# Patient Record
Sex: FEMALE | Race: WHITE | NOT HISPANIC OR LATINO | Employment: OTHER | URBAN - METROPOLITAN AREA
[De-identification: names, ages, dates, MRNs, and addresses within clinical notes are randomized per-mention and may not be internally consistent; named-entity substitution may affect disease eponyms.]

---

## 2020-07-10 ENCOUNTER — TRANSCRIBE ORDERS (OUTPATIENT)
Dept: ADMINISTRATIVE | Facility: HOSPITAL | Age: 80
End: 2020-07-10

## 2020-07-10 DIAGNOSIS — C34.00 MALIGNANT NEOPLASM OF MAIN BRONCHUS, UNSPECIFIED LATERALITY (HCC): Primary | ICD-10-CM

## 2020-07-16 ENCOUNTER — HOSPITAL ENCOUNTER (OUTPATIENT)
Dept: CT IMAGING | Facility: HOSPITAL | Age: 80
Discharge: HOME | End: 2020-07-16
Admitting: NURSE PRACTITIONER

## 2020-07-16 DIAGNOSIS — C34.00 MALIGNANT NEOPLASM OF MAIN BRONCHUS, UNSPECIFIED LATERALITY (HCC): ICD-10-CM

## 2020-07-16 PROCEDURE — 71250 CT THORAX DX C-: CPT

## 2024-01-31 ENCOUNTER — APPOINTMENT (OUTPATIENT)
Dept: GENERAL RADIOLOGY | Facility: HOSPITAL | Age: 84
End: 2024-01-31
Payer: MEDICARE

## 2024-01-31 ENCOUNTER — HOSPITAL ENCOUNTER (OUTPATIENT)
Facility: HOSPITAL | Age: 84
Setting detail: OBSERVATION
Discharge: HOME | End: 2024-02-02
Attending: EMERGENCY MEDICINE | Admitting: FAMILY MEDICINE
Payer: MEDICARE

## 2024-01-31 DIAGNOSIS — N30.01 ACUTE CYSTITIS WITH HEMATURIA: ICD-10-CM

## 2024-01-31 DIAGNOSIS — R53.1 GENERALIZED WEAKNESS: ICD-10-CM

## 2024-01-31 DIAGNOSIS — N39.0 ACUTE UTI: Primary | ICD-10-CM

## 2024-01-31 DIAGNOSIS — D72.829 LEUKOCYTOSIS, UNSPECIFIED TYPE: ICD-10-CM

## 2024-01-31 DIAGNOSIS — Z86.59 HISTORY OF DEMENTIA: ICD-10-CM

## 2024-01-31 PROBLEM — I48.91 ATRIAL FIBRILLATION: Status: ACTIVE | Noted: 2024-01-31

## 2024-01-31 PROBLEM — F03.90 DEMENTIA: Status: ACTIVE | Noted: 2024-01-31

## 2024-01-31 PROBLEM — G20.A1 PARKINSON'S DISEASE: Status: ACTIVE | Noted: 2024-01-31

## 2024-01-31 PROBLEM — J44.9 COPD (CHRONIC OBSTRUCTIVE PULMONARY DISEASE): Status: ACTIVE | Noted: 2024-01-31

## 2024-01-31 LAB
ALBUMIN SERPL-MCNC: 3.7 G/DL (ref 3.5–5.2)
ALBUMIN/GLOB SERPL: 1.1 G/DL
ALP SERPL-CCNC: 84 U/L (ref 39–117)
ALT SERPL W P-5'-P-CCNC: 9 U/L (ref 1–33)
ANION GAP SERPL CALCULATED.3IONS-SCNC: 12 MMOL/L (ref 5–15)
AST SERPL-CCNC: 17 U/L (ref 1–32)
BACTERIA UR QL AUTO: ABNORMAL /HPF
BASOPHILS # BLD AUTO: 0.05 10*3/MM3 (ref 0–0.2)
BASOPHILS NFR BLD AUTO: 0.3 % (ref 0–1.5)
BILIRUB SERPL-MCNC: 0.8 MG/DL (ref 0–1.2)
BILIRUB UR QL STRIP: NEGATIVE
BUN SERPL-MCNC: 17 MG/DL (ref 8–23)
BUN/CREAT SERPL: 20 (ref 7–25)
CALCIUM SPEC-SCNC: 9.1 MG/DL (ref 8.6–10.5)
CHLORIDE SERPL-SCNC: 99 MMOL/L (ref 98–107)
CLARITY UR: ABNORMAL
CO2 SERPL-SCNC: 25 MMOL/L (ref 22–29)
COLOR UR: YELLOW
CREAT SERPL-MCNC: 0.85 MG/DL (ref 0.57–1)
D-LACTATE SERPL-SCNC: 1 MMOL/L (ref 0.5–2)
DEPRECATED RDW RBC AUTO: 46.7 FL (ref 37–54)
EGFRCR SERPLBLD CKD-EPI 2021: 67.2 ML/MIN/1.73
EOSINOPHIL # BLD AUTO: 0.01 10*3/MM3 (ref 0–0.4)
EOSINOPHIL NFR BLD AUTO: 0.1 % (ref 0.3–6.2)
ERYTHROCYTE [DISTWIDTH] IN BLOOD BY AUTOMATED COUNT: 14.2 % (ref 12.3–15.4)
FLUAV SUBTYP SPEC NAA+PROBE: NOT DETECTED
FLUBV RNA ISLT QL NAA+PROBE: NOT DETECTED
GLOBULIN UR ELPH-MCNC: 3.4 GM/DL
GLUCOSE SERPL-MCNC: 113 MG/DL (ref 65–99)
GLUCOSE UR STRIP-MCNC: NEGATIVE MG/DL
HCT VFR BLD AUTO: 38.8 % (ref 34–46.6)
HGB BLD-MCNC: 12 G/DL (ref 12–15.9)
HGB UR QL STRIP.AUTO: ABNORMAL
HOLD SPECIMEN: NORMAL
HYALINE CASTS UR QL AUTO: ABNORMAL /LPF
IMM GRANULOCYTES # BLD AUTO: 0.08 10*3/MM3 (ref 0–0.05)
IMM GRANULOCYTES NFR BLD AUTO: 0.5 % (ref 0–0.5)
KETONES UR QL STRIP: NEGATIVE
LEUKOCYTE ESTERASE UR QL STRIP.AUTO: ABNORMAL
LYMPHOCYTES # BLD AUTO: 0.91 10*3/MM3 (ref 0.7–3.1)
LYMPHOCYTES NFR BLD AUTO: 5.8 % (ref 19.6–45.3)
MAGNESIUM SERPL-MCNC: 2.1 MG/DL (ref 1.6–2.4)
MCH RBC QN AUTO: 27.9 PG (ref 26.6–33)
MCHC RBC AUTO-ENTMCNC: 30.9 G/DL (ref 31.5–35.7)
MCV RBC AUTO: 90.2 FL (ref 79–97)
MONOCYTES # BLD AUTO: 0.81 10*3/MM3 (ref 0.1–0.9)
MONOCYTES NFR BLD AUTO: 5.2 % (ref 5–12)
NEUTROPHILS NFR BLD AUTO: 13.83 10*3/MM3 (ref 1.7–7)
NEUTROPHILS NFR BLD AUTO: 88.1 % (ref 42.7–76)
NITRITE UR QL STRIP: POSITIVE
NRBC BLD AUTO-RTO: 0 /100 WBC (ref 0–0.2)
PH UR STRIP.AUTO: 7 [PH] (ref 5–8)
PLATELET # BLD AUTO: 293 10*3/MM3 (ref 140–450)
PMV BLD AUTO: 9.4 FL (ref 6–12)
POTASSIUM SERPL-SCNC: 3.9 MMOL/L (ref 3.5–5.2)
PROCALCITONIN SERPL-MCNC: 1.51 NG/ML (ref 0–0.25)
PROT SERPL-MCNC: 7.1 G/DL (ref 6–8.5)
PROT UR QL STRIP: ABNORMAL
QT INTERVAL: 438 MS
QTC INTERVAL: 486 MS
RBC # BLD AUTO: 4.3 10*6/MM3 (ref 3.77–5.28)
RBC # UR STRIP: ABNORMAL /HPF
REF LAB TEST METHOD: ABNORMAL
SARS-COV-2 RNA RESP QL NAA+PROBE: NOT DETECTED
SODIUM SERPL-SCNC: 136 MMOL/L (ref 136–145)
SP GR UR STRIP: 1.01 (ref 1–1.03)
SQUAMOUS #/AREA URNS HPF: ABNORMAL /HPF
TRANS CELLS #/AREA URNS HPF: ABNORMAL /HPF
TROPONIN T SERPL HS-MCNC: 23 NG/L
UROBILINOGEN UR QL STRIP: ABNORMAL
WBC # UR STRIP: ABNORMAL /HPF
WBC NRBC COR # BLD AUTO: 15.69 10*3/MM3 (ref 3.4–10.8)
WHOLE BLOOD HOLD COAG: NORMAL
WHOLE BLOOD HOLD SPECIMEN: NORMAL

## 2024-01-31 PROCEDURE — 80053 COMPREHEN METABOLIC PANEL: CPT | Performed by: EMERGENCY MEDICINE

## 2024-01-31 PROCEDURE — 99284 EMERGENCY DEPT VISIT MOD MDM: CPT

## 2024-01-31 PROCEDURE — 83605 ASSAY OF LACTIC ACID: CPT | Performed by: EMERGENCY MEDICINE

## 2024-01-31 PROCEDURE — G0378 HOSPITAL OBSERVATION PER HR: HCPCS

## 2024-01-31 PROCEDURE — 81001 URINALYSIS AUTO W/SCOPE: CPT | Performed by: EMERGENCY MEDICINE

## 2024-01-31 PROCEDURE — 84484 ASSAY OF TROPONIN QUANT: CPT | Performed by: EMERGENCY MEDICINE

## 2024-01-31 PROCEDURE — 87088 URINE BACTERIA CULTURE: CPT | Performed by: EMERGENCY MEDICINE

## 2024-01-31 PROCEDURE — 87040 BLOOD CULTURE FOR BACTERIA: CPT | Performed by: EMERGENCY MEDICINE

## 2024-01-31 PROCEDURE — 83735 ASSAY OF MAGNESIUM: CPT | Performed by: EMERGENCY MEDICINE

## 2024-01-31 PROCEDURE — 84145 PROCALCITONIN (PCT): CPT | Performed by: EMERGENCY MEDICINE

## 2024-01-31 PROCEDURE — 71045 X-RAY EXAM CHEST 1 VIEW: CPT

## 2024-01-31 PROCEDURE — P9612 CATHETERIZE FOR URINE SPEC: HCPCS

## 2024-01-31 PROCEDURE — 97530 THERAPEUTIC ACTIVITIES: CPT

## 2024-01-31 PROCEDURE — 96365 THER/PROPH/DIAG IV INF INIT: CPT

## 2024-01-31 PROCEDURE — 93005 ELECTROCARDIOGRAM TRACING: CPT | Performed by: EMERGENCY MEDICINE

## 2024-01-31 PROCEDURE — 25010000002 CEFTRIAXONE PER 250 MG: Performed by: EMERGENCY MEDICINE

## 2024-01-31 PROCEDURE — 87636 SARSCOV2 & INF A&B AMP PRB: CPT | Performed by: EMERGENCY MEDICINE

## 2024-01-31 PROCEDURE — 85025 COMPLETE CBC W/AUTO DIFF WBC: CPT | Performed by: EMERGENCY MEDICINE

## 2024-01-31 PROCEDURE — 36415 COLL VENOUS BLD VENIPUNCTURE: CPT

## 2024-01-31 PROCEDURE — 99223 1ST HOSP IP/OBS HIGH 75: CPT | Performed by: INTERNAL MEDICINE

## 2024-01-31 PROCEDURE — 87086 URINE CULTURE/COLONY COUNT: CPT | Performed by: EMERGENCY MEDICINE

## 2024-01-31 PROCEDURE — 87186 SC STD MICRODIL/AGAR DIL: CPT | Performed by: EMERGENCY MEDICINE

## 2024-01-31 PROCEDURE — 97162 PT EVAL MOD COMPLEX 30 MIN: CPT

## 2024-01-31 RX ORDER — SODIUM CHLORIDE 0.9 % (FLUSH) 0.9 %
10 SYRINGE (ML) INJECTION AS NEEDED
Status: DISCONTINUED | OUTPATIENT
Start: 2024-01-31 | End: 2024-02-02 | Stop reason: HOSPADM

## 2024-01-31 RX ORDER — ONDANSETRON 2 MG/ML
4 INJECTION INTRAMUSCULAR; INTRAVENOUS EVERY 6 HOURS PRN
Status: DISCONTINUED | OUTPATIENT
Start: 2024-01-31 | End: 2024-02-02 | Stop reason: HOSPADM

## 2024-01-31 RX ORDER — AMOXICILLIN 250 MG
2 CAPSULE ORAL 2 TIMES DAILY PRN
Status: DISCONTINUED | OUTPATIENT
Start: 2024-01-31 | End: 2024-02-02 | Stop reason: HOSPADM

## 2024-01-31 RX ORDER — ACETAMINOPHEN 325 MG/1
650 TABLET ORAL EVERY 4 HOURS PRN
Status: DISCONTINUED | OUTPATIENT
Start: 2024-01-31 | End: 2024-02-02 | Stop reason: HOSPADM

## 2024-01-31 RX ORDER — TRAZODONE HYDROCHLORIDE 50 MG/1
100 TABLET ORAL NIGHTLY
COMMUNITY

## 2024-01-31 RX ORDER — FLUTICASONE PROPIONATE 50 MCG
2 SPRAY, SUSPENSION (ML) NASAL DAILY PRN
COMMUNITY

## 2024-01-31 RX ORDER — ACETAMINOPHEN 650 MG/1
650 SUPPOSITORY RECTAL EVERY 4 HOURS PRN
Status: DISCONTINUED | OUTPATIENT
Start: 2024-01-31 | End: 2024-02-02 | Stop reason: HOSPADM

## 2024-01-31 RX ORDER — BISACODYL 5 MG/1
5 TABLET, DELAYED RELEASE ORAL DAILY PRN
Status: DISCONTINUED | OUTPATIENT
Start: 2024-01-31 | End: 2024-02-02 | Stop reason: HOSPADM

## 2024-01-31 RX ORDER — SODIUM CHLORIDE 0.9 % (FLUSH) 0.9 %
10 SYRINGE (ML) INJECTION EVERY 12 HOURS SCHEDULED
Status: DISCONTINUED | OUTPATIENT
Start: 2024-01-31 | End: 2024-02-02 | Stop reason: HOSPADM

## 2024-01-31 RX ORDER — BISACODYL 10 MG
10 SUPPOSITORY, RECTAL RECTAL DAILY PRN
Status: DISCONTINUED | OUTPATIENT
Start: 2024-01-31 | End: 2024-02-02 | Stop reason: HOSPADM

## 2024-01-31 RX ORDER — TRAZODONE HYDROCHLORIDE 100 MG/1
100 TABLET ORAL NIGHTLY
Status: DISCONTINUED | OUTPATIENT
Start: 2024-01-31 | End: 2024-02-02 | Stop reason: HOSPADM

## 2024-01-31 RX ORDER — SODIUM CHLORIDE 9 MG/ML
40 INJECTION, SOLUTION INTRAVENOUS AS NEEDED
Status: DISCONTINUED | OUTPATIENT
Start: 2024-01-31 | End: 2024-02-02 | Stop reason: HOSPADM

## 2024-01-31 RX ORDER — ACETAMINOPHEN 160 MG/5ML
650 SOLUTION ORAL EVERY 4 HOURS PRN
Status: DISCONTINUED | OUTPATIENT
Start: 2024-01-31 | End: 2024-02-02 | Stop reason: HOSPADM

## 2024-01-31 RX ORDER — POLYETHYLENE GLYCOL 3350 17 G/17G
17 POWDER, FOR SOLUTION ORAL DAILY PRN
Status: DISCONTINUED | OUTPATIENT
Start: 2024-01-31 | End: 2024-02-02 | Stop reason: HOSPADM

## 2024-01-31 RX ADMIN — APIXABAN 2.5 MG: 2.5 TABLET, FILM COATED ORAL at 21:37

## 2024-01-31 RX ADMIN — TRAZODONE HYDROCHLORIDE 100 MG: 100 TABLET ORAL at 21:37

## 2024-01-31 RX ADMIN — CARBIDOPA AND LEVODOPA 1 TABLET: 25; 100 TABLET ORAL at 18:29

## 2024-01-31 RX ADMIN — Medication 10 ML: at 21:00

## 2024-01-31 RX ADMIN — CARBIDOPA AND LEVODOPA 1 TABLET: 25; 100 TABLET ORAL at 21:37

## 2024-01-31 RX ADMIN — SODIUM CHLORIDE 1000 MG: 900 INJECTION INTRAVENOUS at 11:09

## 2024-01-31 NOTE — Clinical Note
Level of Care: Telemetry [5]   Diagnosis: UTI (urinary tract infection) [559399]   Admitting Physician: WILBERT HODGES [[id number]]

## 2024-01-31 NOTE — CASE MANAGEMENT/SOCIAL WORK
Discharge Planning Assessment  [facility]     Patient Name: Monica Boyce  MRN: [id number]  Today's Date: 1/31/2024    Admit Date: 1/31/2024    Plan: Home   Discharge Needs Assessment       Row Name 01/31/24 1129       Living Environment    People in Home child(cari), adult    Current Living Arrangements home    Primary Care Provided by child(cari)    Provides Primary Care For no one    Family Caregiver if Needed child(cari), adult    Family Caregiver Names Lore Lewis, daughter    Quality of Family Relationships helpful;involved;supportive    Able to Return to Prior Arrangements yes       Transition Planning    Patient/Family Anticipates Transition to home with family;home with help/services    Patient/Family Anticipated Services at Transition        Discharge Needs Assessment    Equipment Currently Used at Home lift device;hospital bed;shower chair;other (see comments);wheelchair;rollator  elevated toilet seat    Concerns to be Addressed denies needs/concerns at this time    Discharge Coordination/Progress Lives in a house in [facility] with daughter, needs assistance with ADLs. Has multiple DME, see list.  Has had [facility] in the past, and would like it again.  Has an advanced directive.                   Discharge Plan       Row Name 01/31/24 1131       Plan    Plan Home    Patient/Family in Agreement with Plan yes    Plan Comments I spoke with Ms Boyce's daughter Lety over the phone.  Ms Boyce resides with Lety in [facility] and needs assistance with ADLs.  She has a lift chair, hospital bed, shower chair, elevated toilet seat, rollator and wheelchair.  She has had [facility] home [facility] in the past, and would like them again after this hospitalization.  Ms Boyce does have advanced directives, and Lety states that the power of  paperwork is at bedside.  Her insurance is Medicare with [employer] as secondary, and they do help cover prescriptions.   There hasn't been any issues or lapses in coverage with insurance.  Ms Boyce's PCP is Nemo Aponte, and she gets her prescriptions filled at [facility] in [place].  Lety did state that she would not send her mother to a SNF for rehab, and would prefer home health instead.  Case management will continue to follow.    Final Discharge Disposition Code 01 - home or self-care                  Continued Care and Services - Admitted Since 1/31/2024    Coordination has not been started for this encounter.          Demographic Summary    No documentation.                  Functional Status       Row Name 01/31/24 1124       Functional Status    Usual Activity Tolerance fair    Current Activity Tolerance fair       Functional Status, IADL    Medications assistive equipment and person    Meal Preparation assistive equipment and person    Housekeeping assistive equipment and person    Laundry assistive equipment and person    Shopping assistive equipment and person                   Psychosocial    No documentation.                  Abuse/Neglect    No documentation.                  Legal    No documentation.                  Substance Abuse    No documentation.                  Patient Forms    No documentation.                     Elicia Banerjee RN

## 2024-01-31 NOTE — ED PROVIDER NOTES
"Subjective   History of Present Illness  83-year-old female presents for evaluation of generalized weakness.  She presents via EMS.  She lives at home with her daughter.  She is a poor historian.  She was recently treated for a UTI but presents today complaining of generalized weakness that she reports has been ongoing for the past few weeks.  She is reportedly continuing to have \"cloudy urine\" as well.  She has been a bit more confused than normal per EMS personnel.  No known fevers.  Given her ongoing symptoms, she was brought here to the emergency department to be evaluated.  Awaiting arrival of family to further corroborate her history.      Review of Systems   Unable to perform ROS: Dementia   Neurological:  Positive for weakness.   Psychiatric/Behavioral:  Positive for confusion.        Past Medical History:   Diagnosis Date    Atrial fibrillation     COPD (chronic obstructive pulmonary disease)     Dementia     History of lung cancer     Parkinson disease     Pulmonary embolism        No Known Allergies    Past Surgical History:   Procedure Laterality Date    JOINT REPLACEMENT      LUNG CANCER SURGERY         Family History   Problem Relation Age of Onset    Heart disease Mother     Heart disease Father     Aneurysm Father        Social History     Socioeconomic History    Marital status: Legally    Tobacco Use    Smoking status: Former     Packs/day: 1.00     Years: 40.00     Additional pack years: 0.00     Total pack years: 40.00     Types: Cigarettes    Smokeless tobacco: Never   Vaping Use    Vaping Use: Never used   Substance and Sexual Activity    Alcohol use: Never    Drug use: Never           Objective   Physical Exam  Vitals and nursing note reviewed.   Constitutional:       General: She is not in acute distress.     Appearance: She is well-developed. She is not diaphoretic.      Comments: Nontoxic-appearing elderly female   HENT:      Head: Normocephalic and atraumatic.   Eyes:      Pupils: " "Pupils are equal, round, and reactive to light.   Neck:      Comments: No meningeal signs or nuchal rigidity  Cardiovascular:      Rate and Rhythm: Normal rate and regular rhythm.      Heart sounds: Normal heart sounds. No murmur heard.     No friction rub. No gallop.   Pulmonary:      Effort: Pulmonary effort is normal. No respiratory distress.      Breath sounds: Normal breath sounds. No wheezing or rales.   Abdominal:      General: Bowel sounds are normal. There is no distension.      Palpations: Abdomen is soft. There is no mass.      Tenderness: There is no abdominal tenderness. There is no guarding or rebound.      Comments: No focal abdominal tenderness, no peritoneal signs, no pain out of proportion to exam   Genitourinary:     Comments: No CVA tenderness present  Musculoskeletal:         General: Normal range of motion.      Cervical back: Neck supple.   Skin:     General: Skin is warm and dry.      Findings: No erythema or rash.   Neurological:      Mental Status: She is alert.      Comments: Following simple commands, moving all 4 extremities   Psychiatric:         Mood and Affect: Mood normal.         Procedures           ED Course  ED Course as of 01/31/24 1707   Wed Jan 31, 2024   0836 83-year-old female presents for evaluation of generalized weakness.  She presents via EMS.  She lives at home with her daughter.  She was recently treated for a UTI but presents today complaining of generalized weakness that she reports has been ongoing for the past few weeks.  She is reportedly continued to have \"cloudy urine.\"  On arrival to the ED, the patient is nontoxic-appearing.  Benign exam.  Nonsurgical abdomen.  Moist mucous membranes.  Differential diagnosis is quite broad.  We will obtain labs and imaging, and we will reassess following initial interventions. [DD]   0586 I personally and independently viewed the patient's x-ray images myself, and I am in agreement with the radiologist's reading for final " interpretation--particularly, there is no pneumonia noted.  No indication for emergent chest CT. [DD]   1025 Urinalysis is suggestive of infection.  Urine culture obtained.  Blood culture obtained.  The patient clearly appears to be failing outpatient treatment at this point.  Rocephin given.  I discussed the patient's case with our hospitalist, Dr. Ames, and the patient will be admitted under her care for further evaluation and treatment.  The patient is hemodynamically stable at this time and aware/agreeable with the plan. [DD]      ED Course User Index  [DD] Joaquin Castro MD                                   Recent Results (from the past 24 hour(s))   Comprehensive Metabolic Panel    Collection Time: 01/31/24  8:34 AM    Specimen: Blood   Result Value Ref Range    Glucose 113 (H) 65 - 99 mg/dL    BUN 17 8 - 23 mg/dL    Creatinine 0.85 0.57 - 1.00 mg/dL    Sodium 136 136 - 145 mmol/L    Potassium 3.9 3.5 - 5.2 mmol/L    Chloride 99 98 - 107 mmol/L    CO2 25.0 22.0 - 29.0 mmol/L    Calcium 9.1 8.6 - 10.5 mg/dL    Total Protein 7.1 6.0 - 8.5 g/dL    Albumin 3.7 3.5 - 5.2 g/dL    ALT (SGPT) 9 1 - 33 U/L    AST (SGOT) 17 1 - 32 U/L    Alkaline Phosphatase 84 39 - 117 U/L    Total Bilirubin 0.8 0.0 - 1.2 mg/dL    Globulin 3.4 gm/dL    A/G Ratio 1.1 g/dL    BUN/Creatinine Ratio 20.0 7.0 - 25.0    Anion Gap 12.0 5.0 - 15.0 mmol/L    eGFR 67.2 >60.0 mL/min/1.73   Single High Sensitivity Troponin T    Collection Time: 01/31/24  8:34 AM    Specimen: Blood   Result Value Ref Range    HS Troponin T 23 (H) <14 ng/L   Magnesium    Collection Time: 01/31/24  8:34 AM    Specimen: Blood   Result Value Ref Range    Magnesium 2.1 1.6 - 2.4 mg/dL   Green Top (Gel)    Collection Time: 01/31/24  8:34 AM   Result Value Ref Range    Extra Tube Hold for add-ons.    Lavender Top    Collection Time: 01/31/24  8:34 AM   Result Value Ref Range    Extra Tube hold for add-on    Gold Top - SST    Collection Time: 01/31/24  8:34 AM    Result Value Ref Range    Extra Tube Hold for add-ons.    Gray Top    Collection Time: 01/31/24  8:34 AM   Result Value Ref Range    Extra Tube Hold for add-ons.    Light Blue Top    Collection Time: 01/31/24  8:34 AM   Result Value Ref Range    Extra Tube Hold for add-ons.    CBC Auto Differential    Collection Time: 01/31/24  8:34 AM    Specimen: Blood   Result Value Ref Range    WBC 15.69 (H) 3.40 - 10.80 10*3/mm3    RBC 4.30 3.77 - 5.28 10*6/mm3    Hemoglobin 12.0 12.0 - 15.9 g/dL    Hematocrit 38.8 34.0 - 46.6 %    MCV 90.2 79.0 - 97.0 fL    MCH 27.9 26.6 - 33.0 pg    MCHC 30.9 (L) 31.5 - 35.7 g/dL    RDW 14.2 12.3 - 15.4 %    RDW-SD 46.7 37.0 - 54.0 fl    MPV 9.4 6.0 - 12.0 fL    Platelets 293 140 - 450 10*3/mm3    Neutrophil % 88.1 (H) 42.7 - 76.0 %    Lymphocyte % 5.8 (L) 19.6 - 45.3 %    Monocyte % 5.2 5.0 - 12.0 %    Eosinophil % 0.1 (L) 0.3 - 6.2 %    Basophil % 0.3 0.0 - 1.5 %    Immature Grans % 0.5 0.0 - 0.5 %    Neutrophils, Absolute 13.83 (H) 1.70 - 7.00 10*3/mm3    Lymphocytes, Absolute 0.91 0.70 - 3.10 10*3/mm3    Monocytes, Absolute 0.81 0.10 - 0.90 10*3/mm3    Eosinophils, Absolute 0.01 0.00 - 0.40 10*3/mm3    Basophils, Absolute 0.05 0.00 - 0.20 10*3/mm3    Immature Grans, Absolute 0.08 (H) 0.00 - 0.05 10*3/mm3    nRBC 0.0 0.0 - 0.2 /100 WBC   Lactic Acid, Plasma    Collection Time: 01/31/24  8:34 AM    Specimen: Blood   Result Value Ref Range    Lactate 1.0 0.5 - 2.0 mmol/L   Procalcitonin    Collection Time: 01/31/24  8:34 AM    Specimen: Blood   Result Value Ref Range    Procalcitonin 1.51 (H) 0.00 - 0.25 ng/mL   COVID-19 and FLU A/B PCR, 1 HR TAT - Swab, Nasopharynx    Collection Time: 01/31/24  9:00 AM    Specimen: Nasopharynx; Swab   Result Value Ref Range    COVID19 Not Detected Not Detected - Ref. Range    Influenza A PCR Not Detected Not Detected    Influenza B PCR Not Detected Not Detected   ECG 12 Lead ED Triage Standing Order; Weak / Dizzy / AMS    Collection Time: 01/31/24  9:03  AM   Result Value Ref Range    QT Interval 438 ms    QTC Interval 486 ms   Urinalysis With Culture If Indicated - Straight Cath    Collection Time: 01/31/24  9:07 AM    Specimen: Straight Cath; Urine   Result Value Ref Range    Color, UA Yellow Yellow, Straw    Appearance, UA Turbid (A) Clear    pH, UA 7.0 5.0 - 8.0    Specific Gravity, UA 1.014 1.001 - 1.030    Glucose, UA Negative Negative    Ketones, UA Negative Negative    Bilirubin, UA Negative Negative    Blood, UA Moderate (2+) (A) Negative    Protein,  mg/dL (2+) (A) Negative    Leuk Esterase, UA Large (3+) (A) Negative    Nitrite, UA Positive (A) Negative    Urobilinogen, UA 0.2 E.U./dL 0.2 - 1.0 E.U./dL   Urinalysis, Microscopic Only - Straight Cath    Collection Time: 01/31/24  9:07 AM    Specimen: Straight Cath; Urine   Result Value Ref Range    RBC, UA Too Numerous to Count (A) None Seen, 0-2 /HPF    WBC, UA Too Numerous to Count (A) None Seen, 0-2 /HPF    Bacteria, UA 2+ (A) None Seen, Trace /HPF    Squamous Epithelial Cells, UA 0-2 None Seen, 0-2 /HPF    Transitional Epithelial Cells, UA 0-2 0 - 2 /HPF    Hyaline Casts, UA Unable to determine due to loaded field 0 - 6 /LPF    Methodology Manual Light Microscopy      Note: In addition to lab results from this visit, the labs listed above may include labs taken at another facility or during a different encounter within the last 24 hours. Please correlate lab times with ED admission and discharge times for further clarification of the services performed during this visit.    XR Chest 1 View   Final Result   Impression:   1. COPD with suspected chronic interstitial changes.   2. Prominence of the right pulmonary hilum which may be due to vessels or lymphadenopathy. A CT of the chest can be obtained for further evaluation.         Electronically Signed: Haleigh Mcgovern MD     1/31/2024 9:10 AM EST     Workstation ID: [id number]        Vitals:    01/31/24 1149 01/31/24 1200 01/31/24 1300 01/31/24 1500    BP: 135/62   134/61   BP Location:    Left arm   Patient Position:    Sitting   Pulse: 74 79 77 94   Resp: 18   16   Temp:    98.5 °F (36.9 °C)   TempSrc:    Oral   SpO2: 96% 96% 95% 94%   Weight:       Height:         Medications   sodium chloride 0.9 % flush 10 mL (has no administration in time range)   apixaban (ELIQUIS) tablet 2.5 mg (has no administration in time range)   carbidopa-levodopa (SINEMET)  MG per tablet 1 tablet (has no administration in time range)   metoprolol tartrate (LOPRESSOR) tablet 25 mg (has no administration in time range)   traZODone (DESYREL) tablet 100 mg (has no administration in time range)   sodium chloride 0.9 % flush 10 mL (has no administration in time range)   sodium chloride 0.9 % flush 10 mL (has no administration in time range)   sodium chloride 0.9 % infusion 40 mL (has no administration in time range)   sennosides-docusate (PERICOLACE) 8.6-50 MG per tablet 2 tablet (has no administration in time range)     And   polyethylene glycol (MIRALAX) packet 17 g (has no administration in time range)     And   bisacodyl (DULCOLAX) EC tablet 5 mg (has no administration in time range)     And   bisacodyl (DULCOLAX) suppository 10 mg (has no administration in time range)   Potassium Replacement - Follow Nurse / BPA Driven Protocol (has no administration in time range)   Magnesium Standard Dose Replacement - Follow Nurse / BPA Driven Protocol (has no administration in time range)   Phosphorus Replacement - Follow Nurse / BPA Driven Protocol (has no administration in time range)   Calcium Replacement - Follow Nurse / BPA Driven Protocol (has no administration in time range)   acetaminophen (TYLENOL) tablet 650 mg (has no administration in time range)     Or   acetaminophen (TYLENOL) 160 MG/5ML oral solution 650 mg (has no administration in time range)     Or   acetaminophen (TYLENOL) suppository 650 mg (has no administration in time range)   ondansetron (ZOFRAN) injection 4 mg (has  no administration in time range)   cefTRIAXone (ROCEPHIN) 1,000 mg in sodium chloride 0.9 % 100 mL IVPB (0 mg Intravenous Stopped 1/31/24 1151)     ECG/EMG Results (last 24 hours)       Procedure Component Value Units Date/Time    ECG 12 Lead ED Triage Standing Order; Weak / Dizzy / AMS [[id number]] Collected: 01/31/24 0903     Updated: 01/31/24 1510     QT Interval 438 ms      QTC Interval 486 ms     Narrative:      Test Reason : ED Triage Standing Order~  Blood Pressure :   */*   mmHG  Vent. Rate :  74 BPM     Atrial Rate :  74 BPM     P-R Int : 184 ms          QRS Dur : 128 ms      QT Int : 438 ms       P-R-T Axes : 107  55   3 degrees     QTc Int : 486 ms    Sinus rhythm with marked sinus arrhythmia  Right bundle branch block  Abnormal ECG  No previous ECGs available  Confirmed by MD Castro Michael ([id number]) on 1/31/2024 3:09:26 PM    Referred By: MALIKA           Confirmed By: Adi Castro MD          ECG 12 Lead ED Triage Standing Order; Weak / Dizzy / AMS   Final Result   Test Reason : ED Triage Standing Order~   Blood Pressure :   */*   mmHG   Vent. Rate :  74 BPM     Atrial Rate :  74 BPM      P-R Int : 184 ms          QRS Dur : 128 ms       QT Int : 438 ms       P-R-T Axes : 107  55   3 degrees      QTc Int : 486 ms      Sinus rhythm with marked sinus arrhythmia   Right bundle branch block   Abnormal ECG   No previous ECGs available   Confirmed by MD Castro Michael ([id number]) on 1/31/2024 3:09:26 PM      Referred By: MALIKA           Confirmed By: Adi Castro MD                    Medical Decision Making  Amount and/or Complexity of Data Reviewed  Labs: ordered.  Radiology: ordered.  ECG/medicine tests: ordered.    Risk  Prescription drug management.  Decision regarding hospitalization.        Final diagnoses:   Acute UTI   Generalized weakness   Leukocytosis, unspecified type   History of dementia       ED Disposition  ED Disposition       ED Disposition   Decision to Admit    Condition   --    Comment   Level of  Care: Telemetry [5]   Diagnosis: UTI (urinary tract infection) [535076]   Admitting Physician: WILBERT HODGES [[id number]]                 No follow-up provider specified.       Medication List      No changes were made to your prescriptions during this visit.            Joaquin Castro MD  01/31/24 6016

## 2024-01-31 NOTE — THERAPY EVALUATION
Patient Name: Fanny Boyce  : 1940    MRN: [id number]                              Today's Date: 2024       Admit Date: 2024    Visit Dx: No diagnosis found.  Patient Active Problem List   Diagnosis    UTI (urinary tract infection)    Atrial fibrillation    COPD (chronic obstructive pulmonary disease)    Dementia    Parkinson's disease     Past Medical History:   Diagnosis Date    Atrial fibrillation     COPD (chronic obstructive pulmonary disease)     Dementia     History of lung cancer     Parkinson disease     Pulmonary embolism      Past Surgical History:   Procedure Laterality Date    JOINT REPLACEMENT      LUNG CANCER SURGERY        General Information       Row Name 24 1557          Physical Therapy Time and Intention    Document Type evaluation  -CM     Mode of Treatment physical therapy;individual therapy  -CM       Row Name 24 1557          General Information    Patient Profile Reviewed yes  -CM     Prior Level of Function independent:;all household mobility;mod assist:;ADL's;dependent:;home management  patient reports she ambulates in home with a rollator and her daughter assists her with all ADLs, denies any recent falls, patient may be a poor historian  -CM     Existing Precautions/Restrictions fall  -CM     Barriers to Rehab previous functional deficit;cognitive status  -CM       Row Name 24 155          Living Environment    People in Home child(cari), adult  -CM       Row Name 24 1557          Home Main Entrance    Number of Stairs, Main Entrance none  -CM       Row Name 24 1557          Stairs Within Home, Primary    Stairs, Within Home, Primary patient denies navigating any steps at home, she may be a poor historian  -CM     Number of Stairs, Within Home, Primary none  -CM       Row Name 24 1556          Cognition    Orientation Status (Cognition) oriented to;person;place;disoriented to;situation;time  -CM       Row Name 24 1552           Safety Issues, Functional Mobility    Safety Issues Affecting Function (Mobility) awareness of need for assistance;insight into deficits/self-awareness;safety precaution awareness;sequencing abilities;safety precautions follow-through/compliance  -CM     Impairments Affecting Function (Mobility) balance;endurance/activity tolerance;strength;coordination  -CM               User Key  (r) = Recorded By, (t) = Taken By, (c) = Cosigned By      Initials Name Provider Type    CM Erica Rivera, PT Physical Therapist                   Mobility       Row Name 01/31/24 6841          Bed Mobility    Bed Mobility supine-sit  -CM     Supine-Sit [place] (Bed Mobility) maximum assist (25% patient effort);1 person assist;verbal cues  -CM     Assistive Device (Bed Mobility) head of bed elevated;draw sheet;bed rails  -CM     Comment, (Bed Mobility) cues for sequencing, patient required assist to bring BLEs off EOB and to lift trunk from HOB, she denied dizziness at EOB and demonstrated good sitting balance  -CM       Row Name 01/31/24 3657          Bed-Chair Transfer    Bed-Chair [place] (Transfers) moderate assist (50% patient effort);1 person assist;verbal cues  -CM     Assistive Device (Bed-Chair Transfers) walker, front-wheeled  -CM     Comment, (Bed-Chair Transfer) patient able to take shuffling steps from bed to chair, assist required for posterior lean, weightshifting, and management of AD  -CM       Row Name 01/31/24 0354          Sit-Stand Transfer    Sit-Stand [place] (Transfers) moderate assist (50% patient effort);1 person assist;verbal cues  -CM     Assistive Device (Sit-Stand Transfers) walker, front-wheeled  -CM     Comment, (Sit-Stand Transfer) boost to clear hips from EOB, she had a fairly strong posterior lean upon standing with difficulty correcting  -CM       Row Name 01/31/24 1736          Gait/Stairs (Locomotion)    [place] Level (Gait) unable to assess  -CM     Comment,  (Gait/Stairs) deferred due to fatigue following standing transfer  -CM               User Key  (r) = Recorded By, (t) = Taken By, (c) = Cosigned By      Initials Name Provider Type    Erica Infante PT Physical Therapist                   Obj/Interventions       Row Name 01/31/24 1600          Range of Motion Comprehensive    General Range of Motion bilateral lower extremity ROM WFL  -CM       Row Name 01/31/24 1600          Strength Comprehensive (MMT)    General Manual Muscle Testing (MMT) Assessment lower extremity strength deficits identified  -CM     Comment, General Manual Muscle Testing (MMT) Assessment BLE grossly 4/5  -CM       Row Name 01/31/24 1600          Balance    Balance Assessment sitting static balance;standing static balance;standing dynamic balance  -CM     Static Sitting Balance contact guard  -CM     Position, Sitting Balance unsupported;sitting edge of bed  -CM     Static Standing Balance moderate assist  -CM     Dynamic Standing Balance moderate assist  -CM     Position/Device Used, Standing Balance supported;walker, front-wheeled  -CM     Comment, Balance posterior lean while in standing  -CM       Row Name 01/31/24 1600          Sensory Assessment (Somatosensory)    Sensory Assessment (Somatosensory) LE sensation intact  -CM               User Key  (r) = Recorded By, (t) = Taken By, (c) = Cosigned By      Initials Name Provider Type    Erica Infante PT Physical Therapist                   Goals/Plan       Row Name 01/31/24 1604          Bed Mobility Goal 1 (PT)    Activity/Assistive Device (Bed Mobility Goal 1, PT) sit to supine/supine to sit  -CM     [place] Level/Cues Needed (Bed Mobility Goal 1, PT) minimum assist (75% or more patient effort)  -CM     Time Frame (Bed Mobility Goal 1, PT) long term goal (LTG);10 days  -CM     Progress/Outcomes (Bed Mobility Goal 1, PT) new goal  -CM       Row Name 01/31/24 1604          Transfer Goal 1 (PT)    Activity/Assistive  Device (Transfer Goal 1, PT) sit-to-stand/stand-to-sit;bed-to-chair/chair-to-bed  -CM     [place] Level/Cues Needed (Transfer Goal 1, PT) minimum assist (75% or more patient effort)  -CM     Time Frame (Transfer Goal 1, PT) long term goal (LTG);10 days  -CM     Progress/Outcome (Transfer Goal 1, PT) new goal  -CM       Row Name 01/31/24 1604          Gait Training Goal 1 (PT)    Activity/Assistive Device (Gait Training Goal 1, PT) gait (walking locomotion);assistive device use  -CM     [place] Level (Gait Training Goal 1, PT) minimum assist (75% or more patient effort)  -CM     Distance (Gait Training Goal 1, PT) 50'  -CM     Time Frame (Gait Training Goal 1, PT) long term goal (LTG);10 days  -CM     Progress/Outcome (Gait Training Goal 1, PT) new goal  -CM       Row Name 01/31/24 160          Therapy Assessment/Plan (PT)    Planned Therapy Interventions (PT) balance training;bed mobility training;gait training;home exercise program;stretching;strengthening;stair training;ROM (range of motion);postural re-education;patient/family education;transfer training  -CM               User Key  (r) = Recorded By, (t) = Taken By, (c) = Cosigned By      Initials Name Provider Type    Erica Infante, PT Physical Therapist                   Clinical Impression       Row Name 01/31/24 1600          Pain    Pretreatment Pain Rating 0/10 - no pain  -CM     Posttreatment Pain Rating 0/10 - no pain  -CM       Row Name 01/31/24 1602          Plan of Care Review    Plan of Care Reviewed With patient  -CM     Outcome Evaluation Patient presents with deficits primarily in balance and coordination/sequencing with minor deficit in strength. She performed transfer to chair with modAx1 with FWW, limited by fatigue and posterior lean. IPPT is indicated to address current deficits. Recommend D/C to SNF. Per CM note, daughter prefers HHPT. If patient D/C'd home she would need 24/7 assist.  -CM       Row Name 01/31/24 4547           Therapy Assessment/Plan (PT)    Rehab Potential (PT) fair, will monitor progress closely  -CM     Criteria for Skilled Interventions Met (PT) yes;meets criteria;skilled treatment is necessary  -CM     Therapy Frequency (PT) daily  -CM       Row Name 01/31/24 1602          Vital Signs    Pretreatment Heart Rate (beats/min) 90  -CM     Posttreatment Heart Rate (beats/min) 99  -CM     Pre SpO2 (%) 94  -CM     O2 Delivery Pre Treatment room air  -CM     O2 Delivery Intra Treatment room air  -CM     Post SpO2 (%) 95  -CM     O2 Delivery Post Treatment room air  -CM     Pre Patient Position Supine  -CM     Intra Patient Position Sitting  -CM     Post Patient Position Sitting  -CM       Row Name 01/31/24 1602          Positioning and Restraints    Pre-Treatment Position in bed  -CM     Post Treatment Position chair  -CM     In Chair reclined;call light within reach;encouraged to call for assist;exit alarm on;waffle cushion;compression device;notified ns  -CM               User Key  (r) = Recorded By, (t) = Taken By, (c) = Cosigned By      Initials Name Provider Type    Erica Infante, PT Physical Therapist                   Outcome Measures       Row Name 01/31/24 1605 01/31/24 1155       How much help from another person do you currently need...    Turning from your back to your side while in flat bed without using bedrails? 2  -CM 3  -MO    Moving from lying on back to sitting on the side of a flat bed without bedrails? 2  -CM 3  -MO    Moving to and from a bed to a chair (including a wheelchair)? 2  -CM 3  -MO    Standing up from a chair using your arms (e.g., wheelchair, bedside chair)? 2  -CM 3  -MO    Climbing 3-5 steps with a railing? 1  -CM 2  -MO    To walk in hospital room? 2  -CM 3  -MO    AM-PAC 6 Clicks Score (PT) 11  -CM 17  -MO    Highest Level of Mobility Goal 4 --> Transfer to chair/commode  -CM 5 --> Static standing  -MO      Row Name 01/31/24 1605          Functional Assessment    Outcome  Measure Options AM-PAC 6 Clicks Basic Mobility (PT)  -CM               User Key  (r) = Recorded By, (t) = Taken By, (c) = Cosigned By      Initials Name Provider Type    Susana Marroquin, RN Registered Nurse    Erica Infante PT Physical Therapist                                 Physical Therapy Education       Title: PT OT SLP Therapies (In Progress)       Topic: Physical Therapy (In Progress)       Point: Mobility training (Done)       Learning Progress Summary             Patient Acceptance, E, VU by CM at 1/31/2024 1605                         Point: Home exercise program (Not Started)       Learner Progress:  Not documented in this visit.              Point: Body mechanics (Done)       Learning Progress Summary             Patient Acceptance, E, VU by CM at 1/31/2024 1605                         Point: Precautions (Done)       Learning Progress Summary             Patient Acceptance, E, VU by CM at 1/31/2024 1605                                         User Key       Initials Effective Dates Name Provider Type Discipline     09/22/22 -  Erica Rivera PT Physical Therapist PT                  PT Recommendation and Plan  Planned Therapy Interventions (PT): balance training, bed mobility training, gait training, home exercise program, stretching, strengthening, stair training, ROM (range of motion), postural re-education, patient/family education, transfer training  Plan of Care Reviewed With: patient  Outcome Evaluation: Patient presents with deficits primarily in balance and coordination/sequencing with minor deficit in strength. She performed transfer to chair with modAx1 with FWW, limited by fatigue and posterior lean. IPPT is indicated to address current deficits. Recommend D/C to SNF. Per CM note, daughter prefers HHPT. If patient D/C'd home she would need 24/7 assist.     Time Calculation:   PT Evaluation Complexity  History, PT Evaluation Complexity: 3 or more personal factors and/or  comorbidities  Examination of Body Systems (PT Eval Complexity): total of 3 or more elements  Clinical Presentation (PT Evaluation Complexity): evolving  Clinical Decision Making (PT Evaluation Complexity): moderate complexity  Overall Complexity (PT Evaluation Complexity): moderate complexity     PT Charges       Row Name 01/31/24 1606             Time Calculation    Start Time 1519  -CM      PT Received On 01/31/24  -CM      PT Goal Re-Cert Due Date 02/10/24  -CM         Timed Charges    13766 - PT Therapeutic Activity Minutes 10  -CM         Untimed Charges    PT Eval/Re-eval Minutes 48  -CM         Total Minutes    Timed Charges Total Minutes 10  -CM      Untimed Charges Total Minutes 48  -CM       Total Minutes 58  -CM                User Key  (r) = Recorded By, (t) = Taken By, (c) = Cosigned By      Initials Name Provider Type    Erica Infante, PT Physical Therapist                  Therapy Charges for Today       Code Description Service Date Service Provider Modifiers Qty    00387640641 HC PT EVAL MOD COMPLEXITY 4 1/31/2024 Erica Rivera, PT GP 1    15100050508 HC PT THERAPEUTIC ACT EA 15 MIN 1/31/2024 Erica Rivera, PT GP 1            PT G-Codes  Outcome Measure Options: AM-PAC 6 Clicks Basic Mobility (PT)  AM-PAC 6 Clicks Score (PT): 11  PT Discharge Summary  Anticipated Discharge Disposition (PT): skilled nursing facility    Erica Rivera, JOAN  1/31/2024

## 2024-01-31 NOTE — ED NOTES
Monica Boyce    Nursing Report ED to Floor:  Mental status: AO4  Ambulatory status: X2  Oxygen Therapy:  RA  Cardiac Rhythm: NSR  Admitted from: ED  Safety Concerns:  FALL RISK  Social Issues: NONE  ED Room #:  20    ED Nurse Phone Extension - [phone] or may call [phone].      HPI:   Chief Complaint   Patient presents with    Weakness - Generalized       Past Medical History:  No past medical history on file.     Past Surgical History:  No past surgical history on file.     Admitting Doctor:   Lorna Gamez MD    Consulting Provider(s):  Consults       No orders found from 1/2/2024 to 2/1/2024.             Admitting Diagnosis:   There were no encounter diagnoses.    Most Recent Vitals:   Vitals:    01/31/24 0930 01/31/24 0931 01/31/24 1000 01/31/24 1030   BP: 112/50  105/45 126/51   BP Location:       Patient Position:       Pulse:  72 62 65   Resp:       Temp:       TempSrc:       SpO2:  96% 96% 96%   Weight:       Height:           Active LDAs/IV Access:   Lines, Drains & Airways       Active LDAs       Name Placement date Placement time Site Days    Peripheral IV 01/31/24 0855 Right Antecubital 01/31/24  0855  Antecubital  less than 1                    Labs (abnormal labs have a star):   Labs Reviewed   COMPREHENSIVE METABOLIC PANEL - Abnormal; Notable for the following components:       Result Value    Glucose 113 (*)     All other components within normal limits    Narrative:     GFR Normal >60  Chronic Kidney Disease <60  Kidney Failure <15    The GFR formula is only valid for adults with stable renal function between ages 18 and 70.   SINGLE HSTROPONIN T - Abnormal; Notable for the following components:    HS Troponin T 23 (*)     All other components within normal limits    Narrative:     High Sensitive Troponin T Reference Range:  <14.0 ng/L- Negative Female for AMI  <22.0 ng/L- Negative Male for AMI  >=14 - Abnormal Female indicating possible myocardial injury.  >=22 - Abnormal Male indicating possible  myocardial injury.   Clinicians would have to utilize clinical acumen, EKG, Troponin, and serial changes to determine if it is an Acute Myocardial Infarction or myocardial injury due to an underlying chronic condition.        CBC WITH AUTO DIFFERENTIAL - Abnormal; Notable for the following components:    WBC 15.69 (*)     MCHC 30.9 (*)     Neutrophil % 88.1 (*)     Lymphocyte % 5.8 (*)     Eosinophil % 0.1 (*)     Neutrophils, Absolute 13.83 (*)     Immature Grans, Absolute 0.08 (*)     All other components within normal limits   URINALYSIS W/ CULTURE IF INDICATED - Abnormal; Notable for the following components:    Appearance, UA Turbid (*)     Blood, UA Moderate (2+) (*)     Protein,  mg/dL (2+) (*)     Leuk Esterase, UA Large (3+) (*)     Nitrite, UA Positive (*)     All other components within normal limits    Narrative:     In absence of clinical symptoms, the presence of pyuria, bacteria, and/or nitrites on the urinalysis result does not correlate with infection.   URINALYSIS, MICROSCOPIC ONLY - Abnormal; Notable for the following components:    RBC, UA Too Numerous to Count (*)     WBC, UA Too Numerous to Count (*)     Bacteria, UA 2+ (*)     All other components within normal limits   PROCALCITONIN - Abnormal; Notable for the following components:    Procalcitonin 1.51 (*)     All other components within normal limits    Narrative:     As a Marker for Sepsis (Non-Neonates):    1. <0.5 ng/mL represents a low risk of severe sepsis and/or septic shock.  2. >2 ng/mL represents a high risk of severe sepsis and/or septic shock.    As a Marker for Lower Respiratory Tract Infections that require antibiotic therapy:    PCT on Admission    Antibiotic Therapy       6-12 Hrs later    >0.5                Strongly Recommended  >0.25 - <0.5        Recommended   0.1 - 0.25          Discouraged              Remeasure/reassess PCT  <0.1                Strongly Discouraged     Remeasure/reassess PCT    As 28 day  "mortality risk marker: \"Change in Procalcitonin Result\" (>80% or <=80%) if Day 0 (or Day 1) and Day 4 values are available. Refer to http://www.[email or web address]-calculator.com    Change in PCT <=80%  A decrease of PCT levels below or equal to 80% defines a positive change in PCT test result representing a higher risk for 28-day all-cause mortality of patients diagnosed with severe sepsis for septic shock.    Change in PCT >80%  A decrease of PCT levels of more than 80% defines a negative change in PCT result representing a lower risk for 28-day all-cause mortality of patients diagnosed with severe sepsis or septic shock.      COVID-19 AND FLU A/B, NP SWAB IN TRANSPORT MEDIA 1 HR TAT - Normal    Narrative:     Fact sheet for providers: https://www.fda.gov/media/450695/download    Fact sheet for patients: https://www.fda.gov/media/824631/download    Test performed by PCR.   MAGNESIUM - Normal   LACTIC ACID, PLASMA - Normal   URINE CULTURE   BLOOD CULTURE   BLOOD CULTURE   RAINBOW DRAW    Narrative:     The following orders were created for panel order [place] Draw.  Procedure                               Abnormality         Status                     ---------                               -----------         ------                     Green Top (Gel)[[id number]]                                  Final result               Lavender Top[[id number]]                                     Final result               Gold Top - SST[[id number]]                                   Final result               [place] Top[[id number]]                                         In process                 Light Blue Top[[id number]]                                   Final result                 Please view results for these tests on the individual orders.   CBC AND DIFFERENTIAL    Narrative:     The following orders were created for panel order CBC & Differential.  Procedure                               Abnormality         Status                   "   ---------                               -----------         ------                     CBC Auto Differential[[id number]]        Abnormal            Final result                 Please view results for these tests on the individual orders.   GREEN TOP   LAVENDER TOP   GOLD TOP - SST   LIGHT BLUE TOP   GRAY TOP       Meds Given in ED:   Medications   sodium chloride 0.9 % flush 10 mL (has no administration in time range)   cefTRIAXone (ROCEPHIN) 1,000 mg in sodium chloride 0.9 % 100 mL IVPB (1,000 mg Intravenous New Bag 1/31/24 1101)

## 2024-01-31 NOTE — PLAN OF CARE
Goal Outcome Evaluation:  Plan of Care Reviewed With: patient           Outcome Evaluation: Patient presents with deficits primarily in balance and coordination/sequencing with minor deficit in strength. She performed transfer to chair with modAx1 with FWW, limited by fatigue and posterior lean. IPPT is indicated to address current deficits. Recommend D/C to SNF. Per CM note, daughter prefers HHPT. If patient D/C'd home she would need 24/7 assist.      Anticipated Discharge Disposition (PT): skilled nursing facility

## 2024-01-31 NOTE — H&P
[facility] Medicine Services  HISTORY AND PHYSICAL    Patient Name: Fanny Boyce  : 1940  MRN: [id number]  Primary Care Physician: Nemo Aponte PA  Date of admission: 2024      Subjective   Subjective     Chief Complaint:  Weakness, urinary frequency    HPI:  Fanny Boyce is a 83 y.o. female with atrial fibrillation and history of PE on Eliquis, mild dementia, COPD, parkinson's disease who was sent to the ED by her daughter due to worsening weakness and urinary frequency despite two rounds of outpatient antibiotics for UTI.  She was originally seen at the ED in [place] a few weeks ago and given a prescription for Bactrim which she completed then due to worsening symptoms again, her daughter gave her about a week of Augmentin that she had at the house.  Despite antibiotics, weakness again worsened and daughter reports that patient was up numerous times throughout the night urinating which is not normal for her.  Urine was cloudy and smelled foul.  Patient complained of pain in her side once last week but otherwise no abdominal pain or diarrhea.  No cough but did have some labored breathing yesterday for a brief time.  In the ED, UA was consistent with UTI and she had leukocytosis with WBC 15K.  Admission was requested for further management.  Patient denies any pain.  She is hungry.  Unable to provide much history other than that so everything else obtained from the daughter via telephone.      Personal History     Past Medical History:   Diagnosis Date    Atrial fibrillation     COPD (chronic obstructive pulmonary disease)     Dementia     History of lung cancer     Parkinson disease     Pulmonary embolism            Past Surgical History:   Procedure Laterality Date    JOINT REPLACEMENT      LUNG CANCER SURGERY         Family History: family history includes Aneurysm in her father; Heart disease in her father and mother.     Social History:  reports that she has  quit smoking. Her smoking use included cigarettes. She has a 40.00 pack-year smoking history. She has never used smokeless tobacco. She reports that she does not drink alcohol and does not use drugs.  Social History     Social History Narrative    Not on file       Medications:  Available home medication information reviewed.  apixaban, carbidopa-levodopa, fluticasone, metoprolol tartrate, and traZODone    No Known Allergies    Objective   Objective     Vital Signs:   Temp:  [98.4 °F (36.9 °C)] 98.4 °F (36.9 °C)  Heart Rate:  [62-81] 77  Resp:  [16-18] 18  BP: (105-135)/(45-62) 135/62       Physical Exam   Constitutional: No acute distress, awake, alert, laying in bed  HENT: NCAT, mucous membranes moist  Respiratory: Clear to auscultation bilaterally, respiratory effort normal   Cardiovascular: RRR  Gastrointestinal: Soft, nontender, nondistended  Musculoskeletal: No bilateral ankle edema  Psychiatric: Appropriate affect, cooperative  Neurologic: Pleasantly confused, Speech clear  Skin: No rashes on exposed surfaces    Result Review:  I have personally reviewed the results from the time of this admission to 1/31/2024 14:26 EST and agree with these findings:  [x]  Laboratory list / accordion  []  Microbiology  [x]  Radiology  []  EKG/Telemetry   []  Cardiology/Vascular   []  Pathology  [x]  Old records  []  Other:  Most notable findings include:       LAB RESULTS:      Lab 01/31/24  0834   WBC 15.69*   HEMOGLOBIN 12.0   HEMATOCRIT 38.8   PLATELETS 293   NEUTROS ABS 13.83*   IMMATURE GRANS (ABS) 0.08*   LYMPHS ABS 0.91   MONOS ABS 0.81   EOS ABS 0.01   MCV 90.2   PROCALCITONIN 1.51*   LACTATE 1.0         Lab 01/31/24  0834   SODIUM 136   POTASSIUM 3.9   CHLORIDE 99   CO2 25.0   ANION GAP 12.0   BUN 17   CREATININE 0.85   EGFR 67.2   GLUCOSE 113*   CALCIUM 9.1   MAGNESIUM 2.1         Lab 01/31/24  0834   TOTAL PROTEIN 7.1   ALBUMIN 3.7   GLOBULIN 3.4   ALT (SGPT) 9   AST (SGOT) 17   BILIRUBIN 0.8   ALK PHOS 84          Lab 01/31/24  0834   HSTROP T 23*                 UA          1/31/2024    09:07   Urinalysis   Squamous Epithelial Cells, UA 0-2    Specific Gravity, UA 1.014    Ketones, UA Negative    Blood, UA Moderate (2+)    Leukocytes, UA Large (3+)    Nitrite, UA Positive    RBC, UA Too Numerous to Count    WBC, UA Too Numerous to Count    Bacteria, UA 2+        Microbiology Results (last 10 days)       Procedure Component Value - Date/Time    COVID-19 and FLU A/B PCR, 1 HR TAT - Swab, Nasopharynx [[id number]]  (Normal) Collected: 01/31/24 0900    Lab Status: Final result Specimen: Swab from Nasopharynx Updated: 01/31/24 1006     COVID19 Not Detected     Influenza A PCR Not Detected     Influenza B PCR Not Detected    Narrative:      Fact sheet for providers: https://www.fda.gov/media/495502/download    Fact sheet for patients: https://www.[place].gov/media/309335/download    Test performed by PCR.            XR Chest 1 View    Result Date: 1/31/2024  XR CHEST 1 VW Date of Exam: 1/31/2024 8:52 AM EST Indication: Weak/Dizzy/AMS triage protocol Comparison: None available. Findings: Cardiomediastinal silhouette is unremarkable. The lungs are hyperinflated. There is prominence of the right pulmonary hilum. Mild interstitial coarsening is noted which may be chronic. Scarring at the left costophrenic angle. No pleural effusion or pneumothorax. No acute osseous abnormality identified.     Impression: Impression: 1. COPD with suspected chronic interstitial changes. 2. Prominence of the right pulmonary hilum which may be due to vessels or lymphadenopathy. A CT of the chest can be obtained for further evaluation. Electronically Signed: Haleigh Mcgovern MD  1/31/2024 9:10 AM EST  Workstation ID: [id number]         Assessment & Plan   Assessment & Plan       UTI (urinary tract infection)    Atrial fibrillation    COPD (chronic obstructive pulmonary disease)    Dementia    Parkinson's disease        UTI  -Failure of outpatient  antibiotics  -Obtain urine culture results from [facility] along with any physician documentation and radiology reports  -Urine, blood cultures here pending  -Continue ceftriaxone for now  -If no good explanation for why she failed oral antibiotics (resistance to bactrim and/or augmentin) or if she develops abdominal/flank pain, would consider CT abdomen/pelvis to rule out kidney stone or structural source of ongoing infection    Atrial fibrillation  -Continue Eliquis and metoprolol    COPD  Right hilar fullness  -Not on any medications  -Had some noncalcified nodules in that area on CT chest from 2020 but since she does not typically receive care here, not sure if that was followed.  Daughter reports history of lung cancer with resection so could be related to that.  I recommended to her that the patient discuss these findings with her PCP after discharge to determine if additional follow up is necessary based on their records.    Parkinson's disease  -Continue carbidopa-levodopa    Total time spent: 75 minutes  Time spent includes time reviewing chart, face-to-face time, counseling patient/family/caregiver, ordering medications/tests/procedures, communicating with other health care professionals, documenting clinical information in the electronic health record, and coordination of care.     DVT prophylaxis:  Medical DVT prophylaxis orders are present.          CODE STATUS:    Code Status and Medical Interventions:   Ordered at: 01/31/24 1422     Medical Intervention Limits:    NO intubation (DNI)     Level Of Support Discussed With:    Next of Kin (If No Surrogate)     Code Status (Patient has no pulse and is not breathing):    No CPR (Do Not Attempt to Resuscitate)     Medical Interventions (Patient has pulse or is breathing):    Limited Support   Daughter states she sent POA paperwork with EMS.  Will check to see if these records made it upstairs with the patient.    Expected Discharge   Expected discharge date/  time has not been documented.     Lorna Gamez MD  01/31/24

## 2024-02-01 LAB
ANION GAP SERPL CALCULATED.3IONS-SCNC: 11 MMOL/L (ref 5–15)
BUN SERPL-MCNC: 13 MG/DL (ref 8–23)
BUN/CREAT SERPL: 19.4 (ref 7–25)
CALCIUM SPEC-SCNC: 8.8 MG/DL (ref 8.6–10.5)
CHLORIDE SERPL-SCNC: 105 MMOL/L (ref 98–107)
CO2 SERPL-SCNC: 25 MMOL/L (ref 22–29)
CREAT SERPL-MCNC: 0.67 MG/DL (ref 0.57–1)
DEPRECATED RDW RBC AUTO: 46.5 FL (ref 37–54)
EGFRCR SERPLBLD CKD-EPI 2021: 85.8 ML/MIN/1.73
ERYTHROCYTE [DISTWIDTH] IN BLOOD BY AUTOMATED COUNT: 14 % (ref 12.3–15.4)
GLUCOSE SERPL-MCNC: 82 MG/DL (ref 65–99)
HCT VFR BLD AUTO: 36.2 % (ref 34–46.6)
HGB BLD-MCNC: 11.7 G/DL (ref 12–15.9)
MCH RBC QN AUTO: 29.1 PG (ref 26.6–33)
MCHC RBC AUTO-ENTMCNC: 32.3 G/DL (ref 31.5–35.7)
MCV RBC AUTO: 90 FL (ref 79–97)
PLATELET # BLD AUTO: 273 10*3/MM3 (ref 140–450)
PMV BLD AUTO: 9.8 FL (ref 6–12)
POTASSIUM SERPL-SCNC: 3.9 MMOL/L (ref 3.5–5.2)
RBC # BLD AUTO: 4.02 10*6/MM3 (ref 3.77–5.28)
SODIUM SERPL-SCNC: 141 MMOL/L (ref 136–145)
WBC NRBC COR # BLD AUTO: 12.23 10*3/MM3 (ref 3.4–10.8)

## 2024-02-01 PROCEDURE — G0378 HOSPITAL OBSERVATION PER HR: HCPCS

## 2024-02-01 PROCEDURE — 97165 OT EVAL LOW COMPLEX 30 MIN: CPT

## 2024-02-01 PROCEDURE — 99232 SBSQ HOSP IP/OBS MODERATE 35: CPT | Performed by: FAMILY MEDICINE

## 2024-02-01 PROCEDURE — 25010000002 CEFTRIAXONE PER 250 MG: Performed by: FAMILY MEDICINE

## 2024-02-01 PROCEDURE — 85027 COMPLETE CBC AUTOMATED: CPT | Performed by: INTERNAL MEDICINE

## 2024-02-01 PROCEDURE — 80048 BASIC METABOLIC PNL TOTAL CA: CPT | Performed by: INTERNAL MEDICINE

## 2024-02-01 PROCEDURE — 97535 SELF CARE MNGMENT TRAINING: CPT

## 2024-02-01 RX ADMIN — CARBIDOPA AND LEVODOPA 1 TABLET: 25; 100 TABLET ORAL at 17:56

## 2024-02-01 RX ADMIN — CARBIDOPA AND LEVODOPA 1 TABLET: 25; 100 TABLET ORAL at 20:28

## 2024-02-01 RX ADMIN — METOPROLOL TARTRATE 25 MG: 25 TABLET, FILM COATED ORAL at 08:46

## 2024-02-01 RX ADMIN — Medication 10 ML: at 08:46

## 2024-02-01 RX ADMIN — APIXABAN 2.5 MG: 2.5 TABLET, FILM COATED ORAL at 08:46

## 2024-02-01 RX ADMIN — TRAZODONE HYDROCHLORIDE 100 MG: 100 TABLET ORAL at 20:28

## 2024-02-01 RX ADMIN — Medication 10 ML: at 20:29

## 2024-02-01 RX ADMIN — APIXABAN 2.5 MG: 2.5 TABLET, FILM COATED ORAL at 20:28

## 2024-02-01 RX ADMIN — SODIUM CHLORIDE 1000 MG: 900 INJECTION INTRAVENOUS at 08:52

## 2024-02-01 NOTE — CASE MANAGEMENT/SOCIAL WORK
Continued Stay Note  [facility]     Patient Name: Fanny Boyce  MRN: [id number]  Today's Date: 2/1/2024    Admit Date: 1/31/2024    Plan: Home   Discharge Plan       Row Name 02/01/24 1435       Plan    Plan Home    Plan Comments I spoke with patient's daughter and she would like a referral to [facility].  I have reached out to Trang with [facility] and they have accepted and orders are placed. They will need to be reached out to at discharge.     Final Discharge Disposition Code 06 - home with home health care                   Discharge Codes    No documentation.                 Expected Discharge Date and Time       Expected Discharge Date Expected Discharge Time    Feb 2, 2024               Ginger Slaughter RN

## 2024-02-01 NOTE — PROGRESS NOTES
[facility] Medicine Services  PROGRESS NOTE    Patient Name: Fanny Boyce  : 1940  MRN: [id number]    Date of Admission: 2024  Primary Care Physician: Nemo Apotne PA    Subjective   Subjective     CC:  weakness    HPI:  Patient is a 89-year-old who was admitted with generalized weakness and found to have a urinary tract infection.  She denies dysuria or frequency however her daughter states that she never has those symptoms when she has a UTI.  She had been treated with antibiotics as an outpatient first with amoxicillin and then Bactrim.  The patient tells me today she finally feels a little bit stronger.  She is up sitting in a chair.  She is tolerating p.o. but has decreased appetite.  Patient's daughter is very hesitant to take her home today and would like her to stay 1 more day pending the urine culture.      Objective   Objective     Vital Signs:   Temp:  [96.9 °F (36.1 °C)-98.5 °F (36.9 °C)] 98 °F (36.7 °C)  Heart Rate:  [56-94] 56  Resp:  [16-18] 18  BP: (110-134)/(50-61) 110/50     Physical Exam:  Constitutional: No acute distress, awake, alert  HENT: NCAT, mucous membranes moist  Respiratory: Clear to auscultation bilaterally, respiratory effort normal   Cardiovascular: RRR  Gastrointestinal: Positive bowel sounds, soft, nontender, nondistended  Musculoskeletal: No bilateral ankle edema  Psychiatric: Appropriate affect, cooperative  Neurologic: Oriented x 3, generally weak, Cranial Nerves grossly intact to confrontation, speech clear  Skin: No rashes      Results Reviewed:  LAB RESULTS:      Lab 24  0612 24  0834   WBC 12.23* 15.69*   HEMOGLOBIN 11.7* 12.0   HEMATOCRIT 36.2 38.8   PLATELETS 273 293   NEUTROS ABS  --  13.83*   IMMATURE GRANS (ABS)  --  0.08*   LYMPHS ABS  --  0.91   MONOS ABS  --  0.81   EOS ABS  --  0.01   MCV 90.0 90.2   PROCALCITONIN  --  1.51*   LACTATE  --  1.0         Lab 24  0612 24  0834   SODIUM 141 136    POTASSIUM 3.9 3.9   CHLORIDE 105 99   CO2 25.0 25.0   ANION GAP 11.0 12.0   BUN 13 17   CREATININE 0.67 0.85   EGFR 85.8 67.2   GLUCOSE 82 113*   CALCIUM 8.8 9.1   MAGNESIUM  --  2.1         Lab 01/31/24  0834   TOTAL PROTEIN 7.1   ALBUMIN 3.7   GLOBULIN 3.4   ALT (SGPT) 9   AST (SGOT) 17   BILIRUBIN 0.8   ALK PHOS 84         Lab 01/31/24  0834   HSTROP T 23*                 Brief Urine Lab Results  (Last result in the past 365 days)        Color   Clarity   Blood   Leuk Est   Nitrite   Protein   CREAT   Urine HCG        01/31/24 0907 Yellow   Turbid   Moderate (2+)   Large (3+)   Positive   100 mg/dL (2+)                   Microbiology Results Abnormal       Procedure Component Value - Date/Time    Blood Culture - Blood, Arm, Right [[id number]]  (Normal) Collected: 01/31/24 1045    Lab Status: Preliminary result Specimen: Blood from Arm, Right Updated: 02/01/24 1201     Blood Culture No growth at 24 hours    Narrative:      Less than seven (7) mL's of blood was collected.  Insufficient quantity may yield false negative results.    Blood Culture - Blood, Arm, Left [[id number]]  (Normal) Collected: 01/31/24 1045    Lab Status: Preliminary result Specimen: Blood from Arm, Left Updated: 02/01/24 1201     Blood Culture No growth at 24 hours    Narrative:      Less than seven (7) mL's of blood was collected.  Insufficient quantity may yield false negative results.    COVID-19 and FLU A/B PCR, 1 HR TAT - Swab, Nasopharynx [[id number]]  (Normal) Collected: 01/31/24 0900    Lab Status: Final result Specimen: Swab from Nasopharynx Updated: 01/31/24 1006     COVID19 Not Detected     Influenza A PCR Not Detected     Influenza B PCR Not Detected    Narrative:      Fact sheet for providers: https://www.fda.gov/media/776140/download    Fact sheet for patients: https://www.fda.gov/media/952270/download    Test performed by PCR.            XR Chest 1 View    Result Date: 1/31/2024  XR CHEST 1 VW Date of Exam: 1/31/2024 8:52 AM EST  Indication: Weak/Dizzy/AMS triage protocol Comparison: None available. Findings: Cardiomediastinal silhouette is unremarkable. The lungs are hyperinflated. There is prominence of the right pulmonary hilum. Mild interstitial coarsening is noted which may be chronic. Scarring at the left costophrenic angle. No pleural effusion or pneumothorax. No acute osseous abnormality identified.     Impression: Impression: 1. COPD with suspected chronic interstitial changes. 2. Prominence of the right pulmonary hilum which may be due to vessels or lymphadenopathy. A CT of the chest can be obtained for further evaluation. Electronically Signed: Haleigh Mcgovern MD  1/31/2024 9:10 AM EST  Workstation ID: [id number]         Current medications:  Scheduled Meds:apixaban, 2.5 mg, Oral, BID  carbidopa-levodopa, 1 tablet, Oral, TID  cefTRIAXone, 1,000 mg, Intravenous, Q24H  metoprolol tartrate, 25 mg, Oral, QAM  sodium chloride, 10 mL, Intravenous, Q12H  traZODone, 100 mg, Oral, Nightly      Continuous Infusions:   PRN Meds:.  acetaminophen **OR** acetaminophen **OR** acetaminophen    senna-docusate sodium **AND** polyethylene glycol **AND** bisacodyl **AND** bisacodyl    Calcium Replacement - Follow Nurse / BPA Driven Protocol    Magnesium Standard Dose Replacement - Follow Nurse / BPA Driven Protocol    ondansetron    Phosphorus Replacement - Follow Nurse / BPA Driven Protocol    Potassium Replacement - Follow Nurse / BPA Driven Protocol    sodium chloride    sodium chloride    sodium chloride    Assessment & Plan   Assessment & Plan     Active Hospital Problems    Diagnosis  POA    **UTI (urinary tract infection) [N39.0]  Yes    Atrial fibrillation [I48.91]  Yes    COPD (chronic obstructive pulmonary disease) [J44.9]  Yes    Dementia [F03.90]  Yes    Parkinson's disease [G20.A1]  Yes      Resolved Hospital Problems   No resolved problems to display.        Brief Hospital Course to date:  Fanny Boyce is a 83 y.o. female who presented  with generalized weakness and was found to have a urinary tract infection with failure of 2 different outpatient antibiotics (amoxicillin and Bactrim).    Generalized weakness  Urinary tract infection, failed outpatient therapy  -Urine culture pending  -Continue Rocephin  -Clinically feels improved as of 2/1/2024    A-fib  -Continue Eliquis and metoprolol    COPD  Right hilar fullness  -Will follow-up with PCP for additional follow-up imaging    Parkinson's disease  -Continue carbidopa levodopa      Expected Discharge Location and Transportation: home  Expected Discharge   Expected Discharge Date: 2/2/2024; Expected Discharge Time:      DVT prophylaxis:  Medical DVT prophylaxis orders are present.         AM-PAC 6 Clicks Score (PT): 16 (02/01/24 0800)    CODE STATUS:   Code Status and Medical Interventions:   Ordered at: 01/31/24 1422     Medical Intervention Limits:    NO intubation (DNI)     Level Of Support Discussed With:    Next of Kin (If No Surrogate)     Code Status (Patient has no pulse and is not breathing):    No CPR (Do Not Attempt to Resuscitate)     Medical Interventions (Patient has pulse or is breathing):    Limited Support       Martha Arciniega MD  02/01/24

## 2024-02-01 NOTE — PLAN OF CARE
Goal Outcome Evaluation:  Plan of Care Reviewed With: patient           Outcome Evaluation: OT eval complete. Pt presents with overal weakness, decline in adl performance, and increased fall risk. She completed supine to sit with mod assist, sts with mod assist, grooming with setup, mod assist for donning socks, and mod assist for spt to chair. Recommend IPOT and HHOT at d/c.      Anticipated Discharge Disposition (OT): home with home health, home with assist

## 2024-02-01 NOTE — THERAPY EVALUATION
Patient Name: Fanny Boyce  : 1940    MRN: [id number]                              Today's Date: 2024       Admit Date: 2024    Visit Dx:     ICD-10-CM ICD-9-CM   1. Acute UTI  N39.0 599.0   2. Generalized weakness  R53.1 780.79   3. Leukocytosis, unspecified type  D72.829 288.60   4. History of dementia  Z86.59 V11.8     Patient Active Problem List   Diagnosis    UTI (urinary tract infection)    Atrial fibrillation    COPD (chronic obstructive pulmonary disease)    Dementia    Parkinson's disease     Past Medical History:   Diagnosis Date    Atrial fibrillation     COPD (chronic obstructive pulmonary disease)     Dementia     History of lung cancer     Parkinson disease     Pulmonary embolism      Past Surgical History:   Procedure Laterality Date    JOINT REPLACEMENT      LUNG CANCER SURGERY        General Information       Row Name 24 0855          OT Time and Intention    Document Type evaluation  -SW     Mode of Treatment individual therapy;occupational therapy  -       Row Name 24 0855          General Information    Patient Profile Reviewed yes  -SW     Prior Level of Function independent:;all household mobility;feeding;grooming;mod assist:;dressing;bathing;dependent:;home management;driving  -     Existing Precautions/Restrictions fall  -SW     Barriers to Rehab previous functional deficit;cognitive status  -       Row Name 24 0855          Occupational Profile    Environmental Supports and Barriers (Occupational Profile) Pt states she has a supportive dtr, a rollator, a tub shower with a seat.  -       Row Name 24 0855          Living Environment    People in Home child(cari), adult  -       Row Name 24 0855          Home Main Entrance    Number of Stairs, Main Entrance none  -       Row Name 24 0855          Stairs Within Home, Primary    Number of Stairs, Within Home, Primary none  -       Row Name 24 0855          Cognition     Orientation Status (Cognition) oriented to;person;place  -       Row Name 02/01/24 0855          Safety Issues, Functional Mobility    Safety Issues Affecting Function (Mobility) insight into deficits/self-awareness;awareness of need for assistance;safety precautions follow-through/compliance;safety precaution awareness;sequencing abilities  -     Impairments Affecting Function (Mobility) balance;endurance/activity tolerance;strength;coordination;cognition  -     Cognitive Impairments, Mobility Safety/Performance insight into deficits/self-awareness;safety precaution awareness;safety precaution follow-through;sequencing abilities  -               User Key  (r) = Recorded By, (t) = Taken By, (c) = Cosigned By      Initials Name Provider Type    Nicol Jain OT Occupational Therapist                     Mobility/ADL's       Row Name 02/01/24 0855          Bed Mobility    Bed Mobility supine-sit  -SW     Supine-Sit [place] (Bed Mobility) moderate assist (50% patient effort);verbal cues  -     Assistive Device (Bed Mobility) bed rails;head of bed elevated  -       Row Name 02/01/24 0855          Transfers    Transfers bed-chair transfer;sit-stand transfer  -[facility] Name 02/01/24 0855          Bed-Chair Transfer    Bed-Chair [place] (Transfers) moderate assist (50% patient effort);1 person assist;verbal cues  -     Comment, (Bed-Chair Transfer) spt  -       Row Name 02/01/24 0855          Sit-Stand Transfer    Sit-Stand [place] (Transfers) moderate assist (50% patient effort);verbal cues  -SW     Comment, (Sit-Stand Transfer) bed rail  -       Row Name 02/01/24 0855          Activities of Daily Living    BADL Assessment/Intervention lower body dressing;grooming  -       Row Name 02/01/24 0855          Lower Body Dressing Assessment/Training    [place] Level (Lower Body Dressing) lower body dressing skills;socks;moderate assist (50% patient effort)  -     Position (Lower  Body Dressing) unsupported sitting  -       Row Name 02/01/24 0855          Grooming Assessment/Training    [place] Level (Grooming) grooming skills;wash face, hands;set up  -     Position (Grooming) sitting up in bed  -               User Key  (r) = Recorded By, (t) = Taken By, (c) = Cosigned By      Initials Name Provider Type    Nicol Jain OT Occupational Therapist                   Obj/Interventions       Row Name 02/01/24 0850          Sensory Assessment (Somatosensory)    Sensory Assessment (Somatosensory) UE sensation intact  -[facility] Name 02/01/24 0850          Vision Assessment/Intervention    Visual Impairment/Limitations WFL;corrective lenses full-time  -[facility] Name 02/01/24 0850          Range of Motion Comprehensive    General Range of Motion bilateral upper extremity ROM WFL  -[facility] Name 02/01/24 0850          Strength Comprehensive (MMT)    General Manual Muscle Testing (MMT) Assessment upper extremity strength deficits identified  -     Comment, General Manual Muscle Testing (MMT) Assessment BUEs 4/5  -[facility] Name 02/01/24 0850          Balance    Balance Assessment sitting static balance;sitting dynamic balance;sit to stand dynamic balance;standing static balance;standing dynamic balance  -SW     Static Sitting Balance standby assist  -SW     Dynamic Sitting Balance contact guard  -SW     Position, Sitting Balance unsupported  -SW     Sit to Stand Dynamic Balance moderate assist  -SW     Static Standing Balance moderate assist  -SW     Dynamic Standing Balance moderate assist  -SW     Position/Device Used, Standing Balance supported  -SW     Balance Interventions standing;sitting;supported;sit to stand;dynamic;static;minimal challenge;occupation based/functional task  -               User Key  (r) = Recorded By, (t) = Taken By, (c) = Cosigned By      Initials Name Provider Type    Nicol Jain OT Occupational Therapist                   Goals/Plan        Row Name 02/01/24 0855          Transfer Goal 1 (OT)    Activity/Assistive Device (Transfer Goal 1, OT) toilet  -SW     [place] Level/Cues Needed (Transfer Goal 1, OT) minimum assist (75% or more patient effort)  -SW     Time Frame (Transfer Goal 1, OT) long term goal (LTG);by discharge  -     Progress/Outcome (Transfer Goal 1, OT) goal ongoing  -       Row Name 02/01/24 0855          Toileting Goal 1 (OT)    Activity/Device (Toileting Goal 1, OT) toileting skills, all  -SW     [place] Level/Cues Needed (Toileting Goal 1, OT) moderate assist (50-74% patient effort)  -SW     Time Frame (Toileting Goal 1, OT) long term goal (LTG);by discharge  -     Progress/Outcome (Toileting Goal 1, OT) goal ongoing  -       Row Name 02/01/24 0827          Therapy Assessment/Plan (OT)    Planned Therapy Interventions (OT) activity tolerance training;adaptive equipment training;BADL retraining;functional balance retraining;patient/caregiver education/training;strengthening exercise;transfer/mobility retraining  -               User Key  (r) = Recorded By, (t) = Taken By, (c) = Cosigned By      Initials Name Provider Type    Nicol Jain OT Occupational Therapist                   Clinical Impression       Row Name 02/01/24 0850          Pain Assessment    Pretreatment Pain Rating 0/10 - no pain  -     Posttreatment Pain Rating 0/10 - no pain  -[facility] Name 02/01/24 0850          Plan of Care Review    Plan of Care Reviewed With patient  -SW     Outcome Evaluation OT eval complete. Pt presents with overal weakness, decline in adl performance, and increased fall risk. She completed supine to sit with mod assist, sts with mod assist, grooming with setup, mod assist for donning socks, and mod assist for spt to chair. Recommend IPOT and HHOT at d/c.  -       Row Name 02/01/24 0850          Therapy Assessment/Plan (OT)    Rehab Potential (OT) good, to achieve stated therapy goals  -     Criteria for  Skilled Therapeutic Interventions Met (OT) yes;meets criteria;skilled treatment is necessary  -     Therapy Frequency (OT) daily  -SW       Row Name 02/01/24 0850          Therapy Plan Review/Discharge Plan (OT)    Anticipated Discharge Disposition (OT) home with home health;home with assist  -       Row Name 02/01/24 0850          Vital Signs    Pre Systolic BP Rehab 115  -SW     Pre Treatment Diastolic BP 52  -SW     Pretreatment Heart Rate (beats/min) 84  -SW     Pre SpO2 (%) 95  -SW     O2 Delivery Pre Treatment room air  -SW     O2 Delivery Intra Treatment room air  -SW     O2 Delivery Post Treatment room air  -SW     Pre Patient Position Supine  -SW     Intra Patient Position Standing  -SW     Post Patient Position Sitting  -SW       Row Name 02/01/24 0850          Positioning and Restraints    Pre-Treatment Position in bed  -SW     Post Treatment Position chair  -SW     In Chair notified nsg;reclined;sitting;call light within reach;encouraged to call for assist;exit alarm on;waffle cushion;legs elevated  -SW               User Key  (r) = Recorded By, (t) = Taken By, (c) = Cosigned By      Initials Name Provider Type    Nicol Jain, OT Occupational Therapist                   Outcome Measures       Row Name 02/01/24 1031          How much help from another is currently needed...    Putting on and taking off regular lower body clothing? 2  -SW     Bathing (including washing, rinsing, and drying) 2  -SW     Toileting (which includes using toilet bed pan or urinal) 2  -SW     Putting on and taking off regular upper body clothing 2  -SW     Taking care of personal grooming (such as brushing teeth) 3  -SW     Eating meals 4  -SW     AM-PAC 6 Clicks Score (OT) 15  -SW       Row Name 02/01/24 0800          How much help from another person do you currently need...    Turning from your back to your side while in flat bed without using bedrails? 3  -MO     Moving from lying on back to sitting on the side of a  flat bed without bedrails? 3  -MO     Moving to and from a bed to a chair (including a wheelchair)? 3  -MO     Standing up from a chair using your arms (e.g., wheelchair, bedside chair)? 3  -MO     Climbing 3-5 steps with a railing? 2  -MO     To walk in hospital room? 2  -MO     AM-PAC 6 Clicks Score (PT) 16  -MO     Highest Level of Mobility Goal 5 --> Static standing  -MO       Row Name 02/01/24 1031          Functional Assessment    Outcome Measure Options AM-PAC 6 Clicks Daily Activity (OT)  -               User Key  (r) = Recorded By, (t) = Taken By, (c) = Cosigned By      Initials Name Provider Type    Susana Marroquin, RN Registered Nurse    Nicol Jain OT Occupational Therapist                    Occupational Therapy Education       Title: PT OT SLP Therapies (In Progress)       Topic: Occupational Therapy (Not Started)       Point: ADL training (Done)       Description:   Instruct learner(s) on proper safety adaptation and remediation techniques during self care or transfers.   Instruct in proper use of assistive devices.                  Learning Progress Summary             Patient Acceptance, E, NR,VU by FABY at 2/1/2024 1033     by FABY at 2/1/2024 1032                         Point: Home exercise program (Not Started)       Description:   Instruct learner(s) on appropriate technique for monitoring, assisting and/or progressing therapeutic exercises/activities.                  Learner Progress:  Not documented in this visit.              Point: Precautions (Done)       Description:   Instruct learner(s) on prescribed precautions during self-care and functional transfers.                  Learning Progress Summary             Patient Acceptance, E, NR,VU by FABY at 2/1/2024 1033     by FABY at 2/1/2024 1032                         Point: Body mechanics (Not Started)       Description:   Instruct learner(s) on proper positioning and spine alignment during self-care, functional mobility activities and/or  exercises.                  Learner Progress:  Not documented in this visit.                              User Key       Initials Effective Dates Name Provider Type Discipline     06/16/21 -  Nicol Pugh OT Occupational Therapist OT                  OT Recommendation and Plan  Planned Therapy Interventions (OT): activity tolerance training, adaptive equipment training, BADL retraining, functional balance retraining, patient/caregiver education/training, strengthening exercise, transfer/mobility retraining  Therapy Frequency (OT): daily  Plan of Care Review  Plan of Care Reviewed With: patient  Outcome Evaluation: OT eval complete. Pt presents with overal weakness, decline in adl performance, and increased fall risk. She completed supine to sit with mod assist, sts with mod assist, grooming with setup, mod assist for donning socks, and mod assist for spt to chair. Recommend IPOT and HHOT at d/c.     Time Calculation:   Evaluation Complexity (OT)  Review Occupational Profile/Medical/Therapy History Complexity: brief/low complexity  Assessment, Occupational Performance/Identification of Deficit Complexity: 3-5 performance deficits  Clinical Decision Making Complexity (OT): problem focused assessment/low complexity  Overall Complexity of Evaluation (OT): low complexity     Time Calculation- OT       Row Name 02/01/24 0855             Time Calculation- OT    OT Start Time 0855  -SW      OT Received On 02/01/24  -SW      OT Goal Re-Cert Due Date 02/11/24  -         Timed Charges    03949 - OT Self Care/Mgmt Minutes 23  -SW         Untimed Charges    OT Eval/Re-eval Minutes 38  -SW         Total Minutes    Timed Charges Total Minutes 23  -SW      Untimed Charges Total Minutes 38  -SW       Total Minutes 61  -SW                User Key  (r) = Recorded By, (t) = Taken By, (c) = Cosigned By      Initials Name Provider Type     Nicol Pugh OT Occupational Therapist                  Therapy Charges for Today       Code  Description Service Date Service Provider Modifiers Qty    08197495896 HC OT SELF CARE/MGMT/TRAIN EA 15 MIN 2/1/2024 Nicol Pugh OT GO 2    [id number]  OT EVAL LOW COMPLEXITY 3 2/1/2024 Nicol Pugh OT GO 1                 Nicol Pugh OT  2/1/2024

## 2024-02-02 VITALS
BODY MASS INDEX: 22.97 KG/M2 | TEMPERATURE: 97.5 F | RESPIRATION RATE: 18 BRPM | OXYGEN SATURATION: 94 % | DIASTOLIC BLOOD PRESSURE: 62 MMHG | SYSTOLIC BLOOD PRESSURE: 124 MMHG | HEART RATE: 64 BPM | HEIGHT: 60 IN | WEIGHT: 117 LBS

## 2024-02-02 LAB — BACTERIA SPEC AEROBE CULT: ABNORMAL

## 2024-02-02 PROCEDURE — G0378 HOSPITAL OBSERVATION PER HR: HCPCS

## 2024-02-02 PROCEDURE — 25010000002 CEFTRIAXONE PER 250 MG: Performed by: FAMILY MEDICINE

## 2024-02-02 PROCEDURE — 99239 HOSP IP/OBS DSCHRG MGMT >30: CPT | Performed by: FAMILY MEDICINE

## 2024-02-02 RX ORDER — CEPHALEXIN 500 MG/1
500 CAPSULE ORAL 3 TIMES DAILY
Qty: 15 CAPSULE | Refills: 0 | Status: SHIPPED | OUTPATIENT
Start: 2024-02-02 | End: 2024-02-07

## 2024-02-02 RX ADMIN — Medication 10 ML: at 08:28

## 2024-02-02 RX ADMIN — METOPROLOL TARTRATE 25 MG: 25 TABLET, FILM COATED ORAL at 06:43

## 2024-02-02 RX ADMIN — CARBIDOPA AND LEVODOPA 1 TABLET: 25; 100 TABLET ORAL at 08:27

## 2024-02-02 RX ADMIN — SODIUM CHLORIDE 1000 MG: 900 INJECTION INTRAVENOUS at 11:16

## 2024-02-02 RX ADMIN — APIXABAN 2.5 MG: 2.5 TABLET, FILM COATED ORAL at 08:27

## 2024-02-02 NOTE — CASE MANAGEMENT/SOCIAL WORK
Continued Stay Note  [facility]     Patient Name: Fanny Boyce  MRN: [id number]  Today's Date: 2/2/2024    Admit Date: 1/31/2024    Plan: Home with [facility]   Discharge Plan       Row Name 02/02/24 0857       Plan    Plan Home with [facility]    Patient/Family in Agreement with Plan yes    Plan Comments Spoke with patient at beside and daughter by phone. Plan is home with [facility]. Trang at [facility] has the referral. CM will continue to follow.    Final Discharge Disposition Code 01 - home or self-care                   Discharge Codes    No documentation.                 Expected Discharge Date and Time       Expected Discharge Date Expected Discharge Time    Feb 2, 2024               Dom Dye RN

## 2024-02-02 NOTE — NURSING NOTE
WOC consult for pressure injury to coccyx.    Patient visited in chair with chair cushion, patient did well with walker and my help.    Did note pressure injury on coccyx.  Did note mild Gluteal hyperpigmentation and a few rough dry areas evidence of chronic irritant contact dermatitis due to incontinence from stool and/or urine /friction.    Recommend Z guard application.  Will sign off.

## 2024-02-02 NOTE — DISCHARGE SUMMARY
[facility] Medicine Services  DISCHARGE SUMMARY    Patient Name: Fanny Boyce  : 1940  MRN: [id number]    Date of Admission: 2024  8:29 AM  Date of Discharge:  24    Primary Care Physician: Nemo Aponte PA    Consults       No orders found from 2024 to 2024.            Hospital Course     Presenting Problem: UTI    Active Hospital Problems    Diagnosis  POA    **UTI (urinary tract infection) [N39.0]  Yes    Atrial fibrillation [I48.91]  Yes    COPD (chronic obstructive pulmonary disease) [J44.9]  Yes    Dementia [F03.90]  Yes    Parkinson's disease [G20.A1]  Yes      Resolved Hospital Problems   No resolved problems to display.          Hospital Course:  Fanny Boyce is a 83 y.o. female who presented with generalized weakness and was found to have a urinary tract infection with failure of 2 different outpatient antibiotics (amoxicillin and Bactrim) prior to presentation.     Generalized weakness  Urinary tract infection, failed outpatient therapy  -Urine culture growing E. coli, sensitivities pending  - Treated with Rocephin while admitted, transition to Keflex x 5 days at discharge  -Clinically feels improved as of -2024     A-fib  -Continue Eliquis and metoprolol     COPD  Right hilar fullness  -Will follow-up with PCP for additional follow-up imaging     Parkinson's disease  -Continue carbidopa levodopa      Discharge Follow Up Recommendations for outpatient labs/diagnostics:   PCP - 1 week check urine and repeat xrays    Day of Discharge     HPI:   Pt feeling better and feels ready to go home today. She denies NV or abd pain.     Review of Systems  Gen- No fevers, chills  CV- No chest pain, palpitations  Resp- No cough, dyspnea  GI- No N/V/D, abd pain      Vital Signs:   Temp:  [97.4 °F (36.3 °C)-98.3 °F (36.8 °C)] 97.4 °F (36.3 °C)  Heart Rate:  [55-84] 63  Resp:  [18] 18  BP: (110-134)/(50-60) 133/59      Physical Exam:  Constitutional: No  acute distress, awake, alert  HENT: NCAT, mucous membranes moist  Respiratory: Clear to auscultation bilaterally, respiratory effort normal   Cardiovascular: RRR  Gastrointestinal: Positive bowel sounds, soft, nontender, nondistended  Musculoskeletal: generally weak  Psychiatric: Appropriate affect, cooperative  Neurologic: Oriented x 3, weakness, Cranial Nerves grossly intact to confrontation, speech clear  Skin: No rashes      Pertinent  and/or Most Recent Results     LAB RESULTS:      Lab 02/01/24  0612 01/31/24  0834   WBC 12.23* 15.69*   HEMOGLOBIN 11.7* 12.0   HEMATOCRIT 36.2 38.8   PLATELETS 273 293   NEUTROS ABS  --  13.83*   IMMATURE GRANS (ABS)  --  0.08*   LYMPHS ABS  --  0.91   MONOS ABS  --  0.81   EOS ABS  --  0.01   MCV 90.0 90.2   PROCALCITONIN  --  1.51*   LACTATE  --  1.0         Lab 02/01/24  0612 01/31/24  0834   SODIUM 141 136   POTASSIUM 3.9 3.9   CHLORIDE 105 99   CO2 25.0 25.0   ANION GAP 11.0 12.0   BUN 13 17   CREATININE 0.67 0.85   EGFR 85.8 67.2   GLUCOSE 82 113*   CALCIUM 8.8 9.1   MAGNESIUM  --  2.1         Lab 01/31/24  0834   TOTAL PROTEIN 7.1   ALBUMIN 3.7   GLOBULIN 3.4   ALT (SGPT) 9   AST (SGOT) 17   BILIRUBIN 0.8   ALK PHOS 84         Lab 01/31/24  0834   HSTROP T 23*                 Brief Urine Lab Results  (Last result in the past 365 days)        Color   Clarity   Blood   Leuk Est   Nitrite   Protein   CREAT   Urine HCG        01/31/24 0907 Yellow   Turbid   Moderate (2+)   Large (3+)   Positive   100 mg/dL (2+)                 Microbiology Results (last 10 days)       Procedure Component Value - Date/Time    Blood Culture - Blood, Arm, Right [[id number]]  (Normal) Collected: 01/31/24 1045    Lab Status: Preliminary result Specimen: Blood from Arm, Right Updated: 02/01/24 1201     Blood Culture No growth at 24 hours    Narrative:      Less than seven (7) mL's of blood was collected.  Insufficient quantity may yield false negative results.    Blood Culture - Blood, Arm, Left  [[id number]]  (Normal) Collected: 01/31/24 1045    Lab Status: Preliminary result Specimen: Blood from Arm, Left Updated: 02/01/24 1201     Blood Culture No growth at 24 hours    Narrative:      Less than seven (7) mL's of blood was collected.  Insufficient quantity may yield false negative results.    Urine Culture - Urine, Straight Cath [[id number]]  (Abnormal)  (Susceptibility) Collected: 01/31/24 0907    Lab Status: Final result Specimen: Urine from Straight Cath Updated: 02/02/24 0349     Urine Culture >100,000 CFU/mL Escherichia coli    Narrative:      Colonization of the urinary tract without infection is common. Treatment is discouraged unless the patient is symptomatic, pregnant, or undergoing an invasive urologic procedure.    Susceptibility        Escherichia coli      KENDALL      Amoxicillin + Clavulanate Susceptible      Ampicillin Susceptible      Ampicillin + Sulbactam Susceptible      Cefazolin Susceptible      Cefepime Susceptible      Ceftazidime Susceptible      Ceftriaxone Susceptible      Gentamicin Susceptible      Levofloxacin Susceptible      Nitrofurantoin Susceptible      Piperacillin + Tazobactam Susceptible      Trimethoprim + Sulfamethoxazole Susceptible                           COVID-19 and FLU A/B PCR, 1 HR TAT - Swab, Nasopharynx [[id number]]  (Normal) Collected: 01/31/24 0900    Lab Status: Final result Specimen: Swab from Nasopharynx Updated: 01/31/24 1006     COVID19 Not Detected     Influenza A PCR Not Detected     Influenza B PCR Not Detected    Narrative:      Fact sheet for providers: https://www.fda.gov/media/403504/download    Fact sheet for patients: https://www.fda.gov/media/437895/download    Test performed by PCR.            XR Chest 1 View    Result Date: 1/31/2024  XR CHEST 1 VW Date of Exam: 1/31/2024 8:52 AM EST Indication: Weak/Dizzy/AMS triage protocol Comparison: None available. Findings: Cardiomediastinal silhouette is unremarkable. The lungs are hyperinflated. There  is prominence of the right pulmonary hilum. Mild interstitial coarsening is noted which may be chronic. Scarring at the left costophrenic angle. No pleural effusion or pneumothorax. No acute osseous abnormality identified.     Impression: 1. COPD with suspected chronic interstitial changes. 2. Prominence of the right pulmonary hilum which may be due to vessels or lymphadenopathy. A CT of the chest can be obtained for further evaluation. Electronically Signed: Haleigh Mcgovern MD  1/31/2024 9:10 AM EST  Workstation ID: [id number]                 Plan for Follow-up of Pending Labs/Results: blood cultures pending  Pending Labs       Order Current Status    Blood Culture - Blood, Arm, Left Preliminary result    Blood Culture - Blood, Arm, Right Preliminary result          Discharge Details        Discharge Medications        New Medications        Instructions Start Date   cephalexin 500 MG capsule  Commonly known as: Keflex   500 mg, Oral, 3 Times Daily             Continue These Medications        Instructions Start Date   apixaban 2.5 MG tablet tablet  Commonly known as: ELIQUIS   2.5 mg, Oral, 2 Times Daily      carbidopa-levodopa  MG per tablet  Commonly known as: SINEMET   1 tablet, Oral, 3 Times Daily      fluticasone 50 MCG/ACT nasal spray  Commonly known as: FLONASE   2 sprays, Nasal, Daily PRN      metoprolol tartrate 25 MG tablet  Commonly known as: LOPRESSOR   25 mg, Oral, Every Morning      traZODone 50 MG tablet  Commonly known as: DESYREL   100 mg, Oral, Nightly               No Known Allergies      Discharge Disposition:  Home or Self Care    Diet:  Hospital:  Diet Order   Procedures    Diet: Regular/House Diet; Texture: Regular Texture (IDDSI 7); Fluid Consistency: Thin (IDDSI 0)       Diet Instructions       Diet: Regular/House Diet; Thin (IDDSI 0)      Discharge Diet: Regular/House Diet    Fluid Consistency: Thin (IDDSI 0)             Activity:  Activity Instructions       Activity as Tolerated               Restrictions or Other Recommendations:  none       CODE STATUS:    Code Status and Medical Interventions:   Ordered at: 01/31/24 1422     Medical Intervention Limits:    NO intubation (DNI)     Level Of Support Discussed With:    Next of Kin (If No Surrogate)     Code Status (Patient has no pulse and is not breathing):    No CPR (Do Not Attempt to Resuscitate)     Medical Interventions (Patient has pulse or is breathing):    Limited Support       No future appointments.    Additional Instructions for the Follow-ups that You Need to Schedule       Ambulatory Referral to Home Health   As directed      Face to Face Visit Date: 2/1/2024   Follow-up provider for Plan of Care?: I treated the patient in an acute care facility and will not continue treatment after discharge.   Follow-up provider: NEMO APONTE [[id number]]   Reason/Clinical Findings: UTI   Describe mobility limitations that make leaving home difficult: use of rollator   Nursing/Therapeutic Services Requested: Skilled Nursing Physical Therapy Occupational Therapy   Skilled nursing orders: Medication education   PT orders: Strengthening   Occupational orders: Activities of daily living Strengthening   Frequency: Other (2-3 times a week)        Discharge Follow-up with PCP   As directed       Currently Documented PCP:    Nemo Aponte PA    PCP Phone Number:    [phone]     Follow Up Details: 1 week check urine                      Martha Arciniega MD  02/02/24      Time Spent on Discharge:  I spent  34  minutes on this discharge activity which included: face-to-face encounter with the patient, reviewing the data in the system, coordination of the care with the nursing staff as well as consultants, documentation, and entering orders.

## 2024-02-02 NOTE — CASE MANAGEMENT/SOCIAL WORK
Case Management Discharge Note      Final Note: Plan is home with [facility] SN/PT/OT. Daughter will transport. No other discharge needs identified. Trang at [facility] was notified of discharge.         Selected Continued Care - Admitted Since 1/31/2024       Destination    No services have been selected for the patient.                Durable Medical Equipment    No services have been selected for the patient.                Dialysis/Infusion    No services have been selected for the patient.                Home Medical Care Coordination complete.      Service Provider Selected Services Address Phone Fax Patient Preferred    [facility] ,  [facility] [address] [id number] [phone] --              Therapy    No services have been selected for the patient.                Community Resources    No services have been selected for the patient.                Community & DME    No services have been selected for the patient.                         Final Discharge Disposition Code: 06 - home with home health care

## 2024-02-02 NOTE — PLAN OF CARE
Problem: Skin Injury Risk Increased  Goal: Skin Health and Integrity  Outcome: Ongoing, Progressing  Intervention: Optimize Skin Protection  Recent Flowsheet Documentation  Taken 2/1/2024 2200 by Belkys Nolen RN  Head of Bed (HOB) Positioning: HOB elevated  Taken 2/1/2024 2000 by Belkys Nolen RN  Pressure Reduction Techniques: heels elevated off bed  Head of Bed (HOB) Positioning: HOB elevated  Pressure Reduction Devices: pressure-redistributing mattress utilized  Skin Protection: adhesive use limited     Problem: Fall Injury Risk  Goal: Absence of Fall and Fall-Related Injury  Outcome: Ongoing, Progressing  Intervention: Identify and Manage Contributors  Recent Flowsheet Documentation  Taken 2/1/2024 2000 by Belkys Nolen RN  Medication Review/Management: medications reviewed  Intervention: Promote Injury-Free Environment  Recent Flowsheet Documentation  Taken 2/1/2024 2200 by Belkys Nolen RN  Safety Promotion/Fall Prevention:   safety round/check completed   clutter free environment maintained   assistive device/personal items within reach  Taken 2/1/2024 2000 by Belkys Nolen RN  Safety Promotion/Fall Prevention:   safety round/check completed   assistive device/personal items within reach   clutter free environment maintained   elopement precautions   fall prevention program maintained   Goal Outcome Evaluation:

## 2024-02-02 NOTE — DISCHARGE PLACEMENT REQUEST
"Fanny Campbell \"Monica\" (83 y.o. Female)       Date of Birth   1940    Social Security Number       Address   [address] [place] [facility] [address]    Home Phone   [phone]    MRN   [id number]       Taoism   None    Marital Status   Legally                             Admission Date   1/31/24    Admission Type   Emergency    Admitting Provider   Martha Arciniega MD    Attending Provider   Martha Arciniega MD    Department, Room/Bed   [address], [address]       Discharge Date       Discharge Disposition   Home or Self Care    Discharge Destination                                 Attending Provider: Martha Arciniega MD    Allergies: No Known Allergies    Isolation: None   Infection: None   Code Status: No CPR    Ht: 152.4 cm (60\")   Wt: 53.1 kg (117 lb)    Admission Cmt: None   Principal Problem: UTI (urinary tract infection) [N39.0]                   Active Insurance as of 1/31/2024       Primary Coverage       Payor Plan Insurance Group Employer/Plan Group    MEDICARE MEDICARE A & B        Payor Plan Address Payor Plan Phone Number Payor Plan Fax Number Effective Dates    [address] [phone]  9/1/2001 - None Entered    [facility] [id number]         Subscriber Name Subscriber Birth Date Member ID       FANNY CAMPBELL 1940 [id number]               Secondary Coverage       Payor Plan Insurance Group Employer/Plan Group    HUMANA HUMANA [facility]              [id number]       Payor Plan Address Payor Plan Phone Number Payor Plan Fax Number Effective Dates    [address] [address]   1/1/2015 - None Entered    [facility] [id number]         Subscriber Name Subscriber Birth Date Member ID       FANNY CAMPBELL 1940 [id number]                     Emergency Contacts        (Rel.) Home Phone Work Phone Mobile Phone    Lety Lewis (Daughter) [phone] -- [phone]    Licha Pugh (Grandchild) -- -- --                 Discharge Summary        Martha Arciniega, " MD at 24 1033              [facility] Medicine Services  DISCHARGE SUMMARY    Patient Name: Fanny Boyce  : 1940  MRN: [id number]    Date of Admission: 2024  8:29 AM  Date of Discharge:  24    Primary Care Physician: Nemo Aponte PA    Consults       No orders found from 2024 to 2024.            Hospital Course     Presenting Problem: UTI    Active Hospital Problems    Diagnosis  POA    **UTI (urinary tract infection) [N39.0]  Yes    Atrial fibrillation [I48.91]  Yes    COPD (chronic obstructive pulmonary disease) [J44.9]  Yes    Dementia [F03.90]  Yes    Parkinson's disease [G20.A1]  Yes      Resolved Hospital Problems   No resolved problems to display.          Hospital Course:  Fanny Boyce is a 83 y.o. female who presented with generalized weakness and was found to have a urinary tract infection with failure of 2 different outpatient antibiotics (amoxicillin and Bactrim) prior to presentation.     Generalized weakness  Urinary tract infection, failed outpatient therapy  -Urine culture growing E. coli, sensitivities pending  - Treated with Rocephin while admitted, transition to Keflex x 5 days at discharge  -Clinically feels improved as of -2024     A-fib  -Continue Eliquis and metoprolol     COPD  Right hilar fullness  -Will follow-up with PCP for additional follow-up imaging     Parkinson's disease  -Continue carbidopa levodopa      Discharge Follow Up Recommendations for outpatient labs/diagnostics:   PCP - 1 week check urine and repeat xrays    Day of Discharge     HPI:   Pt feeling better and feels ready to go home today. She denies NV or abd pain.     Review of Systems  Gen- No fevers, chills  CV- No chest pain, palpitations  Resp- No cough, dyspnea  GI- No N/V/D, abd pain      Vital Signs:   Temp:  [97.4 °F (36.3 °C)-98.3 °F (36.8 °C)] 97.4 °F (36.3 °C)  Heart Rate:  [55-84] 63  Resp:  [18] 18  BP: (110-134)/(50-60)  133/59      Physical Exam:  Constitutional: No acute distress, awake, alert  HENT: NCAT, mucous membranes moist  Respiratory: Clear to auscultation bilaterally, respiratory effort normal   Cardiovascular: RRR  Gastrointestinal: Positive bowel sounds, soft, nontender, nondistended  Musculoskeletal: generally weak  Psychiatric: Appropriate affect, cooperative  Neurologic: Oriented x 3, weakness, Cranial Nerves grossly intact to confrontation, speech clear  Skin: No rashes      Pertinent  and/or Most Recent Results     LAB RESULTS:      Lab 02/01/24  0612 01/31/24  0834   WBC 12.23* 15.69*   HEMOGLOBIN 11.7* 12.0   HEMATOCRIT 36.2 38.8   PLATELETS 273 293   NEUTROS ABS  --  13.83*   IMMATURE GRANS (ABS)  --  0.08*   LYMPHS ABS  --  0.91   MONOS ABS  --  0.81   EOS ABS  --  0.01   MCV 90.0 90.2   PROCALCITONIN  --  1.51*   LACTATE  --  1.0         Lab 02/01/24  0612 01/31/24  0834   SODIUM 141 136   POTASSIUM 3.9 3.9   CHLORIDE 105 99   CO2 25.0 25.0   ANION GAP 11.0 12.0   BUN 13 17   CREATININE 0.67 0.85   EGFR 85.8 67.2   GLUCOSE 82 113*   CALCIUM 8.8 9.1   MAGNESIUM  --  2.1         Lab 01/31/24  0834   TOTAL PROTEIN 7.1   ALBUMIN 3.7   GLOBULIN 3.4   ALT (SGPT) 9   AST (SGOT) 17   BILIRUBIN 0.8   ALK PHOS 84         Lab 01/31/24  0834   HSTROP T 23*                 Brief Urine Lab Results  (Last result in the past 365 days)        Color   Clarity   Blood   Leuk Est   Nitrite   Protein   CREAT   Urine HCG        01/31/24 0907 Yellow   Turbid   Moderate (2+)   Large (3+)   Positive   100 mg/dL (2+)                 Microbiology Results (last 10 days)       Procedure Component Value - Date/Time    Blood Culture - Blood, Arm, Right [[id number]]  (Normal) Collected: 01/31/24 1045    Lab Status: Preliminary result Specimen: Blood from Arm, Right Updated: 02/01/24 1201     Blood Culture No growth at 24 hours    Narrative:      Less than seven (7) mL's of blood was collected.  Insufficient quantity may yield false negative  results.    Blood Culture - Blood, Arm, Left [[id number]]  (Normal) Collected: 01/31/24 1045    Lab Status: Preliminary result Specimen: Blood from Arm, Left Updated: 02/01/24 1201     Blood Culture No growth at 24 hours    Narrative:      Less than seven (7) mL's of blood was collected.  Insufficient quantity may yield false negative results.    Urine Culture - Urine, Straight Cath [[id number]]  (Abnormal)  (Susceptibility) Collected: 01/31/24 0907    Lab Status: Final result Specimen: Urine from Straight Cath Updated: 02/02/24 0349     Urine Culture >100,000 CFU/mL Escherichia coli    Narrative:      Colonization of the urinary tract without infection is common. Treatment is discouraged unless the patient is symptomatic, pregnant, or undergoing an invasive urologic procedure.    Susceptibility        Escherichia coli      KENDALL      Amoxicillin + Clavulanate Susceptible      Ampicillin Susceptible      Ampicillin + Sulbactam Susceptible      Cefazolin Susceptible      Cefepime Susceptible      Ceftazidime Susceptible      Ceftriaxone Susceptible      Gentamicin Susceptible      Levofloxacin Susceptible      Nitrofurantoin Susceptible      Piperacillin + Tazobactam Susceptible      Trimethoprim + Sulfamethoxazole Susceptible                           COVID-19 and FLU A/B PCR, 1 HR TAT - Swab, Nasopharynx [[id number]]  (Normal) Collected: 01/31/24 0900    Lab Status: Final result Specimen: Swab from Nasopharynx Updated: 01/31/24 1006     COVID19 Not Detected     Influenza A PCR Not Detected     Influenza B PCR Not Detected    Narrative:      Fact sheet for providers: https://www.fda.gov/media/348054/download    Fact sheet for patients: https://www.fda.gov/media/321917/download    Test performed by PCR.            XR Chest 1 View    Result Date: 1/31/2024  XR CHEST 1 VW Date of Exam: 1/31/2024 8:52 AM EST Indication: Weak/Dizzy/AMS triage protocol Comparison: None available. Findings: Cardiomediastinal silhouette is  unremarkable. The lungs are hyperinflated. There is prominence of the right pulmonary hilum. Mild interstitial coarsening is noted which may be chronic. Scarring at the left costophrenic angle. No pleural effusion or pneumothorax. No acute osseous abnormality identified.     Impression: 1. COPD with suspected chronic interstitial changes. 2. Prominence of the right pulmonary hilum which may be due to vessels or lymphadenopathy. A CT of the chest can be obtained for further evaluation. Electronically Signed: Haleigh Mcgovern MD  1/31/2024 9:10 AM EST  Workstation ID: [id number]                 Plan for Follow-up of Pending Labs/Results: blood cultures pending  Pending Labs       Order Current Status    Blood Culture - Blood, Arm, Left Preliminary result    Blood Culture - Blood, Arm, Right Preliminary result          Discharge Details        Discharge Medications        New Medications        Instructions Start Date   cephalexin 500 MG capsule  Commonly known as: Keflex   500 mg, Oral, 3 Times Daily             Continue These Medications        Instructions Start Date   apixaban 2.5 MG tablet tablet  Commonly known as: ELIQUIS   2.5 mg, Oral, 2 Times Daily      carbidopa-levodopa  MG per tablet  Commonly known as: SINEMET   1 tablet, Oral, 3 Times Daily      fluticasone 50 MCG/ACT nasal spray  Commonly known as: FLONASE   2 sprays, Nasal, Daily PRN      metoprolol tartrate 25 MG tablet  Commonly known as: LOPRESSOR   25 mg, Oral, Every Morning      traZODone 50 MG tablet  Commonly known as: DESYREL   100 mg, Oral, Nightly               No Known Allergies      Discharge Disposition:  Home or Self Care    Diet:  Hospital:  Diet Order   Procedures    Diet: Regular/House Diet; Texture: Regular Texture (IDDSI 7); Fluid Consistency: Thin (IDDSI 0)       Diet Instructions       Diet: Regular/House Diet; Thin (IDDSI 0)      Discharge Diet: Regular/House Diet    Fluid Consistency: Thin (IDDSI 0)              Activity:  Activity Instructions       Activity as Tolerated              Restrictions or Other Recommendations:  none       CODE STATUS:    Code Status and Medical Interventions:   Ordered at: 01/31/24 1422     Medical Intervention Limits:    NO intubation (DNI)     Level Of Support Discussed With:    Next of Kin (If No Surrogate)     Code Status (Patient has no pulse and is not breathing):    No CPR (Do Not Attempt to Resuscitate)     Medical Interventions (Patient has pulse or is breathing):    Limited Support       No future appointments.    Additional Instructions for the Follow-ups that You Need to Schedule       Ambulatory Referral to Home Health   As directed      Face to Face Visit Date: 2/1/2024   Follow-up provider for Plan of Care?: I treated the patient in an acute care facility and will not continue treatment after discharge.   Follow-up provider: NEMO APONTE [[id number]]   Reason/Clinical Findings: UTI   Describe mobility limitations that make leaving home difficult: use of rollator   Nursing/Therapeutic Services Requested: Skilled Nursing Physical Therapy Occupational Therapy   Skilled nursing orders: Medication education   PT orders: Strengthening   Occupational orders: Activities of daily living Strengthening   Frequency: Other (2-3 times a week)        Discharge Follow-up with PCP   As directed       Currently Documented PCP:    Nemo Aponte PA    PCP Phone Number:    [phone]     Follow Up Details: 1 week check urine                      Martha Arciniega MD  02/02/24      Time Spent on Discharge:  I spent  34  minutes on this discharge activity which included: face-to-face encounter with the patient, reviewing the data in the system, coordination of the care with the nursing staff as well as consultants, documentation, and entering orders.            Electronically signed by Martha Arciniega MD at 02/02/24 3279

## 2024-02-02 NOTE — PLAN OF CARE
Goal Outcome Evaluation: Aox4 room air pt is resting in chair call light within reach. Pt will be discharge home with daughter. Discharge teaching will be done with pt and daughter at bedside.

## 2024-02-03 ENCOUNTER — READMISSION MANAGEMENT (OUTPATIENT)
Dept: CALL CENTER | Facility: HOSPITAL | Age: 84
End: 2024-02-03
Payer: MEDICARE

## 2024-02-03 NOTE — OUTREACH NOTE
Prep Survey      Flowsheet Row Responses   [facility] facility patient discharged from? [place]   Is LACE score < 7 ? No   Eligibility Readm Mgmt   Discharge diagnosis UTI (urinary tract infection)   Does the patient have one of the following disease processes/diagnoses(primary or secondary)? Other   Does the patient have Home health ordered? Yes   What is the Home health agency?  [facility]   Is there a DME ordered? No   Prep survey completed? Yes            Libby DAVIS - Registered Nurse

## 2024-02-05 LAB
BACTERIA SPEC AEROBE CULT: NORMAL
BACTERIA SPEC AEROBE CULT: NORMAL

## 2024-02-07 ENCOUNTER — READMISSION MANAGEMENT (OUTPATIENT)
Dept: CALL CENTER | Facility: HOSPITAL | Age: 84
End: 2024-02-07
Payer: MEDICARE

## 2024-02-07 NOTE — OUTREACH NOTE
Medical Week 1 Survey      Flowsheet Row Responses   [facility] patient discharged from? [place]   Does the patient have one of the following disease processes/diagnoses(primary or secondary)? Other   Week 1 attempt successful? Yes   Call start time 1615   Call end time 1618   Discharge diagnosis UTI (urinary tract infection)   Person spoke with today (if not patient) and relationship Linwood, daughter   Meds reviewed with patient/caregiver? Yes   Is the patient having any side effects they believe may be caused by any medication additions or changes? No   Does the patient have all medications ordered at discharge? Yes   Is the patient taking all medications as directed (includes completed medication regime)? Yes   Does the patient have a primary care provider?  Yes   Does the patient have an appointment with their PCP within 7 days of discharge? Greater than 7 days   What is preventing the patient from scheduling follow up appointments within 7 days of discharge? Earlier appointment not available   Nursing Interventions Verified appointment date/time/provider   Has the patient kept scheduled appointments due by today? N/A   What is the Home health agency?  [facility]   Has home health visited the patient within 72 hours of discharge? Yes   Psychosocial issues? No   Did the patient receive a copy of their discharge instructions? Yes   Nursing interventions Reviewed instructions with patient   What is the patient's perception of their health status since discharge? Improving   Is the patient/caregiver able to teach back signs and symptoms related to disease process for when to call PCP? Yes   Is the patient/caregiver able to teach back signs and symptoms related to disease process for when to call 911? Yes   Is the patient/caregiver able to teach back the hierarchy of who to call/visit for symptoms/problems? PCP, Specialist, Home health nurse, Urgent Care, ED, 911 Yes   If the patient is a current  smoker, are they able to teach back resources for cessation? Not a smoker   Week 1 call completed? Yes   Call end time 1612            Libby DAVIS - Registered Nurse

## 2024-02-15 ENCOUNTER — READMISSION MANAGEMENT (OUTPATIENT)
Dept: CALL CENTER | Facility: HOSPITAL | Age: 84
End: 2024-02-15
Payer: MEDICARE

## 2024-02-19 ENCOUNTER — READMISSION MANAGEMENT (OUTPATIENT)
Dept: CALL CENTER | Facility: HOSPITAL | Age: 84
End: 2024-02-19
Payer: MEDICARE

## 2024-02-19 NOTE — OUTREACH NOTE
Medical Week 2 Survey      Flowsheet Row Responses   [facility] [place] patient discharged from? [place]   Does the patient have one of the following disease processes/diagnoses(primary or secondary)? Other   Week 2 attempt successful? Yes   Call start time 1828   Discharge diagnosis UTI (urinary tract infection)   Call end time 1831   Person spoke with today (if not patient) and relationship Linwood, lulú   Meds reviewed with patient/caregiver? Yes   Is the patient having any side effects they believe may be caused by any medication additions or changes? No   Does the patient have all medications ordered at discharge? Yes   Is the patient taking all medications as directed (includes completed medication regime)? Yes   Does the patient have a primary care provider?  Yes   Does the patient have an appointment with their PCP within 7 days of discharge? Yes   Has the patient kept scheduled appointments due by today? Yes   What is the Home health agency?  [facility]   Has home health visited the patient within 72 hours of discharge? Yes   Psychosocial issues? No   Did the patient receive a copy of their discharge instructions? Yes   Nursing interventions Reviewed instructions with patient   What is the patient's perception of their health status since discharge? Improving   Is the patient/caregiver able to teach back signs and symptoms related to disease process for when to call PCP? Yes   Is the patient/caregiver able to teach back signs and symptoms related to disease process for when to call 911? Yes   Is the patient/caregiver able to teach back the hierarchy of who to call/visit for symptoms/problems? PCP, Specialist, Home health nurse, Urgent Care, ED, 911 Yes   If the patient is a current smoker, are they able to teach back resources for cessation? Not a smoker   Week 2 Call Completed? Yes   Graduated Yes   Did the patient feel the follow up calls were helpful during their recovery period? Yes   Was the  number of calls appropriate? Yes   Does the patient have an Advance Directive or Living Will? Yes   Is the patient/caregiver familiar with Advance Care Planning? No   Would the patient like more information on Advance Care Planning? No   Is the patient interested in additional calls from an ambulatory ? No   Would this patient benefit from a Referral to [facility] Social Work? No   Wrap up additional comments pt doing great   Call end time [id number]            ADELITA STILES - Registered Nurse

## 2024-03-13 ENCOUNTER — APPOINTMENT (OUTPATIENT)
Dept: CT IMAGING | Facility: HOSPITAL | Age: 84
End: 2024-03-13
Payer: MEDICARE

## 2024-03-13 ENCOUNTER — HOSPITAL ENCOUNTER (EMERGENCY)
Facility: HOSPITAL | Age: 84
Discharge: HOME | End: 2024-03-14
Attending: EMERGENCY MEDICINE | Admitting: EMERGENCY MEDICINE
Payer: MEDICARE

## 2024-03-13 DIAGNOSIS — N20.0 KIDNEY STONE ON RIGHT SIDE: Primary | ICD-10-CM

## 2024-03-13 LAB
ALBUMIN SERPL-MCNC: 3.9 G/DL (ref 3.5–5.2)
ALBUMIN/GLOB SERPL: 1.1 G/DL
ALP SERPL-CCNC: 89 U/L (ref 39–117)
ALT SERPL W P-5'-P-CCNC: 5 U/L (ref 1–33)
ANION GAP SERPL CALCULATED.3IONS-SCNC: 10 MMOL/L (ref 5–15)
AST SERPL-CCNC: 17 U/L (ref 1–32)
BACTERIA UR QL AUTO: ABNORMAL /HPF
BASOPHILS # BLD AUTO: 0.08 10*3/MM3 (ref 0–0.2)
BASOPHILS NFR BLD AUTO: 1 % (ref 0–1.5)
BILIRUB SERPL-MCNC: 0.3 MG/DL (ref 0–1.2)
BILIRUB UR QL STRIP: NEGATIVE
BUN SERPL-MCNC: 21 MG/DL (ref 8–23)
BUN/CREAT SERPL: 21.9 (ref 7–25)
CALCIUM SPEC-SCNC: 9.2 MG/DL (ref 8.6–10.5)
CHLORIDE SERPL-SCNC: 101 MMOL/L (ref 98–107)
CLARITY UR: CLEAR
CO2 SERPL-SCNC: 25 MMOL/L (ref 22–29)
COLOR UR: YELLOW
CREAT SERPL-MCNC: 0.96 MG/DL (ref 0.57–1)
D-LACTATE SERPL-SCNC: 1.3 MMOL/L (ref 0.5–2)
DEPRECATED RDW RBC AUTO: 45.8 FL (ref 37–54)
EGFRCR SERPLBLD CKD-EPI 2021: 58.1 ML/MIN/1.73
EOSINOPHIL # BLD AUTO: 0.18 10*3/MM3 (ref 0–0.4)
EOSINOPHIL NFR BLD AUTO: 2.3 % (ref 0.3–6.2)
ERYTHROCYTE [DISTWIDTH] IN BLOOD BY AUTOMATED COUNT: 14.3 % (ref 12.3–15.4)
GLOBULIN UR ELPH-MCNC: 3.5 GM/DL
GLUCOSE SERPL-MCNC: 96 MG/DL (ref 65–99)
GLUCOSE UR STRIP-MCNC: NEGATIVE MG/DL
HCT VFR BLD AUTO: 42.4 % (ref 34–46.6)
HGB BLD-MCNC: 13.2 G/DL (ref 12–15.9)
HGB UR QL STRIP.AUTO: ABNORMAL
HOLD SPECIMEN: NORMAL
HOLD SPECIMEN: NORMAL
HYALINE CASTS UR QL AUTO: ABNORMAL /LPF
IMM GRANULOCYTES # BLD AUTO: 0.03 10*3/MM3 (ref 0–0.05)
IMM GRANULOCYTES NFR BLD AUTO: 0.4 % (ref 0–0.5)
KETONES UR QL STRIP: NEGATIVE
LEUKOCYTE ESTERASE UR QL STRIP.AUTO: ABNORMAL
LIPASE SERPL-CCNC: 35 U/L (ref 13–60)
LYMPHOCYTES # BLD AUTO: 1.44 10*3/MM3 (ref 0.7–3.1)
LYMPHOCYTES NFR BLD AUTO: 18.3 % (ref 19.6–45.3)
MCH RBC QN AUTO: 27.4 PG (ref 26.6–33)
MCHC RBC AUTO-ENTMCNC: 31.1 G/DL (ref 31.5–35.7)
MCV RBC AUTO: 88.1 FL (ref 79–97)
MONOCYTES # BLD AUTO: 0.48 10*3/MM3 (ref 0.1–0.9)
MONOCYTES NFR BLD AUTO: 6.1 % (ref 5–12)
NEUTROPHILS NFR BLD AUTO: 5.65 10*3/MM3 (ref 1.7–7)
NEUTROPHILS NFR BLD AUTO: 71.9 % (ref 42.7–76)
NITRITE UR QL STRIP: NEGATIVE
NRBC BLD AUTO-RTO: 0 /100 WBC (ref 0–0.2)
PH UR STRIP.AUTO: 7 [PH] (ref 5–8)
PLATELET # BLD AUTO: 267 10*3/MM3 (ref 140–450)
PMV BLD AUTO: 9.9 FL (ref 6–12)
POTASSIUM SERPL-SCNC: 4.3 MMOL/L (ref 3.5–5.2)
PROT SERPL-MCNC: 7.4 G/DL (ref 6–8.5)
PROT UR QL STRIP: NEGATIVE
RBC # BLD AUTO: 4.81 10*6/MM3 (ref 3.77–5.28)
RBC # UR STRIP: ABNORMAL /HPF
REF LAB TEST METHOD: ABNORMAL
SODIUM SERPL-SCNC: 136 MMOL/L (ref 136–145)
SP GR UR STRIP: 1.01 (ref 1–1.03)
SQUAMOUS #/AREA URNS HPF: ABNORMAL /HPF
UROBILINOGEN UR QL STRIP: ABNORMAL
WBC # UR STRIP: ABNORMAL /HPF
WBC NRBC COR # BLD AUTO: 7.86 10*3/MM3 (ref 3.4–10.8)
WHOLE BLOOD HOLD COAG: NORMAL
WHOLE BLOOD HOLD SPECIMEN: NORMAL

## 2024-03-13 PROCEDURE — 74176 CT ABD & PELVIS W/O CONTRAST: CPT

## 2024-03-13 PROCEDURE — 85025 COMPLETE CBC W/AUTO DIFF WBC: CPT | Performed by: EMERGENCY MEDICINE

## 2024-03-13 PROCEDURE — 83690 ASSAY OF LIPASE: CPT | Performed by: EMERGENCY MEDICINE

## 2024-03-13 PROCEDURE — 80053 COMPREHEN METABOLIC PANEL: CPT | Performed by: EMERGENCY MEDICINE

## 2024-03-13 PROCEDURE — 99284 EMERGENCY DEPT VISIT MOD MDM: CPT

## 2024-03-13 PROCEDURE — 83605 ASSAY OF LACTIC ACID: CPT | Performed by: EMERGENCY MEDICINE

## 2024-03-13 PROCEDURE — 81001 URINALYSIS AUTO W/SCOPE: CPT | Performed by: EMERGENCY MEDICINE

## 2024-03-13 RX ORDER — SODIUM CHLORIDE 9 MG/ML
10 INJECTION, SOLUTION INTRAMUSCULAR; INTRAVENOUS; SUBCUTANEOUS AS NEEDED
Status: DISCONTINUED | OUTPATIENT
Start: 2024-03-13 | End: 2024-03-14 | Stop reason: HOSPADM

## 2024-03-14 VITALS
HEIGHT: 60 IN | SYSTOLIC BLOOD PRESSURE: 149 MMHG | OXYGEN SATURATION: 94 % | DIASTOLIC BLOOD PRESSURE: 72 MMHG | RESPIRATION RATE: 18 BRPM | WEIGHT: 117 LBS | TEMPERATURE: 98.1 F | HEART RATE: 75 BPM | BODY MASS INDEX: 22.97 KG/M2

## 2024-03-14 LAB — HOLD SPECIMEN: NORMAL

## 2024-03-14 RX ORDER — HYDROCODONE BITARTRATE AND ACETAMINOPHEN 5; 325 MG/1; MG/1
1 TABLET ORAL EVERY 8 HOURS PRN
Qty: 9 TABLET | Refills: 0 | Status: SHIPPED | OUTPATIENT
Start: 2024-03-14 | End: 2024-03-17

## 2024-03-14 RX ORDER — ONDANSETRON 4 MG/1
4 TABLET, ORALLY DISINTEGRATING ORAL EVERY 8 HOURS PRN
Qty: 15 TABLET | Refills: 0 | Status: SHIPPED | OUTPATIENT
Start: 2024-03-14 | End: 2024-03-19

## 2024-03-14 NOTE — ED PROVIDER NOTES
Subjective   History of Present Illness  This is a 83-year-old female with a history of atrial fibrillation and dementia presented to the emergency department with some abdominal discomfort.  Patient states that she has had this for the last 2 weeks.  She complaining of some intermittent cramping in her lower abdomen.  Patient states that she is actually pain-free at this time.  Dull in nature.  Nonradiating.  She is not having any other associated symptoms.  Denies any nausea or vomiting.  No diarrhea.  No fevers or chills.  No headache or change in vision.  No focal weakness.  No chest pain or shortness of breath.    History provided by:  Patient and EMS personnel   used: No        Review of Systems   Constitutional:  Negative for chills and fever.   HENT:  Negative for congestion, ear pain and sore throat.    Eyes:  Negative for visual disturbance.   Respiratory:  Negative for shortness of breath.    Cardiovascular:  Negative for chest pain.   Gastrointestinal:  Positive for abdominal pain.   Genitourinary:  Negative for difficulty urinating.   Musculoskeletal:  Negative for arthralgias.   Skin:  Negative for rash.   Neurological:  Negative for dizziness, weakness and numbness.   Psychiatric/Behavioral:  Negative for agitation.        Past Medical History:   Diagnosis Date    Atrial fibrillation     COPD (chronic obstructive pulmonary disease)     Dementia     History of lung cancer     Parkinson disease     Pulmonary embolism        No Known Allergies    Past Surgical History:   Procedure Laterality Date    JOINT REPLACEMENT      LUNG CANCER SURGERY         Family History   Problem Relation Age of Onset    Heart disease Mother     Heart disease Father     Aneurysm Father        Social History     Socioeconomic History    Marital status: Legally    Tobacco Use    Smoking status: Former     Current packs/day: 1.00     Average packs/day: 1 pack/day for 40.0 years (40.0 ttl pk-yrs)      Types: Cigarettes    Smokeless tobacco: Never   Vaping Use    Vaping status: Never Used   Substance and Sexual Activity    Alcohol use: Never    Drug use: Never           Objective   Physical Exam  Vitals and nursing note reviewed.   Constitutional:       General: She is not in acute distress.     Appearance: She is not ill-appearing or toxic-appearing.   HENT:      Mouth/Throat:      Pharynx: No posterior oropharyngeal erythema.   Eyes:      Conjunctiva/sclera: Conjunctivae normal.      Pupils: Pupils are equal, round, and reactive to light.   Cardiovascular:      Rate and Rhythm: Normal rate and regular rhythm.   Pulmonary:      Effort: Pulmonary effort is normal. No respiratory distress.   Abdominal:      General: Abdomen is flat. There is no distension.      Palpations: There is no mass.      Tenderness: There is no abdominal tenderness. There is no guarding or rebound.   Musculoskeletal:         General: No deformity. Normal range of motion.   Skin:     General: Skin is warm.      Findings: No rash.   Neurological:      General: No focal deficit present.      Mental Status: She is alert.      Motor: No weakness.         Procedures           ED Course  ED Course as of 03/14/24 0029   Wed Mar 13, 2024   2138 BP: 149/79 [JK]   2138 Heart Rate: 69 [JK]   2138 Resp: 18 [JK]   2138 SpO2: 95 %  Interpretation:  Patient's repeat vitals, telemetry tracing, and pulse oximetry tracing were directly viewed and interpreted by myself.  Normal sinus rhythm [JK]   u Mar 14, 2024   0026 Urinalysis With Microscopic If Indicated (No Culture) - Urine, Clean Catch(!) [JK]   0026 Urinalysis, Microscopic Only - Urine, Clean Catch(!) [JK]   0026 Lipase [JK]   0026 Lactic Acid, Plasma [JK]   0026 CBC & Differential(!) [JK]   0026 Comprehensive Metabolic Panel(!)  Interpretation:  Laboratory studies were reviewed and interpreted directly by myself.  Urinalysis did not show any bacteria, lipase normal, lactic normal, CBC normal, CMP  normal [JK]   0027 CT Abdomen Pelvis Without Contrast  Interpretation:  Imaging was directly visualized by myself, per my interpretations, CT of the abdomen pelvis showed 8 mm mid ureteral stone as well as chronic changes. [JK]   0027 On reevaluation, the patient remains pain-free.  She is tolerating oral intake.  Patient does have evidence of an 8 mm obstructing stone.  I will consult urology for discussion. [JK]   0027 I did speak with Dr. Womack regarding the patient.  Given that there is no evidence in kidney dysfunction or bacteria in the urine, and the patient is pain-free, he feels that the patient is appropriate for outpatient treatment.  I did have discussion with the patient regarding this.  She is agreeable to that.  She would like to proceed with outpatient treatment. [JK]   0027 I spoke with family and notified them about the patient's condition.  They are agreeable with outpatient management.  She will be placed on a short course of analgesics as well as antiemetics.  The need to call in 24 hours to establish follow-up with urology.  Given strict return precautions.  Verbalized understanding. [JK]   0028 Shared decision making:   After full review of the patient's clinical presentation, review of any work-up including but not limited to laboratory studies and radiology obtained, I had a discussion with the patient's family.  Treatment options were discussed as well as the risks, benefits and consequences.  I discussed all findings with the patient's family.  During the discussion, treatment goals were understood by all as well as any misconceptions which were addressed with the patient's family.  Ample time was given for any questions they may have had.  They are in agreement with the treatment plan as well as final disposition.   [JK]      ED Course User Index  [JK] Bolivar Molina MD                                             Medical Decision Making  This is a 83-year-old female with a  history of A-fib and dementia presenting to the emergency department with abdominal discomfort.  Patient apparently has had this discomfort for the last 2 weeks.  At this time she is pain-free.  On exam there is no evidence of acute abdomen or peritonitis.  There is no reproducible tenderness.  Overall she is hemodynamically stable.  Nontoxic.  IV access was established and the patient.  Workup initiated.  Placed on continuous telemetry monitoring.      Differential diagnosis: Peptic ulcer disease, dyspepsia, GERD, pancreatitis, biliary colic, electrolyte abnormality, acute kidney injury, bowel obstruction      Amount and/or Complexity of Data Reviewed  Independent Historian: EMS  External Data Reviewed: labs, radiology, ECG and notes.     Details: External laboratories, imaging as well as notes were reviewed personally by myself.  All relevant studies were used to guide decision making.     Date of previous record: 1/3/2024    Source of note: Admission record    Summary: Patient was admitted for UTI and encephalopathy.  I did review basic laboratory studies on file as well as a previous chest x-ray, CT abdomen pelvis and EKG.  Records reviewed    Labs: ordered. Decision-making details documented in ED Course.  Radiology: ordered and independent interpretation performed. Decision-making details documented in ED Course.    Risk  Prescription drug management.        Final diagnoses:   Kidney stone on right side       ED Disposition  ED Disposition       ED Disposition   Discharge    Condition   Stable    Comment   --               Harshil Paul Jr., MD  [address] [facility] [address] [place] [address]  [phone]    Call in 1 day           Medication List        New Prescriptions      HYDROcodone-acetaminophen 5-325 MG per tablet  Commonly known as: NORCO  Take 1 tablet by mouth Every 8 (Eight) Hours As Needed for Moderate Pain for up to 3 days.     ondansetron ODT 4 MG disintegrating tablet  Commonly  known as: ZOFRAN-ODT  Place 1 tablet on the tongue Every 8 (Eight) Hours As Needed for Nausea or Vomiting for up to 5 days.               Where to Get Your Medications        These medications were sent to [facility] [id number] - [place] - [address] - [phone]  - [phone] FX  [address] [place] [address]      Phone: [phone]   HYDROcodone-acetaminophen 5-325 MG per tablet  ondansetron ODT 4 MG disintegrating tablet            Bolivar Molina MD  03/14/24 0029

## 2024-06-01 ENCOUNTER — APPOINTMENT (OUTPATIENT)
Dept: GENERAL RADIOLOGY | Facility: HOSPITAL | Age: 84
End: 2024-06-01
Payer: MEDICARE

## 2024-06-01 ENCOUNTER — HOSPITAL ENCOUNTER (INPATIENT)
Facility: HOSPITAL | Age: 84
LOS: 4 days | Discharge: HOME HEALTH | End: 2024-06-05
Attending: EMERGENCY MEDICINE | Admitting: INTERNAL MEDICINE
Payer: MEDICARE

## 2024-06-01 DIAGNOSIS — R44.1 VISUAL HALLUCINATIONS: ICD-10-CM

## 2024-06-01 DIAGNOSIS — S42.295A OTHER CLOSED NONDISPLACED FRACTURE OF PROXIMAL END OF LEFT HUMERUS, INITIAL ENCOUNTER: ICD-10-CM

## 2024-06-01 DIAGNOSIS — N39.0 ACUTE UTI: Primary | ICD-10-CM

## 2024-06-01 DIAGNOSIS — R79.89 ELEVATED LACTIC ACID LEVEL: ICD-10-CM

## 2024-06-01 DIAGNOSIS — S22.42XA CLOSED FRACTURE OF MULTIPLE RIBS OF LEFT SIDE, INITIAL ENCOUNTER: ICD-10-CM

## 2024-06-01 DIAGNOSIS — R79.89 ELEVATED TROPONIN: ICD-10-CM

## 2024-06-01 DIAGNOSIS — R53.1 GENERALIZED WEAKNESS: ICD-10-CM
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
## 2024-06-01 DIAGNOSIS — Z91.81 AT HIGH RISK FOR FALLS: ICD-10-CM

## 2024-06-01 LAB
ALBUMIN SERPL-MCNC: 3.7 G/DL (ref 3.5–5.2)
ALBUMIN/GLOB SERPL: 1.4 G/DL
ALP SERPL-CCNC: 96 U/L (ref 39–117)
ALT SERPL W P-5'-P-CCNC: <5 U/L (ref 1–33)
ANION GAP SERPL CALCULATED.3IONS-SCNC: 11 MMOL/L (ref 5–15)
AST SERPL-CCNC: 15 U/L (ref 1–32)
BACTERIA UR QL AUTO: ABNORMAL /HPF
BASOPHILS # BLD AUTO: 0.06 10*3/MM3 (ref 0–0.2)
BASOPHILS NFR BLD AUTO: 0.8 % (ref 0–1.5)
BILIRUB SERPL-MCNC: 0.5 MG/DL (ref 0–1.2)
BILIRUB UR QL STRIP: NEGATIVE
BUN SERPL-MCNC: 18 MG/DL (ref 8–23)
BUN/CREAT SERPL: 25 (ref 7–25)
CALCIUM SPEC-SCNC: 9.2 MG/DL (ref 8.6–10.5)
CHLORIDE SERPL-SCNC: 103 MMOL/L (ref 98–107)
CLARITY UR: ABNORMAL
CO2 SERPL-SCNC: 26 MMOL/L (ref 22–29)
COLOR UR: YELLOW
CREAT SERPL-MCNC: 0.72 MG/DL (ref 0.57–1)
D-LACTATE SERPL-SCNC: 1.7 MMOL/L (ref 0.5–2)
D-LACTATE SERPL-SCNC: 2.1 MMOL/L (ref 0.5–2)
DEPRECATED RDW RBC AUTO: 50.4 FL (ref 37–54)
EGFRCR SERPLBLD CKD-EPI 2021: 82.1 ML/MIN/1.73
EOSINOPHIL # BLD AUTO: 0.08 10*3/MM3 (ref 0–0.4)
EOSINOPHIL NFR BLD AUTO: 1.1 % (ref 0.3–6.2)
ERYTHROCYTE [DISTWIDTH] IN BLOOD BY AUTOMATED COUNT: 15.2 % (ref 12.3–15.4)
GLOBULIN UR ELPH-MCNC: 2.7 GM/DL
GLUCOSE SERPL-MCNC: 90 MG/DL (ref 65–99)
GLUCOSE UR STRIP-MCNC: NEGATIVE MG/DL
HCT VFR BLD AUTO: 33.4 % (ref 34–46.6)
HGB BLD-MCNC: 10.2 G/DL (ref 12–15.9)
HGB UR QL STRIP.AUTO: ABNORMAL
HOLD SPECIMEN: NORMAL
HYALINE CASTS UR QL AUTO: ABNORMAL /LPF
IMM GRANULOCYTES # BLD AUTO: 0.02 10*3/MM3 (ref 0–0.05)
IMM GRANULOCYTES NFR BLD AUTO: 0.3 % (ref 0–0.5)
KETONES UR QL STRIP: NEGATIVE
LEUKOCYTE ESTERASE UR QL STRIP.AUTO: ABNORMAL
LYMPHOCYTES # BLD AUTO: 1.01 10*3/MM3 (ref 0.7–3.1)
LYMPHOCYTES NFR BLD AUTO: 13.3 % (ref 19.6–45.3)
MAGNESIUM SERPL-MCNC: 2 MG/DL (ref 1.6–2.4)
MCH RBC QN AUTO: 27.6 PG (ref 26.6–33)
MCHC RBC AUTO-ENTMCNC: 30.5 G/DL (ref 31.5–35.7)
MCV RBC AUTO: 90.3 FL (ref 79–97)
MONOCYTES # BLD AUTO: 0.49 10*3/MM3 (ref 0.1–0.9)
MONOCYTES NFR BLD AUTO: 6.4 % (ref 5–12)
NEUTROPHILS NFR BLD AUTO: 5.95 10*3/MM3 (ref 1.7–7)
NEUTROPHILS NFR BLD AUTO: 78.1 % (ref 42.7–76)
NITRITE UR QL STRIP: POSITIVE
NRBC BLD AUTO-RTO: 0 /100 WBC (ref 0–0.2)
PH UR STRIP.AUTO: 6.5 [PH] (ref 5–8)
PLATELET # BLD AUTO: 253 10*3/MM3 (ref 140–450)
PMV BLD AUTO: 10.3 FL (ref 6–12)
POTASSIUM SERPL-SCNC: 4.1 MMOL/L (ref 3.5–5.2)
PROT SERPL-MCNC: 6.4 G/DL (ref 6–8.5)
PROT UR QL STRIP: ABNORMAL
RBC # BLD AUTO: 3.7 10*6/MM3 (ref 3.77–5.28)
RBC # UR STRIP: ABNORMAL /HPF
REF LAB TEST METHOD: ABNORMAL
RENAL EPI CELLS #/AREA URNS HPF: ABNORMAL /HPF
SODIUM SERPL-SCNC: 140 MMOL/L (ref 136–145)
SP GR UR STRIP: 1.01 (ref 1–1.03)
SQUAMOUS #/AREA URNS HPF: ABNORMAL /HPF
TROPONIN T SERPL HS-MCNC: 16 NG/L
UROBILINOGEN UR QL STRIP: ABNORMAL
WBC # UR STRIP: ABNORMAL /HPF
WBC NRBC COR # BLD AUTO: 7.61 10*3/MM3 (ref 3.4–10.8)
WHOLE BLOOD HOLD COAG: NORMAL
WHOLE BLOOD HOLD SPECIMEN: NORMAL

## 2024-06-01 PROCEDURE — 80053 COMPREHEN METABOLIC PANEL: CPT | Performed by: EMERGENCY MEDICINE

## 2024-06-01 PROCEDURE — 25010000002 CEFTRIAXONE PER 250 MG: Performed by: INTERNAL MEDICINE

## 2024-06-01 PROCEDURE — 87040 BLOOD CULTURE FOR BACTERIA: CPT | Performed by: EMERGENCY MEDICINE

## 2024-06-01 PROCEDURE — 93010 ELECTROCARDIOGRAM REPORT: CPT | Performed by: INTERNAL MEDICINE

## 2024-06-01 PROCEDURE — 83605 ASSAY OF LACTIC ACID: CPT | Performed by: EMERGENCY MEDICINE

## 2024-06-01 PROCEDURE — 73030 X-RAY EXAM OF SHOULDER: CPT

## 2024-06-01 PROCEDURE — 99223 1ST HOSP IP/OBS HIGH 75: CPT | Performed by: INTERNAL MEDICINE

## 2024-06-01 PROCEDURE — 87186 SC STD MICRODIL/AGAR DIL: CPT | Performed by: EMERGENCY MEDICINE

## 2024-06-01 PROCEDURE — 87088 URINE BACTERIA CULTURE: CPT | Performed by: EMERGENCY MEDICINE

## 2024-06-01 PROCEDURE — 73060 X-RAY EXAM OF HUMERUS: CPT

## 2024-06-01 PROCEDURE — 71045 X-RAY EXAM CHEST 1 VIEW: CPT

## 2024-06-01 PROCEDURE — 25810000003 SODIUM CHLORIDE 0.9 % SOLUTION: Performed by: EMERGENCY MEDICINE

## 2024-06-01 PROCEDURE — 81001 URINALYSIS AUTO W/SCOPE: CPT | Performed by: EMERGENCY MEDICINE

## 2024-06-01 PROCEDURE — 83735 ASSAY OF MAGNESIUM: CPT | Performed by: EMERGENCY MEDICINE

## 2024-06-01 PROCEDURE — 36415 COLL VENOUS BLD VENIPUNCTURE: CPT

## 2024-06-01 PROCEDURE — 87086 URINE CULTURE/COLONY COUNT: CPT | Performed by: EMERGENCY MEDICINE

## 2024-06-01 PROCEDURE — 25010000002 CEFTRIAXONE PER 250 MG: Performed by: EMERGENCY MEDICINE

## 2024-06-01 PROCEDURE — 85025 COMPLETE CBC W/AUTO DIFF WBC: CPT | Performed by: EMERGENCY MEDICINE

## 2024-06-01 PROCEDURE — 99285 EMERGENCY DEPT VISIT HI MDM: CPT

## 2024-06-01 PROCEDURE — 93005 ELECTROCARDIOGRAM TRACING: CPT | Performed by: EMERGENCY MEDICINE

## 2024-06-01 PROCEDURE — 84484 ASSAY OF TROPONIN QUANT: CPT | Performed by: EMERGENCY MEDICINE

## 2024-06-01 PROCEDURE — 73080 X-RAY EXAM OF ELBOW: CPT

## 2024-06-01 PROCEDURE — P9612 CATHETERIZE FOR URINE SPEC: HCPCS

## 2024-06-01 RX ORDER — SODIUM CHLORIDE 0.9 % (FLUSH) 0.9 %
10 SYRINGE (ML) INJECTION AS NEEDED
Status: DISCONTINUED | OUTPATIENT
Start: 2024-06-01 | End: 2024-06-05 | Stop reason: HOSPADM

## 2024-06-01 RX ORDER — ONDANSETRON 4 MG/1
4 TABLET, ORALLY DISINTEGRATING ORAL EVERY 6 HOURS PRN
Status: DISCONTINUED | OUTPATIENT
Start: 2024-06-01 | End: 2024-06-05 | Stop reason: HOSPADM

## 2024-06-01 RX ORDER — SODIUM CHLORIDE 9 MG/ML
40 INJECTION, SOLUTION INTRAVENOUS AS NEEDED
Status: DISCONTINUED | OUTPATIENT
Start: 2024-06-01 | End: 2024-06-05 | Stop reason: HOSPADM

## 2024-06-01 RX ORDER — BISACODYL 10 MG
10 SUPPOSITORY, RECTAL RECTAL DAILY PRN
Status: DISCONTINUED | OUTPATIENT
Start: 2024-06-01 | End: 2024-06-05 | Stop reason: HOSPADM

## 2024-06-01 RX ORDER — ACETAMINOPHEN 325 MG/1
650 TABLET ORAL EVERY 4 HOURS PRN
Status: DISCONTINUED | OUTPATIENT
Start: 2024-06-01 | End: 2024-06-05 | Stop reason: HOSPADM

## 2024-06-01 RX ORDER — AMOXICILLIN 250 MG
2 CAPSULE ORAL 2 TIMES DAILY PRN
Status: DISCONTINUED | OUTPATIENT
Start: 2024-06-01 | End: 2024-06-05 | Stop reason: HOSPADM

## 2024-06-01 RX ORDER — SODIUM CHLORIDE 0.9 % (FLUSH) 0.9 %
10 SYRINGE (ML) INJECTION EVERY 12 HOURS SCHEDULED
Status: DISCONTINUED | OUTPATIENT
Start: 2024-06-01 | End: 2024-06-05 | Stop reason: HOSPADM

## 2024-06-01 RX ORDER — FLUTICASONE PROPIONATE 50 MCG
2 SPRAY, SUSPENSION (ML) NASAL DAILY PRN
Status: DISCONTINUED | OUTPATIENT
Start: 2024-06-01 | End: 2024-06-05 | Stop reason: HOSPADM

## 2024-06-01 RX ORDER — BISACODYL 5 MG/1
5 TABLET, DELAYED RELEASE ORAL DAILY PRN
Status: DISCONTINUED | OUTPATIENT
Start: 2024-06-01 | End: 2024-06-05 | Stop reason: HOSPADM

## 2024-06-01 RX ORDER — POLYETHYLENE GLYCOL 3350 17 G/17G
17 POWDER, FOR SOLUTION ORAL DAILY PRN
Status: DISCONTINUED | OUTPATIENT
Start: 2024-06-01 | End: 2024-06-05 | Stop reason: HOSPADM

## 2024-06-01 RX ORDER — ONDANSETRON 2 MG/ML
4 INJECTION INTRAMUSCULAR; INTRAVENOUS EVERY 6 HOURS PRN
Status: DISCONTINUED | OUTPATIENT
Start: 2024-06-01 | End: 2024-06-05 | Stop reason: HOSPADM

## 2024-06-01 RX ADMIN — Medication 10 ML: at 20:34

## 2024-06-01 RX ADMIN — SODIUM CHLORIDE 1000 MG: 900 INJECTION INTRAVENOUS at 18:17

## 2024-06-01 RX ADMIN — CARBIDOPA AND LEVODOPA 1 TABLET: 25; 100 TABLET ORAL at 20:34

## 2024-06-01 RX ADMIN — SODIUM CHLORIDE 500 ML: 9 INJECTION, SOLUTION INTRAVENOUS at 14:47

## 2024-06-01 RX ADMIN — SODIUM CHLORIDE 1000 MG: 900 INJECTION INTRAVENOUS at 14:10

## 2024-06-01 RX ADMIN — CARBIDOPA AND LEVODOPA 1 TABLET: 25; 100 TABLET ORAL at 18:18

## 2024-06-01 RX ADMIN — ACETAMINOPHEN 650 MG: 325 TABLET ORAL at 20:30

## 2024-06-01 NOTE — H&P
[facility] Medicine Services  HISTORY AND PHYSICAL    Patient Name: Fanny Boyce  : 1940  MRN: [id number]  Primary Care Physician: Nemo Aponte PA  Date of admission: 2024      Subjective   Subjective     Chief Complaint:  Hallucinations/weakness/fall    HPI:  Fanny Boyce is a 83 y.o. female with h/o UTI's, COPD, parkinson's disease, afib on eliquis presents d/t hallucinations, weakness and fall today.  Hx is limited d/t mild confusion.  Xrays revealed acute impacted left humerus fracture, and acute mildly displaced fracture of the left 9th rib and anterior left 10th rib.  Currently patient only c/o left arm pain.        Personal History     Past Medical History:   Diagnosis Date    Atrial fibrillation     COPD (chronic obstructive pulmonary disease)     Dementia     History of lung cancer     Parkinson disease     Pulmonary embolism            Past Surgical History:   Procedure Laterality Date    JOINT REPLACEMENT      LUNG CANCER SURGERY         Family History: family history includes Aneurysm in her father; Heart disease in her father and mother.     Social History:  reports that she has quit smoking. Her smoking use included cigarettes. She has a 40 pack-year smoking history. She has never used smokeless tobacco. She reports that she does not drink alcohol and does not use drugs.  Social History     Social History Narrative    Not on file       Medications:  Available home medication information reviewed.  apixaban, carbidopa-levodopa, fluticasone, metoprolol tartrate, and traZODone    No Known Allergies    Objective   Objective     Vital Signs:   Temp:  [98.2 °F (36.8 °C)] 98.2 °F (36.8 °C)  Heart Rate:  [72-83] 72  Resp:  [18] 18  BP: ()/(51-72) 126/65       Physical Exam   Constitutional: Awake, alert  Eyes: PERRLA, sclerae anicteric, no conjunctival injection  HENT: NCAT, mucous membranes moist  Neck: Supple, no thyromegaly, no lymphadenopathy, trachea  midline  Respiratory: Clear to auscultation bilaterally, nonlabored respirations   Cardiovascular: irreg/irreg, no murmurs, rubs, or gallops, palpable pedal pulses bilaterally  Gastrointestinal: Positive bowel sounds, soft, nontender, nondistended  Musculoskeletal: No bilateral ankle edema, no clubbing or cyanosis to extremities.  LUE with ecchymosis and swelling  Psychiatric: Appropriate affect, cooperative  Neurologic: Oriented x 2 (not date), moves all extremities, Cranial Nerves grossly intact to confrontation, speech clear  Skin: No rashes      Result Review:  I have personally reviewed the results from the time of this admission to 6/1/2024 15:38 EDT and agree with these findings:  [x]  Laboratory list / accordion  [x]  Microbiology  [x]  Radiology  [x]  EKG/Telemetry   []  Cardiology/Vascular   []  Pathology  []  Old records  []  Other:  Most notable findings include:       LAB RESULTS:      Lab 06/01/24  1149   WBC 7.61   HEMOGLOBIN 10.2*   HEMATOCRIT 33.4*   PLATELETS 253   NEUTROS ABS 5.95   IMMATURE GRANS (ABS) 0.02   LYMPHS ABS 1.01   MONOS ABS 0.49   EOS ABS 0.08   MCV 90.3   LACTATE 2.1*         Lab 06/01/24  1149   SODIUM 140   POTASSIUM 4.1   CHLORIDE 103   CO2 26.0   ANION GAP 11.0   BUN 18   CREATININE 0.72   EGFR 82.1   GLUCOSE 90   CALCIUM 9.2   MAGNESIUM 2.0         Lab 06/01/24  1149   TOTAL PROTEIN 6.4   ALBUMIN 3.7   GLOBULIN 2.7   ALT (SGPT) <5   AST (SGOT) 15   BILIRUBIN 0.5   ALK PHOS 96         Lab 06/01/24  1220   HSTROP T 16*                 UA          1/31/2024    09:07 3/13/2024    22:54 6/1/2024    11:57   Urinalysis   Squamous Epithelial Cells, UA 0-2  0-2  0-2    Specific Gravity, UA 1.014  1.011  1.013    Ketones, UA Negative  Negative  Negative    Blood, UA Moderate (2+)  Trace  Small (1+)    Leukocytes, UA Large (3+)  Trace  Large (3+)    Nitrite, UA Positive  Negative  Positive    RBC, UA Too Numerous to Count  6-10  11-20    WBC, UA Too Numerous to Count  6-10  Too Numerous  to Count    Bacteria, UA 2+  None Seen  4+        Microbiology Results (last 10 days)       ** No results found for the last 240 hours. **            XR Chest 1 View    Result Date: 6/1/2024  XR CHEST 1 VW Date of Exam: 6/1/2024 12:09 PM EDT Indication: Weak/Dizzy/AMS triage protocol Comparison: 1/31/2024 Findings: No focal consolidation. No pneumothorax or pleural effusion. Cardiac size is normal. The visualized clavicles appear intact. No displaced rib fractures. The visualized upper abdomen is normal.     Impression: Impression: No acute cardiopulmonary disease. Electronically Signed: Aristeo Greene MD  6/1/2024 12:52 PM EDT  Workstation ID: [id number]    XR Shoulder 2+ View Left    Result Date: 6/1/2024  XR SHOULDER 2+ VW LEFT, XR ELBOW 3+ VW LEFT, XR HUMERUS LEFT Date of Exam: 6/1/2024 12:10 PM EDT Indication: trauma Comparison: Chest x-ray 1/31/2024 Findings: Left shoulder: There is an acute fracture of the proximal humerus with impaction at the surgical neck and poorly visualized greater tuberosity fracture involvement. There is approximately 1.7 cm of impaction at the surgical neck. The humeral head maintains articulation with the glenoid. No additional acute fractures are seen at the shoulder. The acromioclavicular joint is congruent. The bones are demineralized. There there is an acute segmental fracture of the left ninth rib with fracture at the posterior and anterolateral rib. There is an acute mildly displaced fracture of the anterolateral left 10th rib. Left humerus: The mid and distal humeral shaft appears normal. Left elbow: No definite soft tissue swelling. No evidence of elbow joint effusion. Joint spaces are preserved. No acute fracture or malalignment.     Impression: Impression: Acute impacted proximal humeral fracture. Acute mildly displaced segmental fracture of the left ninth rib and fracture of the anterior left 10th rib. Electronically Signed: Royer Quinetro MD  6/1/2024 12:49 PM EDT   Workstation ID: [id number]    XR Humerus Left    Result Date: 6/1/2024  XR SHOULDER 2+ VW LEFT, XR ELBOW 3+ VW LEFT, XR HUMERUS LEFT Date of Exam: 6/1/2024 12:10 PM EDT Indication: trauma Comparison: Chest x-ray 1/31/2024 Findings: Left shoulder: There is an acute fracture of the proximal humerus with impaction at the surgical neck and poorly visualized greater tuberosity fracture involvement. There is approximately 1.7 cm of impaction at the surgical neck. The humeral head maintains articulation with the glenoid. No additional acute fractures are seen at the shoulder. The acromioclavicular joint is congruent. The bones are demineralized. There there is an acute segmental fracture of the left ninth rib with fracture at the posterior and anterolateral rib. There is an acute mildly displaced fracture of the anterolateral left 10th rib. Left humerus: The mid and distal humeral shaft appears normal. Left elbow: No definite soft tissue swelling. No evidence of elbow joint effusion. Joint spaces are preserved. No acute fracture or malalignment.     Impression: Impression: Acute impacted proximal humeral fracture. Acute mildly displaced segmental fracture of the left ninth rib and fracture of the anterior left 10th rib. Electronically Signed: Royer Quintero MD  6/1/2024 12:49 PM EDT  Workstation ID: [id number]    XR Elbow 3+ View Left    Result Date: 6/1/2024  XR SHOULDER 2+ VW LEFT, XR ELBOW 3+ VW LEFT, XR HUMERUS LEFT Date of Exam: 6/1/2024 12:10 PM EDT Indication: trauma Comparison: Chest x-ray 1/31/2024 Findings: Left shoulder: There is an acute fracture of the proximal humerus with impaction at the surgical neck and poorly visualized greater tuberosity fracture involvement. There is approximately 1.7 cm of impaction at the surgical neck. The humeral head maintains articulation with the glenoid. No additional acute fractures are seen at the shoulder. The acromioclavicular joint is congruent. The bones are demineralized.  There there is an acute segmental fracture of the left ninth rib with fracture at the posterior and anterolateral rib. There is an acute mildly displaced fracture of the anterolateral left 10th rib. Left humerus: The mid and distal humeral shaft appears normal. Left elbow: No definite soft tissue swelling. No evidence of elbow joint effusion. Joint spaces are preserved. No acute fracture or malalignment.     Impression: Impression: Acute impacted proximal humeral fracture. Acute mildly displaced segmental fracture of the left ninth rib and fracture of the anterior left 10th rib. Electronically Signed: Royer Quintero MD  6/1/2024 12:49 PM EDT  Workstation ID: [id number]         Assessment & Plan   Assessment & Plan       UTI (urinary tract infection)    Atrial fibrillation    COPD (chronic obstructive pulmonary disease)    Dementia    Parkinson's disease    Fanny Boyce is a 83 y.o. female with h/o UTI's, COPD, parkinson's disease, afib on eliquis presents d/t hallucinations, weakness and fall today found to have UTI and left humerus fracture    Fall  UTI  --treat with rocephin.  Follow cultures  --with fall and on eliquis and some mild confusion (may be 2nd to UTI), will order CT head    Left Humerus fracture  --consult Ortho  --hold home eliquis in case of surgery and NPO after MN until seen by ortho    Left 9th/anterior 10th rib fracture    COPD    Parkinson's Disease  --continue sinemet    Afib  --hold eliquis in case need for surgery  --continue metoprolol      Attempted to call daughter, Lety Lewis but was unsuccessful in reaching.       DVT prophylaxis:  Mechanical DVT prophylaxis orders are signed and held.            CODE STATUS:    Code Status and Medical Interventions:   Ordered at: 06/01/24 1537     Medical Intervention Limits:    NO intubation (DNI)     Level Of Support Discussed With:    Patient     Code Status (Patient has no pulse and is not breathing):    No CPR (Do Not Attempt to  Resuscitate)     Medical Interventions (Patient has pulse or is breathing):    Limited Support       Expected Discharge   Expected discharge date/ time has not been documented.     Yusef Arshad MD  06/01/24

## 2024-06-01 NOTE — Clinical Note
Level of Care: Telemetry [5]   Diagnosis: UTI (urinary tract infection) [075597]   Admitting Physician: DEBBIE WALTON [[id number]]   Attending Physician: DEBBIE WALTON [[id number]]   Certification: I Certify That Inpatient Hospital Services Are Medically Necessary For Greater Than 2 Midnights

## 2024-06-01 NOTE — CONSULTS
Patient: Fanny Boyce    Date of Admission: 6/1/2024 11:20 AM    YOB: 1940    Medical Record Number: [id number]    Attending Physician: Yusef Arshad MD    Primary care physician: STEW Shukla    Consulting Physician: Reggie Harrington MD      Chief Complaints: Left shoulder pain      History of Present Illness: Monica is a pleasant 83-year-old female who fell on Monday onto her left side.  She complains of left shoulder and chest wall pain.  I spoke with her daughter over the phone who she lives with.  She notes they called EMS out to the house a couple of times but they did not think it was serious enough at that time to take her to the hospital earlier this week.  She was brought to [facility] today when her daughter noticed her urine was cloudy and smelled and she was concerned  for a UTI in addition to persistent left arm pain and bruising.  Upon my evaluation of the patient in her room she is sitting up in bed eating dinner and has an isolated complaint of left shoulder pain.  She appears comfortable.     No Known Allergies     Home Medications:  Medications Prior to Admission   Medication Sig Dispense Refill Last Dose    apixaban (ELIQUIS) 2.5 MG tablet tablet Take 1 tablet by mouth 2 (Two) Times a Day.   6/1/2024    carbidopa-levodopa (SINEMET)  MG per tablet Take 1 tablet by mouth 3 (Three) Times a Day.   6/1/2024    metoprolol tartrate (LOPRESSOR) 25 MG tablet Take 0.5 tablets by mouth 2 (Two) Times a Day.   6/1/2024    traZODone (DESYREL) 50 MG tablet Take 2 tablets by mouth Every Night.   6/1/2024    fluticasone (FLONASE) 50 MCG/ACT nasal spray 2 sprays into the nostril(s) as directed by provider Daily As Needed for Rhinitis or Allergies.          Current Medications:  Scheduled Meds:carbidopa-levodopa, 1 tablet, Oral, TID  cefTRIAXone, 1,000 mg, Intravenous, Once  [START ON 6/2/2024] cefTRIAXone, 2,000 mg, Intravenous, Q24H  [START ON 6/2/2024] metoprolol tartrate, 25  mg, Oral, QAM  sodium chloride, 10 mL, Intravenous, Q12H      Continuous Infusions:   PRN Meds:.  acetaminophen    senna-docusate sodium **AND** polyethylene glycol **AND** bisacodyl **AND** bisacodyl    fluticasone    ondansetron ODT **OR** ondansetron    sodium chloride    sodium chloride    sodium chloride    Past Medical History:   Diagnosis Date    Atrial fibrillation     COPD (chronic obstructive pulmonary disease)     Dementia     History of lung cancer     Parkinson disease     Pulmonary embolism         Past Surgical History:   Procedure Laterality Date    JOINT REPLACEMENT      LUNG CANCER SURGERY          Social History     Occupational History    Not on file   Tobacco Use    Smoking status: Former     Current packs/day: 1.00     Average packs/day: 1 pack/day for 40.0 years (40.0 ttl pk-yrs)     Types: Cigarettes    Smokeless tobacco: Never   Vaping Use    Vaping status: Never Used   Substance and Sexual Activity    Alcohol use: Never    Drug use: Never    Sexual activity: Not Currently      Social History     Social History Narrative    Not on file        Family History   Problem Relation Age of Onset    Heart disease Mother     Heart disease Father     Aneurysm Father          Review of Systems:     Musculoskeletal: Left shoulder pain.  No numbness or tingling.  Otherwise negative or noncontributory to the orthopedic exam.    Physical Exam: 83 y.o. female  General Appearance:    Alert, cooperative, in no acute distress                   Vitals:    06/01/24 1521 06/01/24 1526 06/01/24 1531 06/01/24 1641   BP:    131/76   BP Location:    Right arm   Patient Position:    Lying   Pulse: 76 73 72    Resp:    18   Temp:    98.5 °F (36.9 °C)   TempSrc:    Oral   SpO2: 94% 94% 94%    Weight:       Height:            Extremities:  Left shoulder skin is intact with no deformity  Left arm ecchymosis to the elbow, compartments soft  Sensation intact lateral shoulder and arm  Neurovascular intact  distally      Diagnostic Tests:    Admission on 06/01/2024   Component Date Value Ref Range Status    QT Interval 06/01/2024 396  ms Preliminary    QTC Interval 06/01/2024 456  ms Preliminary    Glucose 06/01/2024 90  65 - 99 mg/dL Final    BUN 06/01/2024 18  8 - 23 mg/dL Final    Creatinine 06/01/2024 0.72  0.57 - 1.00 mg/dL Final    Sodium 06/01/2024 140  136 - 145 mmol/L Final    Potassium 06/01/2024 4.1  3.5 - 5.2 mmol/L Final    Chloride 06/01/2024 103  98 - 107 mmol/L Final    CO2 06/01/2024 26.0  22.0 - 29.0 mmol/L Final    Calcium 06/01/2024 9.2  8.6 - 10.5 mg/dL Final    Total Protein 06/01/2024 6.4  6.0 - 8.5 g/dL Final    Albumin 06/01/2024 3.7  3.5 - 5.2 g/dL Final    ALT (SGPT) 06/01/2024 <5  1 - 33 U/L Final    AST (SGOT) 06/01/2024 15  1 - 32 U/L Final    Alkaline Phosphatase 06/01/2024 96  39 - 117 U/L Final    Total Bilirubin 06/01/2024 0.5  0.0 - 1.2 mg/dL Final    Globulin 06/01/2024 2.7  gm/dL Final    Calculated Result    A/G Ratio 06/01/2024 1.4  g/dL Final    BUN/Creatinine Ratio 06/01/2024 25.0  7.0 - 25.0 Final    Anion Gap 06/01/2024 11.0  5.0 - 15.0 mmol/L Final    eGFR 06/01/2024 82.1  >60.0 mL/min/1.73 Final    HS Troponin T 06/01/2024 16 (H)  <14 ng/L Final    Magnesium 06/01/2024 2.0  1.6 - 2.4 mg/dL Final    Extra Tube 06/01/2024 Hold for add-ons.   Final    Auto resulted.    Extra Tube 06/01/2024 hold for add-on   Final    Auto resulted    Extra Tube 06/01/2024 Hold for add-ons.   Final    Auto resulted.    Extra Tube 06/01/2024 Hold for add-ons.   Final    Auto resulted.    Extra Tube 06/01/2024 Hold for add-ons.   Final    Auto resulted    WBC 06/01/2024 7.61  3.40 - 10.80 10*3/mm3 Final    RBC 06/01/2024 3.70 (L)  3.77 - 5.28 10*6/mm3 Final    Hemoglobin 06/01/2024 10.2 (L)  12.0 - 15.9 g/dL Final    Hematocrit 06/01/2024 33.4 (L)  34.0 - 46.6 % Final    MCV 06/01/2024 90.3  79.0 - 97.0 fL Final    MCH 06/01/2024 27.6  26.6 - 33.0 pg Final    MCHC 06/01/2024 30.5 (L)  31.5 - 35.7  g/dL Final    RDW 06/01/2024 15.2  12.3 - 15.4 % Final    RDW-SD 06/01/2024 50.4  37.0 - 54.0 fl Final    MPV 06/01/2024 10.3  6.0 - 12.0 fL Final    Platelets 06/01/2024 253  140 - 450 10*3/mm3 Final    Neutrophil % 06/01/2024 78.1 (H)  42.7 - 76.0 % Final    Lymphocyte % 06/01/2024 13.3 (L)  19.6 - 45.3 % Final    Monocyte % 06/01/2024 6.4  5.0 - 12.0 % Final    Eosinophil % 06/01/2024 1.1  0.3 - 6.2 % Final    Basophil % 06/01/2024 0.8  0.0 - 1.5 % Final    Immature Grans % 06/01/2024 0.3  0.0 - 0.5 % Final    Neutrophils, Absolute 06/01/2024 5.95  1.70 - 7.00 10*3/mm3 Final    Lymphocytes, Absolute 06/01/2024 1.01  0.70 - 3.10 10*3/mm3 Final    Monocytes, Absolute 06/01/2024 0.49  0.10 - 0.90 10*3/mm3 Final    Eosinophils, Absolute 06/01/2024 0.08  0.00 - 0.40 10*3/mm3 Final    Basophils, Absolute 06/01/2024 0.06  0.00 - 0.20 10*3/mm3 Final    Immature Grans, Absolute 06/01/2024 0.02  0.00 - 0.05 10*3/mm3 Final    nRBC 06/01/2024 0.0  0.0 - 0.2 /100 WBC Final    Color, UA 06/01/2024 Yellow  Yellow, Straw Final    Appearance, UA 06/01/2024 Turbid (A)  Clear Final    pH, UA 06/01/2024 6.5  5.0 - 8.0 Final    Specific Gravity, UA 06/01/2024 1.013  1.001 - 1.030 Final    Glucose, UA 06/01/2024 Negative  Negative Final    Ketones, UA 06/01/2024 Negative  Negative Final    Bilirubin, UA 06/01/2024 Negative  Negative Final    Blood, UA 06/01/2024 Small (1+) (A)  Negative Final    Protein, UA 06/01/2024 30 mg/dL (1+) (A)  Negative Final    Leuk Esterase, UA 06/01/2024 Large (3+) (A)  Negative Final    Nitrite, UA 06/01/2024 Positive (A)  Negative Final    Urobilinogen, UA 06/01/2024 0.2 E.U./dL  0.2 - 1.0 E.U./dL Final    RBC, UA 06/01/2024 11-20 (A)  None Seen, 0-2 /HPF Final    WBC, UA 06/01/2024 Too Numerous to Count (A)  None Seen, 0-2 /HPF Final    Bacteria, UA 06/01/2024 4+ (A)  None Seen, Trace /HPF Final    Squamous Epithelial Cells, UA 06/01/2024 0-2  None Seen, 0-2 /HPF Final    Renal Epithelial Cells, UA  06/01/2024 7-12 (A)  0 - 2 /HPF Final    Hyaline Casts, UA 06/01/2024 7-12  0 - 6 /LPF Final    Methodology 06/01/2024 Manual Light Microscopy   Final    Lactate 06/01/2024 2.1 (C)  0.5 - 2.0 mmol/L Final    Falsely depressed results may occur on samples drawn from patients receiving N-Acetylcysteine (NAC) or Metamizole.    Lactate 06/01/2024 1.7  0.5 - 2.0 mmol/L Final    Falsely depressed results may occur on samples drawn from patients receiving N-Acetylcysteine (NAC) or Metamizole.       Left shoulder and humerus x-rays reveal impacted proximal humerus fracture.  Also left ninth and 10th rib fractures      Assessment: 83-year-old female with left proximal humerus fracture  Patient Active Problem List   Diagnosis    UTI (urinary tract infection)    Atrial fibrillation    COPD (chronic obstructive pulmonary disease)    Dementia    Parkinson's disease           Plan:  The patient voiced understanding of the risks, benefits, and alternative forms of treatment that were discussed and the patient consents to proceed with conservative nonoperative management of the left proximal humerus fracture.   -I reviewed the x-ray findings with Monica as well as with her daughter over the phone.  She has an impacted left proximal humerus fracture that should not require surgery.  Recommend regular sling immobilization.  PT and OT for elbow, wrist and hand exercises along with pendulum swings.  No active left shoulder motion.  -Admitted to the hospitalist service for medical management and UTI treatment.  -Recommend follow-up in the office in 2 weeks for repeat clinical check with x-rays.  -I will sign off for now, please call with any questions or concerns.  Thank you very much for calling, this patient was a pleasure evaluate and treat.      Discharge Plan: TBD      Date: 6/1/2024    Reggie Harrington MD

## 2024-06-01 NOTE — ED PROVIDER NOTES
Subjective   History of Present Illness  Patient is a pleasant 83-year-old female sent to the emergency department by her daughter who she lives with at home.  History is per EMS and his daughter is not present.  She to EMS that 2 days ago the patient fell and injured her left upper extremity.  The cause of the fall was not clear.  Patient states that she walked into the bedroom and then fell to the floor without a clear cause.  The daughter states that over the past day she has noticed her urine having a foul odor and being more concentrated in this coupled with the fall made her I suspect the patient may have a urinary tract infection.      Review of Systems   All other systems reviewed and are negative.      Past Medical History:   Diagnosis Date    Atrial fibrillation     COPD (chronic obstructive pulmonary disease)     Dementia     History of lung cancer     Parkinson disease     Pulmonary embolism        No Known Allergies    Past Surgical History:   Procedure Laterality Date    JOINT REPLACEMENT      LUNG CANCER SURGERY         Family History   Problem Relation Age of Onset    Heart disease Mother     Heart disease Father     Aneurysm Father        Social History     Socioeconomic History    Marital status: Legally    Tobacco Use    Smoking status: Former     Current packs/day: 1.00     Average packs/day: 1 pack/day for 40.0 years (40.0 ttl pk-yrs)     Types: Cigarettes    Smokeless tobacco: Never   Vaping Use    Vaping status: Never Used   Substance and Sexual Activity    Alcohol use: Never    Drug use: Never    Sexual activity: Not Currently           Objective   Physical Exam  Vitals and nursing note reviewed.   Constitutional:       General: She is not in acute distress.  HENT:      Head: Normocephalic and atraumatic.   Eyes:      Conjunctiva/sclera: Conjunctivae normal.      Pupils: Pupils are equal, round, and reactive to light.   Neck:      Thyroid: No thyromegaly.   Cardiovascular:      Rate  and Rhythm: Normal rate and regular rhythm.      Heart sounds: Normal heart sounds. No murmur heard.     No friction rub. No gallop.   Pulmonary:      Effort: Pulmonary effort is normal. No respiratory distress.      Breath sounds: Normal breath sounds.   Abdominal:      General: Bowel sounds are normal.      Palpations: Abdomen is soft.      Tenderness: There is no abdominal tenderness.   Musculoskeletal:         General: Normal range of motion.        Arms:       Cervical back: Normal range of motion and neck supple.      Comments: Contusion noted to the medial aspect of the proximal left upper extremity.  Mild tenderness to palpation of the left elbow but otherwise patient denies tenderness to palpation.   Lymphadenopathy:      Cervical: No cervical adenopathy.   Skin:     General: Skin is warm and dry.   Neurological:      Mental Status: She is alert.      Comments: Oriented to person and place, not time   Psychiatric:         Mood and Affect: Mood normal.         Behavior: Behavior normal.         Procedures           ED Course  ED Course as of 06/02/24 1205   Sat Jun 01, 2024   1426 Discussed case with the daughter over the phone.  She states that in addition to the history noted in the HPI she is not having hallucinations.  She thought a little [place] right away for glasses 2 days ago.  The daughter of the bedside commode right next to her bed so she would not have to ambulate if she has been a fall risk.  Given the falls, weakness, loose Nations and mental status changes think patient would be best served from admission for IV antibiotics and more aggressive treatment of her urinary tract infection.  Daughter is in agreement with this.  I paged hospitalist for admission. [CP]      ED Course User Index  [CP] Lonny Vergara DO      Recent Results (from the past 24 hour(s))   Single High Sensitivity Troponin T    Collection Time: 06/01/24 12:20 PM    Specimen: Blood   Result Value Ref Range    HS Troponin T 16  (H) <14 ng/L   STAT Lactic Acid, Reflex    Collection Time: 06/01/24  3:30 PM    Specimen: Blood   Result Value Ref Range    Lactate 1.7 0.5 - 2.0 mmol/L   CBC (No Diff)    Collection Time: 06/02/24  5:26 AM    Specimen: Blood   Result Value Ref Range    WBC 9.17 3.40 - 10.80 10*3/mm3    RBC 3.33 (L) 3.77 - 5.28 10*6/mm3    Hemoglobin 8.9 (L) 12.0 - 15.9 g/dL    Hematocrit 29.4 (L) 34.0 - 46.6 %    MCV 88.3 79.0 - 97.0 fL    MCH 26.7 26.6 - 33.0 pg    MCHC 30.3 (L) 31.5 - 35.7 g/dL    RDW 15.2 12.3 - 15.4 %    RDW-SD 49.6 37.0 - 54.0 fl    MPV 10.1 6.0 - 12.0 fL    Platelets 229 140 - 450 10*3/mm3   Basic Metabolic Panel    Collection Time: 06/02/24  5:26 AM    Specimen: Blood   Result Value Ref Range    Glucose 96 65 - 99 mg/dL    BUN 16 8 - 23 mg/dL    Creatinine 0.69 0.57 - 1.00 mg/dL    Sodium 141 136 - 145 mmol/L    Potassium 4.3 3.5 - 5.2 mmol/L    Chloride 107 98 - 107 mmol/L    CO2 24.0 22.0 - 29.0 mmol/L    Calcium 8.1 (L) 8.6 - 10.5 mg/dL    BUN/Creatinine Ratio 23.2 7.0 - 25.0    Anion Gap 10.0 5.0 - 15.0 mmol/L    eGFR 85.2 >60.0 mL/min/1.73     Note: In addition to lab results from this visit, the labs listed above may include labs taken at another facility or during a different encounter within the last 24 hours. Please correlate lab times with ED admission and discharge times for further clarification of the services performed during this visit.    CT Head Without Contrast   Final Result   Impression:   No acute intracranial process.            Electronically Signed: Dina Basilio MD     6/2/2024 4:40 AM EDT     Workstation ID: [id number]      XR Elbow 3+ View Left   Final Result   Impression:   Acute impacted proximal humeral fracture.      Acute mildly displaced segmental fracture of the left ninth rib and fracture of the anterior left 10th rib.         Electronically Signed: Royer Quintero MD     6/1/2024 12:49 PM EDT     Workstation ID: [id number]      XR Humerus Left   Final Result   Impression:    Acute impacted proximal humeral fracture.      Acute mildly displaced segmental fracture of the left ninth rib and fracture of the anterior left 10th rib.         Electronically Signed: Royer Quintero MD     6/1/2024 12:49 PM EDT     Workstation ID: [id number]      XR Shoulder 2+ View Left   Final Result   Impression:   Acute impacted proximal humeral fracture.      Acute mildly displaced segmental fracture of the left ninth rib and fracture of the anterior left 10th rib.         Electronically Signed: Royer Quintero MD     6/1/2024 12:49 PM EDT     Workstation ID: [id number]      XR Chest 1 View   Final Result   Impression: No acute cardiopulmonary disease.         Electronically Signed: Aristeo Greene MD     6/1/2024 12:52 PM EDT     Workstation ID: [id number]        Vitals:    06/02/24 0912 06/02/24 1126 06/02/24 1127 06/02/24 1134   BP: 114/76 (!) 88/46 (!) 95/38 91/48   BP Location:    Right arm   Patient Position:    Sitting   Pulse: 116 73 73 69   Resp:       Temp:       TempSrc:       SpO2:  91% 91% 92%   Weight:       Height:         Medications   sodium chloride 0.9 % flush 10 mL (has no administration in time range)   carbidopa-levodopa (SINEMET)  MG per tablet 1 tablet (1 tablet Oral Given 6/2/24 0912)   fluticasone (FLONASE) 50 MCG/ACT nasal spray 2 spray (has no administration in time range)   sodium chloride 0.9 % flush 10 mL (10 mL Intravenous Given 6/2/24 0914)   sodium chloride 0.9 % flush 10 mL (has no administration in time range)   sodium chloride 0.9 % infusion 40 mL (has no administration in time range)   sennosides-docusate (PERICOLACE) 8.6-50 MG per tablet 2 tablet (2 tablets Oral Given 6/2/24 0912)     And   polyethylene glycol (MIRALAX) packet 17 g (has no administration in time range)     And   bisacodyl (DULCOLAX) EC tablet 5 mg (has no administration in time range)     And   bisacodyl (DULCOLAX) suppository 10 mg (has no administration in time range)   acetaminophen (TYLENOL) tablet  650 mg (650 mg Oral Given 6/2/24 0912)   ondansetron ODT (ZOFRAN-ODT) disintegrating tablet 4 mg (has no administration in time range)     Or   ondansetron (ZOFRAN) injection 4 mg (has no administration in time range)   cefTRIAXone (ROCEPHIN) 2,000 mg in sodium chloride 0.9 % 100 mL MBP ( Intravenous Canceled Entry 6/2/24 1200)   sodium chloride 0.9 % infusion (75 mL/hr Intravenous New Bag 6/2/24 0913)   metoprolol tartrate (LOPRESSOR) tablet 12.5 mg (12.5 mg Oral Given 6/2/24 0912)   apixaban (ELIQUIS) tablet 2.5 mg (2.5 mg Oral Given 6/2/24 1044)   cefTRIAXone (ROCEPHIN) 1,000 mg in sodium chloride 0.9 % 100 mL MBP (0 mg Intravenous Stopped 6/1/24 1440)   sodium chloride 0.9 % bolus 500 mL (500 mL Intravenous New Bag 6/1/24 1447)   cefTRIAXone (ROCEPHIN) 1,000 mg in sodium chloride 0.9 % 100 mL MBP (1,000 mg Intravenous New Bag 6/1/24 1817)   sodium chloride 0.9 % bolus 500 mL (500 mL Intravenous New Bag 6/2/24 0115)     ECG/EMG Results (last 24 hours)       Procedure Component Value Units Date/Time    Telemetry Scan [[id number]] Resulted: 06/01/24     Updated: 06/02/24 0737    ECG 12 Lead ED Triage Standing Order; Weak / Dizzy / AMS [[id number]] Collected: 06/01/24 1149     Updated: 06/02/24 0748     QT Interval 396 ms      QTC Interval 456 ms     Narrative:      Test Reason : ED Triage Standing Order~  Blood Pressure :   */*   mmHG  Vent. Rate :  80 BPM     Atrial Rate :  80 BPM     P-R Int : 146 ms          QRS Dur : 128 ms      QT Int : 396 ms       P-R-T Axes :  99  10  -6 degrees     QTc Int : 456 ms    Normal sinus rhythm  Right bundle branch block  Abnormal ECG  When compared with ECG of 31-JAN-2024 09:03,  T wave inversion no longer evident in Anterior leads  Confirmed by SHAWN BURGESS ([id number]) on 6/2/2024 7:48:41 AM    Referred By: EDMD           Confirmed By: SHAWN BURGESS          ECG 12 Lead ED Triage Standing Order; Weak / Dizzy / AMS   Final Result   Test Reason : ED Triage Standing Order~   Blood  Pressure :   */*   mmHG   Vent. Rate :  80 BPM     Atrial Rate :  80 BPM      P-R Int : 146 ms          QRS Dur : 128 ms       QT Int : 396 ms       P-R-T Axes :  99  10  -6 degrees      QTc Int : 456 ms      Normal sinus rhythm   Right bundle branch block   Abnormal ECG   When compared with ECG of 31-JAN-2024 09:03,   T wave inversion no longer evident in Anterior leads   Confirmed by SHAWN BURGESS ([id number]) on 6/2/2024 7:48:41 AM      Referred By: EDMD           Confirmed By: SHAWN BURGESS      Telemetry Scan   Final Result                                                 Medical Decision Making  Problems Addressed:  Acute UTI: complicated acute illness or injury  At high risk for falls: complicated acute illness or injury  Closed fracture of multiple ribs of left side, initial encounter: complicated acute illness or injury  Elevated lactic acid level: complicated acute illness or injury  Elevated troponin: complicated acute illness or injury  Fall, initial encounter: complicated acute illness or injury  Generalized weakness: complicated acute illness or injury  Other closed nondisplaced fracture of proximal end of left humerus, initial encounter: complicated acute illness or injury  Visual hallucinations: complicated acute illness or injury    Amount and/or Complexity of Data Reviewed  Labs: ordered.  Radiology: ordered.  ECG/medicine tests: ordered.    Risk  Prescription drug management.  Decision regarding hospitalization.        Final diagnoses:   Acute UTI   Other closed nondisplaced fracture of proximal end of left humerus, initial encounter   Closed fracture of multiple ribs of left side, initial encounter   Fall, initial encounter   At high risk for falls   Visual hallucinations   Generalized weakness   Elevated lactic acid level   Elevated troponin       ED Disposition  ED Disposition       ED Disposition   Decision to Admit    Condition   --    Comment   Level of Care: Telemetry [5]   Diagnosis: UTI (urinary  tract infection) [738964]   Admitting Physician: DEBBIE WALTON [[id number]]   Attending Physician: DEBBIE WALTON [[id number]]                  Lonny Vergara DO  06/04/24 1146

## 2024-06-01 NOTE — ED NOTES
Fanny Boyce    Nursing Report ED to Floor:  Mental status: alert and oriented x3. Hx of dementia.  Ambulatory status: did not get patient up during ED visit due to recent fall.  Oxygen Therapy:  room air  Cardiac Rhythm: normal sinus rhythm  Admitted from: ED  Safety Concerns:  none noted  Social Issues: none noted.  ED Room #:  02    ED Nurse Phone Extension - [phone] or may call [phone].      HPI:   Chief Complaint   Patient presents with    Urinary Tract Infection       Past Medical History:  Past Medical History:   Diagnosis Date    Atrial fibrillation     COPD (chronic obstructive pulmonary disease)     Dementia     History of lung cancer     Parkinson disease     Pulmonary embolism         Past Surgical History:  Past Surgical History:   Procedure Laterality Date    JOINT REPLACEMENT      LUNG CANCER SURGERY          Admitting Doctor:   Yusef Arshad MD    Consulting Provider(s):  Consults       No orders found from 5/3/2024 to 6/2/2024.             Admitting Diagnosis:   The primary encounter diagnosis was Acute UTI. Diagnoses of Other closed nondisplaced fracture of proximal end of left humerus, initial encounter, Closed fracture of multiple ribs of left side, initial encounter, Fall, initial encounter, At high risk for falls, Visual hallucinations, Generalized weakness, Elevated lactic acid level, and Elevated troponin were also pertinent to this visit.    Most Recent Vitals:   Vitals:    06/01/24 1341 06/01/24 1400 06/01/24 1430 06/01/24 1500   BP:  115/55 122/51 126/65   Pulse: 75 75 76 75   Resp:       Temp:       TempSrc:       SpO2: 96% 92% 94% 95%   Weight:       Height:           Active LDAs/IV Access:   Lines, Drains & Airways       Active LDAs       Name Placement date Placement time Site Days    Peripheral IV 06/01/24 1123 Anterior;Right Wrist 06/01/24  1123  Wrist  less than 1    Peripheral IV 06/01/24 1207 Anterior;Right Forearm 06/01/24  1207  Forearm  less than 1    External Urinary Catheter  06/01/24  1213  --  less than 1                    Labs (abnormal labs have a star):   Labs Reviewed   SINGLE HS TROPONIN T - Abnormal; Notable for the following components:       Result Value    HS Troponin T 16 (*)     All other components within normal limits    Narrative:     High Sensitive Troponin T Reference Range:  <14.0 ng/L- Negative Female for AMI  <22.0 ng/L- Negative Male for AMI  >=14 - Abnormal Female indicating possible myocardial injury.  >=22 - Abnormal Male indicating possible myocardial injury.   Clinicians would have to utilize clinical acumen, EKG, Troponin, and serial changes to determine if it is an Acute Myocardial Infarction or myocardial injury due to an underlying chronic condition.        CBC WITH AUTO DIFFERENTIAL - Abnormal; Notable for the following components:    RBC 3.70 (*)     Hemoglobin 10.2 (*)     Hematocrit 33.4 (*)     MCHC 30.5 (*)     Neutrophil % 78.1 (*)     Lymphocyte % 13.3 (*)     All other components within normal limits   URINALYSIS W/ CULTURE IF INDICATED - Abnormal; Notable for the following components:    Appearance, UA Turbid (*)     Blood, UA Small (1+) (*)     Protein, UA 30 mg/dL (1+) (*)     Leuk Esterase, UA Large (3+) (*)     Nitrite, UA Positive (*)     All other components within normal limits    Narrative:     In absence of clinical symptoms, the presence of pyuria, bacteria, and/or nitrites on the urinalysis result does not correlate with infection.   URINALYSIS, MICROSCOPIC ONLY - Abnormal; Notable for the following components:    RBC, UA 11-20 (*)     WBC, UA Too Numerous to Count (*)     Bacteria, UA 4+ (*)     Renal Epithelial Cells, UA 7-12 (*)     All other components within normal limits   LACTIC ACID, PLASMA - Abnormal; Notable for the following components:    Lactate 2.1 (*)     All other components within normal limits   MAGNESIUM - Normal   URINE CULTURE   BLOOD CULTURE   BLOOD CULTURE   RAINBOW DRAW    Narrative:     The following orders  were created for panel order [facility] Draw.  Procedure                               Abnormality         Status                     ---------                               -----------         ------                     Green Top (Gel)[[id number]]                                  Final result               Lavender Top[[id number]]                                     Final result               Gold Top - SST[[id number]]                                   Final result               [place] Top[[id number]]                                         Final result               Light Blue Top[[id number]]                                   Final result                 Please view results for these tests on the individual orders.   COMPREHENSIVE METABOLIC PANEL    Narrative:     GFR Normal >60  Chronic Kidney Disease <60  Kidney Failure <15    The GFR formula is only valid for adults with stable renal function between ages 18 and 70.   LACTIC ACID, REFLEX   CBC AND DIFFERENTIAL    Narrative:     The following orders were created for panel order CBC & Differential.  Procedure                               Abnormality         Status                     ---------                               -----------         ------                     CBC Auto Differential[[id number]]        Abnormal            Final result                 Please view results for these tests on the individual orders.   GREEN TOP   LAVENDER TOP   GOLD TOP - SST   GRAY TOP   LIGHT BLUE TOP       Meds Given in ED:   Medications   sodium chloride 0.9 % flush 10 mL (has no administration in time range)   sodium chloride 0.9 % bolus 500 mL (500 mL Intravenous New Bag 6/1/24 1447)   cefTRIAXone (ROCEPHIN) 1,000 mg in sodium chloride 0.9 % 100 mL MBP (0 mg Intravenous Stopped 6/1/24 1440)           Last NIH score:                                                          Dysphagia screening results:        Kemal Coma Scale:  No data recorded     CIWA:        Restraint Type:             Isolation Status:  No active isolations

## 2024-06-02 ENCOUNTER — APPOINTMENT (OUTPATIENT)
Dept: CT IMAGING | Facility: HOSPITAL | Age: 84
End: 2024-06-02
Payer: MEDICARE

## 2024-06-02 LAB
ANION GAP SERPL CALCULATED.3IONS-SCNC: 10 MMOL/L (ref 5–15)
BUN SERPL-MCNC: 16 MG/DL (ref 8–23)
BUN/CREAT SERPL: 23.2 (ref 7–25)
CALCIUM SPEC-SCNC: 8.1 MG/DL (ref 8.6–10.5)
CHLORIDE SERPL-SCNC: 107 MMOL/L (ref 98–107)
CO2 SERPL-SCNC: 24 MMOL/L (ref 22–29)
CREAT SERPL-MCNC: 0.69 MG/DL (ref 0.57–1)
DEPRECATED RDW RBC AUTO: 49.6 FL (ref 37–54)
EGFRCR SERPLBLD CKD-EPI 2021: 85.2 ML/MIN/1.73
ERYTHROCYTE [DISTWIDTH] IN BLOOD BY AUTOMATED COUNT: 15.2 % (ref 12.3–15.4)
GLUCOSE SERPL-MCNC: 96 MG/DL (ref 65–99)
HCT VFR BLD AUTO: 29.4 % (ref 34–46.6)
HGB BLD-MCNC: 8.9 G/DL (ref 12–15.9)
MCH RBC QN AUTO: 26.7 PG (ref 26.6–33)
MCHC RBC AUTO-ENTMCNC: 30.3 G/DL (ref 31.5–35.7)
MCV RBC AUTO: 88.3 FL (ref 79–97)
PLATELET # BLD AUTO: 229 10*3/MM3 (ref 140–450)
PMV BLD AUTO: 10.1 FL (ref 6–12)
POTASSIUM SERPL-SCNC: 4.3 MMOL/L (ref 3.5–5.2)
QT INTERVAL: 396 MS
QTC INTERVAL: 456 MS
RBC # BLD AUTO: 3.33 10*6/MM3 (ref 3.77–5.28)
SODIUM SERPL-SCNC: 141 MMOL/L (ref 136–145)
WBC NRBC COR # BLD AUTO: 9.17 10*3/MM3 (ref 3.4–10.8)

## 2024-06-02 PROCEDURE — 80048 BASIC METABOLIC PNL TOTAL CA: CPT | Performed by: INTERNAL MEDICINE

## 2024-06-02 PROCEDURE — 25810000003 SODIUM CHLORIDE 0.9 % SOLUTION: Performed by: NURSE PRACTITIONER

## 2024-06-02 PROCEDURE — 99232 SBSQ HOSP IP/OBS MODERATE 35: CPT | Performed by: INTERNAL MEDICINE

## 2024-06-02 PROCEDURE — 85027 COMPLETE CBC AUTOMATED: CPT | Performed by: INTERNAL MEDICINE

## 2024-06-02 PROCEDURE — 97162 PT EVAL MOD COMPLEX 30 MIN: CPT

## 2024-06-02 PROCEDURE — 70450 CT HEAD/BRAIN W/O DYE: CPT

## 2024-06-02 PROCEDURE — 97166 OT EVAL MOD COMPLEX 45 MIN: CPT

## 2024-06-02 PROCEDURE — 97110 THERAPEUTIC EXERCISES: CPT

## 2024-06-02 PROCEDURE — 25010000002 CEFTRIAXONE PER 250 MG: Performed by: INTERNAL MEDICINE

## 2024-06-02 RX ORDER — SODIUM CHLORIDE 9 MG/ML
75 INJECTION, SOLUTION INTRAVENOUS CONTINUOUS
Status: DISCONTINUED | OUTPATIENT
Start: 2024-06-02 | End: 2024-06-05 | Stop reason: HOSPADM

## 2024-06-02 RX ADMIN — CARBIDOPA AND LEVODOPA 1 TABLET: 25; 100 TABLET ORAL at 20:42

## 2024-06-02 RX ADMIN — SODIUM CHLORIDE 75 ML/HR: 9 INJECTION, SOLUTION INTRAVENOUS at 09:13

## 2024-06-02 RX ADMIN — METOPROLOL TARTRATE 12.5 MG: 25 TABLET, FILM COATED ORAL at 09:12

## 2024-06-02 RX ADMIN — Medication 10 ML: at 20:42

## 2024-06-02 RX ADMIN — SODIUM CHLORIDE 75 ML/HR: 9 INJECTION, SOLUTION INTRAVENOUS at 02:09

## 2024-06-02 RX ADMIN — CEFTRIAXONE 2000 MG: 2 INJECTION, POWDER, FOR SOLUTION INTRAMUSCULAR; INTRAVENOUS at 10:45

## 2024-06-02 RX ADMIN — APIXABAN 2.5 MG: 2.5 TABLET, FILM COATED ORAL at 10:44

## 2024-06-02 RX ADMIN — ACETAMINOPHEN 650 MG: 325 TABLET ORAL at 16:41

## 2024-06-02 RX ADMIN — SODIUM CHLORIDE 75 ML/HR: 9 INJECTION, SOLUTION INTRAVENOUS at 23:03

## 2024-06-02 RX ADMIN — SENNOSIDES AND DOCUSATE SODIUM 2 TABLET: 8.6; 5 TABLET ORAL at 09:12

## 2024-06-02 RX ADMIN — CARBIDOPA AND LEVODOPA 1 TABLET: 25; 100 TABLET ORAL at 09:12

## 2024-06-02 RX ADMIN — APIXABAN 2.5 MG: 2.5 TABLET, FILM COATED ORAL at 20:42

## 2024-06-02 RX ADMIN — CARBIDOPA AND LEVODOPA 1 TABLET: 25; 100 TABLET ORAL at 16:41

## 2024-06-02 RX ADMIN — ACETAMINOPHEN 650 MG: 325 TABLET ORAL at 09:12

## 2024-06-02 RX ADMIN — SODIUM CHLORIDE 500 ML: 9 INJECTION, SOLUTION INTRAVENOUS at 01:15

## 2024-06-02 RX ADMIN — Medication 10 ML: at 09:14

## 2024-06-02 NOTE — THERAPY EVALUATION
Patient Name: Fanny Boyce  : 1940    MRN: [id number]                              Today's Date: 2024       Admit Date: 2024    Visit Dx:     ICD-10-CM ICD-9-CM   1. Acute UTI  N39.0 599.0   2. Other closed nondisplaced fracture of proximal end of left humerus, initial encounter  S42.295A 812.09   3. Closed fracture of multiple ribs of left side, initial encounter  S22.42XA 807.09   4. Fall, initial encounter  W19.XXXA E888.9   5. At high risk for falls  Z91.81 V15.88   6. Visual hallucinations  R44.1 368.16   7. Generalized weakness  R53.1 780.79   8. Elevated lactic acid level  R79.89 276.2   9. Elevated troponin  R79.89 790.6     Patient Active Problem List   Diagnosis    UTI (urinary tract infection)    Atrial fibrillation    COPD (chronic obstructive pulmonary disease)    Dementia    Parkinson's disease     Past Medical History:   Diagnosis Date    Atrial fibrillation     COPD (chronic obstructive pulmonary disease)     Dementia     History of lung cancer     Parkinson disease     Pulmonary embolism      Past Surgical History:   Procedure Laterality Date    JOINT REPLACEMENT      LUNG CANCER SURGERY        General Information       Row Name 24 1009          Physical Therapy Time and Intention    Document Type evaluation  -AB     Mode of Treatment physical therapy  -AB       Row Name 24 1009          General Information    Patient Profile Reviewed yes  -AB     Prior Level of Function --   Difficult to assess, pt likely poor historian. Reports using a rollator for mobility. Will need to further assess once family is present.  -AB     Existing Precautions/Restrictions fall;other (see comments)  left proximal humerus fracture, non-op. Regular sling immobilization. No active left shoulder motion. L rib fx's. PD  -AB     Barriers to Rehab medically complex;previous functional deficit;cognitive status  -AB       Row Name 24 1009          Living Environment    People in Home  sibling(s);other (see comments)  Pt reports she lives with her sister. Will need to be further assessed  -AB       Row Name 06/02/24 1009          Home Main Entrance    Number of Stairs, Main Entrance other (see comments)  CONNER  -AB       Row Name 06/02/24 1009          Stairs Within Home, Primary    Stairs, Within Home, Primary CONNER, pt oriented to self only.  -AB       Row Name 06/02/24 1009          Cognition    Orientation Status (Cognition) oriented to;person;disoriented to;situation;place;time  -AB       Row Name 06/02/24 1009          Safety Issues, Functional Mobility    Safety Issues Affecting Function (Mobility) awareness of need for assistance;insight into deficits/self-awareness;safety precaution awareness;safety precautions follow-through/compliance;sequencing abilities;problem-solving;judgment  -AB     Impairments Affecting Function (Mobility) balance;cognition;endurance/activity tolerance;pain;strength;range of motion (ROM);postural/trunk control  -AB     Cognitive Impairments, Mobility Safety/Performance awareness, need for assistance;insight into deficits/self-awareness;judgment;problem-solving/reasoning;sequencing abilities;safety precaution follow-through;safety precaution awareness  -AB     Comment, Safety Issues/Impairments (Mobility) Alert, following all commands.  -AB               User Key  (r) = Recorded By, (t) = Taken By, (c) = Cosigned By      Initials Name Provider Type    AB Katy Marley, PT Physical Therapist                   Mobility       Row Name 06/02/24 1014          Transfers    Comment, (Transfers) Pt received from chair with OT.  -AB       Row Name 06/02/24 1014          Sit-Stand Transfer    Sit-Stand [place] (Transfers) moderate assist (50% patient effort);verbal cues;1 person to manage equipment;1 person assist  -AB     Assistive Device (Sit-Stand Transfers) other (see comments)  HHA  -AB     Comment, (Sit-Stand Transfer) B foot blocking. Pt with posterior lean and  required TC's at hips to weight shift anteriorly.  -AB       Row Name 06/02/24 1014          Gait/Stairs (Locomotion)    [place] Level (Gait) moderate assist (50% patient effort);1 person assist;1 person to manage equipment;verbal cues  -AB     Assistive Device (Gait) other (see comments)  HHA  -AB     Patient was able to Ambulate yes  -AB     Distance in Feet (Gait) 10  -AB     Deviations/Abnormal Patterns (Gait) bilateral deviations;[place] decreased;base of support, narrow;gait speed decreased;stride length decreased;festinating/shuffling;scissoring  -AB     Bilateral Gait Deviations forward flexed posture;heel strike decreased  -AB     Comment, (Gait/Stairs) Pt ambulated with step to gait pattern, short/shuffling steps, and forward flexed posture. Pt with very narrow HEENA and intermittent scissoring during gait. Encouragement provided to continue ambulation in forward direction and maintain anterior weight shifting over HEENA. No knee buckling. Mod A provided for improved stability, +1 for close chair follow. Further activity limited by weakness and fatigue.  -AB       Row Name 06/02/24 1014          Mobility    Extremity Weight-bearing Status left upper extremity  -AB     Left Upper Extremity (Weight-bearing Status) other (see comments)  no active shoulder ROM.  -AB               User Key  (r) = Recorded By, (t) = Taken By, (c) = Cosigned By      Initials Name Provider Type    AB Katy Marley, PT Physical Therapist                   Obj/Interventions       Row Name 06/02/24 1021          Range of Motion Comprehensive    General Range of Motion bilateral lower extremity ROM WFL  -AB       Row Name 06/02/24 1021          Strength Comprehensive (MMT)    General Manual Muscle Testing (MMT) Assessment lower extremity strength deficits identified  -AB     Comment, General Manual Muscle Testing (MMT) Assessment BLE grossly 3+/5 as observed during functional activity  -AB       Row Name 06/02/24 1021           Balance    Balance Assessment sitting static balance;sitting dynamic balance;standing static balance;standing dynamic balance  -AB     Static Sitting Balance contact guard  -AB     Dynamic Sitting Balance contact guard  -AB     Position, Sitting Balance unsupported;sitting in chair  -AB     Static Standing Balance moderate assist  -AB     Dynamic Standing Balance moderate assist;1-person assist;1 person to manage equipment;verbal cues  -AB     Position/Device Used, Standing Balance supported  -AB     Balance Interventions sitting;standing;sit to stand;supported;static;dynamic;occupation based/functional task  -AB     Comment, Balance Mod A to maintain standing balance. Pt with intermittent posterior lean.  -AB       Row Name 06/02/24 1021          Sensory Assessment (Somatosensory)    Sensory Assessment (Somatosensory) LE sensation intact  -AB               User Key  (r) = Recorded By, (t) = Taken By, (c) = Cosigned By      Initials Name Provider Type    AB Katy Marley, PT Physical Therapist                   Goals/Plan       Row Name 06/02/24 1027          Bed Mobility Goal 1 (PT)    Activity/Assistive Device (Bed Mobility Goal 1, PT) sit to supine;supine to sit  -AB     [place] Level/Cues Needed (Bed Mobility Goal 1, PT) contact guard required  -AB     Time Frame (Bed Mobility Goal 1, PT) short term goal (STG);5 days  -AB       Row Name 06/02/24 1027          Transfer Goal 1 (PT)    Activity/Assistive Device (Transfer Goal 1, PT) sit-to-stand/stand-to-sit;bed-to-chair/chair-to-bed;cane, quad  -AB     [place] Level/Cues Needed (Transfer Goal 1, PT) contact guard required  -AB     Time Frame (Transfer Goal 1, PT) long term goal (LTG);10 days  -AB       Row Name 06/02/24 1027          Gait Training Goal 1 (PT)    Activity/Assistive Device (Gait Training Goal 1, PT) gait (walking locomotion);assistive device use;cane, quad  -AB     [place] Level (Gait Training Goal 1, PT) contact guard required  -AB      Distance (Gait Training Goal 1, PT) 50  -AB     Time Frame (Gait Training Goal 1, PT) long term goal (LTG);10 days  -AB       Row Name 06/02/24 1027          Therapy Assessment/Plan (PT)    Planned Therapy Interventions (PT) balance training;bed mobility training;gait training;home exercise program;patient/family education;postural re-education;transfer training;stretching;strengthening;ROM (range of motion)  -AB               User Key  (r) = Recorded By, (t) = Taken By, (c) = Cosigned By      Initials Name Provider Type    AB Katy Marley, PT Physical Therapist                   Clinical Impression       Row Name 06/02/24 1022          Pain    Pain Intervention(s) Ambulation/increased activity;Repositioned;Nursing Notified  -AB     Additional Documentation Pain Scale: FACES Pre/Post-Treatment (Group)  -AB       Row Name 06/02/24 1022          Pain Scale: FACES Pre/Post-Treatment    Pain: FACES Scale, Pretreatment 0-->no hurt  -AB     Posttreatment Pain Rating 2-->hurts little bit  -AB     Pain Location - Side/Orientation Left  -AB     Pain Location upper  -AB     Pain Location - extremity  -AB     Pre/Posttreatment Pain Comment grimaces with mobility.  -AB       Row Name 06/02/24 1022          Plan of Care Review    Plan of Care Reviewed With patient  -AB     Progress no change  -AB     Outcome Evaluation PT initial eval completed. Pt is A&Ox1 and presents with generalized weakness, impaired balance, and decreased activity tolerance. Pt ambulated 10' with mod Ax1+1 and R UE support. Further IPPT is warrented. PT rec d/c to SNF when medically appropriate.  -AB       Row Name 06/02/24 1022          Therapy Assessment/Plan (PT)    Rehab Potential (PT) good, to achieve stated therapy goals  -AB     Criteria for Skilled Interventions Met (PT) yes;meets criteria;skilled treatment is necessary  -AB     Therapy Frequency (PT) daily  -AB     Predicted Duration of Therapy Intervention (PT) 10 days  -AB       Row Name  06/02/24 1022          Vital Signs    Pre Systolic BP Rehab 131  -AB     Pre Treatment Diastolic BP 54  -AB     Post Systolic BP Rehab 126  -AB     Post Treatment Diastolic BP 70  -AB     Pretreatment Heart Rate (beats/min) 78  -AB     O2 Delivery Pre Treatment room air  -AB     O2 Delivery Intra Treatment room air  -AB     Post SpO2 (%) 91  -AB     O2 Delivery Post Treatment room air  -AB     Pre Patient Position Sitting  -AB     Intra Patient Position Standing  -AB     Post Patient Position Sitting  -AB       Row Name 06/02/24 1022          Positioning and Restraints    Pre-Treatment Position sitting in chair/recliner  -AB     Post Treatment Position chair  -AB     In Chair reclined;notified nsg;sitting;call light within reach;encouraged to call for assist;exit alarm on;legs elevated;waffle cushion;compression device;with brace;heels elevated  -AB               User Key  (r) = Recorded By, (t) = Taken By, (c) = Cosigned By      Initials Name Provider Type    AB Katy Marley, PT Physical Therapist                   Outcome Measures       Row Name 06/02/24 1027 06/02/24 0159       How much help from another person do you currently need...    Turning from your back to your side while in flat bed without using bedrails? 3  -AB 1  -EC    Moving from lying on back to sitting on the side of a flat bed without bedrails? 3  -AB 1  -EC    Moving to and from a bed to a chair (including a wheelchair)? 2  -AB 1  -EC    Standing up from a chair using your arms (e.g., wheelchair, bedside chair)? 2  -AB 1  -EC    Climbing 3-5 steps with a railing? 1  -AB 1  -EC    To walk in hospital room? 2  -AB 1  -EC    AM-PAC 6 Clicks Score (PT) 13  -AB 6  -EC    Highest Level of Mobility Goal 4 --> Transfer to chair/commode  -AB 2 --> Bed activities/dependent transfer  -EC      Row Name 06/02/24 1027          Functional Assessment    Outcome Measure Options AM-PAC 6 Clicks Basic Mobility (PT)  -AB               User Key  (r) = Recorded  By, (t) = Taken By, (c) = Cosigned By      Initials Name Provider Type    AB Katy Marley, PT Physical Therapist    Jami Alonso RN Registered Nurse                                 Physical Therapy Education       Title: PT OT SLP Therapies (In Progress)       Topic: Physical Therapy (In Progress)       Point: Mobility training (In Progress)       Learning Progress Summary             Patient Acceptance, E,D, NR by AB at 6/2/2024 1028                         Point: Home exercise program (Not Started)       Learner Progress:  Not documented in this visit.              Point: Body mechanics (In Progress)       Learning Progress Summary             Patient Acceptance, E,D, NR by AB at 6/2/2024 1028                         Point: Precautions (In Progress)       Learning Progress Summary             Patient Acceptance, E,D, NR by AB at 6/2/2024 1028                                         User Key       Initials Effective Dates Name Provider Type Discipline    AB 09/22/22 -  Katy Marley PT Physical Therapist PT                  PT Recommendation and Plan  Planned Therapy Interventions (PT): balance training, bed mobility training, gait training, home exercise program, patient/family education, postural re-education, transfer training, stretching, strengthening, ROM (range of motion)  Plan of Care Reviewed With: patient  Progress: no change  Outcome Evaluation: PT initial eval completed. Pt is A&Ox1 and presents with generalized weakness, impaired balance, and decreased activity tolerance. Pt ambulated 10' with mod Ax1+1 and R UE support. Further IPPT is warrented. PT rec d/c to SNF when medically appropriate.     Time Calculation:   PT Evaluation Complexity  History, PT Evaluation Complexity: 3 or more personal factors and/or comorbidities  Examination of Body Systems (PT Eval Complexity): total of 3 or more elements  Clinical Presentation (PT Evaluation Complexity): evolving  Clinical Decision Making (PT  Evaluation Complexity): moderate complexity  Overall Complexity (PT Evaluation Complexity): moderate complexity     PT Charges       Row Name 06/02/24 1028             Time Calculation    Start Time 0807  -AB      PT Received On 06/02/24  -AB      PT Goal Re-Cert Due Date 06/12/24  -AB         Untimed Charges    PT Eval/Re-eval Minutes 46  -AB         Total Minutes    Untimed Charges Total Minutes 46  -AB       Total Minutes 46  -AB                User Key  (r) = Recorded By, (t) = Taken By, (c) = Cosigned By      Initials Name Provider Type    AB Katy Marley, PT Physical Therapist                  Therapy Charges for Today       Code Description Service Date Service Provider Modifiers Qty    [id number] HC PT EVAL MOD COMPLEXITY 4 6/2/2024 Katy Marley, PT GP 1            PT G-Codes  Outcome Measure Options: AM-PAC 6 Clicks Basic Mobility (PT)  AM-PAC 6 Clicks Score (PT): 13  PT Discharge Summary  Anticipated Discharge Disposition (PT): skilled nursing facility    Katy Marley PT  6/2/2024

## 2024-06-02 NOTE — PROGRESS NOTES
[facility] Medicine Services  PROGRESS NOTE    Patient Name: Fanny Boyce  : 1940  MRN: [id number]    Date of Admission: 2024  Primary Care Physician: Nemo Aponte PA    Subjective   Subjective     CC:  Humerus fracture     HPI:  Wearing sling this morning. Denies any pain.      Objective   Objective     Vital Signs:   Temp:  [98.1 °F (36.7 °C)-100 °F (37.8 °C)] 98.7 °F (37.1 °C)  Heart Rate:  [] 116  Resp:  [16-18] 16  BP: ()/(42-88) 114/76  Flow (L/min):  [2] 2     Physical Exam:  Constitutional: thin elderly female, no acute distress, sitting in bedside chair  Respiratory: Clear to auscultation bilaterally, respiratory effort normal   Cardiovascular: RRR, no murmurs, rubs, or gallops  Gastrointestinal: Positive bowel sounds, soft, nontender, nondistended  Musculoskeletal: right arm in sling  Psychiatric: Appropriate affect, cooperative  Neurologic: Oriented x 1, no focal deficits      Results Reviewed:  LAB RESULTS:      Lab 24  0526 24  1530 24  1149   WBC 9.17  --  7.61   HEMOGLOBIN 8.9*  --  10.2*   HEMATOCRIT 29.4*  --  33.4*   PLATELETS 229  --  253   NEUTROS ABS  --   --  5.95   IMMATURE GRANS (ABS)  --   --  0.02   LYMPHS ABS  --   --  1.01   MONOS ABS  --   --  0.49   EOS ABS  --   --  0.08   MCV 88.3  --  90.3   LACTATE  --  1.7 2.1*         Lab 24  0526 24  1149   SODIUM 141 140   POTASSIUM 4.3 4.1   CHLORIDE 107 103   CO2 24.0 26.0   ANION GAP 10.0 11.0   BUN 16 18   CREATININE 0.69 0.72   EGFR 85.2 82.1   GLUCOSE 96 90   CALCIUM 8.1* 9.2   MAGNESIUM  --  2.0         Lab 24  1149   TOTAL PROTEIN 6.4   ALBUMIN 3.7   GLOBULIN 2.7   ALT (SGPT) <5   AST (SGOT) 15   BILIRUBIN 0.5   ALK PHOS 96         Lab 24  1220   HSTROP T 16*                 Brief Urine Lab Results  (Last result in the past 365 days)        Color   Clarity   Blood   Leuk Est   Nitrite   Protein   CREAT   Urine HCG        24 1157  Yellow   Turbid   Small (1+)   Large (3+)   Positive   30 mg/dL (1+)                   Microbiology Results Abnormal       None            CT Head Without Contrast    Result Date: 6/2/2024  CT HEAD WO CONTRAST Date of Exam: 6/2/2024 4:30 AM EDT Indication: increased confusion. Comparison: None available. Technique: Axial CT images were obtained of the head without contrast administration.  Automated exposure control and iterative construction methods were used. Findings: There is no evidence of hemorrhage. There is no mass effect or midline shift. There is no extracerebral collection. Ventricles are normal in size and configuration for patient's stated age.  Posterior fossa is within normal limits. Calvarium and skull base appear intact.   Visualized sinuses show no air fluid levels. Visualized orbits are unremarkable.     Impression: Impression: No acute intracranial process. Electronically Signed: Dina Basilio MD  6/2/2024 4:40 AM EDT  Workstation ID: [id number]    XR Chest 1 View    Result Date: 6/1/2024  XR CHEST 1 VW Date of Exam: 6/1/2024 12:09 PM EDT Indication: Weak/Dizzy/AMS triage protocol Comparison: 1/31/2024 Findings: No focal consolidation. No pneumothorax or pleural effusion. Cardiac size is normal. The visualized clavicles appear intact. No displaced rib fractures. The visualized upper abdomen is normal.     Impression: Impression: No acute cardiopulmonary disease. Electronically Signed: Aristeo Greene MD  6/1/2024 12:52 PM EDT  Workstation ID: [id number]    XR Shoulder 2+ View Left    Result Date: 6/1/2024  XR SHOULDER 2+ VW LEFT, XR ELBOW 3+ VW LEFT, XR HUMERUS LEFT Date of Exam: 6/1/2024 12:10 PM EDT Indication: trauma Comparison: Chest x-ray 1/31/2024 Findings: Left shoulder: There is an acute fracture of the proximal humerus with impaction at the surgical neck and poorly visualized greater tuberosity fracture involvement. There is approximately 1.7 cm of impaction at the surgical neck. The humeral  head maintains articulation with the glenoid. No additional acute fractures are seen at the shoulder. The acromioclavicular joint is congruent. The bones are demineralized. There there is an acute segmental fracture of the left ninth rib with fracture at the posterior and anterolateral rib. There is an acute mildly displaced fracture of the anterolateral left 10th rib. Left humerus: The mid and distal humeral shaft appears normal. Left elbow: No definite soft tissue swelling. No evidence of elbow joint effusion. Joint spaces are preserved. No acute fracture or malalignment.     Impression: Impression: Acute impacted proximal humeral fracture. Acute mildly displaced segmental fracture of the left ninth rib and fracture of the anterior left 10th rib. Electronically Signed: Royer Quintero MD  6/1/2024 12:49 PM EDT  Workstation ID: [id number]    XR Humerus Left    Result Date: 6/1/2024  XR SHOULDER 2+ VW LEFT, XR ELBOW 3+ VW LEFT, XR HUMERUS LEFT Date of Exam: 6/1/2024 12:10 PM EDT Indication: trauma Comparison: Chest x-ray 1/31/2024 Findings: Left shoulder: There is an acute fracture of the proximal humerus with impaction at the surgical neck and poorly visualized greater tuberosity fracture involvement. There is approximately 1.7 cm of impaction at the surgical neck. The humeral head maintains articulation with the glenoid. No additional acute fractures are seen at the shoulder. The acromioclavicular joint is congruent. The bones are demineralized. There there is an acute segmental fracture of the left ninth rib with fracture at the posterior and anterolateral rib. There is an acute mildly displaced fracture of the anterolateral left 10th rib. Left humerus: The mid and distal humeral shaft appears normal. Left elbow: No definite soft tissue swelling. No evidence of elbow joint effusion. Joint spaces are preserved. No acute fracture or malalignment.     Impression: Impression: Acute impacted proximal humeral fracture.  Acute mildly displaced segmental fracture of the left ninth rib and fracture of the anterior left 10th rib. Electronically Signed: Royer Quintero MD  6/1/2024 12:49 PM EDT  Workstation ID: [id number]    XR Elbow 3+ View Left    Result Date: 6/1/2024  XR SHOULDER 2+ VW LEFT, XR ELBOW 3+ VW LEFT, XR HUMERUS LEFT Date of Exam: 6/1/2024 12:10 PM EDT Indication: trauma Comparison: Chest x-ray 1/31/2024 Findings: Left shoulder: There is an acute fracture of the proximal humerus with impaction at the surgical neck and poorly visualized greater tuberosity fracture involvement. There is approximately 1.7 cm of impaction at the surgical neck. The humeral head maintains articulation with the glenoid. No additional acute fractures are seen at the shoulder. The acromioclavicular joint is congruent. The bones are demineralized. There there is an acute segmental fracture of the left ninth rib with fracture at the posterior and anterolateral rib. There is an acute mildly displaced fracture of the anterolateral left 10th rib. Left humerus: The mid and distal humeral shaft appears normal. Left elbow: No definite soft tissue swelling. No evidence of elbow joint effusion. Joint spaces are preserved. No acute fracture or malalignment.     Impression: Impression: Acute impacted proximal humeral fracture. Acute mildly displaced segmental fracture of the left ninth rib and fracture of the anterior left 10th rib. Electronically Signed: Royer Quintero MD  6/1/2024 12:49 PM EDT  Workstation ID: [id number]         Current medications:  Scheduled Meds:carbidopa-levodopa, 1 tablet, Oral, TID  cefTRIAXone, 2,000 mg, Intravenous, Q24H  metoprolol tartrate, 12.5 mg, Oral, Q12H  sodium chloride, 10 mL, Intravenous, Q12H      Continuous Infusions:sodium chloride, 75 mL/hr, Last Rate: 75 mL/hr (06/02/24 0913)      PRN Meds:.  acetaminophen    senna-docusate sodium **AND** polyethylene glycol **AND** bisacodyl **AND** bisacodyl    fluticasone     ondansetron ODT **OR** ondansetron    sodium chloride    sodium chloride    sodium chloride    Assessment & Plan   Assessment & Plan     Active Hospital Problems    Diagnosis  POA    **UTI (urinary tract infection) [N39.0]  Yes    Atrial fibrillation [I48.91]  Yes    COPD (chronic obstructive pulmonary disease) [J44.9]  Yes    Dementia [F03.90]  Yes    Parkinson's disease [G20.A1]  Yes      Resolved Hospital Problems   No resolved problems to display.        Brief Hospital Course to date:  Fanny Boyce is a 83 y.o. female with h/o UTI's, COPD, parkinson's disease, afib on eliquis presents d/t hallucinations, weakness and fall today found to have UTI and left humerus fracture     Left Humerus fracture  -Acute impacted proximal humeral fracture on xray  -Ortho following, patient wants conservative nonoperatively management.   -Continue sling   -PT/OT  -Continue pain control.   -Resume home eliquis since non-operative treatment  -follow-up with Dr. Harrington in 2 weeks with repeat Xray    Hypotension:  -BP are low normal and intermittently in the 80s. Asymptomatic so may be her normal. Renal function WNL. Will monitor, if persistently lower the 90s systolic will consider addition of midodrine.    -received fluids over night.     Fall  UTI  -Urine culture negative, blood cultures negative   -Continue rocephin.      Confusion  - noted on presentation  - CT head negative for bleeding or acute abnormality     Left 9th/anterior 10th rib fracture     COPD     Parkinson's Disease  --continue sinemet     Afib  --resume eliquis  --continue metoprolol             Expected Discharge Location and Transportation: home  Expected Discharge   Expected discharge date/ time has not been documented.     DVT prophylaxis:  Mechanical DVT prophylaxis orders are present.         AM-PAC 6 Clicks Score (PT): 6 (06/02/24 0159)    CODE STATUS:   Code Status and Medical Interventions:   Ordered at: 06/01/24 8888     Medical Intervention Limits:    NO  intubation (DNI)     Level Of Support Discussed With:    Patient     Code Status (Patient has no pulse and is not breathing):    No CPR (Do Not Attempt to Resuscitate)     Medical Interventions (Patient has pulse or is breathing):    Limited Support     This patient's problems and plans were partially entered by my partner and updated as appropriate by me 06/02/24. Today is my first day evaluating this patient's active medical problems. I Personally reviewed chart and adjusted note to reflect daily changes in management/clinical condition. Copied text in this note has been reviewed and is accurate as of  06/02/24      Inés Sheridan MD  06/02/24

## 2024-06-02 NOTE — PLAN OF CARE
Goal Outcome Evaluation:  Plan of Care Reviewed With: patient           Outcome Evaluation: Pt presents w/ R shoulder ROM restrictions, generalized weakness, decreased functional endurance, confusion, and balance deficits limiting her ADL independence. Pt tolerated 10 reps of LUE elbow/wrist/hand HEP w/ no c/o pain. Pt req dep A for sling management/proper fit & LBD this date. Pt would benefit from continued skilled IPOT services to address current functional deficits. Rec SNF at d/c.      Anticipated Discharge Disposition (OT): skilled nursing facility

## 2024-06-02 NOTE — PLAN OF CARE
Goal Outcome Evaluation:  Plan of Care Reviewed With: patient        Progress: no change  Outcome Evaluation: PT initial eval completed. Pt is A&Ox1 and presents with generalized weakness, impaired balance, and decreased activity tolerance. Pt ambulated 10' with mod Ax1+1 and R UE support. Further IPPT is warrented. PT rec d/c to SNF when medically appropriate.      Anticipated Discharge Disposition (PT): skilled nursing facility

## 2024-06-02 NOTE — PLAN OF CARE
Goal Outcome Evaluation:  Plan of Care Reviewed With: patient        Progress: declining  Outcome Evaluation: Pt in bed a/ox1- disoriented to birthdate, time, situation, location. Pt only oriented to name and birth month and day, not year. Pt had fever at beginning of this shift that is now resolved. Pt BP low with on call provider notified on several occasions. Pt received 1x 500ml NS bolus and started 75ml/hr NS continuous infusion through RFA IV . CT of head ordered by on call provider for pt's confusion possibly r/t fall. WC consult in for pressure injury on coccyx present upon admission. Pt sleeping between care. Bed in lowest position and locked. Call light in hand. Will continue to monitor for changes.         Problem: Adult Inpatient Plan of Care  Goal: Plan of Care Review  Outcome: Ongoing, Not Progressing  Flowsheets (Taken 6/2/2024 0210)  Progress: declining  Plan of Care Reviewed With: patient  Outcome Evaluation: Pt in bed a/ox1- disoriented to birthdate, time, situation, location. Pt only oriented to name and birth month and day, not year. Pt had fever at beginning of this shift that is now resolved. Pt BP low with on call provider notified on several occasions. Pt received 1x 500ml NS bolus and started 75ml/hr NS continuous infusion through RFA IV . CT of head ordered by on call provider for pt's confusion possibly r/t fall. WC consult in for pressure injury on coccyx present upon admission. Pt sleeping between care. Bed in lowest position and locked. Call light in hand. Will continue to monitor for changes.  Goal: Patient-Specific Goal (Individualized)  Outcome: Ongoing, Not Progressing  Goal: Absence of Hospital-Acquired Illness or Injury  Outcome: Ongoing, Not Progressing  Intervention: Identify and Manage Fall Risk  Flowsheets  Taken 6/2/2024 0158  Safety Promotion/Fall Prevention:   nonskid shoes/slippers when out of bed   safety round/check completed   clutter free environment maintained   fall  prevention program maintained   lighting adjusted   room organization consistent  Taken 6/2/2024 0000  Safety Promotion/Fall Prevention:   nonskid shoes/slippers when out of bed   safety round/check completed   clutter free environment maintained   fall prevention program maintained   lighting adjusted   room organization consistent  Taken 6/1/2024 2200  Safety Promotion/Fall Prevention:   nonskid shoes/slippers when out of bed   safety round/check completed   clutter free environment maintained   fall prevention program maintained   lighting adjusted   room organization consistent  Taken 6/1/2024 2030  Safety Promotion/Fall Prevention:   nonskid shoes/slippers when out of bed   safety round/check completed   clutter free environment maintained   fall prevention program maintained   lighting adjusted   room organization consistent  Intervention: Prevent Skin Injury  Flowsheets  Taken 6/2/2024 0155 by Jami Shi RN  Skin Protection:   adhesive use limited   tubing/devices free from skin contact   transparent dressing maintained  Taken 6/2/2024 0100 by France Shah PCT  Body Position:   turned   left  Taken 6/2/2024 0000 by Jami Shi RN  Skin Protection:   adhesive use limited   tubing/devices free from skin contact   transparent dressing maintained  Taken 6/1/2024 2200 by Jami Shi RN  Body Position: turned  Skin Protection:   adhesive use limited   tubing/devices free from skin contact   transparent dressing maintained  Taken 6/1/2024 2030 by Jami Shi RN  Body Position: turned  Skin Protection:   adhesive use limited   tubing/devices free from skin contact   transparent dressing maintained  Intervention: Prevent and Manage VTE (Venous Thromboembolism) Risk  Flowsheets  Taken 6/2/2024 0155  Activity Management: activity encouraged  Taken 6/2/2024 0000  Activity Management: activity encouraged  Taken 6/1/2024 2200  Activity Management: activity encouraged  Taken 6/1/2024 2030  Activity  Management: activity encouraged  VTE Prevention/Management:   sequential compression devices on   bilateral  Range of Motion: active ROM (range of motion) encouraged  Intervention: Prevent Infection  Flowsheets  Taken 6/2/2024 0155  Infection Prevention:   cohorting utilized   environmental surveillance performed   equipment surfaces disinfected   hand hygiene promoted   personal protective equipment utilized   rest/sleep promoted   single patient room provided   visitors restricted/screened  Taken 6/2/2024 0000  Infection Prevention:   cohorting utilized   environmental surveillance performed   equipment surfaces disinfected   hand hygiene promoted   personal protective equipment utilized   rest/sleep promoted   single patient room provided   visitors restricted/screened  Taken 6/1/2024 2200  Infection Prevention:   cohorting utilized   environmental surveillance performed   equipment surfaces disinfected   hand hygiene promoted   personal protective equipment utilized   rest/sleep promoted   single patient room provided   visitors restricted/screened  Taken 6/1/2024 2030  Infection Prevention:   cohorting utilized   environmental surveillance performed   equipment surfaces disinfected   hand hygiene promoted   personal protective equipment utilized   rest/sleep promoted   single patient room provided   visitors restricted/screened  Goal: Optimal Comfort and Wellbeing  Outcome: Ongoing, Not Progressing  Intervention: Monitor Pain and Promote Comfort  Flowsheets (Taken 6/2/2024 0210)  Pain Management Interventions: care clustered  Intervention: Provide Person-Centered Care  Flowsheets (Taken 6/1/2024 2030)  Trust Relationship/Rapport:   care explained   choices provided   emotional support provided   empathic listening provided   questions answered   questions encouraged   reassurance provided   thoughts/feelings acknowledged  Goal: Readiness for Transition of Care  Outcome: Ongoing, Not Progressing  Intervention:  Mutually Develop Transition Plan  Recent Flowsheet Documentation  Taken 6/2/2024 0206 by Jami Shi RN  Transportation Anticipated: family or friend will provide  Patient/Family Anticipated Services at Transition: none  Patient/Family Anticipates Transition to: (giana d/t pt status) other (see comments)  Taken 6/2/2024 0201 by Jami Shi RN  Equipment Currently Used at Home: (giana d/t pt status) other (see comments)     Problem: Pain Acute  Goal: Acceptable Pain Control and Functional Ability  Outcome: Ongoing, Not Progressing  Intervention: Prevent or Manage Pain  Recent Flowsheet Documentation  Taken 6/1/2024 2030 by Jami Shi RN  Sensory Stimulation Regulation:   auditory stimulation minimized   care clustered   lighting decreased   visual stimulation minimized  Bowel Elimination Promotion: commode/bedpan at bedside  Sleep/Rest Enhancement:   consistent schedule promoted   regular sleep/rest pattern promoted   relaxation techniques promoted  Intervention: Develop Pain Management Plan  Recent Flowsheet Documentation  Taken 6/2/2024 0210 by Jami Shi RN  Pain Management Interventions: care clustered  Intervention: Optimize Psychosocial Wellbeing  Recent Flowsheet Documentation  Taken 6/1/2024 2030 by Jami Shi RN  Supportive Measures:   active listening utilized   self-care encouraged  Diversional Activities: television  Spiritual Activities Assistance: affirmation provided     Problem: Fall Injury Risk  Goal: Absence of Fall and Fall-Related Injury  Outcome: Ongoing, Not Progressing  Intervention: Identify and Manage Contributors  Recent Flowsheet Documentation  Taken 6/1/2024 2030 by Jami Shi RN  Self-Care Promotion:   independence encouraged   BADL personal objects within reach   BADL personal routines maintained  Intervention: Promote Injury-Free Environment  Recent Flowsheet Documentation  Taken 6/2/2024 0155 by Jami Shi RN  Safety Promotion/Fall Prevention:   nonskid  shoes/slippers when out of bed   safety round/check completed   clutter free environment maintained   fall prevention program maintained   lighting adjusted   room organization consistent  Taken 6/2/2024 0000 by Jami Shi RN  Safety Promotion/Fall Prevention:   nonskid shoes/slippers when out of bed   safety round/check completed   clutter free environment maintained   fall prevention program maintained   lighting adjusted   room organization consistent  Taken 6/1/2024 2200 by Jami Shi RN  Safety Promotion/Fall Prevention:   nonskid shoes/slippers when out of bed   safety round/check completed   clutter free environment maintained   fall prevention program maintained   lighting adjusted   room organization consistent  Taken 6/1/2024 2030 by Jami Shi RN  Safety Promotion/Fall Prevention:   nonskid shoes/slippers when out of bed   safety round/check completed   clutter free environment maintained   fall prevention program maintained   lighting adjusted   room organization consistent     Problem: Skin Injury Risk Increased  Goal: Skin Health and Integrity  Outcome: Ongoing, Not Progressing  Intervention: Optimize Skin Protection  Flowsheets  Taken 6/2/2024 0155  Pressure Reduction Techniques: frequent weight shift encouraged  Head of Bed (HOB) Positioning: [facility] elevated  Pressure Reduction Devices: specialty bed utilized  Skin Protection:   adhesive use limited   tubing/devices free from skin contact   transparent dressing maintained  Taken 6/2/2024 0000  Pressure Reduction Techniques:   frequent weight shift encouraged   heels elevated off bed  Head of Bed (HOB) Positioning: [facility] elevated  Pressure Reduction Devices:   specialty bed utilized   pressure-redistributing mattress utilized  Skin Protection:   adhesive use limited   tubing/devices free from skin contact   transparent dressing maintained  Taken 6/1/2024 2200  Pressure Reduction Techniques: frequent weight shift encouraged  Head of Bed (HOB)  Positioning: [facility] elevated  Pressure Reduction Devices: specialty bed utilized  Skin Protection:   adhesive use limited   tubing/devices free from skin contact   transparent dressing maintained  Taken 6/1/2024 2030  Pressure Reduction Techniques: frequent weight shift encouraged  Head of Bed ([facility]) Positioning: [facility] elevated  Pressure Reduction Devices:   pressure-redistributing mattress utilized   specialty bed utilized  Skin Protection:   adhesive use limited   tubing/devices free from skin contact   transparent dressing maintained

## 2024-06-02 NOTE — THERAPY EVALUATION
Patient Name: Fanny Boyce  : 1940    MRN: [id number]                              Today's Date: 2024       Admit Date: 2024    Visit Dx:     ICD-10-CM ICD-9-CM   1. Acute UTI  N39.0 599.0   2. Other closed nondisplaced fracture of proximal end of left humerus, initial encounter  S42.295A 812.09   3. Closed fracture of multiple ribs of left side, initial encounter  S22.42XA 807.09   4. Fall, initial encounter  W19.XXXA E888.9   5. At high risk for falls  Z91.81 V15.88   6. Visual hallucinations  R44.1 368.16   7. Generalized weakness  R53.1 780.79   8. Elevated lactic acid level  R79.89 276.2   9. Elevated troponin  R79.89 790.6     Patient Active Problem List   Diagnosis    UTI (urinary tract infection)    Atrial fibrillation    COPD (chronic obstructive pulmonary disease)    Dementia    Parkinson's disease     Past Medical History:   Diagnosis Date    Atrial fibrillation     COPD (chronic obstructive pulmonary disease)     Dementia     History of lung cancer     Parkinson disease     Pulmonary embolism      Past Surgical History:   Procedure Laterality Date    JOINT REPLACEMENT      LUNG CANCER SURGERY        General Information       Row Name 24 1143          OT Time and Intention    Document Type evaluation  -     Mode of Treatment occupational therapy  -       Row Name 24 1143          General Information    Patient Profile Reviewed yes  -MC     Prior Level of Function independent:;min assist:;bed mobility;transfer;all household mobility;ADL's  Pt reports she uses a rollator for mobility at baseline, limited historian, Unable to gather full PLOF, TBA further  -MC     Existing Precautions/Restrictions fall;other (see comments)  non-op L proximal humerus fx, LUE sling, LUE NWB, L elbow/wrist/hand, pendulum swings. L rib fractures. Hx of dementia & PD  -MC     Barriers to Rehab medically complex;previous functional deficit;cognitive status  -       Row Name 24 1142           Living Environment    People in Home sibling(s);other (see comments)  Pt reports she lives w/ her sister, per CR pt lives w/ dtr. ANA further  -       Row Name 06/02/24 1143          Home Main Entrance    Number of Stairs, Main Entrance other (see comments)  Pt limited historian, unable to gather home info. AAN further  -       Row Name 06/02/24 1143          Cognition    Orientation Status (Cognition) oriented to;person;disoriented to;place;situation;time;verbal cues/prompts needed for orientation  -       Row Name 06/02/24 1143          Safety Issues, Functional Mobility    Safety Issues Affecting Function (Mobility) awareness of need for assistance;insight into deficits/self-awareness;safety precaution awareness;safety precautions follow-through/compliance;sequencing abilities  -     Impairments Affecting Function (Mobility) balance;cognition;endurance/activity tolerance;pain;strength;range of motion (ROM);postural/trunk control  -     Cognitive Impairments, Mobility Safety/Performance attention;awareness, need for assistance;insight into deficits/self-awareness;safety precaution awareness;safety precaution follow-through;sequencing abilities  -               User Key  (r) = Recorded By, (t) = Taken By, (c) = Cosigned By      Initials Name Provider Type     Rhianna Lake OT Occupational Therapist                     Mobility/ADL's       Row Name 06/02/24 1146          Bed Mobility    Bed Mobility supine-sit  -     Supine-Sit [place] (Bed Mobility) moderate assist (50% patient effort);verbal cues  -     Bed Mobility, Safety Issues decreased use of arms for pushing/pulling;impaired trunk control for bed mobility  -     Assistive Device (Bed Mobility) draw sheet;head of bed elevated  -       Row Name 06/02/24 1146          Transfers    Transfers sit-stand transfer;stand-sit transfer;bed-chair transfer  -       Row Name 06/02/24 1146          Bed-Chair Transfer    Bed-Chair  [place] (Transfers) moderate assist (50% patient effort);verbal cues  -     Assistive Device (Bed-Chair Transfers) other (see comments)  RUE support  -[facility] Name 06/02/24 1146          Sit-Stand Transfer    Sit-Stand [place] (Transfers) moderate assist (50% patient effort);verbal cues  -     Assistive Device (Sit-Stand Transfers) other (see comments)  -[facility] Name 06/02/24 1146          Stand-Sit Transfer    Stand-Sit [place] (Transfers) moderate assist (50% patient effort);verbal cues  -     Assistive Device (Stand-Sit Transfers) other (see comments)  -[facility] Name 06/02/24 1146          Activities of Daily Living    BADL Assessment/Intervention lower body dressing;grooming;upper body dressing  -[facility] 06/02/24 1146          Mobility    Extremity Weight-bearing Status left upper extremity  -     Left Upper Extremity (Weight-bearing Status) non weight-bearing (NWB)  -[facility] Name 06/02/24 1146          Lower Body Dressing Assessment/Training    [place] Level (Lower Body Dressing) don;socks;dependent (less than 25% patient effort);verbal cues  -     Position (Lower Body Dressing) supine  -[facility] Name 06/02/24 1146          Grooming Assessment/Training    [place] Level (Grooming) wash face, hands;set up  -     Position (Grooming) sitting up in bed  -[facility] Name 06/02/24 1146          Upper Body Dressing Assessment/Training    [place] Level (Upper Body Dressing) don;doff;other (see comments);dependent (less than 25% patient effort);verbal cues  LUE sling management/proper fit  -     Position (Upper Body Dressing) sitting up in bed  -               User Key  (r) = Recorded By, (t) = Taken By, (c) = Cosigned By      Initials Name Provider Type     Rhianna Lake OT Occupational Therapist                   Obj/Interventions       [facility] Name 06/02/24 1148          Sensory Assessment (Somatosensory)    Sensory Assessment  (Somatosensory) UE sensation intact  -[facility] Name 06/02/24 1148          Vision Assessment/Intervention    Visual Impairment/Limitations WFL  -[facility] Name 06/02/24 1148          Range of Motion Comprehensive    General Range of Motion bilateral upper extremity ROM WFL  -[facility] Name 06/02/24 1148          Strength Comprehensive (MMT)    General Manual Muscle Testing (MMT) Assessment upper extremity strength deficits identified  -     Comment, General Manual Muscle Testing (MMT) Assessment Formal assessment limited d/t cognitive status, based on functional activity RUE grossly 4/5, LUE deferred d/t NWB restrictions  -[facility] Name 06/02/24 1148          Elbow/Forearm (Therapeutic Exercise)    Elbow/Forearm (Therapeutic Exercise) AAROM (active assistive range of motion)  -     Elbow/Forearm AAROM (Therapeutic Exercise) left;flexion;extension;supination;pronation;10 repetitions;supine  -[facility] Name 06/02/24 1148          Wrist (Therapeutic Exercise)    Wrist (Therapeutic Exercise) AAROM (active assistive range of motion)  -     Wrist AAROM (Therapeutic Exercise) left;flexion;extension;10 repetitions  -MC       Row Name 06/02/24 1148          Hand (Therapeutic Exercise)    Hand (Therapeutic Exercise) AROM (active range of motion)  -     Hand AROM/AAROM (Therapeutic Exercise) left;AAROM (active assistive range of motion);finger flexion;finger extension;10 repetitions  -[facility] Name 06/02/24 1148          Motor Skills    Therapeutic Exercise elbow/forearm;wrist;hand  -[facility] Name 06/02/24 1148          Balance    Balance Assessment sitting static balance;sit to stand dynamic balance;standing static balance;standing dynamic balance;sitting dynamic balance  -     Static Sitting Balance contact guard  -     Dynamic Sitting Balance minimal assist  -     Position, Sitting Balance unsupported;sitting edge of bed;sitting in chair  -     Sit to Stand Dynamic Balance moderate  assist;verbal cues  -     Static Standing Balance moderate assist;verbal cues  -     Dynamic Standing Balance moderate assist;verbal cues  -     Position/Device Used, Standing Balance supported  -     Balance Interventions sitting;sit to stand;occupation based/functional task  -               User Key  (r) = Recorded By, (t) = Taken By, (c) = Cosigned By      Initials Name Provider Type     Rhianna Lake, OT Occupational Therapist                   Goals/Plan       Row Name 06/02/24 1152          Bed Mobility Goal 1 (OT)    Activity/Assistive Device (Bed Mobility Goal 1, OT) sit to supine;supine to sit  -     [place] Level/Cues Needed (Bed Mobility Goal 1, OT) minimum assist (75% or more patient effort);verbal cues required  -     Time Frame (Bed Mobility Goal 1, OT) short term goal (STG);5 days  -     Progress/Outcomes (Bed Mobility Goal 1, OT) goal ongoing  -       Row Name 06/02/24 1152          Transfer Goal 1 (OT)    Activity/Assistive Device (Transfer Goal 1, OT) bed-to-chair/chair-to-bed;toilet;cane, straight  -     [place] Level/Cues Needed (Transfer Goal 1, OT) minimum assist (75% or more patient effort)  -     Time Frame (Transfer Goal 1, OT) long term goal (LTG);10 days  -     Progress/Outcome (Transfer Goal 1, OT) goal ongoing  -       Row Name 06/02/24 1156          Grooming Goal 1 (OT)    Activity/Device (Grooming Goal 1, OT) hair care;oral care;wash face, hands  unsupported sitting  -     [place] (Grooming Goal 1, OT) minimum assist (75% or more patient effort);verbal cues required  -     Time Frame (Grooming Goal 1, OT) long term goal (LTG);10 days  -     Progress/Outcome (Grooming Goal 1, OT) goal ongoing  -       Row Name 06/02/24 1152          Therapy Assessment/Plan (OT)    Planned Therapy Interventions (OT) activity tolerance training;adaptive equipment training;BADL retraining;cognitive/visual perception retraining;functional balance  retraining;IADL retraining;occupation/activity based interventions;patient/caregiver education/training;ROM/therapeutic exercise;strengthening exercise;transfer/mobility retraining;orthotic fabrication/fitting/training;edema control/reduction;passive ROM/stretching  -               User Key  (r) = Recorded By, (t) = Taken By, (c) = Cosigned By      Initials Name Provider Type     Rhianna Lake, RADHA Occupational Therapist                   Clinical Impression       [facility] Name 06/02/24 1149          Pain Assessment    Pretreatment Pain Rating 0/10 - no pain  -     Posttreatment Pain Rating 0/10 - no pain  -[facility] Name 06/02/24 1149          Plan of Care Review    Plan of Care Reviewed With patient  -     Outcome Evaluation Pt presents w/ R shoulder ROM restrictions, generalized weakness, decreased functional endurance, confusion, and balance deficits limiting her ADL independence. Pt tolerated 10 reps of LUE elbow/wrist/hand HEP w/ no c/o pain. Pt req dep A for sling management/proper fit & LBD this date. Pt would benefit from continued skilled IPOT services to address current functional deficits. Rec SNF at d/c.  -[facility] Name 06/02/24 1149          Therapy Assessment/Plan (OT)    Patient/Family Therapy Goal Statement (OT) Return to [facility]  -     Rehab Potential (OT) good, to achieve stated therapy goals  -     Criteria for Skilled Therapeutic Interventions Met (OT) yes;skilled treatment is necessary  -     Therapy Frequency (OT) daily  -     Predicted Duration of Therapy Intervention (OT) 10 days  -       Row Name 06/02/24 1149          Therapy Plan Review/Discharge Plan (OT)    Anticipated Discharge Disposition (OT) skilled nursing facility  -[facility] Name 06/02/24 1149          Vital Signs    O2 Delivery Pre Treatment room air  -     O2 Delivery Intra Treatment room air  -     O2 Delivery Post Treatment room air  -     Pre Patient Position Supine  -     Intra Patient  Position Standing  -     Post Patient Position Sitting  -       Row Name 06/02/24 1149          Positioning and Restraints    Pre-Treatment Position in bed  -     Post Treatment Position chair  -     In Chair sitting;with PT;waffle cushion  -               User Key  (r) = Recorded By, (t) = Taken By, (c) = Cosigned By      Initials Name Provider Type     Rhianna Lake, RADHA Occupational Therapist                   Outcome Measures       Row Name 06/02/24 1153          How much help from another is currently needed...    Putting on and taking off regular lower body clothing? 1  -MC     Bathing (including washing, rinsing, and drying) 2  -MC     Toileting (which includes using toilet bed pan or urinal) 1  -MC     Putting on and taking off regular upper body clothing 2  -MC     Taking care of personal grooming (such as brushing teeth) 3  -MC     Eating meals 3  -MC     AM-PAC 6 Clicks Score (OT) 12  -MC       Row Name 06/02/24 1027 06/02/24 0159       How much help from another person do you currently need...    Turning from your back to your side while in flat bed without using bedrails? 3  -AB 1  -EC    Moving from lying on back to sitting on the side of a flat bed without bedrails? 3  -AB 1  -EC    Moving to and from a bed to a chair (including a wheelchair)? 2  -AB 1  -EC    Standing up from a chair using your arms (e.g., wheelchair, bedside chair)? 2  -AB 1  -EC    Climbing 3-5 steps with a railing? 1  -AB 1  -EC    To walk in hospital room? 2  -AB 1  -EC    AM-PAC 6 Clicks Score (PT) 13  -AB 6  -EC    Highest Level of Mobility Goal 4 --> Transfer to chair/commode  -AB 2 --> Bed activities/dependent transfer  -EC      Row Name 06/02/24 1153 06/02/24 1027       Functional Assessment    Outcome Measure [facility] AM-PAC 6 Clicks Daily Activity (OT)  -MC AM-PAC 6 Clicks Basic Mobility (PT)  -AB              User Key  (r) = Recorded By, (t) = Taken By, (c) = Cosigned By      Initials Name Provider Type      Rhianna Lake, RADHA Occupational Therapist    Katy Hahn, PT Physical Therapist    Jami Alonso, RN Registered Nurse                    Occupational Therapy Education       Title: PT OT SLP Therapies (In Progress)       Topic: Occupational Therapy (In Progress)       Point: ADL training (In Progress)       Description:   Instruct learner(s) on proper safety adaptation and remediation techniques during self care or transfers.   Instruct in proper use of assistive devices.                  Learning Progress Summary             Patient Acceptance, E, NR by  at 6/2/2024 1153                         Point: Home exercise program (In Progress)       Description:   Instruct learner(s) on appropriate technique for monitoring, assisting and/or progressing therapeutic exercises/activities.                  Learning Progress Summary             Patient Acceptance, E, NR by  at 6/2/2024 1153                         Point: Precautions (In Progress)       Description:   Instruct learner(s) on prescribed precautions during self-care and functional transfers.                  Learning Progress Summary             Patient Acceptance, E, NR by  at 6/2/2024 1153                         Point: Body mechanics (In Progress)       Description:   Instruct learner(s) on proper positioning and spine alignment during self-care, functional mobility activities and/or exercises.                  Learning Progress Summary             Patient Acceptance, E, NR by  at 6/2/2024 1153                                         User Key       Initials Effective Dates Name Provider Type Discipline     10/14/22 -  Rhianna Lake OT Occupational Therapist OT                  OT Recommendation and Plan  Planned Therapy Interventions (OT): activity tolerance training, adaptive equipment training, BADL retraining, cognitive/visual perception retraining, functional balance retraining, IADL retraining, occupation/activity based  interventions, patient/caregiver education/training, ROM/therapeutic exercise, strengthening exercise, transfer/mobility retraining, orthotic fabrication/fitting/training, edema control/reduction, passive ROM/stretching  Therapy Frequency (OT): daily  Plan of Care Review  Plan of Care Reviewed With: patient  Outcome Evaluation: Pt presents w/ R shoulder ROM restrictions, generalized weakness, decreased functional endurance, confusion, and balance deficits limiting her ADL independence. Pt tolerated 10 reps of LUE elbow/wrist/hand HEP w/ no c/o pain. Pt req dep A for sling management/proper fit & LBD this date. Pt would benefit from continued skilled IPOT services to address current functional deficits. Rec SNF at d/c.     Time Calculation:   Evaluation Complexity (OT)  Review Occupational Profile/Medical/Therapy History Complexity: expanded/moderate complexity  Assessment, Occupational Performance/Identification of Deficit Complexity: 3-5 performance deficits  Clinical Decision Making Complexity (OT): detailed assessment/moderate complexity  Overall Complexity of Evaluation (OT): moderate complexity     Time Calculation- OT       Row Name 06/02/24 1154             Time Calculation- OT    OT Start Time 0756  -MC      OT Received On 06/02/24  -      OT Goal Re-Cert Due Date 06/12/24  -         Timed Charges    52108 - OT Therapeutic Exercise Minutes 6  -MC      45368 - OT Therapeutic Activity Minutes 4  -MC      51019 - OT Self Care/Mgmt Minutes 5  -MC         Untimed Charges    OT Eval/Re-eval Minutes 31  -MC         Total Minutes    Timed Charges Total Minutes 15  -MC      Untimed Charges Total Minutes 31  -MC       Total Minutes 46  -MC                User Key  (r) = Recorded By, (t) = Taken By, (c) = Cosigned By      Initials Name Provider Type    Rhianna Pace OT Occupational Therapist                  Therapy Charges for Today       Code Description Service Date Service Provider Modifiers Qty     57403225771 HC OT THER PROC EA 15 MIN 6/2/2024 Rhianna Lake OT GO 1    65606434526 HC OT EVAL MOD COMPLEXITY 3 6/2/2024 Rhianna Lkae OT GO 1                 Rhianna Lake OT  6/2/2024

## 2024-06-03 LAB
ANION GAP SERPL CALCULATED.3IONS-SCNC: 12 MMOL/L (ref 5–15)
BACTERIA SPEC AEROBE CULT: ABNORMAL
BUN SERPL-MCNC: 16 MG/DL (ref 8–23)
BUN/CREAT SERPL: 29.6 (ref 7–25)
CALCIUM SPEC-SCNC: 8.2 MG/DL (ref 8.6–10.5)
CHLORIDE SERPL-SCNC: 106 MMOL/L (ref 98–107)
CO2 SERPL-SCNC: 21 MMOL/L (ref 22–29)
CREAT SERPL-MCNC: 0.54 MG/DL (ref 0.57–1)
DEPRECATED RDW RBC AUTO: 49.9 FL (ref 37–54)
EGFRCR SERPLBLD CKD-EPI 2021: 90.4 ML/MIN/1.73
ERYTHROCYTE [DISTWIDTH] IN BLOOD BY AUTOMATED COUNT: 15.3 % (ref 12.3–15.4)
GLUCOSE SERPL-MCNC: 97 MG/DL (ref 65–99)
HCT VFR BLD AUTO: 29.3 % (ref 34–46.6)
HGB BLD-MCNC: 9.1 G/DL (ref 12–15.9)
MCH RBC QN AUTO: 27.9 PG (ref 26.6–33)
MCHC RBC AUTO-ENTMCNC: 31.1 G/DL (ref 31.5–35.7)
MCV RBC AUTO: 89.9 FL (ref 79–97)
PLATELET # BLD AUTO: 256 10*3/MM3 (ref 140–450)
PMV BLD AUTO: 10.1 FL (ref 6–12)
POTASSIUM SERPL-SCNC: 3.7 MMOL/L (ref 3.5–5.2)
RBC # BLD AUTO: 3.26 10*6/MM3 (ref 3.77–5.28)
SODIUM SERPL-SCNC: 139 MMOL/L (ref 136–145)
WBC NRBC COR # BLD AUTO: 7.41 10*3/MM3 (ref 3.4–10.8)

## 2024-06-03 PROCEDURE — 97535 SELF CARE MNGMENT TRAINING: CPT

## 2024-06-03 PROCEDURE — 97116 GAIT TRAINING THERAPY: CPT

## 2024-06-03 PROCEDURE — 80048 BASIC METABOLIC PNL TOTAL CA: CPT | Performed by: INTERNAL MEDICINE

## 2024-06-03 PROCEDURE — 25810000003 SODIUM CHLORIDE 0.9 % SOLUTION: Performed by: NURSE PRACTITIONER

## 2024-06-03 PROCEDURE — 97110 THERAPEUTIC EXERCISES: CPT

## 2024-06-03 PROCEDURE — 99232 SBSQ HOSP IP/OBS MODERATE 35: CPT | Performed by: INTERNAL MEDICINE

## 2024-06-03 PROCEDURE — 25010000002 CEFTRIAXONE PER 250 MG: Performed by: INTERNAL MEDICINE

## 2024-06-03 PROCEDURE — 85027 COMPLETE CBC AUTOMATED: CPT | Performed by: INTERNAL MEDICINE

## 2024-06-03 RX ADMIN — METOPROLOL TARTRATE 12.5 MG: 25 TABLET, FILM COATED ORAL at 08:36

## 2024-06-03 RX ADMIN — APIXABAN 2.5 MG: 2.5 TABLET, FILM COATED ORAL at 20:19

## 2024-06-03 RX ADMIN — CARBIDOPA AND LEVODOPA 1 TABLET: 25; 100 TABLET ORAL at 15:40

## 2024-06-03 RX ADMIN — POLYETHYLENE GLYCOL 3350 17 G: 17 POWDER, FOR SOLUTION ORAL at 08:34

## 2024-06-03 RX ADMIN — APIXABAN 2.5 MG: 2.5 TABLET, FILM COATED ORAL at 08:36

## 2024-06-03 RX ADMIN — Medication 10 ML: at 08:43

## 2024-06-03 RX ADMIN — CARBIDOPA AND LEVODOPA 1 TABLET: 25; 100 TABLET ORAL at 20:19

## 2024-06-03 RX ADMIN — BISACODYL 5 MG: 5 TABLET, COATED ORAL at 08:36

## 2024-06-03 RX ADMIN — SODIUM CHLORIDE 75 ML/HR: 9 INJECTION, SOLUTION INTRAVENOUS at 08:44

## 2024-06-03 RX ADMIN — METOPROLOL TARTRATE 12.5 MG: 25 TABLET, FILM COATED ORAL at 20:19

## 2024-06-03 RX ADMIN — CEFTRIAXONE 2000 MG: 2 INJECTION, POWDER, FOR SOLUTION INTRAMUSCULAR; INTRAVENOUS at 15:41

## 2024-06-03 RX ADMIN — SENNOSIDES AND DOCUSATE SODIUM 2 TABLET: 8.6; 5 TABLET ORAL at 08:36

## 2024-06-03 RX ADMIN — ACETAMINOPHEN 650 MG: 325 TABLET ORAL at 08:36

## 2024-06-03 RX ADMIN — ACETAMINOPHEN 650 MG: 325 TABLET ORAL at 00:02

## 2024-06-03 RX ADMIN — CARBIDOPA AND LEVODOPA 1 TABLET: 25; 100 TABLET ORAL at 08:36

## 2024-06-03 NOTE — PLAN OF CARE
Goal Outcome Evaluation:  Plan of Care Reviewed With: patient        Progress: improving     Alert to self, VSS, RA-2L at night, up with assist x2 to BSC, sat in chair for most of the day, PO tylenol given for pain, sling in place,         Problem: Adult Inpatient Plan of Care  Goal: Absence of Hospital-Acquired Illness or Injury  Outcome: Ongoing, Progressing  Intervention: Identify and Manage Fall Risk  Recent Flowsheet Documentation  Taken 6/3/2024 1812 by Hannah Kim, RN  Safety Promotion/Fall Prevention:   safety round/check completed   room organization consistent   nonskid shoes/slippers when out of bed   gait belt   lighting adjusted   mobility aid in reach   fall prevention program maintained   clutter free environment maintained   assistive device/personal items within reach  Taken 6/3/2024 1600 by Hannah Kim, RN  Safety Promotion/Fall Prevention:   safety round/check completed   room organization consistent   nonskid shoes/slippers when out of bed   lighting adjusted   fall prevention program maintained   clutter free environment maintained   assistive device/personal items within reach  Taken 6/3/2024 1411 by Hannah Kim, RN  Safety Promotion/Fall Prevention:   safety round/check completed   toileting scheduled   room organization consistent   nonskid shoes/slippers when out of bed   lighting adjusted   fall prevention program maintained   clutter free environment maintained   assistive device/personal items within reach  Taken 6/3/2024 1214 by Hannah Kim, RN  Safety Promotion/Fall Prevention:   safety round/check completed   room organization consistent   toileting scheduled   nonskid shoes/slippers when out of bed   mobility aid in reach   lighting adjusted   elopement precautions   fall prevention program maintained   clutter free environment maintained   assistive device/personal items within reach  Taken 6/3/2024 1011 by Hannah Kim, RN  Safety Promotion/Fall Prevention:   safety  round/check completed   room organization consistent   nonskid shoes/slippers when out of bed   lighting adjusted   fall prevention program maintained   clutter free environment maintained   assistive device/personal items within reach  Taken 6/3/2024 0844 by Hannah Kim RN  Safety Promotion/Fall Prevention:   safety round/check completed   room organization consistent   nonskid shoes/slippers when out of bed   lighting adjusted   fall prevention program maintained   clutter free environment maintained   assistive device/personal items within reach   activity supervised  Intervention: Prevent Skin Injury  Recent Flowsheet Documentation  Taken 6/3/2024 1812 by Hannah Kim RN  Body Position: lower extremity elevated  Skin Protection: adhesive use limited  Taken 6/3/2024 1600 by Hannah Kim RN  Body Position: lower extremity elevated  Skin Protection: adhesive use limited  Taken 6/3/2024 1411 by Hannah Kim RN  Body Position: lower extremity elevated  Skin Protection: adhesive use limited  Taken 6/3/2024 1214 by Hannah Kim RN  Body Position: lower extremity elevated  Skin Protection: adhesive use limited  Taken 6/3/2024 1011 by Hannah Kim RN  Body Position: lower extremity elevated  Taken 6/3/2024 0844 by Hannah Kim RN  Body Position: lower extremity elevated  Skin Protection: adhesive use limited  Intervention: Prevent and Manage VTE (Venous Thromboembolism) Risk  Recent Flowsheet Documentation  Taken 6/3/2024 1812 by Hannah Kim RN  Activity Management: up in chair  VTE Prevention/Management: sequential compression devices off  Taken 6/3/2024 1600 by Hannah Kim RN  Activity Management: up in chair  VTE Prevention/Management:   sequential compression devices off   patient refused intervention  Taken 6/3/2024 1411 by Hannah Kim RN  Activity Management: up in chair  VTE Prevention/Management: sequential compression devices off  Taken 6/3/2024 1214 by Hannah Kim  RN  Activity Management: up in chair  VTE Prevention/Management: sequential compression devices off  Taken 6/3/2024 1011 by Hannah Kim RN  Activity Management: up in chair  VTE Prevention/Management: sequential compression devices off  Taken 6/3/2024 0844 by Hannah Kim, RN  Activity Management:   up in chair   dorsiflexion/plantar flexion performed  VTE Prevention/Management:   bilateral   sequential compression devices on  Range of Motion: active ROM (range of motion) encouraged

## 2024-06-03 NOTE — THERAPY TREATMENT NOTE
Patient Name: Fanny Boyce  : 1940    MRN: [id number]                              Today's Date: 6/3/2024       Admit Date: 2024    Visit Dx:     ICD-10-CM ICD-9-CM   1. Acute UTI  N39.0 599.0   2. Other closed nondisplaced fracture of proximal end of left humerus, initial encounter  S42.295A 812.09   3. Closed fracture of multiple ribs of left side, initial encounter  S22.42XA 807.09   4. Fall, initial encounter  W19.XXXA E888.9   5. At high risk for falls  Z91.81 V15.88   6. Visual hallucinations  R44.1 368.16   7. Generalized weakness  R53.1 780.79   8. Elevated lactic acid level  R79.89 276.2   9. Elevated troponin  R79.89 790.6     Patient Active Problem List   Diagnosis    UTI (urinary tract infection)    Atrial fibrillation    COPD (chronic obstructive pulmonary disease)    Dementia    Parkinson's disease     Past Medical History:   Diagnosis Date    Atrial fibrillation     COPD (chronic obstructive pulmonary disease)     Dementia     History of lung cancer     Parkinson disease     Pulmonary embolism      Past Surgical History:   Procedure Laterality Date    JOINT REPLACEMENT      LUNG CANCER SURGERY        General Information       Row Name 24 0929          Physical Therapy Time and Intention    Document Type therapy note (daily note)  -LR     Mode of Treatment physical therapy;individual therapy  -LR       Row Name 24 0929          General Information    Patient Profile Reviewed yes  -LR     Existing Precautions/Restrictions fall;non-weight bearing;left;shoulder;other (see comments)   non-op L proximal humeral fx, sling to L UE, NWB L UE, L rib fx, dementia, parkinson's  -LR     Barriers to Rehab medically complex;previous functional deficit;cognitive status  -LR       Row Name 24 0929          Cognition    Orientation Status (Cognition) oriented to;person;place;disoriented to;time;verbal cues/prompts needed for orientation;other (see comments)  Patient could not state  current month and year  -LR       Row Name 06/03/24 0929          Safety Issues, Functional Mobility    Safety Issues Affecting Function (Mobility) ability to follow commands;awareness of need for assistance;insight into deficits/self-awareness;judgment;safety precautions follow-through/compliance;sequencing abilities;safety precaution awareness;positioning of assistive device  -LR     Impairments Affecting Function (Mobility) balance;cognition;endurance/activity tolerance;pain;strength;range of motion (ROM);postural/trunk control  -LR               User Key  (r) = Recorded By, (t) = Taken By, (c) = Cosigned By      Initials Name Provider Type    LR Tessy Martinez, PT Physical Therapist                   Mobility       Row Name 06/03/24 0929          Bed Mobility    Supine-Sit [place] (Bed Mobility) not tested  -LR     Comment, (Bed Mobility) [facility] on arrival and at end of treatment.  -LR       Row Name 06/03/24 0929          Transfers    Comment, (Transfers) Required assist via draw sheet to scoot hips out to get feet on floor. Cues to push up from chair with R UE to stand and to reach back for chair with R UE to lower into sitting. Stood with forward flexed posture. Verbal cues to shift hips forward and shoulders back to correct posture, improved slightly.  -LR       Row Name 06/03/24 0929          Bed-Chair Transfer    Bed-Chair [place] (Transfers) not tested  -LR       Row Name 06/03/24 0929          Sit-Stand Transfer    Sit-Stand [place] (Transfers) verbal cues;minimum assist (75% patient effort);2 person assist  -LR     Assistive Device (Sit-Stand Transfers) cane, straight  -LR       Row Name 06/03/24 0929          Gait/Stairs (Locomotion)    [place] Level (Gait) verbal cues;minimum assist (75% patient effort);2 person assist  -LR     Assistive Device (Gait) cane, straight  -LR     Patient was able to Ambulate yes  -LR     Distance in Feet (Gait) 10  -LR     Deviations/Abnormal  Patterns (Gait) bilateral deviations;[place] decreased;gait speed decreased;stride length decreased  -LR     Bilateral Gait Deviations forward flexed posture;heel strike decreased  -LR     [place] Level (Stairs) not tested  -LR     Comment, (Gait/Stairs) Prior to gait training, PT demonstrated correct use and sequencing of SPC for ambulation as patient typically uses rollator. Patient ambulated with step to gait pattern at slow pace with forward flexed posture. Verbal cues for correct sequencing and placement of SPC. Patient took very short shuffling steps. Exhibited L lateral lean throughout ambulation, patient had difficulty correcting this with cues for correction. Gait limited by weakness, fatigue, SOA, dizziness. Chair brought up behind patient to return to sitting.  -LR       Row Name 06/03/24 0929          Mobility    Extremity Weight-bearing Status left upper extremity  -LR     Left Upper Extremity (Weight-bearing Status) non weight-bearing (NWB)   -LR               User Key  (r) = Recorded By, (t) = Taken By, (c) = Cosigned By      Initials Name Provider Type    LR Tessy Martinez, PT Physical Therapist                   Obj/Interventions       Row Name 06/03/24 0929          Motor Skills    Therapeutic Exercise ankle;knee;hip;other (see comments)  cues for technique; no assist required  -LR       Row Name 06/03/24 0929          Hip (Therapeutic Exercise)    Hip (Therapeutic Exercise) strengthening exercise;isometric exercises  -LR     Hip Isometrics (Therapeutic Exercise) bilateral;gluteal sets;sitting;10 repetitions  -LR     Hip Strengthening (Therapeutic Exercise) bilateral;heel slides;aBduction;marching while seated;sitting;10 repetitions  -LR       Row Name 06/03/24 0929          Knee (Therapeutic Exercise)    Knee (Therapeutic Exercise) strengthening exercise;isometric exercises  -LR     Knee Isometrics (Therapeutic Exercise) bilateral;quad sets;sitting;10 repetitions  -LR     Knee  Strengthening (Therapeutic Exercise) bilateral;SLR (straight leg raise);SAQ (short arc quad);LAQ (long arc quad);sitting;10 repetitions  -LR       Row Name 06/03/24 0929          Ankle (Therapeutic Exercise)    Ankle (Therapeutic Exercise) AROM (active range of motion)  -LR     Ankle AROM (Therapeutic Exercise) bilateral;dorsiflexion;plantarflexion;sitting;10 repetitions  -LR       Row Name 06/03/24 0929          Balance    Balance Assessment sitting static balance;sitting dynamic balance;standing static balance;standing dynamic balance  -LR     Static Sitting Balance standby assist  -LR     Dynamic Sitting Balance contact guard  -LR     Position, Sitting Balance unsupported;sitting in chair  -LR     Static Standing Balance minimal assist  -LR     Dynamic Standing Balance minimal assist;2-person assist  -LR     Position/Device Used, Standing Balance supported;cane, straight  -LR               User Key  (r) = Recorded By, (t) = Taken By, (c) = Cosigned By      Initials Name Provider Type    LR Tessy Martinez, PT Physical Therapist                   Goals/Plan       Row Name 06/03/24 0929          Bed Mobility Goal 1 (PT)    Activity/Assistive Device (Bed Mobility Goal 1, PT) sit to supine;supine to sit  -LR     [place] Level/Cues Needed (Bed Mobility Goal 1, PT) contact guard required  -LR     Time Frame (Bed Mobility Goal 1, PT) short term goal (STG);5 days  -LR     Progress/Outcomes (Bed Mobility Goal 1, PT) goal ongoing  -LR       Row Name 06/03/24 0929          Transfer Goal 1 (PT)    Activity/Assistive Device (Transfer Goal 1, PT) sit-to-stand/stand-to-sit;bed-to-chair/chair-to-bed;cane, quad  -LR     [place] Level/Cues Needed (Transfer Goal 1, PT) contact guard required  -LR     Time Frame (Transfer Goal 1, PT) long term goal (LTG);10 days  -LR     Progress/Outcome (Transfer Goal 1, PT) continuing progress toward goal;goal ongoing  -       Row Name 06/03/24 0929          Gait Training Goal 1  (PT)    Activity/Assistive Device (Gait Training Goal 1, PT) gait (walking locomotion);assistive device use;cane, quad  -LR     [place] Level (Gait Training Goal 1, PT) contact guard required  -LR     Distance (Gait Training Goal 1, PT) 50 feet  -LR     Time Frame (Gait Training Goal 1, PT) long term goal (LTG);10 days  -LR     Progress/Outcome (Gait Training Goal 1, PT) continuing progress toward goal;goal ongoing  -LR               User Key  (r) = Recorded By, (t) = Taken By, (c) = Cosigned By      Initials Name Provider Type    LR Tessy Martinez, PT Physical Therapist                   Clinical Impression       Row Name 06/03/24 0929          Pain    Pretreatment Pain Rating 0/10 - no pain  -LR     Posttreatment Pain Rating 0/10 - no pain  -LR     Pain Location - Side/Orientation Left  -LR     Pain Location - shoulder  -LR     Pain Intervention(s) Ambulation/increased activity;Repositioned  -LR       Row Name 06/03/24 0929          Plan of Care Review    Plan of Care Reviewed With patient  -LR     Progress improving  -LR     Outcome Evaluation Patient ambulated 10 feet with SPC and min assistx2, limited by fatigue, weakness, and dizziness. Initiated gait training with SPC today to promote increased independence with ambulation. Good effort with LE ther ex. Patient currently below functional baseline, demonstrating decreased functional mobility status, impaired balance, and decreased LE strength. Will address these deficits to promote return to PLOF. Recommend SNF at d/c.  -LR       Row Name 06/03/24 0949          Therapy Assessment/Plan (PT)    Patient/Family Therapy Goals Statement (PT) get better  -LR     Rehab Potential (PT) good, to achieve stated therapy goals  -LR     Criteria for Skilled Interventions Met (PT) yes;meets criteria;skilled treatment is necessary  -LR     Therapy Frequency (PT) daily  -LR       Row Name 06/03/24 0971          Vital Signs    Pre Systolic BP Rehab 125  -LR     Pre  Treatment Diastolic BP 62  -LR     Post Systolic BP Rehab 132  -LR     Post Treatment Diastolic BP 89  -LR     Pre SpO2 (%) 95  -LR     O2 Delivery Pre Treatment room air  -LR     Intra SpO2 (%) 93  -LR     O2 Delivery Intra Treatment room air  -LR     Post SpO2 (%) 94  -LR     O2 Delivery Post Treatment room air  -LR     Pre Patient Position Sitting  -LR     Intra Patient Position Sitting  -LR     Post Patient Position Sitting  -LR       Row Name 06/03/24 0929          Positioning and Restraints    Pre-Treatment Position sitting in chair/recliner  -LR     Post Treatment Position chair  -LR     In Chair notified nsg;reclined;sitting;call light within reach;encouraged to call for assist;exit alarm on;waffle cushion;legs elevated  SCDs not on on arrival  -LR               User Key  (r) = Recorded By, (t) = Taken By, (c) = Cosigned By      Initials Name Provider Type    Tessy Darling, PT Physical Therapist                   Outcome Measures       Row Name 06/03/24 0929          How much help from another person do you currently need...    Turning from your back to your side while in flat bed without using bedrails? 2  -LR     Moving from lying on back to sitting on the side of a flat bed without bedrails? 2  -LR     Moving to and from a bed to a chair (including a wheelchair)? 2  -LR     Standing up from a chair using your arms (e.g., wheelchair, bedside chair)? 2  -LR     Climbing 3-5 steps with a railing? 1  -LR     To walk in hospital room? 2  -LR     AM-PAC 6 Clicks Score (PT) 11  -LR     Highest Level of Mobility Goal 4 --> Transfer to chair/commode  -LR       Row Name 06/03/24 0929          Functional Assessment    Outcome Measure Options AM-PAC 6 Clicks Basic Mobility (PT)  -LR               User Key  (r) = Recorded By, (t) = Taken By, (c) = Cosigned By      Initials Name Provider Type    Tessy Darling, PT Physical Therapist                                 Physical Therapy Education        Title: PT OT SLP Therapies (In Progress)       Topic: Physical Therapy (Done)       Point: Mobility training (Done)       Learning Progress Summary             Patient Acceptance, E,D, VU,NR by LR at 6/3/2024 0929    Comment: Educated on NWB status of L UE, precautions, use of sling, LE HEP, correct sit<->stand t/f technique, correct gait mechanics, and progression of POC.    Acceptance, E,D, NR by AB at 6/2/2024 1028                         Point: Home exercise program (Done)       Learning Progress Summary             Patient Acceptance, E,D, VU,NR by LR at 6/3/2024 0929    Comment: Educated on NWB status of L UE, precautions, use of sling, LE HEP, correct sit<->stand t/f technique, correct gait mechanics, and progression of POC.                         Point: Body mechanics (Done)       Learning Progress Summary             Patient Acceptance, E,D, VU,NR by LR at 6/3/2024 0929    Comment: Educated on NWB status of L UE, precautions, use of sling, LE HEP, correct sit<->stand t/f technique, correct gait mechanics, and progression of POC.    Acceptance, E,D, NR by AB at 6/2/2024 1028                         Point: Precautions (Done)       Learning Progress Summary             Patient Acceptance, E,D, VU,NR by LR at 6/3/2024 0929    Comment: Educated on NWB status of L UE, precautions, use of sling, LE HEP, correct sit<->stand t/f technique, correct gait mechanics, and progression of POC.    Acceptance, E,D, NR by AB at 6/2/2024 1028                                         User Key       Initials Effective Dates Name Provider Type Discipline    LR 02/03/23 -  Tessy Martinez, PT Physical Therapist PT    AB 09/22/22 -  Katy Marley, PT Physical Therapist PT                  PT Recommendation and Plan     Plan of Care Reviewed With: patient  Progress: improving  Outcome Evaluation: Patient ambulated 10 feet with SPC and min assistx2, limited by fatigue, weakness, and dizziness. Initiated gait training with  SPC today to promote increased independence with ambulation. Good effort with LE ther ex. Patient currently below functional baseline, demonstrating decreased functional mobility status, impaired balance, and decreased LE strength. Will address these deficits to promote return to PLOF. Recommend SNF at d/c.     Time Calculation:         PT Charges       Row Name 06/03/24 0929             Time Calculation    Start Time 0929  -LR      PT Received On 06/03/24  -LR      PT Goal Re-Cert Due Date 06/12/24  -LR         Timed Charges    83168 - PT Therapeutic Exercise Minutes 10  -LR      92067 - Gait Training Minutes  8  -LR      52717 - PT Therapeutic Activity Minutes 5  -LR         Total Minutes    Timed Charges Total Minutes 23  -LR       Total Minutes 23  -LR                User Key  (r) = Recorded By, (t) = Taken By, (c) = Cosigned By      Initials Name Provider Type    LR Tessy Martinez, PT Physical Therapist                  Therapy Charges for Today       Code Description Service Date Service Provider Modifiers Qty    28211334544 HC PT THER PROC EA 15 MIN 6/3/2024 Tessy Martinez, PT GP 1    62398981985 HC GAIT TRAINING EA 15 MIN 6/3/2024 Tessy Martinez, PT GP 1            PT G-Codes  Outcome Measure Options: AM-PAC 6 Clicks Basic Mobility (PT)  AM-PAC 6 Clicks Score (PT): 11  AM-PAC 6 Clicks Score (OT): 12  PT Discharge Summary  Anticipated Discharge Disposition (PT): skilled nursing facility    Tessy Martinez, JOAN  6/3/2024

## 2024-06-03 NOTE — PROGRESS NOTES
[facility] Medicine Services  PROGRESS NOTE    Patient Name: Fanny Boyce  : 1940  MRN: [id number]    Date of Admission: 2024  Primary Care Physician: Nemo Aponte PA    Subjective   Subjective     CC:  Humerus fracture    HPI:  Patient is doing well this morning.  Pain is controlled.  No complaint      Objective   Objective     Vital Signs:   Temp:  [97.5 °F (36.4 °C)-98.7 °F (37.1 °C)] 97.5 °F (36.4 °C)  Heart Rate:  [] 71  Resp:  [16-18] 16  BP: (103-133)/(48-62) 125/54  Flow (L/min):  [2] 2     Physical Exam:  Constitutional: Frail elderly female in bed, no acute distress  Respiratory: Clear to auscultation bilaterally, respiratory effort normal   Cardiovascular: RRR  Gastrointestinal: Positive bowel sounds, soft, nontender, nondistended  Musculoskeletal: No bilateral ankle edema  Psychiatric: Appropriate affect, cooperative  Neurologic: Oriented x 3, no focal deficits      Results Reviewed:  LAB RESULTS:      Lab 24  03324  0524  1530 24  1149   WBC 7.41 9.17  --  7.61   HEMOGLOBIN 9.1* 8.9*  --  10.2*   HEMATOCRIT 29.3* 29.4*  --  33.4*   PLATELETS 256 229  --  253   NEUTROS ABS  --   --   --  5.95   IMMATURE GRANS (ABS)  --   --   --  0.02   LYMPHS ABS  --   --   --  1.01   MONOS ABS  --   --   --  0.49   EOS ABS  --   --   --  0.08   MCV 89.9 88.3  --  90.3   LACTATE  --   --  1.7 2.1*         Lab 24  0334 24  0526 24  1149   SODIUM 139 141 140   POTASSIUM 3.7 4.3 4.1   CHLORIDE 106 107 103   CO2 21.0* 24.0 26.0   ANION GAP 12.0 10.0 11.0   BUN 16 16 18   CREATININE 0.54* 0.69 0.72   EGFR 90.4 85.2 82.1   GLUCOSE 97 96 90   CALCIUM 8.2* 8.1* 9.2   MAGNESIUM  --   --  2.0         Lab 24  1149   TOTAL PROTEIN 6.4   ALBUMIN 3.7   GLOBULIN 2.7   ALT (SGPT) <5   AST (SGOT) 15   BILIRUBIN 0.5   ALK PHOS 96         Lab 24  1220   HSTROP T 16*                 Brief Urine Lab Results  (Last result in the  past 365 days)        Color   Clarity   Blood   Leuk Est   Nitrite   Protein   CREAT   Urine HCG        06/01/24 1157 Yellow   Turbid   Small (1+)   Large (3+)   Positive   30 mg/dL (1+)                   Microbiology Results Abnormal       Procedure Component Value - Date/Time    Blood Culture - Blood, Arm, Right [[id number]]  (Normal) Collected: 06/01/24 1302    Lab Status: Preliminary result Specimen: Blood from Arm, Right Updated: 06/03/24 1315     Blood Culture No growth at 2 days    Narrative:      Less than seven (7) mL's of blood was collected.  Insufficient quantity may yield false negative results.    Blood Culture - Blood, Wrist, Left [[id number]]  (Normal) Collected: 06/01/24 1358    Lab Status: Preliminary result Specimen: Blood from Wrist, Left Updated: 06/02/24 1500     Blood Culture No growth at 24 hours    Narrative:      Less than seven (7) mL's of blood was collected.  Insufficient quantity may yield false negative results.            CT Head Without Contrast    Result Date: 6/2/2024  CT HEAD WO CONTRAST Date of Exam: 6/2/2024 4:30 AM EDT Indication: increased confusion. Comparison: None available. Technique: Axial CT images were obtained of the head without contrast administration.  Automated exposure control and iterative construction methods were used. Findings: There is no evidence of hemorrhage. There is no mass effect or midline shift. There is no extracerebral collection. Ventricles are normal in size and configuration for patient's stated age.  Posterior fossa is within normal limits. Calvarium and skull base appear intact.   Visualized sinuses show no air fluid levels. Visualized orbits are unremarkable.     Impression: Impression: No acute intracranial process. Electronically Signed: Dina Basilio MD  6/2/2024 4:40 AM EDT  Workstation ID: [id number]         Current medications:  Scheduled Meds:apixaban, 2.5 mg, Oral, Q12H  carbidopa-levodopa, 1 tablet, Oral, TID  cefTRIAXone, 2,000 mg,  Intravenous, Q24H  metoprolol tartrate, 12.5 mg, Oral, Q12H  sodium chloride, 10 mL, Intravenous, Q12H      Continuous Infusions:sodium chloride, 75 mL/hr, Last Rate: 75 mL/hr (06/03/24 0844)      PRN Meds:.  acetaminophen    senna-docusate sodium **AND** polyethylene glycol **AND** bisacodyl **AND** bisacodyl    fluticasone    ondansetron ODT **OR** ondansetron    sodium chloride    sodium chloride    sodium chloride    Assessment & Plan   Assessment & Plan     Active Hospital Problems    Diagnosis  POA    **UTI (urinary tract infection) [N39.0]  Yes    Atrial fibrillation [I48.91]  Yes    COPD (chronic obstructive pulmonary disease) [J44.9]  Yes    Dementia [F03.90]  Yes    Parkinson's disease [G20.A1]  Yes      Resolved Hospital Problems   No resolved problems to display.        Brief Hospital Course to date:  Fanny Boyce is a 83 y.o. female with h/o UTI's, COPD, parkinson's disease, afib on eliquis presents d/t hallucinations, weakness and fall today found to have UTI and left humerus fracture     Left Humerus fracture  -Acute impacted proximal humeral fracture on xray  -Ortho following, patient wants conservative nonoperatively management.   -Continue sling   -PT/OT  -Continue pain control.   -Resume home eliquis since non-operative treatment  -follow-up with Dr. Harrington in 2 weeks with repeat Xray     Hypotension, resolved  -Bps improved today     Fall  UTI  -Urine culture negative, blood cultures negative   -Continue rocephin.      Confusion  - noted on presentation  - CT head negative for bleeding or acute abnormality     Left 9th/anterior 10th rib fracture     COPD     Parkinson's Disease  --continue sinemet     Afib  --resume eliquis  --continue metoprolol              Expected Discharge Location and Transportation: rehab  Expected Discharge   Expected Discharge Date: 6/5/2024; Expected Discharge Time:      DVT prophylaxis:  Medical and mechanical DVT prophylaxis orders are present.         AM-PAC 6 Clicks  Score (PT): 11 (06/03/24 0929)    CODE STATUS:   Code Status and Medical Interventions:   Ordered at: 06/01/24 1537     Medical Intervention Limits:    NO intubation (DNI)     Level Of Support Discussed With:    Patient     Code Status (Patient has no pulse and is not breathing):    No CPR (Do Not Attempt to Resuscitate)     Medical Interventions (Patient has pulse or is breathing):    Limited Support     I have prepared this progress note with copied portions of the prior day's progress note of my own authorship to preserve accuracy and maintain consistency of documentation. I have reviewed these portions and edited them for correctness. I verify that the above documentation accurately and truly represents the evaluation and management performed on today's date.       Inés Sheridan MD  06/03/24

## 2024-06-03 NOTE — PLAN OF CARE
Goal Outcome Evaluation:  Plan of Care Reviewed With: patient        Progress: improving  Outcome Evaluation: Pt alert and participatory in OT interventions. Reviewed with pt (no caregiver/family present) on optimal position, tech, seq for UBD, axilla care and sling mgmt while adhering to LUE precautions throughout. Pt req'd gross max A to dep A for all with minimal help managing gown at RUE with less restrictions. Pt requires dep A for sling mgmt and bathing for safety purposes. Pt able to tolerate AROM at hand, wrist and elbow supination and pronation and AAROM for elbow flex/ext for follow through and to reach max available ROM. Attempted pendulums as ordered however cognitive deficits limited follow through to completion for safety concerns as pt observed attempting AROM at L shoulder. Pt able to stand with min A x 2 and use of cane for posterior clothing mgmt and repositioning. Pt is still below occupational performance baseline and would benefit from cont'd IPOT POC and SNF at d/c when medically ready.      Anticipated Discharge Disposition (OT): skilled nursing facility

## 2024-06-03 NOTE — THERAPY TREATMENT NOTE
Patient Name: Fanny Boyce  : 1940    MRN: [id number]                              Today's Date: 6/3/2024       Admit Date: 2024    Visit Dx:     ICD-10-CM ICD-9-CM   1. Acute UTI  N39.0 599.0   2. Other closed nondisplaced fracture of proximal end of left humerus, initial encounter  S42.295A 812.09   3. Closed fracture of multiple ribs of left side, initial encounter  S22.42XA 807.09   4. Fall, initial encounter  W19.XXXA E888.9   5. At high risk for falls  Z91.81 V15.88   6. Visual hallucinations  R44.1 368.16   7. Generalized weakness  R53.1 780.79   8. Elevated lactic acid level  R79.89 276.2   9. Elevated troponin  R79.89 790.6     Patient Active Problem List   Diagnosis    UTI (urinary tract infection)    Atrial fibrillation    COPD (chronic obstructive pulmonary disease)    Dementia    Parkinson's disease     Past Medical History:   Diagnosis Date    Atrial fibrillation     COPD (chronic obstructive pulmonary disease)     Dementia     History of lung cancer     Parkinson disease     Pulmonary embolism      Past Surgical History:   Procedure Laterality Date    JOINT REPLACEMENT      LUNG CANCER SURGERY        General Information       Row Name 24 1208          OT Time and Intention    Document Type therapy note (daily note)  -JY     Mode of Treatment occupational therapy  -JY       Row Name 24 1208          General Information    Patient Profile Reviewed yes  -JY     Existing Precautions/Restrictions fall;non-weight bearing;left;shoulder;other (see comments)   non op L proximal humerus fx, L UE NWB, sling for comfort; L 9th anterior and 10th rib fxs, hypotensive, dementia, Parkinson's disease  -JY     Barriers to Rehab medically complex;previous functional deficit;cognitive status  -JY       Row Name 24 1208          Cognition    Orientation Status (Cognition) oriented to;person;place;disoriented to;time;verbal cues/prompts needed for orientation;other (see comments)  pt u/a to  state any aspect of time  -EDWARD       Row Name 06/03/24 1208          Safety Issues, Functional Mobility    Safety Issues Affecting Function (Mobility) ability to follow commands;awareness of need for assistance;insight into deficits/self-awareness;judgment;safety precaution awareness;safety precautions follow-through/compliance;sequencing abilities;positioning of assistive device;problem-solving  -JY     Impairments Affecting Function (Mobility) balance;cognition;endurance/activity tolerance;pain;strength;range of motion (ROM);postural/trunk control  -JY     Cognitive Impairments, Mobility Safety/Performance attention;awareness, need for assistance;insight into deficits/self-awareness;judgment;problem-solving/reasoning;safety precaution awareness;safety precaution follow-through;sequencing abilities  -JY     Comment, Safety Issues/Impairments (Mobility) pt alert and able to follow simple commands; req'd review of precautions for LUE prior to and during mobility  -EDWARD               User Key  (r) = Recorded By, (t) = Taken By, (c) = Cosigned By      Initials Name Provider Type    Jaleesa Lazaro OT Occupational Therapist                     Mobility/ADL's       Row Name 06/03/24 1213          Bed Mobility    Bed Mobility other (see comments)  pt received UIC  -JY     Supine-Sit [place] (Bed Mobility) not tested  -JCONNOR     Comment, (Bed Mobility) pt received UIC upon OT arrival, pt preferred to remain OOB at exit thus bed mobility not assessed this date  -EDWARD       Row Name 06/03/24 1213          Transfers    Transfers sit-stand transfer;stand-sit transfer  -JY     Comment, (Transfers) skilled cues for optimal hand placement for controlled ascend and descend specifically to push through RUE to stand and to reach back with RUE for controlled lower once aligned and in close proximity to seated surface; prior to standing req'd draw sheet A to scoot hips forward to allow for greater contract of feet on floor for  stability; in standing pt stands with forward flexed posture and req'd cues for more upright standing, posture briefly improved w/ cues and greater support at LUE in sling  -EDWARD [place] Name 06/03/24 1213          Bed-Chair Transfer    Bed-Chair [place] (Transfers) not tested  -EDWARD       Row Name 06/03/24 [facility]          Sit-Stand Transfer    Sit-Stand [place] (Transfers) minimum assist (75% patient effort);2 person assist;verbal cues;nonverbal cues (demo/gesture)  -     Assistive Device (Sit-Stand Transfers) cane, straight  -JY     Comment, (Sit-Stand Transfer) cues for feet underneath self prior to standing with supplemental blocking for stability; cues for postuure to stand more upright once in standing  -EDWARD [place] Name 06/03/24 1213          Stand-Sit Transfer    Stand-Sit [place] (Transfers) minimum assist (75% patient effort);2 person assist;verbal cues;nonverbal cues (demo/gesture)  -     Assistive Device (Stand-Sit Transfers) other (see comments)  UE support at RUE after release of SPC  -EDWARD       [facility] 06/03/24 1213          Functional Mobility    Functional Mobility- Ind. Level minimum assist (75% patient effort);2 person assist required;verbal cues required  -     Functional Mobility- Device cane, straight  -JY     Functional Mobility- Comment defer to PT for specifics  -EDWARD [place] Name 06/03/24 1213          Activities of Daily Living    BADL Assessment/Intervention upper body dressing;bathing;grooming  -EDWARD       Row Name 06/03/24 1213          Mobility    Extremity Weight-bearing Status left upper extremity  -JY     Left Upper Extremity (Weight-bearing Status) non weight-bearing (NWB)   -CONNOR       Row Name 06/03/24 1213          Grooming Assessment/Training    [place] Level (Grooming) other (see comments)  applied deodorant B axilla  -JY     Position (Grooming) unsupported sitting  -J     Comment, (Grooming) pt able to minimally assist with positioning of LUE while still  adhering to precautions to allow for improved access to L axilla for application of deodorant  -JY       Row Name 06/03/24 1213          Upper Body Dressing Assessment/Training    [place] Level (Upper Body Dressing) doff;don;pajama/robe;maximum assist (25% patient effort);other (see comments)  sling mgmt with dep A  -JY     Assistive Devices (Upper Body Dressing) other (see comments)  vicente tech  -JY     Position (Upper Body Dressing) unsupported sitting  -JY     Comment, (Upper Body Dressing) educated pt on optimal position, tech, seq for UBD given more impacted LUE with need for cues throughout to maintain adherence to precautions, pt assisted minimally to remove RUE from sleeve, otherwise dep on A; OT educated pt on how to d/d sling, use for comfort and how to adjust with need for dep A d/d and to manage for improved fit and support  -JY       Row Name 06/03/24 1213          Bathing Assessment/Intervention    [place] Level (Bathing) other (see comments);dependent (less than 25% patient effort);verbal cues;nonverbal cues (demo/gesture)  L axilla  -JY     Assistive Devices (Bathing) other (see comments)  vicente tech  -JY     Position (Bathing) unsupported sitting  -JY     Comment, (Bathing) educated pt on optimal position, tech, seq for L axilla bathing with need for A in order to maintain adherence to precautions and for safety; pt assisted some to achieve best position for access  -JY               User Key  (r) = Recorded By, (t) = Taken By, (c) = Cosigned By      Initials Name Provider Type    Jaleesa Lazaro OT Occupational Therapist                   Obj/Interventions       Row Name 06/03/24 1224          Sensory Assessment (Somatosensory)    Sensory Assessment (Somatosensory) UE sensation intact  -JY     Sensory Assessment denies any numbness or tingling at BUEs  -JY       Row Name 06/03/24 1224          Elbow/Forearm (Therapeutic Exercise)    Elbow/Forearm (Therapeutic Exercise) AAROM (active  assistive range of motion);AROM (active range of motion)  -     Elbow/Forearm AROM (Therapeutic Exercise) left;supination;pronation;sitting;10 repetitions  -     Elbow/Forearm AAROM (Therapeutic Exercise) left;flexion;extension;sitting;10 repetitions  -[employer]       Row Name 06/03/24 1224          Wrist (Therapeutic Exercise)    Wrist (Therapeutic Exercise) AROM (active range of motion)  -     Wrist AROM (Therapeutic Exercise) left;flexion;extension;10 repetitions;other (see comments)  cues for max flexion and extension  -[employer]       Row Name 06/03/24 1224          Hand (Therapeutic Exercise)    Hand (Therapeutic Exercise) AROM (active range of motion)  -     Hand AROM/AAROM (Therapeutic Exercise) left;AROM (active range of motion);finger flexion;finger extension;10 repetitions  -       Row Name 06/03/24 1224          Motor Skills    Motor Skills functional endurance  -     Functional Endurance decreased activity tolerance toward more dynamic demands; pt impacted by dizziness in standing (no significant change in BP noted)  -     Therapeutic Exercise elbow/forearm;wrist;hand;other (see comments)  facilitated allowed ROM at LUE including elbow, wrist, hand and attempted modified pendulums from seated position and pt u/a to safely return demo thus did not progress for safety concerns; req'd AAROM intermittently for quality, follow through  -[employer]       Row Name 06/03/24 1224          Balance    Balance Assessment sitting static balance;sitting dynamic balance;standing static balance;standing dynamic balance  -JY     Static Sitting Balance standby assist  -JY     Dynamic Sitting Balance contact guard  -JY     Position, Sitting Balance unsupported;sitting in chair  -JY     Static Standing Balance minimal assist  -JY     Dynamic Standing Balance minimal assist;2-person assist;verbal cues  -JY     Position/Device Used, Standing Balance supported;cane, straight  -JY     Balance Interventions  sitting;standing;static;dynamic;sit to stand;supported;occupation based/functional task  -JY     Comment, Balance postural deficits in standing with forward flexion, improved stability with improved posture  -JY               User Key  (r) = Recorded By, (t) = Taken By, (c) = Cosigned By      Initials Name Provider Type    Jaleesa Lazaro, RADHA Occupational Therapist                   Goals/Plan    No documentation.                  Clinical Impression       Row Name 06/03/24 1232          Pain Assessment    Pretreatment Pain Rating 0/10 - no pain  -JY     Posttreatment Pain Rating 0/10 - no pain  -JY     Pre/Posttreatment Pain Comment denies any pain and tolerated OT interventions  -JY     Pain Intervention(s) Repositioned;Ambulation/increased activity;Rest  -JY       Row Name 06/03/24 1232          Plan of Care Review    Plan of Care Reviewed With patient  -JCONNOR     Progress improving  -JY     Outcome Evaluation Pt alert and participatory in OT interventions. Reviewed with pt (no caregiver/family present) on optimal position, tech, seq for UBD, axilla care and sling mgmt while adhering to LUE precautions throughout. Pt req'd gross max A to dep A for all with minimal help managing gown at RUE with less restrictions. Pt requires dep A for sling mgmt and bathing for safety purposes. Pt able to tolerate AROM at hand, wrist and elbow supination and pronation and AAROM for elbow flex/ext for follow through and to reach max available ROM. Attempted pendulums as ordered however cognitive deficits limited follow through to completion for safety concerns as pt observed attempting AROM at L shoulder. Pt able to stand with min A x 2 and use of cane for posterior clothing mgmt and repositioning. Pt is still below occupational performance baseline and would benefit from cont'd IPOT POC and SNF at d/c when medically ready.  -JY       Row Name 06/03/24 1232          Therapy Assessment/Plan (OT)    Rehab Potential (OT) good, to  achieve stated therapy goals  -JY     Criteria for Skilled Therapeutic Interventions Met (OT) yes;skilled treatment is necessary  -JY     Therapy Frequency (OT) daily  -JY       Row Name 06/03/24 1232          Therapy Plan Review/Discharge Plan (OT)    Anticipated Discharge Disposition (OT) skilled nursing facility  -JY       Row Name 06/03/24 1232          Vital Signs    Pre Systolic BP Rehab 125  -JY     Pre Treatment Diastolic BP 62  -JY     Post Systolic BP Rehab 132  -JY     Post Treatment Diastolic BP 89  -JY     Pretreatment Heart Rate (beats/min) 83  -JY     Posttreatment Heart Rate (beats/min) 76  -JY     Pre SpO2 (%) 95  -JY     O2 Delivery Pre Treatment room air  -JY     Intra SpO2 (%) 93  -JY     O2 Delivery Intra Treatment room air  -JY     Post SpO2 (%) 94  -JY     O2 Delivery Post Treatment room air  -JY     Pre Patient Position Sitting  -JY     Intra Patient Position Standing  -JY     Post Patient Position Sitting  -JY       Row Name 06/03/24 1232          Positioning and Restraints    Pre-Treatment Position sitting in chair/recliner  -JY     Post Treatment Position chair  -JY     In Chair notified nsg;reclined;call light within reach;encouraged to call for assist;exit alarm on;waffle cushion;legs elevated;heels elevated;LUE elevated  L UE supported in sling with pillow support to fill void at L side of recliner; SCDs not on hilda arrival and pt declined at end or session  -JY               User Key  (r) = Recorded By, (t) = Taken By, (c) = Cosigned By      Initials Name Provider Type    Jaleesa Lazaro OT Occupational Therapist                   Outcome Measures       Row Name 06/03/24 1316          How much help from another is currently needed...    Putting on and taking off regular lower body clothing? 1  -JY     Bathing (including washing, rinsing, and drying) 1  -JY     Toileting (which includes using toilet bed pan or urinal) 1  -JY     Putting on and taking off regular upper body clothing 2   -JY     Taking care of personal grooming (such as brushing teeth) 3  -JY     Eating meals 3  -JY     AM-PAC 6 Clicks Score (OT) 11  -JY       Row Name 06/03/24 0929 06/03/24 0844       How much help from another person do you currently need...    Turning from your back to your side while in flat bed without using bedrails? 2  -LR 2  -LB    Moving from lying on back to sitting on the side of a flat bed without bedrails? 2  -LR 2  -LB    Moving to and from a bed to a chair (including a wheelchair)? 2  -LR 2  -LB    Standing up from a chair using your arms (e.g., wheelchair, bedside chair)? 2  -LR 2  -LB    Climbing 3-5 steps with a railing? 1  -LR 1  -LB    To walk in hospital room? 2  -LR 2  -LB    AM-PAC 6 Clicks Score (PT) 11  -LR 11  -LB    Highest Level of Mobility Goal 4 --> Transfer to chair/commode  -LR 4 --> Transfer to chair/commode  -LB      Row Name 06/03/24 1316 06/03/24 0929       Functional Assessment    Outcome Measure Options AM-PAC 6 Clicks Daily Activity (OT)  -JY AM-PAC 6 Clicks Basic Mobility (PT)  -LR              User Key  (r) = Recorded By, (t) = Taken By, (c) = Cosigned By      Initials Name Provider Type    LR Tessy Martinez, PT Physical Therapist    Hannah Beck, RN Registered Nurse    Jaleesa Lazaro, OT Occupational Therapist                    Occupational Therapy Education       Title: PT OT SLP Therapies (In Progress)       Topic: Occupational Therapy (In Progress)       Point: ADL training (In Progress)       Description:   Instruct learner(s) on proper safety adaptation and remediation techniques during self care or transfers.   Instruct in proper use of assistive devices.                  Learning Progress Summary             Patient Acceptance, E,D, NR by EDWARD at 6/3/2024 0953    Acceptance, E, NR by ANDRES at 6/2/2024 1153                         Point: Home exercise program (In Progress)       Description:   Instruct learner(s) on appropriate technique for monitoring,  assisting and/or progressing therapeutic exercises/activities.                  Learning Progress Summary             Patient Acceptance, E,D, NR by EDWARD at 6/3/2024 0953    Acceptance, E, NR by  at 6/2/2024 1153                         Point: Precautions (In Progress)       Description:   Instruct learner(s) on prescribed precautions during self-care and functional transfers.                  Learning Progress Summary             Patient Acceptance, E,D, NR by EDWARD at 6/3/2024 0953    Acceptance, E, NR by  at 6/2/2024 1153                         Point: Body mechanics (In Progress)       Description:   Instruct learner(s) on proper positioning and spine alignment during self-care, functional mobility activities and/or exercises.                  Learning Progress Summary             Patient Acceptance, E,D, NR by EDWARD at 6/3/2024 0953    Acceptance, E, NR by  at 6/2/2024 1153                                         User Key       Initials Effective Dates Name Provider Type Discipline    EDWARD 06/16/21 -  Jaleesa Mckeon, OT Occupational Therapist OT     10/14/22 -  Rhianna Lake OT Occupational Therapist OT                  OT Recommendation and Plan  Therapy Frequency (OT): daily  Plan of Care Review  Plan of Care Reviewed With: patient  Progress: improving  Outcome Evaluation: Pt alert and participatory in OT interventions. Reviewed with pt (no caregiver/family present) on optimal position, tech, seq for UBD, axilla care and sling mgmt while adhering to LUE precautions throughout. Pt req'd gross max A to dep A for all with minimal help managing gown at RUE with less restrictions. Pt requires dep A for sling mgmt and bathing for safety purposes. Pt able to tolerate AROM at hand, wrist and elbow supination and pronation and AAROM for elbow flex/ext for follow through and to reach max available ROM. Attempted pendulums as ordered however cognitive deficits limited follow through to completion for safety concerns  as pt observed attempting AROM at L shoulder. Pt able to stand with min A x 2 and use of cane for posterior clothing mgmt and repositioning. Pt is still below occupational performance baseline and would benefit from cont'd IPOT POC and SNF at d/c when medically ready.     Time Calculation:         Time Calculation- OT       Row Name 06/03/24 1318 06/03/24 0929          Time Calculation- OT    OT Start Time 0953 -JY --     OT Received On 06/03/24 -JY --     OT Goal Re-Cert Due Date 06/12/24  -JY --        Timed Charges    41633 - OT Therapeutic Exercise Minutes 13  -JY --     18000 - Gait Training Minutes  -- 8  -LR     18503 - OT Self Care/Mgmt Minutes 10  -JY --        Total Minutes    Timed Charges Total Minutes 23  -JY 8  -LR      Total Minutes 23  -JY 8  -LR               User Key  (r) = Recorded By, (t) = Taken By, (c) = Cosigned By      Initials Name Provider Type    LR Tessy Martinez, PT Physical Therapist    Jaleesa Lazaro OT Occupational Therapist                  Therapy Charges for Today       Code Description Service Date Service Provider Modifiers Qty    81904392919 HC OT THER PROC EA 15 MIN 6/3/2024 Jaleesa Mckeon OT GO 1    88613391463 HC OT SELF CARE/MGMT/TRAIN EA 15 MIN 6/3/2024 Jaleesa Mckeon OT GO 1                 Jaleesa Mckeon OT  6/3/2024

## 2024-06-03 NOTE — CASE MANAGEMENT/SOCIAL WORK
Continued Stay Note  [facility]     Patient Name: Fanny Boyce  MRN: [id number]  Today's Date: 6/3/2024    Admit Date: 6/1/2024    Plan: Home with HH   Discharge Plan       Row Name 06/03/24 1420       Plan    Plan Home with     Patient/Family in Agreement with Plan yes    Plan Comments Spoke with daughterLore by phone. Daughter wants to bring the patient home with Home Health, instead of SNF. Daughter does not want SNF placement. CM will continue to follow.    Final Discharge Disposition Code 06 - home with home health care                   Discharge Codes    No documentation.                 Expected Discharge Date and Time       Expected Discharge Date Expected Discharge Time    Jun 5, 2024               Dom Dye RN

## 2024-06-03 NOTE — CASE MANAGEMENT/SOCIAL WORK
Discharge Planning Assessment  [facility]     Patient Name: Fanny Boyce  MRN: [id number]  Today's Date: 6/3/2024    Admit Date: 6/1/2024    Plan: SNF   Discharge Needs Assessment       Row Name 06/03/24 0855       Living Environment    People in Home child(cari), adult    Name(s) of People in Home Kendell Eason (daughter) [phone]    Current Living Arrangements home    Potentially Unsafe Housing Conditions none    Primary Care Provided by self    Provides Primary Care For no one    Family Caregiver if Needed child(cari), adult    Able to Return to Prior Arrangements yes       Resource/Environmental Concerns    Resource/Environmental Concerns none    Transportation Concerns none       Transition Planning    Patient/Family Anticipates Transition to home with family    Patient/Family Anticipated Services at Transition none    Transportation Anticipated family or friend will provide       Discharge Needs Assessment    Readmission Within the Last 30 Days no previous admission in last 30 days    Equipment Currently Used at Home walker, rolling    Concerns to be Addressed denies needs/concerns at this time    Equipment Needed After Discharge none    Outpatient/Agency/Support Group Needs skilled nursing facility    Discharge Facility/Level of Care Needs nursing facility, skilled    Provided Post Acute Provider List? Yes    Post Acute Provider List Inpatient Rehab    Provided Post Acute Provider Quality & Resource List? Yes    Post Acute Provider Quality and Resource List Inpatient Rehab    Delivered To Patient    Method of Delivery In person    Patient's Choice of Community Agency(s) SNF in [place]                   Discharge Plan       Row Name 06/03/24 0856       Plan    Plan SNF    Patient/Family in Agreement with Plan yes    Plan Comments Spoke with patient at bedside. Lives with Samuel Eason (daughter) [phone] in [facility]. Is independent with ADL's. No problems with Medicare/[employer] or  medications. Uses a rolling walker. No advanced directives. PCP is [facility]. Plan is [facility]. Daughter will transport. CM will continue to follow.    Final Discharge Disposition Code 03 - skilled nursing facility (SNF)                  Continued Care and Services - Admitted Since 6/1/2024    No active coordination exists for this encounter.       Expected Discharge Date and Time       Expected Discharge Date Expected Discharge Time    Jun 5, 2024            Demographic Summary       Row Name 06/03/24 0854       General Information    Admission Type inpatient    Arrived From emergency department    Referral Source admission list    Reason for Consult discharge planning    Preferred Language English       Contact Information    Permission Granted to Share Info With     Contact Information Obtained for                    Functional Status       Row Name 06/03/24 0854       Functional Status    Usual Activity Tolerance moderate    Current Activity Tolerance moderate       Functional Status, IADL    Medications independent    Meal Preparation independent    Housekeeping independent    Laundry independent    Shopping independent       Mental Status    General Appearance WDL WDL       Mental Status Summary    Recent Changes in Mental Status/Cognitive Functioning no changes       Employment/    Employment Status retired                   Psychosocial    No documentation.                  Abuse/Neglect    No documentation.                  Legal    No documentation.                  Substance Abuse    No documentation.                  Patient Forms    No documentation.                     Dom Dye RN

## 2024-06-03 NOTE — PLAN OF CARE
Goal Outcome Evaluation:  Plan of Care Reviewed With: patient        Progress: improving  Outcome Evaluation: Patient ambulated 10 feet with SPC and min assistx2, limited by fatigue, weakness, and dizziness. Initiated gait training with SPC today to promote increased independence with ambulation. Good effort with LE ther ex. Patient currently below functional baseline, demonstrating decreased functional mobility status, impaired balance, and decreased LE strength. Will address these deficits to promote return to PLOF. Recommend SNF at d/c.      Anticipated Discharge Disposition (PT): skilled nursing facility

## 2024-06-03 NOTE — DISCHARGE PLACEMENT REQUEST
"Fanny Campbell \"Monica\" (83 y.o. Female)   Christophe Dye, RN Case Manager  [phone]  Looking for short term rehab      Date of Birth   1940    Social Security Number       Address   [address] [facility] [address]    Home Phone   [phone]    MRN   [id number]       Sabianist   None    Marital Status   Legally                             Admission Date   6/1/24    Admission Type   Emergency    Admitting Provider   Inés Sheridan MD    Attending Provider   Inés Sheridan MD    Department, Room/Bed   [facility] 3H, S387/1       Discharge Date       Discharge Disposition       Discharge Destination                                 Attending Provider: Inés Sheridan MD    Allergies: No Known Allergies    Isolation: None   Infection: None   Code Status: No CPR    Ht: 152.4 cm (60\")   Wt: 53.1 kg (117 lb)    Admission Cmt: None   Principal Problem: UTI (urinary tract infection) [N39.0]                   Active Insurance as of 6/1/2024       Primary Coverage       Payor Plan Insurance Group Employer/Plan Group    MEDICARE MEDICARE A & B        Payor Plan Address Payor Plan Phone Number Payor Plan Fax Number Effective Dates    [address] [phone]  9/1/2001 - None Entered    [facility] [id number]         Subscriber Name Subscriber Birth Date Member ID       FANNY CAMPBELL 1940 [id number]               Secondary Coverage       Payor Plan Insurance Group Employer/Plan Group    HUMANA HUMANA [facility]              [id number]       Payor Plan Address Payor Plan Phone Number Payor Plan Fax Number Effective Dates    [address] [address]   1/1/2015 - None Entered    [facility] [id number]         Subscriber Name Subscriber Birth Date Member ID       FANNY CAMPBELL 1940 [id number]                     Emergency Contacts        (Rel.) Home Phone Work Phone Mobile Phone    Joshua,Lety (Daughter) [phone] -- [phone]    Licha Pugh (Grandchild) -- -- -- "                 History & Physical        Yusef Arshad MD at 24 1536              [facility] Medicine Services  HISTORY AND PHYSICAL    Patient Name: Fanny Boyce  : 1940  MRN: [id number]  Primary Care Physician: Nemo Aponte PA  Date of admission: 2024      Subjective  Subjective     Chief Complaint:  Hallucinations/weakness/fall    HPI:  Fanny Boyce is a 83 y.o. female with h/o UTI's, COPD, parkinson's disease, afib on eliquis presents d/t hallucinations, weakness and fall today.  Hx is limited d/t mild confusion.  Xrays revealed acute impacted left humerus fracture, and acute mildly displaced fracture of the left 9th rib and anterior left 10th rib.  Currently patient only c/o left arm pain.        Personal History     Past Medical History:   Diagnosis Date    Atrial fibrillation     COPD (chronic obstructive pulmonary disease)     Dementia     History of lung cancer     Parkinson disease     Pulmonary embolism            Past Surgical History:   Procedure Laterality Date    JOINT REPLACEMENT      LUNG CANCER SURGERY         Family History: family history includes Aneurysm in her father; Heart disease in her father and mother.     Social History:  reports that she has quit smoking. Her smoking use included cigarettes. She has a 40 pack-year smoking history. She has never used smokeless tobacco. She reports that she does not drink alcohol and does not use drugs.  Social History     Social History Narrative    Not on file       Medications:  Available home medication information reviewed.  apixaban, carbidopa-levodopa, fluticasone, metoprolol tartrate, and traZODone    No Known Allergies    Objective  Objective     Vital Signs:   Temp:  [98.2 °F (36.8 °C)] 98.2 °F (36.8 °C)  Heart Rate:  [72-83] 72  Resp:  [18] 18  BP: ()/(51-72) 126/65       Physical Exam   Constitutional: Awake, alert  Eyes: PERRLA, sclerae anicteric, no conjunctival injection  HENT:  NCAT, mucous membranes moist  Neck: Supple, no thyromegaly, no lymphadenopathy, trachea midline  Respiratory: Clear to auscultation bilaterally, nonlabored respirations   Cardiovascular: irreg/irreg, no murmurs, rubs, or gallops, palpable pedal pulses bilaterally  Gastrointestinal: Positive bowel sounds, soft, nontender, nondistended  Musculoskeletal: No bilateral ankle edema, no clubbing or cyanosis to extremities.  LUE with ecchymosis and swelling  Psychiatric: Appropriate affect, cooperative  Neurologic: Oriented x 2 (not date), moves all extremities, Cranial Nerves grossly intact to confrontation, speech clear  Skin: No rashes      Result Review:  I have personally reviewed the results from the time of this admission to 6/1/2024 15:38 EDT and agree with these findings:  [x]  Laboratory list / accordion  [x]  Microbiology  [x]  Radiology  [x]  EKG/Telemetry   []  Cardiology/Vascular   []  Pathology  []  Old records  []  Other:  Most notable findings include:       LAB RESULTS:      Lab 06/01/24  1149   WBC 7.61   HEMOGLOBIN 10.2*   HEMATOCRIT 33.4*   PLATELETS 253   NEUTROS ABS 5.95   IMMATURE GRANS (ABS) 0.02   LYMPHS ABS 1.01   MONOS ABS 0.49   EOS ABS 0.08   MCV 90.3   LACTATE 2.1*         Lab 06/01/24  1149   SODIUM 140   POTASSIUM 4.1   CHLORIDE 103   CO2 26.0   ANION GAP 11.0   BUN 18   CREATININE 0.72   EGFR 82.1   GLUCOSE 90   CALCIUM 9.2   MAGNESIUM 2.0         Lab 06/01/24  1149   TOTAL PROTEIN 6.4   ALBUMIN 3.7   GLOBULIN 2.7   ALT (SGPT) <5   AST (SGOT) 15   BILIRUBIN 0.5   ALK PHOS 96         Lab 06/01/24  1220   HSTROP T 16*                 UA          1/31/2024    09:07 3/13/2024    22:54 6/1/2024    11:57   Urinalysis   Squamous Epithelial Cells, UA 0-2  0-2  0-2    Specific Gravity, UA 1.014  1.011  1.013    Ketones, UA Negative  Negative  Negative    Blood, UA Moderate (2+)  Trace  Small (1+)    Leukocytes, UA Large (3+)  Trace  Large (3+)    Nitrite, UA Positive  Negative  Positive    RBC, UA  Too Numerous to Count  6-10  11-20    WBC, UA Too Numerous to Count  6-10  Too Numerous to Count    Bacteria, UA 2+  None Seen  4+        Microbiology Results (last 10 days)       ** No results found for the last 240 hours. **            XR Chest 1 View    Result Date: 6/1/2024  XR CHEST 1 VW Date of Exam: 6/1/2024 12:09 PM EDT Indication: Weak/Dizzy/AMS triage protocol Comparison: 1/31/2024 Findings: No focal consolidation. No pneumothorax or pleural effusion. Cardiac size is normal. The visualized clavicles appear intact. No displaced rib fractures. The visualized upper abdomen is normal.     Impression: Impression: No acute cardiopulmonary disease. Electronically Signed: Aristeo Greene MD  6/1/2024 12:52 PM EDT  Workstation ID: [id number]    XR Shoulder 2+ View Left    Result Date: 6/1/2024  XR SHOULDER 2+ VW LEFT, XR ELBOW 3+ VW LEFT, XR HUMERUS LEFT Date of Exam: 6/1/2024 12:10 PM EDT Indication: trauma Comparison: Chest x-ray 1/31/2024 Findings: Left shoulder: There is an acute fracture of the proximal humerus with impaction at the surgical neck and poorly visualized greater tuberosity fracture involvement. There is approximately 1.7 cm of impaction at the surgical neck. The humeral head maintains articulation with the glenoid. No additional acute fractures are seen at the shoulder. The acromioclavicular joint is congruent. The bones are demineralized. There there is an acute segmental fracture of the left ninth rib with fracture at the posterior and anterolateral rib. There is an acute mildly displaced fracture of the anterolateral left 10th rib. Left humerus: The mid and distal humeral shaft appears normal. Left elbow: No definite soft tissue swelling. No evidence of elbow joint effusion. Joint spaces are preserved. No acute fracture or malalignment.     Impression: Impression: Acute impacted proximal humeral fracture. Acute mildly displaced segmental fracture of the left ninth rib and fracture of the anterior  left 10th rib. Electronically Signed: Royer Quintero MD  6/1/2024 12:49 PM EDT  Workstation ID: [id number]    XR Humerus Left    Result Date: 6/1/2024  XR SHOULDER 2+ VW LEFT, XR ELBOW 3+ VW LEFT, XR HUMERUS LEFT Date of Exam: 6/1/2024 12:10 PM EDT Indication: trauma Comparison: Chest x-ray 1/31/2024 Findings: Left shoulder: There is an acute fracture of the proximal humerus with impaction at the surgical neck and poorly visualized greater tuberosity fracture involvement. There is approximately 1.7 cm of impaction at the surgical neck. The humeral head maintains articulation with the glenoid. No additional acute fractures are seen at the shoulder. The acromioclavicular joint is congruent. The bones are demineralized. There there is an acute segmental fracture of the left ninth rib with fracture at the posterior and anterolateral rib. There is an acute mildly displaced fracture of the anterolateral left 10th rib. Left humerus: The mid and distal humeral shaft appears normal. Left elbow: No definite soft tissue swelling. No evidence of elbow joint effusion. Joint spaces are preserved. No acute fracture or malalignment.     Impression: Impression: Acute impacted proximal humeral fracture. Acute mildly displaced segmental fracture of the left ninth rib and fracture of the anterior left 10th rib. Electronically Signed: Royer Quintero MD  6/1/2024 12:49 PM EDT  Workstation ID: [id number]    XR Elbow 3+ View Left    Result Date: 6/1/2024  XR SHOULDER 2+ VW LEFT, XR ELBOW 3+ VW LEFT, XR HUMERUS LEFT Date of Exam: 6/1/2024 12:10 PM EDT Indication: trauma Comparison: Chest x-ray 1/31/2024 Findings: Left shoulder: There is an acute fracture of the proximal humerus with impaction at the surgical neck and poorly visualized greater tuberosity fracture involvement. There is approximately 1.7 cm of impaction at the surgical neck. The humeral head maintains articulation with the glenoid. No additional acute fractures are seen at the  shoulder. The acromioclavicular joint is congruent. The bones are demineralized. There there is an acute segmental fracture of the left ninth rib with fracture at the posterior and anterolateral rib. There is an acute mildly displaced fracture of the anterolateral left 10th rib. Left humerus: The mid and distal humeral shaft appears normal. Left elbow: No definite soft tissue swelling. No evidence of elbow joint effusion. Joint spaces are preserved. No acute fracture or malalignment.     Impression: Impression: Acute impacted proximal humeral fracture. Acute mildly displaced segmental fracture of the left ninth rib and fracture of the anterior left 10th rib. Electronically Signed: Royer Quintero MD  6/1/2024 12:49 PM EDT  Workstation ID: [id number]         Assessment & Plan  Assessment & Plan       UTI (urinary tract infection)    Atrial fibrillation    COPD (chronic obstructive pulmonary disease)    Dementia    Parkinson's disease    Fanny Boyce is a 83 y.o. female with h/o UTI's, COPD, parkinson's disease, afib on eliquis presents d/t hallucinations, weakness and fall today found to have UTI and left humerus fracture    Fall  UTI  --treat with rocephin.  Follow cultures  --with fall and on eliquis and some mild confusion (may be 2nd to UTI), will order CT head    Left Humerus fracture  --consult Ortho  --hold home eliquis in case of surgery and NPO after MN until seen by ortho    Left 9th/anterior 10th rib fracture    COPD    Parkinson's Disease  --continue sinemet    Afib  --hold eliquis in case need for surgery  --continue metoprolol      Attempted to call daughter, Lety Lewis but was unsuccessful in reaching.       DVT prophylaxis:  Mechanical DVT prophylaxis orders are signed and held.            CODE STATUS:    Code Status and Medical Interventions:   Ordered at: 06/01/24 1537     Medical Intervention Limits:    NO intubation (DNI)     Level Of Support Discussed With:    Patient     Code Status  (Patient has no pulse and is not breathing):    No CPR (Do Not Attempt to Resuscitate)     Medical Interventions (Patient has pulse or is breathing):    Limited Support       Expected Discharge   Expected discharge date/ time has not been documented.     Yusef Arshad MD  06/01/24      Electronically signed by Yusef Arshad MD at 06/01/24 7645       Vital Signs (last day)       Date/Time Temp Temp src Pulse Resp BP Patient Position SpO2    06/03/24 0836 -- -- 103 -- 125/62 -- --    06/03/24 0707 98.7 (37.1) Oral 103 18 124/54 Lying 91    06/03/24 0400 98.6 (37) Oral 72 18 133/59 Lying 92    06/02/24 2344 98.4 (36.9) Oral 69 18 131/60 Lying 93    06/02/24 2042 -- -- 77 -- 115/50 -- --    06/02/24 1906 98.5 (36.9) Oral 84 18 116/48 Lying 95    06/02/24 1525 98.5 (36.9) Axillary 76 18 103/51 Lying 92    06/02/24 1310 -- -- 77 -- 102/55 Sitting 93    06/02/24 1134 -- -- 69 -- 91/48 Sitting 92    06/02/24 1127 -- -- 73 -- 95/38 -- 91    06/02/24 1126 -- -- 73 -- 88/46 -- 91    06/02/24 0912 -- -- 116 -- 114/76 -- --    06/02/24 0800 98.7 (37.1) Axillary 83 16 131/54 Lying 97    06/02/24 0700 -- -- 73 -- 111/65 -- 94    06/02/24 0500 -- -- 72 -- 118/48 Lying 90    06/02/24 0403 98.4 (36.9) Axillary 79 16 127/88 Lying 97    06/02/24 0330 -- -- 69 -- 109/46 -- 98    06/02/24 0230 -- -- 79 -- 110/42 Lying 99    06/02/24 0145 -- -- 72 -- 89/59 Lying 96    06/02/24 0130 -- -- 72 -- 92/51 Lying 94    06/02/24 0116 98.1 (36.7) Axillary -- 16 94/56 Lying --    06/02/24 0108 -- -- -- -- 89/63 Lying --    06/02/24 0100 -- -- -- -- 83/50 -- --    06/02/24 0049 98.5 (36.9) Axillary -- 16 100/52 Lying --          Current Facility-Administered Medications   Medication Dose Route Frequency Provider Last Rate Last Admin    acetaminophen (TYLENOL) tablet 650 mg  650 mg Oral Q4H PRN Yusef Arshad MD   650 mg at 06/03/24 0836    apixaban (ELIQUIS) tablet 2.5 mg  2.5 mg Oral Q12H Inés Sheridan MD   2.5 mg at 06/03/24 0836     sennosides-docusate (PERICOLACE) 8.6-50 MG per tablet 2 tablet  2 tablet Oral BID PRN Yusef Arshad MD   2 tablet at 06/03/24 0836    And    polyethylene glycol (MIRALAX) packet 17 g  17 g Oral Daily PRN Yusef Arshad MD   17 g at 06/03/24 0834    And    bisacodyl (DULCOLAX) EC tablet 5 mg  5 mg Oral Daily PRN Yusef Arshad MD   5 mg at 06/03/24 0836    And    bisacodyl (DULCOLAX) suppository 10 mg  10 mg Rectal Daily PRN Yusef Arshad MD        carbidopa-levodopa (SINEMET)  MG per tablet 1 tablet  1 tablet Oral TID Yusef Arshad MD   1 tablet at 06/03/24 0836    cefTRIAXone (ROCEPHIN) 2,000 mg in sodium chloride 0.9 % 100 mL MBP  2,000 mg Intravenous Q24H Yusef Arshad  mL/hr at 06/02/24 1045 2,000 mg at 06/02/24 1045    fluticasone (FLONASE) 50 MCG/ACT nasal spray 2 spray  2 spray Nasal Daily PRN Yusef Arshad MD        metoprolol tartrate (LOPRESSOR) tablet 12.5 mg  12.5 mg Oral Q12H Inés Sheridan MD   12.5 mg at 06/03/24 0836    ondansetron ODT (ZOFRAN-ODT) disintegrating tablet 4 mg  4 mg Oral Q6H PRN Yusef Arshad MD        Or    ondansetron (ZOFRAN) injection 4 mg  4 mg Intravenous Q6H PRN Yusef Arshad MD        sodium chloride 0.9 % flush 10 mL  10 mL Intravenous PRN Lonny Vergara DO        sodium chloride 0.9 % flush 10 mL  10 mL Intravenous Q12H Yusef Arshad MD   10 mL at 06/03/24 0843    sodium chloride 0.9 % flush 10 mL  10 mL Intravenous PRN Yusef Arshad MD        sodium chloride 0.9 % infusion 40 mL  40 mL Intravenous PRN Yusef Arshad MD        sodium chloride 0.9 % infusion  75 mL/hr Intravenous Continuous Wolf, Cristina R, APRN 75 mL/hr at 06/03/24 0844 75 mL/hr at 06/03/24 0844     Operative/Procedure Notes (most recent note)    No notes of this type exist for this encounter.          Physician Progress Notes (last 24 hours)        Inés Sheridan MD at 06/02/24 0952              [facility] Medicine Services  PROGRESS  NOTE    Patient Name: Fanny Boyce  : 1940  MRN: [id number]    Date of Admission: 2024  Primary Care Physician: Nemo Aponte PA    Subjective   Subjective     CC:  Humerus fracture     HPI:  Wearing sling this morning. Denies any pain.      Objective   Objective     Vital Signs:   Temp:  [98.1 °F (36.7 °C)-100 °F (37.8 °C)] 98.7 °F (37.1 °C)  Heart Rate:  [] 116  Resp:  [16-18] 16  BP: ()/(42-88) 114/76  Flow (L/min):  [2] 2     Physical Exam:  Constitutional: thin elderly female, no acute distress, sitting in bedside chair  Respiratory: Clear to auscultation bilaterally, respiratory effort normal   Cardiovascular: RRR, no murmurs, rubs, or gallops  Gastrointestinal: Positive bowel sounds, soft, nontender, nondistended  Musculoskeletal: right arm in sling  Psychiatric: Appropriate affect, cooperative  Neurologic: Oriented x 1, no focal deficits      Results Reviewed:  LAB RESULTS:      Lab 24  0526 24  1530 24  1149   WBC 9.17  --  7.61   HEMOGLOBIN 8.9*  --  10.2*   HEMATOCRIT 29.4*  --  33.4*   PLATELETS 229  --  253   NEUTROS ABS  --   --  5.95   IMMATURE GRANS (ABS)  --   --  0.02   LYMPHS ABS  --   --  1.01   MONOS ABS  --   --  0.49   EOS ABS  --   --  0.08   MCV 88.3  --  90.3   LACTATE  --  1.7 2.1*         Lab 24  0526 24  1149   SODIUM 141 140   POTASSIUM 4.3 4.1   CHLORIDE 107 103   CO2 24.0 26.0   ANION GAP 10.0 11.0   BUN 16 18   CREATININE 0.69 0.72   EGFR 85.2 82.1   GLUCOSE 96 90   CALCIUM 8.1* 9.2   MAGNESIUM  --  2.0         Lab 24  1149   TOTAL PROTEIN 6.4   ALBUMIN 3.7   GLOBULIN 2.7   ALT (SGPT) <5   AST (SGOT) 15   BILIRUBIN 0.5   ALK PHOS 96         Lab 24  1220   HSTROP T 16*                 Brief Urine Lab Results  (Last result in the past 365 days)        Color   Clarity   Blood   Leuk Est   Nitrite   Protein   CREAT   Urine HCG        24 1157 Yellow   Turbid   Small (1+)   Large (3+)   Positive   30 mg/dL  (1+)                   Microbiology Results Abnormal       None            CT Head Without Contrast    Result Date: 6/2/2024  CT HEAD WO CONTRAST Date of Exam: 6/2/2024 4:30 AM EDT Indication: increased confusion. Comparison: None available. Technique: Axial CT images were obtained of the head without contrast administration.  Automated exposure control and iterative construction methods were used. Findings: There is no evidence of hemorrhage. There is no mass effect or midline shift. There is no extracerebral collection. Ventricles are normal in size and configuration for patient's stated age.  Posterior fossa is within normal limits. Calvarium and skull base appear intact.   Visualized sinuses show no air fluid levels. Visualized orbits are unremarkable.     Impression: Impression: No acute intracranial process. Electronically Signed: iDna Basilio MD  6/2/2024 4:40 AM EDT  Workstation ID: [id number]    XR Chest 1 View    Result Date: 6/1/2024  XR CHEST 1 VW Date of Exam: 6/1/2024 12:09 PM EDT Indication: Weak/Dizzy/AMS triage protocol Comparison: 1/31/2024 Findings: No focal consolidation. No pneumothorax or pleural effusion. Cardiac size is normal. The visualized clavicles appear intact. No displaced rib fractures. The visualized upper abdomen is normal.     Impression: Impression: No acute cardiopulmonary disease. Electronically Signed: Aristeo Greene MD  6/1/2024 12:52 PM EDT  Workstation ID: [id number]    XR Shoulder 2+ View Left    Result Date: 6/1/2024  XR SHOULDER 2+ VW LEFT, XR ELBOW 3+ VW LEFT, XR HUMERUS LEFT Date of Exam: 6/1/2024 12:10 PM EDT Indication: trauma Comparison: Chest x-ray 1/31/2024 Findings: Left shoulder: There is an acute fracture of the proximal humerus with impaction at the surgical neck and poorly visualized greater tuberosity fracture involvement. There is approximately 1.7 cm of impaction at the surgical neck. The humeral head maintains articulation with the glenoid. No additional acute  fractures are seen at the shoulder. The acromioclavicular joint is congruent. The bones are demineralized. There there is an acute segmental fracture of the left ninth rib with fracture at the posterior and anterolateral rib. There is an acute mildly displaced fracture of the anterolateral left 10th rib. Left humerus: The mid and distal humeral shaft appears normal. Left elbow: No definite soft tissue swelling. No evidence of elbow joint effusion. Joint spaces are preserved. No acute fracture or malalignment.     Impression: Impression: Acute impacted proximal humeral fracture. Acute mildly displaced segmental fracture of the left ninth rib and fracture of the anterior left 10th rib. Electronically Signed: Royer Quintero MD  6/1/2024 12:49 PM EDT  Workstation ID: [id number]    XR Humerus Left    Result Date: 6/1/2024  XR SHOULDER 2+ VW LEFT, XR ELBOW 3+ VW LEFT, XR HUMERUS LEFT Date of Exam: 6/1/2024 12:10 PM EDT Indication: trauma Comparison: Chest x-ray 1/31/2024 Findings: Left shoulder: There is an acute fracture of the proximal humerus with impaction at the surgical neck and poorly visualized greater tuberosity fracture involvement. There is approximately 1.7 cm of impaction at the surgical neck. The humeral head maintains articulation with the glenoid. No additional acute fractures are seen at the shoulder. The acromioclavicular joint is congruent. The bones are demineralized. There there is an acute segmental fracture of the left ninth rib with fracture at the posterior and anterolateral rib. There is an acute mildly displaced fracture of the anterolateral left 10th rib. Left humerus: The mid and distal humeral shaft appears normal. Left elbow: No definite soft tissue swelling. No evidence of elbow joint effusion. Joint spaces are preserved. No acute fracture or malalignment.     Impression: Impression: Acute impacted proximal humeral fracture. Acute mildly displaced segmental fracture of the left ninth rib and  fracture of the anterior left 10th rib. Electronically Signed: Royer Quintero MD  6/1/2024 12:49 PM EDT  Workstation ID: [id number]    XR Elbow 3+ View Left    Result Date: 6/1/2024  XR SHOULDER 2+ VW LEFT, XR ELBOW 3+ VW LEFT, XR HUMERUS LEFT Date of Exam: 6/1/2024 12:10 PM EDT Indication: trauma Comparison: Chest x-ray 1/31/2024 Findings: Left shoulder: There is an acute fracture of the proximal humerus with impaction at the surgical neck and poorly visualized greater tuberosity fracture involvement. There is approximately 1.7 cm of impaction at the surgical neck. The humeral head maintains articulation with the glenoid. No additional acute fractures are seen at the shoulder. The acromioclavicular joint is congruent. The bones are demineralized. There there is an acute segmental fracture of the left ninth rib with fracture at the posterior and anterolateral rib. There is an acute mildly displaced fracture of the anterolateral left 10th rib. Left humerus: The mid and distal humeral shaft appears normal. Left elbow: No definite soft tissue swelling. No evidence of elbow joint effusion. Joint spaces are preserved. No acute fracture or malalignment.     Impression: Impression: Acute impacted proximal humeral fracture. Acute mildly displaced segmental fracture of the left ninth rib and fracture of the anterior left 10th rib. Electronically Signed: Royer Quintero MD  6/1/2024 12:49 PM EDT  Workstation ID: [id number]         Current medications:  Scheduled Meds:carbidopa-levodopa, 1 tablet, Oral, TID  cefTRIAXone, 2,000 mg, Intravenous, Q24H  metoprolol tartrate, 12.5 mg, Oral, Q12H  sodium chloride, 10 mL, Intravenous, Q12H      Continuous Infusions:sodium chloride, 75 mL/hr, Last Rate: 75 mL/hr (06/02/24 0913)      PRN Meds:.  acetaminophen    senna-docusate sodium **AND** polyethylene glycol **AND** bisacodyl **AND** bisacodyl    fluticasone    ondansetron ODT **OR** ondansetron    sodium chloride    sodium chloride     sodium chloride    Assessment & Plan   Assessment & Plan     Active Hospital Problems    Diagnosis  POA    **UTI (urinary tract infection) [N39.0]  Yes    Atrial fibrillation [I48.91]  Yes    COPD (chronic obstructive pulmonary disease) [J44.9]  Yes    Dementia [F03.90]  Yes    Parkinson's disease [G20.A1]  Yes      Resolved Hospital Problems   No resolved problems to display.        Brief Hospital Course to date:  Fanny Boyce is a 83 y.o. female with h/o UTI's, COPD, parkinson's disease, afib on eliquis presents d/t hallucinations, weakness and fall today found to have UTI and left humerus fracture     Left Humerus fracture  -Acute impacted proximal humeral fracture on xray  -Ortho following, patient wants conservative nonoperatively management.   -Continue sling   -PT/OT  -Continue pain control.   -Resume home eliquis since non-operative treatment  -follow-up with Dr. Harrington in 2 weeks with repeat Xray    Hypotension:  -BP are low normal and intermittently in the 80s. Asymptomatic so may be her normal. Renal function WNL. Will monitor, if persistently lower the 90s systolic will consider addition of midodrine.    -received fluids over night.     Fall  UTI  -Urine culture negative, blood cultures negative   -Continue rocephin.      Confusion  - noted on presentation  - CT head negative for bleeding or acute abnormality     Left 9th/anterior 10th rib fracture     COPD     Parkinson's Disease  --continue sinemet     Afib  --resume eliquis  --continue metoprolol             Expected Discharge Location and Transportation: home  Expected Discharge   Expected discharge date/ time has not been documented.     DVT prophylaxis:  Mechanical DVT prophylaxis orders are present.         AM-PAC 6 Clicks Score (PT): 6 (06/02/24 0159)    CODE STATUS:   Code Status and Medical Interventions:   Ordered at: 06/01/24 1537     Medical Intervention Limits:    NO intubation (DNI)     Level Of Support Discussed With:    Patient     Code  Status (Patient has no pulse and is not breathing):    No CPR (Do Not Attempt to Resuscitate)     Medical Interventions (Patient has pulse or is breathing):    Limited Support     This patient's problems and plans were partially entered by my partner and updated as appropriate by me 24. Today is my first day evaluating this patient's active medical problems. I Personally reviewed chart and adjusted note to reflect daily changes in management/clinical condition. Copied text in this note has been reviewed and is accurate as of  24      Inés Sheridan MD  24        Electronically signed by Inés Sheridan MD at 24 2811          Physical Therapy Notes (most recent note)        Katy Marley, PT at 24 0807  Version 1 of 1         Patient Name: Fanny Boyce  : 1940    MRN: [id number]                              Today's Date: 2024       Admit Date: 2024    Visit Dx:     ICD-10-CM ICD-9-CM   1. Acute UTI  N39.0 599.0   2. Other closed nondisplaced fracture of proximal end of left humerus, initial encounter  S42.295A 812.09   3. Closed fracture of multiple ribs of left side, initial encounter  S22.42XA 807.09   4. Fall, initial encounter  W19.XXXA E888.9   5. At high risk for falls  Z91.81 V15.88   6. Visual hallucinations  R44.1 368.16   7. Generalized weakness  R53.1 780.79   8. Elevated lactic acid level  R79.89 276.2   9. Elevated troponin  R79.89 790.6     Patient Active Problem List   Diagnosis    UTI (urinary tract infection)    Atrial fibrillation    COPD (chronic obstructive pulmonary disease)    Dementia    Parkinson's disease     Past Medical History:   Diagnosis Date    Atrial fibrillation     COPD (chronic obstructive pulmonary disease)     Dementia     History of lung cancer     Parkinson disease     Pulmonary embolism      Past Surgical History:   Procedure Laterality Date    JOINT REPLACEMENT      LUNG CANCER SURGERY        General Information       Row  Name 06/02/24 1009          Physical Therapy Time and Intention    Document Type evaluation  -AB     Mode of Treatment physical therapy  -AB       Row Name 06/02/24 1009          General Information    Patient Profile Reviewed yes  -AB     Prior Level of Function --   Difficult to assess, pt likely poor historian. Reports using a rollator for mobility. Will need to further assess once family is present.  -AB     Existing Precautions/Restrictions fall;other (see comments)  left proximal humerus fracture, non-op. Regular sling immobilization. No active left shoulder motion. L rib fx's. PD  -AB     Barriers to Rehab medically complex;previous functional deficit;cognitive status  -AB       Row Name 06/02/24 1009          Living Environment    People in Home sibling(s);other (see comments)  Pt reports she lives with her sister. Will need to be further assessed  -AB       Row Name 06/02/24 1009          Home Main Entrance    Number of Stairs, Main Entrance other (see comments)  CONNER  -AB       Row Name 06/02/24 1009          Stairs Within Home, Primary    Stairs, Within Home, Primary CONNER, pt oriented to self only.  -AB       Row Name 06/02/24 1009          Cognition    Orientation Status (Cognition) oriented to;person;disoriented to;situation;place;time  -AB       Row Name 06/02/24 1009          Safety Issues, Functional Mobility    Safety Issues Affecting Function (Mobility) awareness of need for assistance;insight into deficits/self-awareness;safety precaution awareness;safety precautions follow-through/compliance;sequencing abilities;problem-solving;judgment  -AB     Impairments Affecting Function (Mobility) balance;cognition;endurance/activity tolerance;pain;strength;range of motion (ROM);postural/trunk control  -AB     Cognitive Impairments, Mobility Safety/Performance awareness, need for assistance;insight into deficits/self-awareness;judgment;problem-solving/reasoning;sequencing abilities;safety precaution  follow-through;safety precaution awareness  -AB     Comment, Safety Issues/Impairments (Mobility) Alert, following all commands.  -AB               User Key  (r) = Recorded By, (t) = Taken By, (c) = Cosigned By      Initials Name Provider Type    AB Katy Marley, PT Physical Therapist                   Mobility       Row Name 06/02/24 1014          Transfers    Comment, (Transfers) Pt received from chair with OT.  -AB       Row Name 06/02/24 1014          Sit-Stand Transfer    Sit-Stand [place] (Transfers) moderate assist (50% patient effort);verbal cues;1 person to manage equipment;1 person assist  -AB     Assistive Device (Sit-Stand Transfers) other (see comments)  HHA  -AB     Comment, (Sit-Stand Transfer) B foot blocking. Pt with posterior lean and required TC's at hips to weight shift anteriorly.  -AB       Row Name 06/02/24 1014          Gait/Stairs (Locomotion)    [place] Level (Gait) moderate assist (50% patient effort);1 person assist;1 person to manage equipment;verbal cues  -AB     Assistive Device (Gait) other (see comments)  HHA  -AB     Patient was able to Ambulate yes  -AB     Distance in Feet (Gait) 10  -AB     Deviations/Abnormal Patterns (Gait) bilateral deviations;[place] decreased;base of support, narrow;gait speed decreased;stride length decreased;festinating/shuffling;scissoring  -AB     Bilateral Gait Deviations forward flexed posture;heel strike decreased  -AB     Comment, (Gait/Stairs) Pt ambulated with step to gait pattern, short/shuffling steps, and forward flexed posture. Pt with very narrow HEENA and intermittent scissoring during gait. Encouragement provided to continue ambulation in forward direction and maintain anterior weight shifting over HEENA. No knee buckling. Mod A provided for improved stability, +1 for close chair follow. Further activity limited by weakness and fatigue.  -AB       Row Name 06/02/24 1014          Mobility    Extremity Weight-bearing Status left upper  extremity  -AB     Left Upper Extremity (Weight-bearing Status) other (see comments)  no active shoulder ROM.  -AB               User Key  (r) = Recorded By, (t) = Taken By, (c) = Cosigned By      Initials Name Provider Type    AB Katy Marley, PT Physical Therapist                   Obj/Interventions       Row Name 06/02/24 1021          Range of Motion Comprehensive    General Range of Motion bilateral lower extremity ROM WFL  -AB       Row Name 06/02/24 1021          Strength Comprehensive (MMT)    General Manual Muscle Testing (MMT) Assessment lower extremity strength deficits identified  -AB     Comment, General Manual Muscle Testing (MMT) Assessment BLE grossly 3+/5 as observed during functional activity  -AB       Row Name 06/02/24 1021          Balance    Balance Assessment sitting static balance;sitting dynamic balance;standing static balance;standing dynamic balance  -AB     Static Sitting Balance contact guard  -AB     Dynamic Sitting Balance contact guard  -AB     Position, Sitting Balance unsupported;sitting in chair  -AB     Static Standing Balance moderate assist  -AB     Dynamic Standing Balance moderate assist;1-person assist;1 person to manage equipment;verbal cues  -AB     Position/Device Used, Standing Balance supported  -AB     Balance Interventions sitting;standing;sit to stand;supported;static;dynamic;occupation based/functional task  -AB     Comment, Balance Mod A to maintain standing balance. Pt with intermittent posterior lean.  -AB       Row Name 06/02/24 1021          Sensory Assessment (Somatosensory)    Sensory Assessment (Somatosensory) LE sensation intact  -AB               User Key  (r) = Recorded By, (t) = Taken By, (c) = Cosigned By      Initials Name Provider Type    AB Katy Marley, PT Physical Therapist                   Goals/Plan       Row Name 06/02/24 1027          Bed Mobility Goal 1 (PT)    Activity/Assistive Device (Bed Mobility Goal 1, PT) sit to supine;supine to  sit  -AB     [place] Level/Cues Needed (Bed Mobility Goal 1, PT) contact guard required  -AB     Time Frame (Bed Mobility Goal 1, PT) short term goal (STG);5 days  -AB       Row Name 06/02/24 1027          Transfer Goal 1 (PT)    Activity/Assistive Device (Transfer Goal 1, PT) sit-to-stand/stand-to-sit;bed-to-chair/chair-to-bed;cane, quad  -AB     [place] Level/Cues Needed (Transfer Goal 1, PT) contact guard required  -AB     Time Frame (Transfer Goal 1, PT) long term goal (LTG);10 days  -AB       Row Name 06/02/24 1027          Gait Training Goal 1 (PT)    Activity/Assistive Device (Gait Training Goal 1, PT) gait (walking locomotion);assistive device use;cane, quad  -AB     [place] Level (Gait Training Goal 1, PT) contact guard required  -AB     Distance (Gait Training Goal 1, PT) 50  -AB     Time Frame (Gait Training Goal 1, PT) long term goal (LTG);10 days  -AB       Row Name 06/02/24 1027          Therapy Assessment/Plan (PT)    Planned Therapy Interventions (PT) balance training;bed mobility training;gait training;home exercise program;patient/family education;postural re-education;transfer training;stretching;strengthening;ROM (range of motion)  -AB               User Key  (r) = Recorded By, (t) = Taken By, (c) = Cosigned By      Initials Name Provider Type    AB Katy Marley, PT Physical Therapist                   Clinical Impression       Row Name 06/02/24 1022          Pain    Pain Intervention(s) Ambulation/increased activity;Repositioned;Nursing Notified  -AB     Additional Documentation Pain Scale: FACES Pre/Post-Treatment (Group)  -AB       Row Name 06/02/24 1022          Pain Scale: FACES Pre/Post-Treatment    Pain: FACES Scale, Pretreatment 0-->no hurt  -AB     Posttreatment Pain Rating 2-->hurts little bit  -AB     Pain Location - Side/Orientation Left  -AB     Pain Location upper  -AB     Pain Location - extremity  -AB     Pre/Posttreatment Pain Comment grimaces with mobility.   -AB       Row Name 06/02/24 1022          Plan of Care Review    Plan of Care Reviewed With patient  -AB     Progress no change  -AB     Outcome Evaluation PT initial eval completed. Pt is A&Ox1 and presents with generalized weakness, impaired balance, and decreased activity tolerance. Pt ambulated 10' with mod Ax1+1 and R UE support. Further IPPT is warrented. PT rec d/c to SNF when medically appropriate.  -AB       Row Name 06/02/24 1022          Therapy Assessment/Plan (PT)    Rehab Potential (PT) good, to achieve stated therapy goals  -AB     Criteria for Skilled Interventions Met (PT) yes;meets criteria;skilled treatment is necessary  -AB     Therapy Frequency (PT) daily  -AB     Predicted Duration of Therapy Intervention (PT) 10 days  -AB       Row Name 06/02/24 1022          Vital Signs    Pre Systolic BP Rehab 131  -AB     Pre Treatment Diastolic BP 54  -AB     Post Systolic BP Rehab 126  -AB     Post Treatment Diastolic BP 70  -AB     Pretreatment Heart Rate (beats/min) 78  -AB     O2 Delivery Pre Treatment room air  -AB     O2 Delivery Intra Treatment room air  -AB     Post SpO2 (%) 91  -AB     O2 Delivery Post Treatment room air  -AB     Pre Patient Position Sitting  -AB     Intra Patient Position Standing  -AB     Post Patient Position Sitting  -AB       Row Name 06/02/24 1022          Positioning and Restraints    Pre-Treatment Position sitting in chair/recliner  -AB     Post Treatment Position chair  -AB     In Chair reclined;notified nsg;sitting;call light within reach;encouraged to call for assist;exit alarm on;legs elevated;waffle cushion;compression device;with brace;heels elevated  -AB               User Key  (r) = Recorded By, (t) = Taken By, (c) = Cosigned By      Initials Name Provider Type    AB Katy Marley, PT Physical Therapist                   Outcome Measures       Row Name 06/02/24 1027 06/02/24 0159       How much help from another person do you currently need...    Turning from  your back to your side while in flat bed without using bedrails? 3  -AB 1  -EC    Moving from lying on back to sitting on the side of a flat bed without bedrails? 3  -AB 1  -EC    Moving to and from a bed to a chair (including a wheelchair)? 2  -AB 1  -EC    Standing up from a chair using your arms (e.g., wheelchair, bedside chair)? 2  -AB 1  -EC    Climbing 3-5 steps with a railing? 1  -AB 1  -EC    To walk in hospital room? 2  -AB 1  -EC    AM-PAC 6 Clicks Score (PT) 13  -AB 6  -EC    Highest Level of Mobility Goal 4 --> Transfer to chair/commode  -AB 2 --> Bed activities/dependent transfer  -EC      Row Name 06/02/24 1027          Functional Assessment    Outcome Measure Options AM-PAC 6 Clicks Basic Mobility (PT)  -AB               User Key  (r) = Recorded By, (t) = Taken By, (c) = Cosigned By      Initials Name Provider Type    AB Katy Marley, PT Physical Therapist    Jami Alonso, RN Registered Nurse                                 Physical Therapy Education       Title: PT OT SLP Therapies (In Progress)       Topic: Physical Therapy (In Progress)       Point: Mobility training (In Progress)       Learning Progress Summary             Patient Acceptance, E,D, NR by AB at 6/2/2024 1028                         Point: Home exercise program (Not Started)       Learner Progress:  Not documented in this visit.              Point: Body mechanics (In Progress)       Learning Progress Summary             Patient Acceptance, E,D, NR by AB at 6/2/2024 1028                         Point: Precautions (In Progress)       Learning Progress Summary             Patient Acceptance, E,D, NR by AB at 6/2/2024 1028                                         User Key       Initials Effective Dates Name Provider Type Discipline    AB 09/22/22 -  Katy Marley, PT Physical Therapist PT                  PT Recommendation and Plan  Planned Therapy Interventions (PT): balance training, bed mobility training, gait training, home  exercise program, patient/family education, postural re-education, transfer training, stretching, strengthening, ROM (range of motion)  Plan of Care Reviewed With: patient  Progress: no change  Outcome Evaluation: PT initial eval completed. Pt is A&Ox1 and presents with generalized weakness, impaired balance, and decreased activity tolerance. Pt ambulated 10' with mod Ax1+1 and R UE support. Further IPPT is warrented. PT rec d/c to SNF when medically appropriate.     Time Calculation:   PT Evaluation Complexity  History, PT Evaluation Complexity: 3 or more personal factors and/or comorbidities  Examination of Body Systems (PT Eval Complexity): total of 3 or more elements  Clinical Presentation (PT Evaluation Complexity): evolving  Clinical Decision Making (PT Evaluation Complexity): moderate complexity  Overall Complexity (PT Evaluation Complexity): moderate complexity     PT Charges       Row Name 06/02/24 1028             Time Calculation    Start Time 0807  -AB      PT Received On 06/02/24  -AB      PT Goal Re-Cert Due Date 06/12/24  -AB         Untimed Charges    PT Eval/Re-eval Minutes 46  -AB         Total Minutes    Untimed Charges Total Minutes 46  -AB       Total Minutes 46  -AB                User Key  (r) = Recorded By, (t) = Taken By, (c) = Cosigned By      Initials Name Provider Type    AB Katy Marley, PT Physical Therapist                  Therapy Charges for Today       Code Description Service Date Service Provider Modifiers Qty    [id number]  PT EVAL MOD COMPLEXITY 4 6/2/2024 Katy Marley, PT GP 1            PT G-Codes  Outcome Measure Options: AM-PAC 6 Clicks Basic Mobility (PT)  AM-PAC 6 Clicks Score (PT): 13  PT Discharge Summary  Anticipated Discharge Disposition (PT): skilled nursing facility    Katy Marley PT  6/2/2024      Electronically signed by Katy Marley, PT at 06/02/24 1029          Occupational Therapy Notes (most recent note)        Rhianna Lake, OT at 06/02/24  0756          Patient Name: Fanny Boyce  : 1940    MRN: [id number]                              Today's Date: 2024       Admit Date: 2024    Visit Dx:     ICD-10-CM ICD-9-CM   1. Acute UTI  N39.0 599.0   2. Other closed nondisplaced fracture of proximal end of left humerus, initial encounter  S42.295A 812.09   3. Closed fracture of multiple ribs of left side, initial encounter  S22.42XA 807.09   4. Fall, initial encounter  W19.XXXA E888.9   5. At high risk for falls  Z91.81 V15.88   6. Visual hallucinations  R44.1 368.16   7. Generalized weakness  R53.1 780.79   8. Elevated lactic acid level  R79.89 276.2   9. Elevated troponin  R79.89 790.6     Patient Active Problem List   Diagnosis    UTI (urinary tract infection)    Atrial fibrillation    COPD (chronic obstructive pulmonary disease)    Dementia    Parkinson's disease     Past Medical History:   Diagnosis Date    Atrial fibrillation     COPD (chronic obstructive pulmonary disease)     Dementia     History of lung cancer     Parkinson disease     Pulmonary embolism      Past Surgical History:   Procedure Laterality Date    JOINT REPLACEMENT      LUNG CANCER SURGERY        General Information       Row Name 24 1143          OT Time and Intention    Document Type evaluation  -     Mode of Treatment occupational therapy  -       Row Name 24 1143          General Information    Patient Profile Reviewed yes  -MC     Prior Level of Function independent:;min assist:;bed mobility;transfer;all household mobility;ADL's  Pt reports she uses a rollator for mobility at baseline, limited historian, Unable to gather full PLOF, TBA further  -MC     Existing Precautions/Restrictions fall;other (see comments)  non-op L proximal humerus fx, LUE sling, LUE NWB, L elbow/wrist/hand, pendulum swings. L rib fractures. Hx of dementia & PD  -MC     Barriers to Rehab medically complex;previous functional deficit;cognitive status  -       Row Name  06/02/24 1143          Living Environment    People in Home sibling(s);other (see comments)  Pt reports she lives w/ her sister, per CR pt lives w/ dtr. ANA hoskins  -       Row Name 06/02/24 1143          Home Main Entrance    Number of Stairs, Main Entrance other (see comments)  Pt limited historian, unable to gather home info. IDANIA hoskins  [facility]       Row Name 06/02/24 1143          Cognition    Orientation Status (Cognition) oriented to;person;disoriented to;place;situation;time;verbal cues/prompts needed for orientation  -       Row Name 06/02/24 1143          Safety Issues, Functional Mobility    Safety Issues Affecting Function (Mobility) awareness of need for assistance;insight into deficits/self-awareness;safety precaution awareness;safety precautions follow-through/compliance;sequencing abilities  -     Impairments Affecting Function (Mobility) balance;cognition;endurance/activity tolerance;pain;strength;range of motion (ROM);postural/trunk control  -     Cognitive Impairments, Mobility Safety/Performance attention;awareness, need for assistance;insight into deficits/self-awareness;safety precaution awareness;safety precaution follow-through;sequencing abilities  -               User Key  (r) = Recorded By, (t) = Taken By, (c) = Cosigned By      Initials Name Provider Type     Rhianna Lake OT Occupational Therapist                     Mobility/ADL's       Row Name 06/02/24 1146          Bed Mobility    Bed Mobility supine-sit  -     Supine-Sit [place] (Bed Mobility) moderate assist (50% patient effort);verbal cues  -     Bed Mobility, Safety Issues decreased use of arms for pushing/pulling;impaired trunk control for bed mobility  -     Assistive Device (Bed Mobility) draw sheet;head of bed elevated  -       Row Name 06/02/24 1146          Transfers    Transfers sit-stand transfer;stand-sit transfer;bed-chair transfer  -       Row Name 06/02/24 1146          Bed-Chair  Transfer    Bed-Chair [place] (Transfers) moderate assist (50% patient effort);verbal cues  -     Assistive Device (Bed-Chair Transfers) other (see comments)  RUE support  -[facility] Name 06/02/24 1146          Sit-Stand Transfer    Sit-Stand [place] (Transfers) moderate assist (50% patient effort);verbal cues  -     Assistive Device (Sit-Stand Transfers) other (see comments)  -[facility] Name 06/02/24 1146          Stand-Sit Transfer    Stand-Sit [place] (Transfers) moderate assist (50% patient effort);verbal cues  -     Assistive Device (Stand-Sit Transfers) other (see comments)  -[facility] Name 06/02/24 1146          Activities of Daily Living    BADL Assessment/Intervention lower body dressing;grooming;upper body dressing  -[facility] Name 06/02/24 1146          Mobility    Extremity Weight-bearing Status left upper extremity  -     Left Upper Extremity (Weight-bearing Status) non weight-bearing (NWB)  -[facility] Name 06/02/24 1146          Lower Body Dressing Assessment/Training    [place] Level (Lower Body Dressing) don;socks;dependent (less than 25% patient effort);verbal cues  -     Position (Lower Body Dressing) supine  -[facility] Name 06/02/24 1146          Grooming Assessment/Training    [place] Level (Grooming) wash face, hands;set up  -     Position (Grooming) sitting up in bed  -[facility] Name 06/02/24 1146          Upper Body Dressing Assessment/Training    [place] Level (Upper Body Dressing) don;doff;other (see comments);dependent (less than 25% patient effort);verbal cues  LUE sling management/proper fit  -     Position (Upper Body Dressing) sitting up in bed  -               User Key  (r) = Recorded By, (t) = Taken By, (c) = Cosigned By      Initials Name Provider Type     Rhianna Lake OT Occupational Therapist                   Obj/Interventions       [facility] Name 06/02/24 1148          Sensory Assessment (Somatosensory)     Sensory Assessment (Somatosensory) UE sensation intact  -[facility] Name 06/02/24 1148          Vision Assessment/Intervention    Visual Impairment/Limitations WFL  -[facility] Name 06/02/24 1148          Range of Motion Comprehensive    General Range of Motion bilateral upper extremity ROM WFL  -[facility] Name 06/02/24 1148          Strength Comprehensive (MMT)    General Manual Muscle Testing (MMT) Assessment upper extremity strength deficits identified  -     Comment, General Manual Muscle Testing (MMT) Assessment Formal assessment limited d/t cognitive status, based on functional activity RUE grossly 4/5, LUE deferred d/t NWB restrictions  -[facility] Name 06/02/24 1148          Elbow/Forearm (Therapeutic Exercise)    Elbow/Forearm (Therapeutic Exercise) AAROM (active assistive range of motion)  -     Elbow/Forearm AAROM (Therapeutic Exercise) left;flexion;extension;supination;pronation;10 repetitions;supine  -[facility] Name 06/02/24 1148          Wrist (Therapeutic Exercise)    Wrist (Therapeutic Exercise) AAROM (active assistive range of motion)  -     Wrist AAROM (Therapeutic Exercise) left;flexion;extension;10 repetitions  -MC       Row Name 06/02/24 1148          Hand (Therapeutic Exercise)    Hand (Therapeutic Exercise) AROM (active range of motion)  -     Hand AROM/AAROM (Therapeutic Exercise) left;AAROM (active assistive range of motion);finger flexion;finger extension;10 repetitions  -[facility] Name 06/02/24 1148          Motor Skills    Therapeutic Exercise elbow/forearm;wrist;hand  -MC       Row Name 06/02/24 1148          Balance    Balance Assessment sitting static balance;sit to stand dynamic balance;standing static balance;standing dynamic balance;sitting dynamic balance  -     Static Sitting Balance contact guard  -     Dynamic Sitting Balance minimal assist  -     Position, Sitting Balance unsupported;sitting edge of bed;sitting in chair  -     Sit to Stand Dynamic  Balance moderate assist;verbal cues  -     Static Standing Balance moderate assist;verbal cues  -     Dynamic Standing Balance moderate assist;verbal cues  -     Position/Device Used, Standing Balance supported  -     Balance Interventions sitting;sit to stand;occupation based/functional task  -               User Key  (r) = Recorded By, (t) = Taken By, (c) = Cosigned By      Initials Name Provider Type     Rhianna Lake, OT Occupational Therapist                   Goals/Plan       Row Name 06/02/24 1157          Bed Mobility Goal 1 (OT)    Activity/Assistive Device (Bed Mobility Goal 1, OT) sit to supine;supine to sit  -     [place] Level/Cues Needed (Bed Mobility Goal 1, OT) minimum assist (75% or more patient effort);verbal cues required  -     Time Frame (Bed Mobility Goal 1, OT) short term goal (STG);5 days  -     Progress/Outcomes (Bed Mobility Goal 1, OT) goal ongoing  -       Row Name 06/02/24 1158          Transfer Goal 1 (OT)    Activity/Assistive Device (Transfer Goal 1, OT) bed-to-chair/chair-to-bed;toilet;cane, straight  -     [place] Level/Cues Needed (Transfer Goal 1, OT) minimum assist (75% or more patient effort)  -     Time Frame (Transfer Goal 1, OT) long term goal (LTG);10 days  -     Progress/Outcome (Transfer Goal 1, OT) goal ongoing  -       Row Name 06/02/24 1151          Grooming Goal 1 (OT)    Activity/Device (Grooming Goal 1, OT) hair care;oral care;wash face, hands  unsupported sitting  -     [place] (Grooming Goal 1, OT) minimum assist (75% or more patient effort);verbal cues required  -     Time Frame (Grooming Goal 1, OT) long term goal (LTG);10 days  -     Progress/Outcome (Grooming Goal 1, OT) goal ongoing  -       Row Name 06/02/24 1156          Therapy Assessment/Plan (OT)    Planned Therapy Interventions (OT) activity tolerance training;adaptive equipment training;BADL retraining;cognitive/visual perception  retraining;functional balance retraining;IADL retraining;occupation/activity based interventions;patient/caregiver education/training;ROM/therapeutic exercise;strengthening exercise;transfer/mobility retraining;orthotic fabrication/fitting/training;edema control/reduction;passive ROM/stretching  -               User Key  (r) = Recorded By, (t) = Taken By, (c) = Cosigned By      Initials Name Provider Type     Rhianna Lake OT Occupational Therapist                   Clinical Impression       [facility] Name 06/02/24 1149          Pain Assessment    Pretreatment Pain Rating 0/10 - no pain  -     Posttreatment Pain Rating 0/10 - no pain  -[facility] Name 06/02/24 1149          Plan of Care Review    Plan of Care Reviewed With patient  -     Outcome Evaluation Pt presents w/ R shoulder ROM restrictions, generalized weakness, decreased functional endurance, confusion, and balance deficits limiting her ADL independence. Pt tolerated 10 reps of LUE elbow/wrist/hand HEP w/ no c/o pain. Pt req dep A for sling management/proper fit & LBD this date. Pt would benefit from continued skilled IPOT services to address current functional deficits. Rec SNF at d/c.  -[facility] Name 06/02/24 1149          Therapy Assessment/Plan (OT)    Patient/Family Therapy Goal Statement (OT) Return to [facility]  -     Rehab Potential (OT) good, to achieve stated therapy goals  -     Criteria for Skilled Therapeutic Interventions Met (OT) yes;skilled treatment is necessary  -     Therapy Frequency (OT) daily  -     Predicted Duration of Therapy Intervention (OT) 10 days  -       Row Name 06/02/24 1149          Therapy Plan Review/Discharge Plan (OT)    Anticipated Discharge Disposition (OT) skilled nursing facility  -[facility] Name 06/02/24 1149          Vital Signs    O2 Delivery Pre Treatment room air  -     O2 Delivery Intra Treatment room air  -     O2 Delivery Post Treatment room air  -     Pre Patient Position Supine   -     Intra Patient Position Standing  -     Post Patient Position Sitting  -       Row Name 06/02/24 1149          Positioning and Restraints    Pre-Treatment Position in bed  -     Post Treatment Position chair  -     In Chair sitting;with PT;waffle cushion  -               User Key  (r) = Recorded By, (t) = Taken By, (c) = Cosigned By      Initials Name Provider Type    Rhianna Pace, RADHA Occupational Therapist                   Outcome Measures       Row Name 06/02/24 1153          How much help from another is currently needed...    Putting on and taking off regular lower body clothing? 1  -MC     Bathing (including washing, rinsing, and drying) 2  -MC     Toileting (which includes using toilet bed pan or urinal) 1  -MC     Putting on and taking off regular upper body clothing 2  -MC     Taking care of personal grooming (such as brushing teeth) 3  -MC     Eating meals 3  -MC     AM-PAC 6 Clicks Score (OT) 12  -       Row Name 06/02/24 1027 06/02/24 0159       How much help from another person do you currently need...    Turning from your back to your side while in flat bed without using bedrails? 3  -AB 1  -EC    Moving from lying on back to sitting on the side of a flat bed without bedrails? 3  -AB 1  -EC    Moving to and from a bed to a chair (including a wheelchair)? 2  -AB 1  -EC    Standing up from a chair using your arms (e.g., wheelchair, bedside chair)? 2  -AB 1  -EC    Climbing 3-5 steps with a railing? 1  -AB 1  -EC    To walk in hospital room? 2  -AB 1  -EC    AM-PAC 6 Clicks Score (PT) 13  -AB 6  -EC    Highest Level of Mobility Goal 4 --> Transfer to chair/commode  -AB 2 --> Bed activities/dependent transfer  -EC      Row Name 06/02/24 1153 06/02/24 1027       Functional Assessment    Outcome Measure Options AM-PAC 6 Clicks Daily Activity (OT)  - AM-PAC 6 Clicks Basic Mobility (PT)  -AB              User Key  (r) = Recorded By, (t) = Taken By, (c) = Cosigned By      Initials  Name Provider Type     Rhianna Lake, RADHA Occupational Therapist    Katy Hahn, PT Physical Therapist    Jami Alonso, RN Registered Nurse                    Occupational Therapy Education       Title: PT OT SLP Therapies (In Progress)       Topic: Occupational Therapy (In Progress)       Point: ADL training (In Progress)       Description:   Instruct learner(s) on proper safety adaptation and remediation techniques during self care or transfers.   Instruct in proper use of assistive devices.                  Learning Progress Summary             Patient Acceptance, E, NR by  at 6/2/2024 1153                         Point: Home exercise program (In Progress)       Description:   Instruct learner(s) on appropriate technique for monitoring, assisting and/or progressing therapeutic exercises/activities.                  Learning Progress Summary             Patient Acceptance, E, NR by  at 6/2/2024 1153                         Point: Precautions (In Progress)       Description:   Instruct learner(s) on prescribed precautions during self-care and functional transfers.                  Learning Progress Summary             Patient Acceptance, E, NR by  at 6/2/2024 1153                         Point: Body mechanics (In Progress)       Description:   Instruct learner(s) on proper positioning and spine alignment during self-care, functional mobility activities and/or exercises.                  Learning Progress Summary             Patient Acceptance, E, NR by  at 6/2/2024 1153                                         User Key       Initials Effective Dates Name Provider Type Discipline     10/14/22 -  Rhianna Lake OT Occupational Therapist OT                  OT Recommendation and Plan  Planned Therapy Interventions (OT): activity tolerance training, adaptive equipment training, BADL retraining, cognitive/visual perception retraining, functional balance retraining, IADL retraining,  occupation/activity based interventions, patient/caregiver education/training, ROM/therapeutic exercise, strengthening exercise, transfer/mobility retraining, orthotic fabrication/fitting/training, edema control/reduction, passive ROM/stretching  Therapy Frequency (OT): daily  Plan of Care Review  Plan of Care Reviewed With: patient  Outcome Evaluation: Pt presents w/ R shoulder ROM restrictions, generalized weakness, decreased functional endurance, confusion, and balance deficits limiting her ADL independence. Pt tolerated 10 reps of LUE elbow/wrist/hand HEP w/ no c/o pain. Pt req dep A for sling management/proper fit & LBD this date. Pt would benefit from continued skilled IPOT services to address current functional deficits. Rec SNF at d/c.     Time Calculation:   Evaluation Complexity (OT)  Review Occupational Profile/Medical/Therapy History Complexity: expanded/moderate complexity  Assessment, Occupational Performance/Identification of Deficit Complexity: 3-5 performance deficits  Clinical Decision Making Complexity (OT): detailed assessment/moderate complexity  Overall Complexity of Evaluation (OT): moderate complexity     Time Calculation- OT       Row Name 06/02/24 1154             Time Calculation- OT    OT Start Time 0756  -MC      OT Received On 06/02/24  -      OT Goal Re-Cert Due Date 06/12/24  -         Timed Charges    75678 - OT Therapeutic Exercise Minutes 6  -MC      90478 - OT Therapeutic Activity Minutes 4  -MC      47184 - OT Self Care/Mgmt Minutes 5  -MC         Untimed Charges    OT Eval/Re-eval Minutes 31  -MC         Total Minutes    Timed Charges Total Minutes 15  -MC      Untimed Charges Total Minutes 31  -MC       Total Minutes 46  -MC                User Key  (r) = Recorded By, (t) = Taken By, (c) = Cosigned By      Initials Name Provider Type    Rhianna Pace OT Occupational Therapist                  Therapy Charges for Today       Code Description Service Date Service  Provider Modifiers Qty    [id number]  OT THER PROC EA 15 MIN 6/2/2024 Rhianna Lake OT GO 1    [id number]  OT EVAL MOD COMPLEXITY 3 6/2/2024 Rhianna Lake OT GO 1                 Rhianna Lake OT  6/2/2024    Electronically signed by Rhianna Lake OT at 06/02/24 0386

## 2024-06-03 NOTE — PLAN OF CARE
Goal Outcome Evaluation:  Plan of Care Reviewed With: patient        Progress: no change            Pt is alert and oriented to self only. VSS, RA/2L. Pain controlled with prn tylenol.

## 2024-06-04 PROBLEM — N39.0 UTI (URINARY TRACT INFECTION): Status: RESOLVED | Noted: 2024-01-31 | Resolved: 2024-06-04

## 2024-06-04 PROBLEM — S42.302D FRACTURE OF LEFT HUMERUS WITH ROUTINE HEALING: Status: ACTIVE | Noted: 2024-06-04

## 2024-06-04 PROCEDURE — 97116 GAIT TRAINING THERAPY: CPT

## 2024-06-04 PROCEDURE — 97110 THERAPEUTIC EXERCISES: CPT

## 2024-06-04 PROCEDURE — 25010000002 CEFTRIAXONE PER 250 MG: Performed by: INTERNAL MEDICINE

## 2024-06-04 PROCEDURE — 99232 SBSQ HOSP IP/OBS MODERATE 35: CPT | Performed by: INTERNAL MEDICINE

## 2024-06-04 PROCEDURE — 25810000003 SODIUM CHLORIDE 0.9 % SOLUTION: Performed by: NURSE PRACTITIONER

## 2024-06-04 PROCEDURE — 93010 ELECTROCARDIOGRAM REPORT: CPT | Performed by: INTERNAL MEDICINE

## 2024-06-04 PROCEDURE — 93005 ELECTROCARDIOGRAM TRACING: CPT | Performed by: INTERNAL MEDICINE

## 2024-06-04 RX ADMIN — APIXABAN 2.5 MG: 2.5 TABLET, FILM COATED ORAL at 09:03

## 2024-06-04 RX ADMIN — METOPROLOL TARTRATE 25 MG: 25 TABLET, FILM COATED ORAL at 21:48

## 2024-06-04 RX ADMIN — APIXABAN 2.5 MG: 2.5 TABLET, FILM COATED ORAL at 21:48

## 2024-06-04 RX ADMIN — SODIUM CHLORIDE 75 ML/HR: 9 INJECTION, SOLUTION INTRAVENOUS at 06:30

## 2024-06-04 RX ADMIN — Medication 10 ML: at 09:03

## 2024-06-04 RX ADMIN — CARBIDOPA AND LEVODOPA 1 TABLET: 25; 100 TABLET ORAL at 21:48

## 2024-06-04 RX ADMIN — CARBIDOPA AND LEVODOPA 1 TABLET: 25; 100 TABLET ORAL at 09:03

## 2024-06-04 RX ADMIN — CEFTRIAXONE 2000 MG: 2 INJECTION, POWDER, FOR SOLUTION INTRAMUSCULAR; INTRAVENOUS at 13:52

## 2024-06-04 RX ADMIN — METOPROLOL TARTRATE 12.5 MG: 25 TABLET, FILM COATED ORAL at 09:03

## 2024-06-04 RX ADMIN — CARBIDOPA AND LEVODOPA 1 TABLET: 25; 100 TABLET ORAL at 17:57

## 2024-06-04 RX ADMIN — APIXABAN 2.5 MG: 2.5 TABLET, FILM COATED ORAL at 21:56

## 2024-06-04 RX ADMIN — METOPROLOL TARTRATE 12.5 MG: 25 TABLET, FILM COATED ORAL at 09:35

## 2024-06-04 NOTE — CASE MANAGEMENT/SOCIAL WORK
Continued Stay Note  Psychiatric     Patient Name: Fanny Boyce  MRN: [id number]  Today's Date: 6/4/2024    Admit Date: 6/1/2024    Plan: Home with    Discharge Plan       Row Name 06/04/24 0844       Plan    Plan Comments RODRIGO spoke with pts daughter who reports she is now agreeable [facility]. Referral has been made. At this time [facility] does not have any available beds. CM spoke with daughter and explained situation and offered to make additional referrals. She has declined and reports she plans on taking her mother home at [place].                    Discharge Codes    No documentation.                 Expected Discharge Date and Time       Expected Discharge Date Expected Discharge Time    Jun 5, 2024               Yuki Girard RN

## 2024-06-04 NOTE — PLAN OF CARE
Goal Outcome Evaluation:  Plan of Care Reviewed With: patient           Outcome Evaluation: Patient very willing to participate in therapeutic activities. She requires alot of assistance to ambulate due to poor dynamic balance increasign her fall risk.      Anticipated Discharge Disposition (PT): skilled nursing facility

## 2024-06-04 NOTE — PROGRESS NOTES
[facility] Medicine Services  PROGRESS NOTE    Patient Name: Fanny Boyce  : 1940  MRN: [id number]    Date of Admission: 2024  Primary Care Physician: Nemo Aponte PA    Subjective   Subjective     CC:  Afib    HPI:  HR up to 140s this morning. Feeling short of breath. Not having any pain in her arm.      Objective   Objective     Vital Signs:   Temp:  [97.5 °F (36.4 °C)-98.5 °F (36.9 °C)] 98.3 °F (36.8 °C)  Heart Rate:  [] 113  Resp:  [16-18] 18  BP: (125-141)/(53-83) 135/83     Physical Exam:  Constitutional: Appears short of breath  HENT: NCAT, mucous membranes moist  Respiratory: Clear to auscultation bilaterally  Cardiovascular: Irregular rhythm with rates up to the 140s  Gastrointestinal: Positive bowel sounds, soft, nontender, nondistended  Musculoskeletal: Left upper extremity sling has been undone  Psychiatric: Appropriate affect, cooperative  Neurologic: Oriented x 3, no gross focal neurological deficits      Results Reviewed:  LAB RESULTS:      Lab 24  0334 24  0526 24  1530 24  1149   WBC 7.41 9.17  --  7.61   HEMOGLOBIN 9.1* 8.9*  --  10.2*   HEMATOCRIT 29.3* 29.4*  --  33.4*   PLATELETS 256 229  --  253   NEUTROS ABS  --   --   --  5.95   IMMATURE GRANS (ABS)  --   --   --  0.02   LYMPHS ABS  --   --   --  1.01   MONOS ABS  --   --   --  0.49   EOS ABS  --   --   --  0.08   MCV 89.9 88.3  --  90.3   LACTATE  --   --  1.7 2.1*         Lab 24  0334 24  0526 24  1149   SODIUM 139 141 140   POTASSIUM 3.7 4.3 4.1   CHLORIDE 106 107 103   CO2 21.0* 24.0 26.0   ANION GAP 12.0 10.0 11.0   BUN 16 16 18   CREATININE 0.54* 0.69 0.72   EGFR 90.4 85.2 82.1   GLUCOSE 97 96 90   CALCIUM 8.2* 8.1* 9.2   MAGNESIUM  --   --  2.0         Lab 24  1149   TOTAL PROTEIN 6.4   ALBUMIN 3.7   GLOBULIN 2.7   ALT (SGPT) <5   AST (SGOT) 15   BILIRUBIN 0.5   ALK PHOS 96         Lab 24  1220   HSTROP T 16*                  Brief Urine Lab Results  (Last result in the past 365 days)        Color   Clarity   Blood   Leuk Est   Nitrite   Protein   CREAT   Urine HCG        06/01/24 1157 Yellow   Turbid   Small (1+)   Large (3+)   Positive   30 mg/dL (1+)                   Microbiology Results Abnormal       Procedure Component Value - Date/Time    Blood Culture - Blood, Wrist, Left [[id number]]  (Normal) Collected: 06/01/24 1358    Lab Status: Preliminary result Specimen: Blood from Wrist, Left Updated: 06/03/24 1500     Blood Culture No growth at 2 days    Narrative:      Less than seven (7) mL's of blood was collected.  Insufficient quantity may yield false negative results.    Blood Culture - Blood, Arm, Right [[id number]]  (Normal) Collected: 06/01/24 1302    Lab Status: Preliminary result Specimen: Blood from Arm, Right Updated: 06/03/24 1315     Blood Culture No growth at 2 days    Narrative:      Less than seven (7) mL's of blood was collected.  Insufficient quantity may yield false negative results.            No radiology results from the last 24 hrs        Current medications:  Scheduled Meds:apixaban, 2.5 mg, Oral, Q12H  carbidopa-levodopa, 1 tablet, Oral, TID  cefTRIAXone, 2,000 mg, Intravenous, Q24H  metoprolol tartrate, 12.5 mg, Oral, Q12H  sodium chloride, 10 mL, Intravenous, Q12H      Continuous Infusions:sodium chloride, 75 mL/hr, Last Rate: 75 mL/hr (06/04/24 0630)      PRN Meds:.  acetaminophen    senna-docusate sodium **AND** polyethylene glycol **AND** bisacodyl **AND** bisacodyl    fluticasone    ondansetron ODT **OR** ondansetron    sodium chloride    sodium chloride    sodium chloride    Assessment & Plan   Assessment & Plan     Active Hospital Problems    Diagnosis  POA    **UTI (urinary tract infection) [N39.0]  Yes    Atrial fibrillation [I48.91]  Yes    COPD (chronic obstructive pulmonary disease) [J44.9]  Yes    Dementia [F03.90]  Yes    Parkinson's disease [G20.A1]  Yes      Resolved Hospital Problems   No  resolved problems to display.        Brief Hospital Course to date:  Fanny Boyce is a 83 y.o. female with h/o UTI's, COPD, parkinson's disease, afib on eliquis presents d/t hallucinations, weakness and fall today found to have UTI and left humerus fracture     Afib with RVR  - Symptomatic, rate uncontrolled this morning, appears afib with RVR on tele  - Just got home dose of metoprolol, will give a little time to see if it takes effect but did increase dose to 25mg daily. Will give additional 12.5mg dose this morning.   - but will consider IV metoprolol -  - Continue eliquis  - EKG pending.     Left Humerus fracture  -Acute impacted proximal humeral fracture on xray  -Ortho following, patient wants conservative nonoperatively management.   -Continue sling   -PT/OT  -Continue pain control.   -Resume home eliquis since non-operative treatment  -follow-up with Dr. Harrington in 2 weeks with repeat Xray     Hypotension, resolved  -Bps improved today     Fall  UTI  -Urine culture negative, blood cultures negative   -Continue rocephin.      Confusion  - noted on presentation  - CT head negative for bleeding or acute abnormality     Left 9th/anterior 10th rib fracture     COPD     Parkinson's Disease  --continue sinemet                   Expected Discharge Location and Transportation: home health  Expected Discharge   Expected Discharge Date: 6/5/2024; Expected Discharge Time:      DVT prophylaxis:  Medical and mechanical DVT prophylaxis orders are present.         AM-PAC 6 Clicks Score (PT): 9 (06/03/24 2020)    CODE STATUS:   Code Status and Medical Interventions:   Ordered at: 06/01/24 1537     Medical Intervention Limits:    NO intubation (DNI)     Level Of Support Discussed With:    Patient     Code Status (Patient has no pulse and is not breathing):    No CPR (Do Not Attempt to Resuscitate)     Medical Interventions (Patient has pulse or is breathing):    Limited Support     I have prepared this progress note with copied  portions of the prior day's progress note of my own authorship to preserve accuracy and maintain consistency of documentation. I have reviewed these portions and edited them for correctness. I verify that the above documentation accurately and truly represents the evaluation and management performed on today's date.       Inés Sheridan MD  06/04/24

## 2024-06-04 NOTE — DISCHARGE PLACEMENT REQUEST
"Fanny Campbell \"Monica\" (83 y.o. Female)       Yuki Girard RN   [phone]    Looking for short term rehab          Date of Birth   1940    Social Security Number       Address   [address] [place] [facility] [address]    Home Phone   [phone]    MRN   [id number]       Nondenominational   None    Marital Status   Legally                             Admission Date   6/1/24    Admission Type   Emergency    Admitting Provider   Inés Sheridan MD    Attending Provider   Inés Sheridan MD    Department, Room/Bed   [facility] 3H, S387/1       Discharge Date       Discharge Disposition       Discharge Destination                                 Attending Provider: Inés Sheridan MD    Allergies: No Known Allergies    Isolation: None   Infection: None   Code Status: No CPR    Ht: 152.4 cm (60\")   Wt: 53.1 kg (117 lb)    Admission Cmt: None   Principal Problem: UTI (urinary tract infection) [N39.0]                   Active Insurance as of 6/1/2024       Primary Coverage       Payor Plan Insurance Group Employer/Plan Group    MEDICARE MEDICARE A & B        Payor Plan Address Payor Plan Phone Number Payor Plan Fax Number Effective Dates    [address] [phone]  9/1/2001 - None Entered    [facility] [id number]         Subscriber Name Subscriber Birth Date Member ID       FANNY CAMPBELL 1940 [id number]               Secondary Coverage       Payor Plan Insurance Group Employer/Plan Group    HUMANA HUMANA [facility]              [id number]       Payor Plan Address Payor Plan Phone Number Payor Plan Fax Number Effective Dates    [address] [address]   1/1/2015 - None Entered    [facility] [address]         Subscriber Name Subscriber Birth Date Member ID       FANNY CAMPBELL 1940 [id number]                     Emergency Contacts        (Rel.) Home Phone Work Phone Mobile Phone    JoshuaLety (Daughter) [phone] -- [phone]    Licha Pugh (Grandchild) -- -- -- "              Insurance Information                  MEDICARE/MEDICARE A & B Phone: [phone]    Subscriber: Fanny Boyce Subscriber#: [id number]    Group#: -- Precert#: --        HUMANA/HUMANA [facility]              Phone: --    Subscriber: Fanny Boyce Subscriber#: [id number]    Group#: [id number] Precert#: --             History & Physical        Yusef Arshad MD at 24 [facility]6              [facility] Medicine Services  HISTORY AND PHYSICAL    Patient Name: Fanny Boyce  : 1940  MRN: [id number]  Primary Care Physician: Nemo Aponte PA  Date of admission: 2024      Subjective  Subjective     Chief Complaint:  Hallucinations/weakness/fall    HPI:  Fanny Boyce is a 83 y.o. female with h/o UTI's, COPD, parkinson's disease, afib on eliquis presents d/t hallucinations, weakness and fall today.  Hx is limited d/t mild confusion.  Xrays revealed acute impacted left humerus fracture, and acute mildly displaced fracture of the left 9th rib and anterior left 10th rib.  Currently patient only c/o left arm pain.        Personal History     Past Medical History:   Diagnosis Date    Atrial fibrillation     COPD (chronic obstructive pulmonary disease)     Dementia     History of lung cancer     Parkinson disease     Pulmonary embolism            Past Surgical History:   Procedure Laterality Date    JOINT REPLACEMENT      LUNG CANCER SURGERY         Family History: family history includes Aneurysm in her father; Heart disease in her father and mother.     Social History:  reports that she has quit smoking. Her smoking use included cigarettes. She has a 40 pack-year smoking history. She has never used smokeless tobacco. She reports that she does not drink alcohol and does not use drugs.  Social History     Social History Narrative    Not on file       Medications:  Available home medication information reviewed.  apixaban, carbidopa-levodopa, fluticasone, metoprolol tartrate,  and traZODone    No Known Allergies    Objective  Objective     Vital Signs:   Temp:  [98.2 °F (36.8 °C)] 98.2 °F (36.8 °C)  Heart Rate:  [72-83] 72  Resp:  [18] 18  BP: ()/(51-72) 126/65       Physical Exam   Constitutional: Awake, alert  Eyes: PERRLA, sclerae anicteric, no conjunctival injection  HENT: NCAT, mucous membranes moist  Neck: Supple, no thyromegaly, no lymphadenopathy, trachea midline  Respiratory: Clear to auscultation bilaterally, nonlabored respirations   Cardiovascular: irreg/irreg, no murmurs, rubs, or gallops, palpable pedal pulses bilaterally  Gastrointestinal: Positive bowel sounds, soft, nontender, nondistended  Musculoskeletal: No bilateral ankle edema, no clubbing or cyanosis to extremities.  LUE with ecchymosis and swelling  Psychiatric: Appropriate affect, cooperative  Neurologic: Oriented x 2 (not date), moves all extremities, Cranial Nerves grossly intact to confrontation, speech clear  Skin: No rashes      Result Review:  I have personally reviewed the results from the time of this admission to 6/1/2024 15:38 EDT and agree with these findings:  [x]  Laboratory list / accordion  [x]  Microbiology  [x]  Radiology  [x]  EKG/Telemetry   []  Cardiology/Vascular   []  Pathology  []  Old records  []  Other:  Most notable findings include:       LAB RESULTS:      Lab 06/01/24  1149   WBC 7.61   HEMOGLOBIN 10.2*   HEMATOCRIT 33.4*   PLATELETS 253   NEUTROS ABS 5.95   IMMATURE GRANS (ABS) 0.02   LYMPHS ABS 1.01   MONOS ABS 0.49   EOS ABS 0.08   MCV 90.3   LACTATE 2.1*         Lab 06/01/24  1149   SODIUM 140   POTASSIUM 4.1   CHLORIDE 103   CO2 26.0   ANION GAP 11.0   BUN 18   CREATININE 0.72   EGFR 82.1   GLUCOSE 90   CALCIUM 9.2   MAGNESIUM 2.0         Lab 06/01/24  1149   TOTAL PROTEIN 6.4   ALBUMIN 3.7   GLOBULIN 2.7   ALT (SGPT) <5   AST (SGOT) 15   BILIRUBIN 0.5   ALK PHOS 96         Lab 06/01/24  1220   HSTROP T 16*                 UA          1/31/2024    09:07 3/13/2024    22:54  6/1/2024    11:57   Urinalysis   Squamous Epithelial Cells, UA 0-2  0-2  0-2    Specific Gravity, UA 1.014  1.011  1.013    Ketones, UA Negative  Negative  Negative    Blood, UA Moderate (2+)  Trace  Small (1+)    Leukocytes, UA Large (3+)  Trace  Large (3+)    Nitrite, UA Positive  Negative  Positive    RBC, UA Too Numerous to Count  6-10  11-20    WBC, UA Too Numerous to Count  6-10  Too Numerous to Count    Bacteria, UA 2+  None Seen  4+        Microbiology Results (last 10 days)       ** No results found for the last 240 hours. **            XR Chest 1 View    Result Date: 6/1/2024  XR CHEST 1 VW Date of Exam: 6/1/2024 12:09 PM EDT Indication: Weak/Dizzy/AMS triage protocol Comparison: 1/31/2024 Findings: No focal consolidation. No pneumothorax or pleural effusion. Cardiac size is normal. The visualized clavicles appear intact. No displaced rib fractures. The visualized upper abdomen is normal.     Impression: Impression: No acute cardiopulmonary disease. Electronically Signed: Aristeo Greene MD  6/1/2024 12:52 PM EDT  Workstation ID: [id number]    XR Shoulder 2+ View Left    Result Date: 6/1/2024  XR SHOULDER 2+ VW LEFT, XR ELBOW 3+ VW LEFT, XR HUMERUS LEFT Date of Exam: 6/1/2024 12:10 PM EDT Indication: trauma Comparison: Chest x-ray 1/31/2024 Findings: Left shoulder: There is an acute fracture of the proximal humerus with impaction at the surgical neck and poorly visualized greater tuberosity fracture involvement. There is approximately 1.7 cm of impaction at the surgical neck. The humeral head maintains articulation with the glenoid. No additional acute fractures are seen at the shoulder. The acromioclavicular joint is congruent. The bones are demineralized. There there is an acute segmental fracture of the left ninth rib with fracture at the posterior and anterolateral rib. There is an acute mildly displaced fracture of the anterolateral left 10th rib. Left humerus: The mid and distal humeral shaft appears  normal. Left elbow: No definite soft tissue swelling. No evidence of elbow joint effusion. Joint spaces are preserved. No acute fracture or malalignment.     Impression: Impression: Acute impacted proximal humeral fracture. Acute mildly displaced segmental fracture of the left ninth rib and fracture of the anterior left 10th rib. Electronically Signed: Royer Quintero MD  6/1/2024 12:49 PM EDT  Workstation ID: [id number]    XR Humerus Left    Result Date: 6/1/2024  XR SHOULDER 2+ VW LEFT, XR ELBOW 3+ VW LEFT, XR HUMERUS LEFT Date of Exam: 6/1/2024 12:10 PM EDT Indication: trauma Comparison: Chest x-ray 1/31/2024 Findings: Left shoulder: There is an acute fracture of the proximal humerus with impaction at the surgical neck and poorly visualized greater tuberosity fracture involvement. There is approximately 1.7 cm of impaction at the surgical neck. The humeral head maintains articulation with the glenoid. No additional acute fractures are seen at the shoulder. The acromioclavicular joint is congruent. The bones are demineralized. There there is an acute segmental fracture of the left ninth rib with fracture at the posterior and anterolateral rib. There is an acute mildly displaced fracture of the anterolateral left 10th rib. Left humerus: The mid and distal humeral shaft appears normal. Left elbow: No definite soft tissue swelling. No evidence of elbow joint effusion. Joint spaces are preserved. No acute fracture or malalignment.     Impression: Impression: Acute impacted proximal humeral fracture. Acute mildly displaced segmental fracture of the left ninth rib and fracture of the anterior left 10th rib. Electronically Signed: Royer Quintero MD  6/1/2024 12:49 PM EDT  Workstation ID: [id number]    XR Elbow 3+ View Left    Result Date: 6/1/2024  XR SHOULDER 2+ VW LEFT, XR ELBOW 3+ VW LEFT, XR HUMERUS LEFT Date of Exam: 6/1/2024 12:10 PM EDT Indication: trauma Comparison: Chest x-ray 1/31/2024 Findings: Left shoulder:  There is an acute fracture of the proximal humerus with impaction at the surgical neck and poorly visualized greater tuberosity fracture involvement. There is approximately 1.7 cm of impaction at the surgical neck. The humeral head maintains articulation with the glenoid. No additional acute fractures are seen at the shoulder. The acromioclavicular joint is congruent. The bones are demineralized. There there is an acute segmental fracture of the left ninth rib with fracture at the posterior and anterolateral rib. There is an acute mildly displaced fracture of the anterolateral left 10th rib. Left humerus: The mid and distal humeral shaft appears normal. Left elbow: No definite soft tissue swelling. No evidence of elbow joint effusion. Joint spaces are preserved. No acute fracture or malalignment.     Impression: Impression: Acute impacted proximal humeral fracture. Acute mildly displaced segmental fracture of the left ninth rib and fracture of the anterior left 10th rib. Electronically Signed: Royer Quintero MD  6/1/2024 12:49 PM EDT  Workstation ID: [id number]         Assessment & Plan  Assessment & Plan       UTI (urinary tract infection)    Atrial fibrillation    COPD (chronic obstructive pulmonary disease)    Dementia    Parkinson's disease    Fanny Boyce is a 83 y.o. female with h/o UTI's, COPD, parkinson's disease, afib on eliquis presents d/t hallucinations, weakness and fall today found to have UTI and left humerus fracture    Fall  UTI  --treat with rocephin.  Follow cultures  --with fall and on eliquis and some mild confusion (may be 2nd to UTI), will order CT head    Left Humerus fracture  --consult Ortho  --hold home eliquis in case of surgery and NPO after MN until seen by ortho    Left 9th/anterior 10th rib fracture    COPD    Parkinson's Disease  --continue sinemet    Afib  --hold eliquis in case need for surgery  --continue metoprolol      Attempted to call daughter, Lety Lewis but was  unsuccessful in reaching.       DVT prophylaxis:  Mechanical DVT prophylaxis orders are signed and held.            CODE STATUS:    Code Status and Medical Interventions:   Ordered at: 06/01/24 1537     Medical Intervention Limits:    NO intubation (DNI)     Level Of Support Discussed With:    Patient     Code Status (Patient has no pulse and is not breathing):    No CPR (Do Not Attempt to Resuscitate)     Medical Interventions (Patient has pulse or is breathing):    Limited Support       Expected Discharge   Expected discharge date/ time has not been documented.     Yusef Arshad MD  06/01/24      Electronically signed by Yusef Arshda MD at 06/01/24 1557       Current Facility-Administered Medications   Medication Dose Route Frequency Provider Last Rate Last Admin    acetaminophen (TYLENOL) tablet 650 mg  650 mg Oral Q4H PRN Yusef Arshad MD   650 mg at 06/03/24 0836    apixaban (ELIQUIS) tablet 2.5 mg  2.5 mg Oral Q12H Inés Sheridan MD   2.5 mg at 06/03/24 2019    sennosides-docusate (PERICOLACE) 8.6-50 MG per tablet 2 tablet  2 tablet Oral BID PRN Yusef Arshad MD   2 tablet at 06/03/24 0836    And    polyethylene glycol (MIRALAX) packet 17 g  17 g Oral Daily PRN Yusef Arshad MD   17 g at 06/03/24 0834    And    bisacodyl (DULCOLAX) EC tablet 5 mg  5 mg Oral Daily PRN Yusef Arshda MD   5 mg at 06/03/24 0836    And    bisacodyl (DULCOLAX) suppository 10 mg  10 mg Rectal Daily PRN Yusef Arshad MD        carbidopa-levodopa (SINEMET)  MG per tablet 1 tablet  1 tablet Oral TID Yusef Arshad MD   1 tablet at 06/03/24 2019    cefTRIAXone (ROCEPHIN) 2,000 mg in sodium chloride 0.9 % 100 mL MBP  2,000 mg Intravenous Q24H Yusef Arshad  mL/hr at 06/03/24 1541 2,000 mg at 06/03/24 1541    fluticasone (FLONASE) 50 MCG/ACT nasal spray 2 spray  2 spray Nasal Daily PRN Yusef Arshad MD        metoprolol tartrate (LOPRESSOR) tablet 12.5 mg  12.5 mg Oral Q12H Inés Sheridan,  MD   12.5 mg at 24 2019    ondansetron ODT (ZOFRAN-ODT) disintegrating tablet 4 mg  4 mg Oral Q6H PRN Yusef Arshad MD        Or    ondansetron (ZOFRAN) injection 4 mg  4 mg Intravenous Q6H PRN Yusef Arshad MD        sodium chloride 0.9 % flush 10 mL  10 mL Intravenous PRN Lonny Vergara DO        sodium chloride 0.9 % flush 10 mL  10 mL Intravenous Q12H Yusef Arshad MD   10 mL at 24 0843    sodium chloride 0.9 % flush 10 mL  10 mL Intravenous PRN Yusef Arshad MD        sodium chloride 0.9 % infusion 40 mL  40 mL Intravenous PRN Yusef Arshad MD        sodium chloride 0.9 % infusion  75 mL/hr Intravenous Continuous Cristina Wolf APRN 75 mL/hr at 24 0630 75 mL/hr at 24 0630        Physician Progress Notes (most recent note)        Inés Sheridan MD at 24 1344              [facility] Medicine Services  PROGRESS NOTE    Patient Name: Fanny Boyce  : 1940  MRN: [id number]    Date of Admission: 2024  Primary Care Physician: Nemo Aponte PA    Subjective   Subjective     CC:  Humerus fracture    HPI:  Patient is doing well this morning.  Pain is controlled.  No complaint      Objective   Objective     Vital Signs:   Temp:  [97.5 °F (36.4 °C)-98.7 °F (37.1 °C)] 97.5 °F (36.4 °C)  Heart Rate:  [] 71  Resp:  [16-18] 16  BP: (103-133)/(48-62) 125/54  Flow (L/min):  [2] 2     Physical Exam:  Constitutional: Frail elderly female in bed, no acute distress  Respiratory: Clear to auscultation bilaterally, respiratory effort normal   Cardiovascular: RRR  Gastrointestinal: Positive bowel sounds, soft, nontender, nondistended  Musculoskeletal: No bilateral ankle edema  Psychiatric: Appropriate affect, cooperative  Neurologic: Oriented x 3, no focal deficits      Results Reviewed:  LAB RESULTS:      Lab 24  0334 24  0526 24  1530 24  1149   WBC 7.41 9.17  --  7.61   HEMOGLOBIN 9.1* 8.9*  --  10.2*    HEMATOCRIT 29.3* 29.4*  --  33.4*   PLATELETS 256 229  --  253   NEUTROS ABS  --   --   --  5.95   IMMATURE GRANS (ABS)  --   --   --  0.02   LYMPHS ABS  --   --   --  1.01   MONOS ABS  --   --   --  0.49   EOS ABS  --   --   --  0.08   MCV 89.9 88.3  --  90.3   LACTATE  --   --  1.7 2.1*         Lab 06/03/24  0334 06/02/24  0526 06/01/24  1149   SODIUM 139 141 140   POTASSIUM 3.7 4.3 4.1   CHLORIDE 106 107 103   CO2 21.0* 24.0 26.0   ANION GAP 12.0 10.0 11.0   BUN 16 16 18   CREATININE 0.54* 0.69 0.72   EGFR 90.4 85.2 82.1   GLUCOSE 97 96 90   CALCIUM 8.2* 8.1* 9.2   MAGNESIUM  --   --  2.0         Lab 06/01/24  1149   TOTAL PROTEIN 6.4   ALBUMIN 3.7   GLOBULIN 2.7   ALT (SGPT) <5   AST (SGOT) 15   BILIRUBIN 0.5   ALK PHOS 96         Lab 06/01/24  1220   HSTROP T 16*                 Brief Urine Lab Results  (Last result in the past 365 days)        Color   Clarity   Blood   Leuk Est   Nitrite   Protein   CREAT   Urine HCG        06/01/24 1157 Yellow   Turbid   Small (1+)   Large (3+)   Positive   30 mg/dL (1+)                   Microbiology Results Abnormal       Procedure Component Value - Date/Time    Blood Culture - Blood, Arm, Right [[id number]]  (Normal) Collected: 06/01/24 1302    Lab Status: Preliminary result Specimen: Blood from Arm, Right Updated: 06/03/24 1315     Blood Culture No growth at 2 days    Narrative:      Less than seven (7) mL's of blood was collected.  Insufficient quantity may yield false negative results.    Blood Culture - Blood, Wrist, Left [[id number]]  (Normal) Collected: 06/01/24 1358    Lab Status: Preliminary result Specimen: Blood from Wrist, Left Updated: 06/02/24 1500     Blood Culture No growth at 24 hours    Narrative:      Less than seven (7) mL's of blood was collected.  Insufficient quantity may yield false negative results.            CT Head Without Contrast    Result Date: 6/2/2024  CT HEAD WO CONTRAST Date of Exam: 6/2/2024 4:30 AM EDT Indication: increased confusion.  Comparison: None available. Technique: Axial CT images were obtained of the head without contrast administration.  Automated exposure control and iterative construction methods were used. Findings: There is no evidence of hemorrhage. There is no mass effect or midline shift. There is no extracerebral collection. Ventricles are normal in size and configuration for patient's stated age.  Posterior fossa is within normal limits. Calvarium and skull base appear intact.   Visualized sinuses show no air fluid levels. Visualized orbits are unremarkable.     Impression: Impression: No acute intracranial process. Electronically Signed: Dina Basilio MD  6/2/2024 4:40 AM EDT  Workstation ID: [id number]         Current medications:  Scheduled Meds:apixaban, 2.5 mg, Oral, Q12H  carbidopa-levodopa, 1 tablet, Oral, TID  cefTRIAXone, 2,000 mg, Intravenous, Q24H  metoprolol tartrate, 12.5 mg, Oral, Q12H  sodium chloride, 10 mL, Intravenous, Q12H      Continuous Infusions:sodium chloride, 75 mL/hr, Last Rate: 75 mL/hr (06/03/24 0844)      PRN Meds:.  acetaminophen    senna-docusate sodium **AND** polyethylene glycol **AND** bisacodyl **AND** bisacodyl    fluticasone    ondansetron ODT **OR** ondansetron    sodium chloride    sodium chloride    sodium chloride    Assessment & Plan   Assessment & Plan     Active Hospital Problems    Diagnosis  POA    **UTI (urinary tract infection) [N39.0]  Yes    Atrial fibrillation [I48.91]  Yes    COPD (chronic obstructive pulmonary disease) [J44.9]  Yes    Dementia [F03.90]  Yes    Parkinson's disease [G20.A1]  Yes      Resolved Hospital Problems   No resolved problems to display.        Brief Hospital Course to date:  Fanny Boyce is a 83 y.o. female with h/o UTI's, COPD, parkinson's disease, afib on eliquis presents d/t hallucinations, weakness and fall today found to have UTI and left humerus fracture     Left Humerus fracture  -Acute impacted proximal humeral fracture on xray  -Ortho following,  patient wants conservative nonoperatively management.   -Continue sling   -PT/OT  -Continue pain control.   -Resume home eliquis since non-operative treatment  -follow-up with Dr. Harrington in 2 weeks with repeat Xray     Hypotension, resolved  -Bps improved today     Fall  UTI  -Urine culture negative, blood cultures negative   -Continue rocephin.      Confusion  - noted on presentation  - CT head negative for bleeding or acute abnormality     Left 9th/anterior 10th rib fracture     COPD     Parkinson's Disease  --continue sinemet     Afib  --resume eliquis  --continue metoprolol              Expected Discharge Location and Transportation: rehab  Expected Discharge   Expected Discharge Date: 2024; Expected Discharge Time:      DVT prophylaxis:  Medical and mechanical DVT prophylaxis orders are present.         AM-PAC 6 Clicks Score (PT): 11 (24 09)    CODE STATUS:   Code Status and Medical Interventions:   Ordered at: 24 1537     Medical Intervention Limits:    NO intubation (DNI)     Level Of Support Discussed With:    Patient     Code Status (Patient has no pulse and is not breathing):    No CPR (Do Not Attempt to Resuscitate)     Medical Interventions (Patient has pulse or is breathing):    Limited Support     I have prepared this progress note with copied portions of the prior day's progress note of my own authorship to preserve accuracy and maintain consistency of documentation. I have reviewed these portions and edited them for correctness. I verify that the above documentation accurately and truly represents the evaluation and management performed on today's date.       Inés Sheridan MD  24        Electronically signed by Inés Sheridan MD at 24 1345          Physical Therapy Notes (most recent note)        Tessy Martinez, PT at 24 0976  Version 1 of 1         Patient Name: Fanny Boyce  : 1940    MRN: [id number]                              Today's  Date: 6/3/2024       Admit Date: 6/1/2024    Visit Dx:     ICD-10-CM ICD-9-CM   1. Acute UTI  N39.0 599.0   2. Other closed nondisplaced fracture of proximal end of left humerus, initial encounter  S42.295A 812.09   3. Closed fracture of multiple ribs of left side, initial encounter  S22.42XA 807.09   4. Fall, initial encounter  W19.XXXA E888.9   5. At high risk for falls  Z91.81 V15.88   6. Visual hallucinations  R44.1 368.16   7. Generalized weakness  R53.1 780.79   8. Elevated lactic acid level  R79.89 276.2   9. Elevated troponin  R79.89 790.6     Patient Active Problem List   Diagnosis    UTI (urinary tract infection)    Atrial fibrillation    COPD (chronic obstructive pulmonary disease)    Dementia    Parkinson's disease     Past Medical History:   Diagnosis Date    Atrial fibrillation     COPD (chronic obstructive pulmonary disease)     Dementia     History of lung cancer     Parkinson disease     Pulmonary embolism      Past Surgical History:   Procedure Laterality Date    JOINT REPLACEMENT      LUNG CANCER SURGERY        General Information       Row Name 06/03/24 0929          Physical Therapy Time and Intention    Document Type therapy note (daily note)  -LR     Mode of Treatment physical therapy;individual therapy  -LR       Row Name 06/03/24 0929          General Information    Patient Profile Reviewed yes  -LR     Existing Precautions/Restrictions fall;non-weight bearing;left;shoulder;other (see comments)   non-op L proximal humeral fx, sling to L UE, NWB L UE, L rib fx, dementia, parkinson's  -LR     Barriers to Rehab medically complex;previous functional deficit;cognitive status  -LR       Row Name 06/03/24 0929          Cognition    Orientation Status (Cognition) oriented to;person;place;disoriented to;time;verbal cues/prompts needed for orientation;other (see comments)  Patient could not state current month and year  -LR       Row Name 06/03/24 0929          Safety Issues, Functional Mobility     Safety Issues Affecting Function (Mobility) ability to follow commands;awareness of need for assistance;insight into deficits/self-awareness;judgment;safety precautions follow-through/compliance;sequencing abilities;safety precaution awareness;positioning of assistive device  -LR     Impairments Affecting Function (Mobility) balance;cognition;endurance/activity tolerance;pain;strength;range of motion (ROM);postural/trunk control  -LR               User Key  (r) = Recorded By, (t) = Taken By, (c) = Cosigned By      Initials Name Provider Type    LR Tessy Martinez, PT Physical Therapist                   Mobility       Row Name 06/03/24 0929          Bed Mobility    Supine-Sit [place] (Bed Mobility) not tested  -LR     Comment, (Bed Mobility) [facility] on arrival and at end of treatment.  -LR       Row Name 06/03/24 0929          Transfers    Comment, (Transfers) Required assist via draw sheet to scoot hips out to get feet on floor. Cues to push up from chair with R UE to stand and to reach back for chair with R UE to lower into sitting. Stood with forward flexed posture. Verbal cues to shift hips forward and shoulders back to correct posture, improved slightly.  -LR       Row Name 06/03/24 0929          Bed-Chair Transfer    Bed-Chair [place] (Transfers) not tested  -LR       Row Name 06/03/24 0929          Sit-Stand Transfer    Sit-Stand [place] (Transfers) verbal cues;minimum assist (75% patient effort);2 person assist  -LR     Assistive Device (Sit-Stand Transfers) cane, straight  -LR       Row Name 06/03/24 0929          Gait/Stairs (Locomotion)    [place] Level (Gait) verbal cues;minimum assist (75% patient effort);2 person assist  -LR     Assistive Device (Gait) cane, straight  -LR     Patient was able to Ambulate yes  -LR     Distance in Feet (Gait) 10  -LR     Deviations/Abnormal Patterns (Gait) bilateral deviations;[place] decreased;gait speed decreased;stride length decreased  -LR      Bilateral Gait Deviations forward flexed posture;heel strike decreased  -LR     [place] Level (Stairs) not tested  -LR     Comment, (Gait/Stairs) Prior to gait training, PT demonstrated correct use and sequencing of SPC for ambulation as patient typically uses rollator. Patient ambulated with step to gait pattern at slow pace with forward flexed posture. Verbal cues for correct sequencing and placement of SPC. Patient took very short shuffling steps. Exhibited L lateral lean throughout ambulation, patient had difficulty correcting this with cues for correction. Gait limited by weakness, fatigue, SOA, dizziness. Chair brought up behind patient to return to sitting.  -LR       Row Name 06/03/24 0929          Mobility    Extremity Weight-bearing Status left upper extremity  -LR     Left Upper Extremity (Weight-bearing Status) non weight-bearing (NWB)   -LR               User Key  (r) = Recorded By, (t) = Taken By, (c) = Cosigned By      Initials Name Provider Type    LR Tessy Martinez, PT Physical Therapist                   Obj/Interventions       Row Name 06/03/24 0929          Motor Skills    Therapeutic Exercise ankle;knee;hip;other (see comments)  cues for technique; no assist required  -LR       Row Name 06/03/24 0929          Hip (Therapeutic Exercise)    Hip (Therapeutic Exercise) strengthening exercise;isometric exercises  -LR     Hip Isometrics (Therapeutic Exercise) bilateral;gluteal sets;sitting;10 repetitions  -LR     Hip Strengthening (Therapeutic Exercise) bilateral;heel slides;aBduction;marching while seated;sitting;10 repetitions  -LR       Row Name 06/03/24 0929          Knee (Therapeutic Exercise)    Knee (Therapeutic Exercise) strengthening exercise;isometric exercises  -LR     Knee Isometrics (Therapeutic Exercise) bilateral;quad sets;sitting;10 repetitions  -LR     Knee Strengthening (Therapeutic Exercise) bilateral;SLR (straight leg raise);SAQ (short arc quad);LAQ (long arc  quad);sitting;10 repetitions  -LR       Row Name 06/03/24 0929          Ankle (Therapeutic Exercise)    Ankle (Therapeutic Exercise) AROM (active range of motion)  -LR     Ankle AROM (Therapeutic Exercise) bilateral;dorsiflexion;plantarflexion;sitting;10 repetitions  -LR       Row Name 06/03/24 0929          Balance    Balance Assessment sitting static balance;sitting dynamic balance;standing static balance;standing dynamic balance  -LR     Static Sitting Balance standby assist  -LR     Dynamic Sitting Balance contact guard  -LR     Position, Sitting Balance unsupported;sitting in chair  -LR     Static Standing Balance minimal assist  -LR     Dynamic Standing Balance minimal assist;2-person assist  -LR     Position/Device Used, Standing Balance supported;cane, straight  -LR               User Key  (r) = Recorded By, (t) = Taken By, (c) = Cosigned By      Initials Name Provider Type    LR Tessy Martinez, PT Physical Therapist                   Goals/Plan       Row Name 06/03/24 0929          Bed Mobility Goal 1 (PT)    Activity/Assistive Device (Bed Mobility Goal 1, PT) sit to supine;supine to sit  -LR     [place] Level/Cues Needed (Bed Mobility Goal 1, PT) contact guard required  -LR     Time Frame (Bed Mobility Goal 1, PT) short term goal (STG);5 days  -LR     Progress/Outcomes (Bed Mobility Goal 1, PT) goal ongoing  -LR       Row Name 06/03/24 0929          Transfer Goal 1 (PT)    Activity/Assistive Device (Transfer Goal 1, PT) sit-to-stand/stand-to-sit;bed-to-chair/chair-to-bed;cane, quad  -LR     [place] Level/Cues Needed (Transfer Goal 1, PT) contact guard required  -LR     Time Frame (Transfer Goal 1, PT) long term goal (LTG);10 days  -LR     Progress/Outcome (Transfer Goal 1, PT) continuing progress toward goal;goal ongoing  -LR       Row Name 06/03/24 0929          Gait Training Goal 1 (PT)    Activity/Assistive Device (Gait Training Goal 1, PT) gait (walking locomotion);assistive device  use;cane, quad  -LR     [place] Level (Gait Training Goal 1, PT) contact guard required  -LR     Distance (Gait Training Goal 1, PT) 50 feet  -LR     Time Frame (Gait Training Goal 1, PT) long term goal (LTG);10 days  -LR     Progress/Outcome (Gait Training Goal 1, PT) continuing progress toward goal;goal ongoing  -LR               User Key  (r) = Recorded By, (t) = Taken By, (c) = Cosigned By      Initials Name Provider Type    LR Tessy Martinez, PT Physical Therapist                   Clinical Impression       Row Name 06/03/24 0929          Pain    Pretreatment Pain Rating 0/10 - no pain  -LR     Posttreatment Pain Rating 0/10 - no pain  -LR     Pain Location - Side/Orientation Left  -LR     Pain Location - shoulder  -LR     Pain Intervention(s) Ambulation/increased activity;Repositioned  -LR       Row Name 06/03/24 0929          Plan of Care Review    Plan of Care Reviewed With patient  -LR     Progress improving  -LR     Outcome Evaluation Patient ambulated 10 feet with SPC and min assistx2, limited by fatigue, weakness, and dizziness. Initiated gait training with SPC today to promote increased independence with ambulation. Good effort with LE ther ex. Patient currently below functional baseline, demonstrating decreased functional mobility status, impaired balance, and decreased LE strength. Will address these deficits to promote return to PLOF. Recommend SNF at d/c.  -LR       Row Name 06/03/24 0929          Therapy Assessment/Plan (PT)    Patient/Family Therapy Goals Statement (PT) get better  -LR     Rehab Potential (PT) good, to achieve stated therapy goals  -LR     Criteria for Skilled Interventions Met (PT) yes;meets criteria;skilled treatment is necessary  -LR     Therapy Frequency (PT) daily  -LR       Row Name 06/03/24 0929          Vital Signs    Pre Systolic BP Rehab 125  -LR     Pre Treatment Diastolic BP 62  -LR     Post Systolic BP Rehab 132  -LR     Post Treatment Diastolic BP 89   -LR     Pre SpO2 (%) 95  -LR     O2 Delivery Pre Treatment room air  -LR     Intra SpO2 (%) 93  -LR     O2 Delivery Intra Treatment room air  -LR     Post SpO2 (%) 94  -LR     O2 Delivery Post Treatment room air  -LR     Pre Patient Position Sitting  -LR     Intra Patient Position Sitting  -LR     Post Patient Position Sitting  -LR       Row Name 06/03/24 0929          Positioning and Restraints    Pre-Treatment Position sitting in chair/recliner  -LR     Post Treatment Position chair  -LR     In Chair notified nsg;reclined;sitting;call light within reach;encouraged to call for assist;exit alarm on;waffle cushion;legs elevated  SCDs not on on arrival  -LR               User Key  (r) = Recorded By, (t) = Taken By, (c) = Cosigned By      Initials Name Provider Type    Tessy Darling, JOAN Physical Therapist                   Outcome Measures       Row Name 06/03/24 0929          How much help from another person do you currently need...    Turning from your back to your side while in flat bed without using bedrails? 2  -LR     Moving from lying on back to sitting on the side of a flat bed without bedrails? 2  -LR     Moving to and from a bed to a chair (including a wheelchair)? 2  -LR     Standing up from a chair using your arms (e.g., wheelchair, bedside chair)? 2  -LR     Climbing 3-5 steps with a railing? 1  -LR     To walk in hospital room? 2  -LR     AM-PAC 6 Clicks Score (PT) 11  -LR     Highest Level of Mobility Goal 4 --> Transfer to chair/commode  -LR       Row Name 06/03/24 0929          Functional Assessment    Outcome Measure Options AM-PAC 6 Clicks Basic Mobility (PT)  -LR               User Key  (r) = Recorded By, (t) = Taken By, (c) = Cosigned By      Initials Name Provider Type    Tessy Darling PT Physical Therapist                                 Physical Therapy Education       Title: PT OT SLP Therapies (In Progress)       Topic: Physical Therapy (Done)       Point: Mobility  training (Done)       Learning Progress Summary             Patient Acceptance, E,D, VU,NR by LR at 6/3/2024 0929    Comment: Educated on NWB status of L UE, precautions, use of sling, LE HEP, correct sit<->stand t/f technique, correct gait mechanics, and progression of POC.    Acceptance, E,D, NR by AB at 6/2/2024 1028                         Point: Home exercise program (Done)       Learning Progress Summary             Patient Acceptance, E,D, VU,NR by LR at 6/3/2024 0929    Comment: Educated on NWB status of L UE, precautions, use of sling, LE HEP, correct sit<->stand t/f technique, correct gait mechanics, and progression of POC.                         Point: Body mechanics (Done)       Learning Progress Summary             Patient Acceptance, E,D, VU,NR by LR at 6/3/2024 0929    Comment: Educated on NWB status of L UE, precautions, use of sling, LE HEP, correct sit<->stand t/f technique, correct gait mechanics, and progression of POC.    Acceptance, E,D, NR by AB at 6/2/2024 1028                         Point: Precautions (Done)       Learning Progress Summary             Patient Acceptance, E,D, VU,NR by LR at 6/3/2024 0929    Comment: Educated on NWB status of L UE, precautions, use of sling, LE HEP, correct sit<->stand t/f technique, correct gait mechanics, and progression of POC.    Acceptance, E,D, NR by AB at 6/2/2024 1028                                         User Key       Initials Effective Dates Name Provider Type Discipline    LR 02/03/23 -  Tessy Martinez, PT Physical Therapist PT    AB 09/22/22 -  Katy Mraley, PT Physical Therapist PT                  PT Recommendation and Plan     Plan of Care Reviewed With: patient  Progress: improving  Outcome Evaluation: Patient ambulated 10 feet with SPC and min assistx2, limited by fatigue, weakness, and dizziness. Initiated gait training with SPC today to promote increased independence with ambulation. Good effort with LE ther ex. Patient  currently below functional baseline, demonstrating decreased functional mobility status, impaired balance, and decreased LE strength. Will address these deficits to promote return to PLOF. Recommend SNF at d/c.     Time Calculation:         PT Charges       Row Name 24             Time Calculation    Start Time 929  -LR      PT Received On 24  -LR      PT Goal Re-Cert Due Date 24  -LR         Timed Charges    21693 - PT Therapeutic Exercise Minutes 10  -LR      60109 - Gait Training Minutes  8  -LR      99039 - PT Therapeutic Activity Minutes 5  -LR         Total Minutes    Timed Charges Total Minutes 23  -LR       Total Minutes 23  -LR                User Key  (r) = Recorded By, (t) = Taken By, (c) = Cosigned By      Initials Name Provider Type    LR Tessy Martinez, PT Physical Therapist                  Therapy Charges for Today       Code Description Service Date Service Provider Modifiers Qty    41049734928 HC PT THER PROC EA 15 MIN 6/3/2024 Tessy Martinez, PT GP 1    35989743967 HC GAIT TRAINING EA 15 MIN 6/3/2024 Tessy Martinez, PT GP 1            PT G-Codes  Outcome Measure Options: AM-PAC 6 Clicks Basic Mobility (PT)  AM-PAC 6 Clicks Score (PT): 11  AM-PAC 6 Clicks Score (OT): 12  PT Discharge Summary  Anticipated Discharge Disposition (PT): skilled nursing facility    Tesys Martinez PT  6/3/2024      Electronically signed by Tessy Martinez, PT at 24 1211          Occupational Therapy Notes (most recent note)        Jaleesa Mckeon, OT at 24 0953          Patient Name: Fanny Boyce  : 1940    MRN: [id number]                              Today's Date: 6/3/2024       Admit Date: 2024    Visit Dx:     ICD-10-CM ICD-9-CM   1. Acute UTI  N39.0 599.0   2. Other closed nondisplaced fracture of proximal end of left humerus, initial encounter  S42.295A 812.09   3. Closed fracture of multiple ribs of left side, initial encounter   S22.42XA 807.09   4. Fall, initial encounter  W19.XXXA E888.9   5. At high risk for falls  Z91.81 V15.88   6. Visual hallucinations  R44.1 368.16   7. Generalized weakness  R53.1 780.79   8. Elevated lactic acid level  R79.89 276.2   9. Elevated troponin  R79.89 790.6     Patient Active Problem List   Diagnosis    UTI (urinary tract infection)    Atrial fibrillation    COPD (chronic obstructive pulmonary disease)    Dementia    Parkinson's disease     Past Medical History:   Diagnosis Date    Atrial fibrillation     COPD (chronic obstructive pulmonary disease)     Dementia     History of lung cancer     Parkinson disease     Pulmonary embolism      Past Surgical History:   Procedure Laterality Date    JOINT REPLACEMENT      LUNG CANCER SURGERY        General Information       Row Name 06/03/24 1208          OT Time and Intention    Document Type therapy note (daily note)  -JY     Mode of Treatment occupational therapy  -[facility] Name 06/03/24 1208          General Information    Patient Profile Reviewed yes  -JY     Existing Precautions/Restrictions fall;non-weight bearing;left;shoulder;other (see comments)   non op L proximal humerus fx, L UE NWB, sling for comfort; L 9th anterior and 10th rib fxs, hypotensive, dementia, Parkinson's disease  -J     Barriers to Rehab medically complex;previous functional deficit;cognitive status  -[facility] Name 06/03/24 1208          Cognition    Orientation Status (Cognition) oriented to;person;place;disoriented to;time;verbal cues/prompts needed for orientation;other (see comments)  pt u/a to state any aspect of time  -[facility] Name 06/03/24 1208          Safety Issues, Functional Mobility    Safety Issues Affecting Function (Mobility) ability to follow commands;awareness of need for assistance;insight into deficits/self-awareness;judgment;safety precaution awareness;safety precautions follow-through/compliance;sequencing abilities;positioning of assistive  device;problem-solving  -JY     Impairments Affecting Function (Mobility) balance;cognition;endurance/activity tolerance;pain;strength;range of motion (ROM);postural/trunk control  -JY     Cognitive Impairments, Mobility Safety/Performance attention;awareness, need for assistance;insight into deficits/self-awareness;judgment;problem-solving/reasoning;safety precaution awareness;safety precaution follow-through;sequencing abilities  -JY     Comment, Safety Issues/Impairments (Mobility) pt alert and able to follow simple commands; req'd review of precautions for LUE prior to and during mobility  -JY               User Key  (r) = Recorded By, (t) = Taken By, (c) = Cosigned By      Initials Name Provider Type    Jaleesa Lazaro OT Occupational Therapist                     Mobility/ADL's       Row Name 06/03/24 1213          Bed Mobility    Bed Mobility other (see comments)  pt received UIC  -JCONNOR     Supine-Sit [place] (Bed Mobility) not tested  -JCONNOR     Comment, (Bed Mobility) pt received UIC upon OT arrival, pt preferred to remain OOB at exit thus bed mobility not assessed this date  -JCONNOR       Row Name 06/03/24 1213          Transfers    Transfers sit-stand transfer;stand-sit transfer  -JY     Comment, (Transfers) skilled cues for optimal hand placement for controlled ascend and descend specifically to push through RUE to stand and to reach back with RUE for controlled lower once aligned and in close proximity to seated surface; prior to standing req'd draw sheet A to scoot hips forward to allow for greater contract of feet on floor for stability; in standing pt stands with forward flexed posture and req'd cues for more upright standing, posture briefly improved w/ cues and greater support at LUE in sling  -JY       Row Name 06/03/24 1213          Bed-Chair Transfer    Bed-Chair [place] (Transfers) not tested  -JY       Row Name 06/03/24 1213          Sit-Stand Transfer    Sit-Stand [place] (Transfers)  minimum assist (75% patient effort);2 person assist;verbal cues;nonverbal cues (demo/gesture)  -JY     Assistive Device (Sit-Stand Transfers) cane, straight  -JY     Comment, (Sit-Stand Transfer) cues for feet underneath self prior to standing with supplemental blocking for stability; cues for postuure to stand more upright once in standing  -[place]       [facility] Name 06/03/24 1213          Stand-Sit Transfer    Stand-Sit [place] (Transfers) minimum assist (75% patient effort);2 person assist;verbal cues;nonverbal cues (demo/gesture)  -JY     Assistive Device (Stand-Sit Transfers) other (see comments)  UE support at RUE after release of SPC  -[facility] Name 06/03/24 1213          Functional Mobility    Functional Mobility- Ind. Level minimum assist (75% patient effort);2 person assist required;verbal cues required  -JY     Functional Mobility- Device cane, straight  -JY     Functional Mobility- Comment defer to PT for specifics  -[facility] Name 06/03/24 1213          Activities of Daily Living    BADL Assessment/Intervention upper body dressing;bathing;grooming  -[facility] Name 06/03/24 1213          Mobility    Extremity Weight-bearing Status left upper extremity  -JY     Left Upper Extremity (Weight-bearing Status) non weight-bearing (NWB)   -[facility] Name 06/03/24 1213          Grooming Assessment/Training    [place] Level (Grooming) other (see comments)  applied deodorant B axilla  -JY     Position (Grooming) unsupported sitting  -JY     Comment, (Grooming) pt able to minimally assist with positioning of LUE while still adhering to precautions to allow for improved access to L axilla for application of deodorant  -JY       [facility] Name 06/03/24 1213          Upper Body Dressing Assessment/Training    [place] Level (Upper Body Dressing) doff;don;pajama/robe;maximum assist (25% patient effort);other (see comments)  sling mgmt with dep A  -JY     Assistive Devices (Upper Body Dressing) other (see  comments)  vicente tech  -JY     Position (Upper Body Dressing) unsupported sitting  -JY     Comment, (Upper Body Dressing) educated pt on optimal position, tech, seq for UBD given more impacted LUE with need for cues throughout to maintain adherence to precautions, pt assisted minimally to remove RUE from sleeve, otherwise dep on A; OT educated pt on how to d/d sling, use for comfort and how to adjust with need for dep A d/d and to manage for improved fit and support  -JY       Row Name 06/03/24 1213          Bathing Assessment/Intervention    [place] Level (Bathing) other (see comments);dependent (less than 25% patient effort);verbal cues;nonverbal cues (demo/gesture)  L axilla  -JY     Assistive Devices (Bathing) other (see comments)  vicente tech  -JY     Position (Bathing) unsupported sitting  -JY     Comment, (Bathing) educated pt on optimal position, tech, seq for L axilla bathing with need for A in order to maintain adherence to precautions and for safety; pt assisted some to achieve best position for access  -JY               User Key  (r) = Recorded By, (t) = Taken By, (c) = Cosigned By      Initials Name Provider Type    Jaleesa Lazaro OT Occupational Therapist                   Obj/Interventions       Row Name 06/03/24 1224          Sensory Assessment (Somatosensory)    Sensory Assessment (Somatosensory) UE sensation intact  -JY     Sensory Assessment denies any numbness or tingling at BUEs  -JY       Row Name 06/03/24 1224          Elbow/Forearm (Therapeutic Exercise)    Elbow/Forearm (Therapeutic Exercise) AAROM (active assistive range of motion);AROM (active range of motion)  -JY     Elbow/Forearm AROM (Therapeutic Exercise) left;supination;pronation;sitting;10 repetitions  -JY     Elbow/Forearm AAROM (Therapeutic Exercise) left;flexion;extension;sitting;10 repetitions  -JY       Row Name 06/03/24 1224          Wrist (Therapeutic Exercise)    Wrist (Therapeutic Exercise) AROM (active range of  motion)  -JY     Wrist AROM (Therapeutic Exercise) left;flexion;extension;10 repetitions;other (see comments)  cues for max flexion and extension  -[place]ongo       Row Name 06/03/24 1224          Hand (Therapeutic Exercise)    Hand (Therapeutic Exercise) AROM (active range of motion)  -JY     Hand AROM/AAROM (Therapeutic Exercise) left;AROM (active range of motion);finger flexion;finger extension;10 repetitions  -[place]ongo       Row Name 06/03/24 1224          Motor Skills    Motor Skills functional endurance  -JY     Functional Endurance decreased activity tolerance toward more dynamic demands; pt impacted by dizziness in standing (no significant change in BP noted)  -JY     Therapeutic Exercise elbow/forearm;wrist;hand;other (see comments)  facilitated allowed ROM at LUE including elbow, wrist, hand and attempted modified pendulums from seated position and pt u/a to safely return demo thus did not progress for safety concerns; req'd AAROM intermittently for quality, follow through  -[place]       Row Name 06/03/24 1224          Balance    Balance Assessment sitting static balance;sitting dynamic balance;standing static balance;standing dynamic balance  -JY     Static Sitting Balance standby assist  -JY     Dynamic Sitting Balance contact guard  -JY     Position, Sitting Balance unsupported;sitting in chair  -JY     Static Standing Balance minimal assist  -JY     Dynamic Standing Balance minimal assist;2-person assist;verbal cues  -JY     Position/Device Used, Standing Balance supported;cane, straight  -JY     Balance Interventions sitting;standing;static;dynamic;sit to stand;supported;occupation based/functional task  -JY     Comment, Balance postural deficits in standing with forward flexion, improved stability with improved posture  -JY               User Key  (r) = Recorded By, (t) = Taken By, (c) = Cosigned By      Initials Name Provider Type    Jaleesa Lazaro OT Occupational Therapist                   Goals/Plan    No  documentation.                  Clinical Impression       Row Name 06/03/24 1232          Pain Assessment    Pretreatment Pain Rating 0/10 - no pain  -JY     Posttreatment Pain Rating 0/10 - no pain  -JY     Pre/Posttreatment Pain Comment denies any pain and tolerated OT interventions  -JY     Pain Intervention(s) Repositioned;Ambulation/increased activity;Rest  -JY       Row Name 06/03/24 1232          Plan of Care Review    Plan of Care Reviewed With patient  -JY     Progress improving  -JY     Outcome Evaluation Pt alert and participatory in OT interventions. Reviewed with pt (no caregiver/family present) on optimal position, tech, seq for UBD, axilla care and sling mgmt while adhering to LUE precautions throughout. Pt req'd gross max A to dep A for all with minimal help managing gown at RUE with less restrictions. Pt requires dep A for sling mgmt and bathing for safety purposes. Pt able to tolerate AROM at hand, wrist and elbow supination and pronation and AAROM for elbow flex/ext for follow through and to reach max available ROM. Attempted pendulums as ordered however cognitive deficits limited follow through to completion for safety concerns as pt observed attempting AROM at L shoulder. Pt able to stand with min A x 2 and use of cane for posterior clothing mgmt and repositioning. Pt is still below occupational performance baseline and would benefit from cont'd IPOT POC and SNF at d/c when medically ready.  -JY       Row Name 06/03/24 1232          Therapy Assessment/Plan (OT)    Rehab Potential (OT) good, to achieve stated therapy goals  -JY     Criteria for Skilled Therapeutic Interventions Met (OT) yes;skilled treatment is necessary  -JY     Therapy Frequency (OT) daily  -JY       Row Name 06/03/24 1232          Therapy Plan Review/Discharge Plan (OT)    Anticipated Discharge Disposition (OT) skilled nursing facility  -JY       Row Name 06/03/24 1232          Vital Signs    Pre Systolic BP Rehab 125  -JY      Pre Treatment Diastolic BP 62  -JY     Post Systolic BP Rehab 132  -JY     Post Treatment Diastolic BP 89  -JY     Pretreatment Heart Rate (beats/min) 83  -JY     Posttreatment Heart Rate (beats/min) 76  -JY     Pre SpO2 (%) 95  -JY     O2 Delivery Pre Treatment room air  -JY     Intra SpO2 (%) 93  -JY     O2 Delivery Intra Treatment room air  -JY     Post SpO2 (%) 94  -JY     O2 Delivery Post Treatment room air  -JY     Pre Patient Position Sitting  -JY     Intra Patient Position Standing  -JY     Post Patient Position Sitting  -JY       Row Name 06/03/24 1232          Positioning and Restraints    Pre-Treatment Position sitting in chair/recliner  -JY     Post Treatment Position chair  -JY     In Chair notified nsg;reclined;call light within reach;encouraged to call for assist;exit alarm on;waffle cushion;legs elevated;heels elevated;LUE elevated  L UE supported in sling with pillow support to fill void at L side of recliner; SCDs not on hilda arrival and pt declined at end or session  -JY               User Key  (r) = Recorded By, (t) = Taken By, (c) = Cosigned By      Initials Name Provider Type    Jaleesa Lazaro, RADHA Occupational Therapist                   Outcome Measures       Row Name 06/03/24 1316          How much help from another is currently needed...    Putting on and taking off regular lower body clothing? 1  -JY     Bathing (including washing, rinsing, and drying) 1  -JY     Toileting (which includes using toilet bed pan or urinal) 1  -JY     Putting on and taking off regular upper body clothing 2  -JY     Taking care of personal grooming (such as brushing teeth) 3  -JY     Eating meals 3  -JY     AM-PAC 6 Clicks Score (OT) 11  -JY       Row Name 06/03/24 0929 06/03/24 0844       How much help from another person do you currently need...    Turning from your back to your side while in flat bed without using bedrails? 2  -LR 2  -LB    Moving from lying on back to sitting on the side of a flat bed  without bedrails? 2  -LR 2  -LB    Moving to and from a bed to a chair (including a wheelchair)? 2  -LR 2  -LB    Standing up from a chair using your arms (e.g., wheelchair, bedside chair)? 2  -LR 2  -LB    Climbing 3-5 steps with a railing? 1  -LR 1  -LB    To walk in hospital room? 2  -LR 2  -LB    AM-PAC 6 Clicks Score (PT) 11  -LR 11  -LB    Highest Level of Mobility Goal 4 --> Transfer to chair/commode  -LR 4 --> Transfer to chair/commode  -LB      Row Name 06/03/24 1316 06/03/24 0929       Functional Assessment    Outcome Measure Options AM-PAC 6 Clicks Daily Activity (OT)  -JY AM-PAC 6 Clicks Basic Mobility (PT)  -LR              User Key  (r) = Recorded By, (t) = Taken By, (c) = Cosigned By      Initials Name Provider Type    LR Tessy Martinez, PT Physical Therapist    Hannah Beck, RN Registered Nurse    Jaleesa Lazaro, OT Occupational Therapist                    Occupational Therapy Education       Title: PT OT SLP Therapies (In Progress)       Topic: Occupational Therapy (In Progress)       Point: ADL training (In Progress)       Description:   Instruct learner(s) on proper safety adaptation and remediation techniques during self care or transfers.   Instruct in proper use of assistive devices.                  Learning Progress Summary             Patient Acceptance, E,D, NR by EDWARD at 6/3/2024 0953    Acceptance, E, NR by  at 6/2/2024 1153                         Point: Home exercise program (In Progress)       Description:   Instruct learner(s) on appropriate technique for monitoring, assisting and/or progressing therapeutic exercises/activities.                  Learning Progress Summary             Patient Acceptance, E,D, NR by EDWARD at 6/3/2024 0953    Acceptance, E, NR by  at 6/2/2024 1153                         Point: Precautions (In Progress)       Description:   Instruct learner(s) on prescribed precautions during self-care and functional transfers.                  Learning  Progress Summary             Patient Acceptance, E,D, NR by EDWARD at 6/3/2024 0953    Acceptance, E, NR by  at 6/2/2024 1153                         Point: Body mechanics (In Progress)       Description:   Instruct learner(s) on proper positioning and spine alignment during self-care, functional mobility activities and/or exercises.                  Learning Progress Summary             Patient Acceptance, E,D, NR by EDWARD at 6/3/2024 0953    Acceptance, E, NR by  at 6/2/2024 1153                                         User Key       Initials Effective Dates Name Provider Type Discipline    EDWARD 06/16/21 -  Jaleesa Mckeon, OT Occupational Therapist OT     10/14/22 -  Rhianna Lake OT Occupational Therapist OT                  OT Recommendation and Plan  Therapy Frequency (OT): daily  Plan of Care Review  Plan of Care Reviewed With: patient  Progress: improving  Outcome Evaluation: Pt alert and participatory in OT interventions. Reviewed with pt (no caregiver/family present) on optimal position, tech, seq for UBD, axilla care and sling mgmt while adhering to LUE precautions throughout. Pt req'd gross max A to dep A for all with minimal help managing gown at RUE with less restrictions. Pt requires dep A for sling mgmt and bathing for safety purposes. Pt able to tolerate AROM at hand, wrist and elbow supination and pronation and AAROM for elbow flex/ext for follow through and to reach max available ROM. Attempted pendulums as ordered however cognitive deficits limited follow through to completion for safety concerns as pt observed attempting AROM at L shoulder. Pt able to stand with min A x 2 and use of cane for posterior clothing mgmt and repositioning. Pt is still below occupational performance baseline and would benefit from cont'd IPOT POC and SNF at d/c when medically ready.     Time Calculation:         Time Calculation- OT       Row Name 06/03/24 1318 06/03/24 0929          Time Calculation- OT    OT Start  Time 0953  -JY --     OT Received On 06/03/24  -JY --     OT Goal Re-Cert Due Date 06/12/24  -JY --        Timed Charges    63727 - OT Therapeutic Exercise Minutes 13  -JY --     13099 - Gait Training Minutes  -- 8  -LR     35429 - OT Self Care/Mgmt Minutes 10  -JY --        Total Minutes    Timed Charges Total Minutes 23  -JY 8  -LR      Total Minutes 23  -JY 8  -LR               User Key  (r) = Recorded By, (t) = Taken By, (c) = Cosigned By      Initials Name Provider Type    LR Tessy Martinez, PT Physical Therapist    Jaleesa Lazaro OT Occupational Therapist                  Therapy Charges for Today       Code Description Service Date Service Provider Modifiers Qty    31537718296 HC OT THER PROC EA 15 MIN 6/3/2024 Jaleesa Mckeon OT GO 1    21276988055 HC OT SELF CARE/MGMT/TRAIN EA 15 MIN 6/3/2024 Jaleesa Mckeon OT GO 1                 Jaleesa Mckeon OT  6/3/2024    Electronically signed by Jaleesa Mckeon OT at 06/03/24 8866

## 2024-06-04 NOTE — THERAPY TREATMENT NOTE
Patient Name: Fanny Boyce  : 1940    MRN: [id number]                              Today's Date: 2024       Admit Date: 2024    Visit Dx:     ICD-10-CM ICD-9-CM   1. Acute UTI  N39.0 599.0   2. Other closed nondisplaced fracture of proximal end of left humerus, initial encounter  S42.295A 812.09   3. Closed fracture of multiple ribs of left side, initial encounter  S22.42XA 807.09   4. Fall, initial encounter  W19.XXXA E888.9   5. At high risk for falls  Z91.81 V15.88   6. Visual hallucinations  R44.1 368.16   7. Generalized weakness  R53.1 780.79   8. Elevated lactic acid level  R79.89 276.2   9. Elevated troponin  R79.89 790.6     Patient Active Problem List   Diagnosis    Atrial fibrillation    COPD (chronic obstructive pulmonary disease)    Dementia    Parkinson's disease    Fracture of left humerus with routine healing     Past Medical History:   Diagnosis Date    Atrial fibrillation     COPD (chronic obstructive pulmonary disease)     Dementia     History of lung cancer     Parkinson disease     Pulmonary embolism      Past Surgical History:   Procedure Laterality Date    JOINT REPLACEMENT      LUNG CANCER SURGERY        General Information       Row Name 24 1507          Physical Therapy Time and Intention    Document Type therapy note (daily note)  -SC     Mode of Treatment physical therapy  -SC       Row Name 24 1507          General Information    Patient Profile Reviewed yes  -SC     Existing Precautions/Restrictions fall;non-weight bearing;left;shoulder;other (see comments)  parkinsons, very unsteady with posterior lean  -SC       Row Name 24 1507          Cognition    Orientation Status (Cognition) oriented x 3  -SC       Row Name 24 1507          Safety Issues, Functional Mobility    Impairments Affecting Function (Mobility) balance;cognition;endurance/activity tolerance;strength;range of motion (ROM);postural/trunk control;coordination;motor control  -SC      Comment, Safety Issues/Impairments (Mobility) alert, following commands  -SC               User Key  (r) = Recorded By, (t) = Taken By, (c) = Cosigned By      Initials Name Provider Type    Sarah Mendoza PT Physical Therapist                   Mobility       Row Name 06/04/24 1508          Bed Mobility    Comment, (Bed Mobility) uic  -SC       Row Name 06/04/24 1508          Transfers    Comment, (Transfers) Stood from recliner with cuesf or hand placement on chair rail. R hand on quad cane on standing. Patient with LOB posteriorly requiring +2 assist to stabilize. Time taken in standing to facillitate center of gravity with vc and tactile cues.  -SC       Row Name 06/04/24 1508          Sit-Stand Transfer    Sit-Stand [place] (Transfers) 2 person assist;minimum assist (75% patient effort)  -SC     Assistive Device (Sit-Stand Transfers) cane, quad  -SC       Row Name 06/04/24 1508          Gait/Stairs (Locomotion)    [place] Level (Gait) 1 person assist;2 person assist;moderate assist (50% patient effort)  -SC     Assistive Device (Gait) cane, quad  -SC     Distance in Feet (Gait) 20  -SC     Deviations/Abnormal Patterns (Gait) scissoring;festinating/shuffling;base of support, narrow;stride length decreased;weight shifting decreased  -SC     Bilateral Gait Deviations forward flexed posture  -SC     Comment, (Gait/Stairs) Gt training focused on initiating steps. Encouraged upright posture and wider base of suppoprt. Scissoring leg causing multiple LOB noted with need for PT assistance to right balance. Followed by chair .  -SC               User Key  (r) = Recorded By, (t) = Taken By, (c) = Cosigned By      Initials Name Provider Type    Sarah Mendoza PT Physical Therapist                   Obj/Interventions       Row Name 06/04/24 1513          Motor Skills    Therapeutic Exercise hip;knee;ankle  -SC       Row Name 06/04/24 1513          Hip (Therapeutic Exercise)    Hip (Therapeutic Exercise)  AROM (active range of motion)  -SC     Hip Isometrics (Therapeutic Exercise) bilateral;flexion;extension;aBduction;aDduction;10 repetitions  -[facility] Name 06/04/24 1513          Knee (Therapeutic Exercise)    Knee (Therapeutic Exercise) AROM (active range of motion)  -SC     Knee Isometrics (Therapeutic Exercise) bilateral;flexion;extension;10 repetitions  -SC       Row Name 06/04/24 1513          Ankle (Therapeutic Exercise)    Ankle (Therapeutic Exercise) AROM (active range of motion)  -SC     Ankle AROM (Therapeutic Exercise) bilateral;dorsiflexion;plantarflexion;10 repetitions  -SC       Row Name 06/04/24 1513          Balance    Balance Assessment standing dynamic balance  -SC     Dynamic Standing Balance 2-person assist;moderate assist  -SC     Position/Device Used, Standing Balance cane, quad  -SC     Comment, Balance poor dynamic balance requiring PT to correct loss of balance  -SC               User Key  (r) = Recorded By, (t) = Taken By, (c) = Cosigned By      Initials Name Provider Type    SC Sarah Easton, PT Physical Therapist                   Goals/Plan    No documentation.                  Clinical Impression       Row Name 06/04/24 1523          Pain    Pretreatment Pain Rating 0/10 - no pain  -SC     Posttreatment Pain Rating 0/10 - no pain  -SC       Row Name 06/04/24 1523          Plan of Care Review    Plan of Care Reviewed With patient  -SC     Outcome Evaluation Patient very willing to participate in therapeutic activities. She requires alot of assistance to ambulate due to poor dynamic balance increasign her fall risk.  -SC       Row Name 06/04/24 1523          Therapy Assessment/Plan (PT)    Patient/Family Therapy Goals Statement (PT) get better  -SC     Rehab Potential (PT) good, to achieve stated therapy goals  -SC     Criteria for Skilled Interventions Met (PT) yes;meets criteria;skilled treatment is necessary  -SC     Therapy Frequency (PT) daily  -[facility] Name 06/04/24 1525           Positioning and Restraints    Pre-Treatment Position sitting in chair/recliner  -SC     Post Treatment Position chair  -SC     In Chair notified nsg;reclined;sitting;call light within reach;encouraged to call for assist;exit alarm on  -SC               User Key  (r) = Recorded By, (t) = Taken By, (c) = Cosigned By      Initials Name Provider Type    SC Sarah Easton, PT Physical Therapist                   Outcome Measures       Row Name 06/04/24 1525 06/04/24 0800       How much help from another person do you currently need...    Turning from your back to your side while in flat bed without using bedrails? 2  -SC 2  -MM    Moving from lying on back to sitting on the side of a flat bed without bedrails? 2  -SC 2  -MM    Moving to and from a bed to a chair (including a wheelchair)? 2  -SC 1  -MM    Standing up from a chair using your arms (e.g., wheelchair, bedside chair)? 2  -SC 1  -MM    Climbing 3-5 steps with a railing? 1  -SC 1  -MM    To walk in hospital room? 2  -SC 1  -MM    AM-PAC 6 Clicks Score (PT) 11  -SC 8  -MM    Highest Level of Mobility Goal 4 --> Transfer to chair/commode  -SC 3 --> Sit at edge of bed  -MM      Row Name 06/04/24 1525          Functional Assessment    Outcome Measure Options AM-PAC 6 Clicks Basic Mobility (PT)  -SC               User Key  (r) = Recorded By, (t) = Taken By, (c) = Cosigned By      Initials Name Provider Type    SC Sarah Easton, PT Physical Therapist    Shantell Rosario RN Registered Nurse                                 Physical Therapy Education       Title: PT OT SLP Therapies (In Progress)       Topic: Physical Therapy (Done)       Point: Mobility training (Done)       Learning Progress Summary             Patient JEREMY Garcia VU by SC at 6/4/2024 1526    Comment: reviewed HEP    JEREMY Lees D, ELVIN,NR by  at 6/3/2024 0929    Comment: Educated on NWB status of L UE, precautions, use of sling, LE HEP, correct sit<->stand t/f technique, correct gait  mechanics, and progression of POC.    Acceptance, E,D, NR by AB at 6/2/2024 1028                         Point: Home exercise program (Done)       Learning Progress Summary             Patient Eager, E, VU by SC at 6/4/2024 1526    Comment: reviewed HEP    Acceptance, E,D, VU,NR by LR at 6/3/2024 0929    Comment: Educated on NWB status of L UE, precautions, use of sling, LE HEP, correct sit<->stand t/f technique, correct gait mechanics, and progression of POC.                         Point: Body mechanics (Done)       Learning Progress Summary             Patient Eager, E, VU by SC at 6/4/2024 1526    Comment: reviewed HEP    Acceptance, E,D, VU,NR by LR at 6/3/2024 0929    Comment: Educated on NWB status of L UE, precautions, use of sling, LE HEP, correct sit<->stand t/f technique, correct gait mechanics, and progression of POC.    Acceptance, E,D, NR by AB at 6/2/2024 1028                         Point: Precautions (Done)       Learning Progress Summary             Patient Eager, E, VU by SC at 6/4/2024 1526    Comment: reviewed HEP    Acceptance, E,D, VU,NR by LR at 6/3/2024 0929    Comment: Educated on NWB status of L UE, precautions, use of sling, LE HEP, correct sit<->stand t/f technique, correct gait mechanics, and progression of POC.    Acceptance, E,D, NR by AB at 6/2/2024 1028                                         User Key       Initials Effective Dates Name Provider Type Discipline    SC 02/03/23 -  Sarah Easton, PT Physical Therapist PT    LR 02/03/23 -  Tessy Martinez, PT Physical Therapist PT    AB 09/22/22 -  Katy Marley, PT Physical Therapist PT                  PT Recommendation and Plan     Plan of Care Reviewed With: patient  Outcome Evaluation: Patient very willing to participate in therapeutic activities. She requires alot of assistance to ambulate due to poor dynamic balance increasign her fall risk.     Time Calculation:         PT Charges       Row Name 06/04/24 8686              Time Calculation    Start Time 1439  -SC      PT Received On 06/04/24  -SC      PT Goal Re-Cert Due Date 06/12/24  -SC         Timed Charges    33137 - PT Therapeutic Exercise Minutes 15  -SC      75516 - Gait Training Minutes  8  -SC      88794 - PT Therapeutic Activity Minutes 2  -SC         Total Minutes    Timed Charges Total Minutes 25  -SC       Total Minutes 25  -SC                User Key  (r) = Recorded By, (t) = Taken By, (c) = Cosigned By      Initials Name Provider Type    SC Sarah Easton PT Physical Therapist                  Therapy Charges for Today       Code Description Service Date Service Provider Modifiers Qty    44324324948 HC PT THER PROC EA 15 MIN 6/4/2024 Sarah Easton, PT GP 1    59048865580 HC GAIT TRAINING EA 15 MIN 6/4/2024 Sarah Easton, PT GP 1    53796900549 HC PT THER SUPP EA 15 MIN 6/4/2024 Sarah Easton PT GP 2            PT G-Codes  Outcome Measure Options: AM-PAC 6 Clicks Basic Mobility (PT)  AM-PAC 6 Clicks Score (PT): 11  AM-PAC 6 Clicks Score (OT): 11  PT Discharge Summary  Anticipated Discharge Disposition (PT): skilled nursing facility    Sarah Easton, PT  6/4/2024

## 2024-06-04 NOTE — PLAN OF CARE
Goal Outcome Evaluation:  Plan of Care Reviewed With: patient, daughter        Progress: improving    Pt denies pain . Worked well with therapy today but gait is unsteady pt crosses feet when walking able to walk 20 feet with rolling walker . Up in chair most of day post episode of Afib with RVR treated with lopressor to return to normal HR . Pt denies pain during episode . Vitals have been WNL post episode of RVR ,will continue to monitor .

## 2024-06-05 ENCOUNTER — READMISSION MANAGEMENT (OUTPATIENT)
Dept: CALL CENTER | Facility: HOSPITAL | Age: 84
End: 2024-06-05
Payer: MEDICARE

## 2024-06-05 VITALS
HEART RATE: 75 BPM | TEMPERATURE: 98.5 F | DIASTOLIC BLOOD PRESSURE: 51 MMHG | OXYGEN SATURATION: 97 % | BODY MASS INDEX: 22.97 KG/M2 | RESPIRATION RATE: 16 BRPM | WEIGHT: 117 LBS | SYSTOLIC BLOOD PRESSURE: 121 MMHG | HEIGHT: 60 IN

## 2024-06-05 LAB
QT INTERVAL: 338 MS
QTC INTERVAL: 499 MS

## 2024-06-05 PROCEDURE — 25010000002 CEFTRIAXONE PER 250 MG: Performed by: INTERNAL MEDICINE

## 2024-06-05 PROCEDURE — 97110 THERAPEUTIC EXERCISES: CPT

## 2024-06-05 PROCEDURE — 25810000003 SODIUM CHLORIDE 0.9 % SOLUTION: Performed by: NURSE PRACTITIONER

## 2024-06-05 PROCEDURE — 97530 THERAPEUTIC ACTIVITIES: CPT

## 2024-06-05 PROCEDURE — 99239 HOSP IP/OBS DSCHRG MGMT >30: CPT | Performed by: NURSE PRACTITIONER

## 2024-06-05 PROCEDURE — 97535 SELF CARE MNGMENT TRAINING: CPT

## 2024-06-05 RX ORDER — CEFDINIR 300 MG/1
300 CAPSULE ORAL EVERY 12 HOURS SCHEDULED
Qty: 6 CAPSULE | Refills: 0 | Status: SHIPPED | OUTPATIENT
Start: 2024-06-05 | End: 2024-06-08

## 2024-06-05 RX ORDER — CEFDINIR 300 MG/1
300 CAPSULE ORAL 2 TIMES DAILY
Qty: 4 CAPSULE | Refills: 0 | Status: SHIPPED | OUTPATIENT
Start: 2024-06-05 | End: 2024-06-05 | Stop reason: HOSPADM

## 2024-06-05 RX ORDER — CEFDINIR 300 MG/1
300 CAPSULE ORAL EVERY 12 HOURS SCHEDULED
Status: DISCONTINUED | OUTPATIENT
Start: 2024-06-05 | End: 2024-06-05 | Stop reason: HOSPADM

## 2024-06-05 RX ADMIN — BISACODYL 5 MG: 5 TABLET, COATED ORAL at 12:22

## 2024-06-05 RX ADMIN — METOPROLOL TARTRATE 25 MG: 25 TABLET, FILM COATED ORAL at 09:22

## 2024-06-05 RX ADMIN — CEFDINIR 300 MG: 300 CAPSULE ORAL at 14:46

## 2024-06-05 RX ADMIN — POLYETHYLENE GLYCOL 3350 17 G: 17 POWDER, FOR SOLUTION ORAL at 12:22

## 2024-06-05 RX ADMIN — SODIUM CHLORIDE 75 ML/HR: 9 INJECTION, SOLUTION INTRAVENOUS at 09:22

## 2024-06-05 RX ADMIN — SENNOSIDES AND DOCUSATE SODIUM 2 TABLET: 8.6; 5 TABLET ORAL at 12:22

## 2024-06-05 RX ADMIN — APIXABAN 2.5 MG: 2.5 TABLET, FILM COATED ORAL at 09:22

## 2024-06-05 RX ADMIN — CARBIDOPA AND LEVODOPA 1 TABLET: 25; 100 TABLET ORAL at 09:21

## 2024-06-05 NOTE — DISCHARGE SUMMARY
[facility] Medicine Services  DISCHARGE SUMMARY    Patient Name: Fanny Boyce  : 1940  MRN: [id number]    Date of Admission: 2024  Date of Discharge:  2024  Primary Care Physician: Nemo Aponte PA    Consults       Date and Time Order Name Status Description    2024  4:18 PM Inpatient Orthopedic Surgery Consult Completed             Hospital Course     Presenting Problem:   UTI (urinary tract infection) [N39.0]    Active Hospital Problems    Diagnosis  POA    Fracture of left humerus with routine healing [S42.302D]  Not Applicable    Atrial fibrillation [I48.91]  Yes    COPD (chronic obstructive pulmonary disease) [J44.9]  Yes    Dementia [F03.90]  Yes    Parkinson's disease [G20.A1]  Yes      Resolved Hospital Problems    Diagnosis Date Resolved POA    **UTI (urinary tract infection) [N39.0] 2024 Yes          Hospital Course:  Fanny Boyce is a 83 y.o. female PMH history of UTIs, COPD, Parkinson's disease, A-fib on Eliquis presenting with hallucinations, weakness and a fall prior to arrival.  Patient found with UTI and a left humerus fracture and left ninth/anterior 10th rib fracture secondary to fall prior to arrival. During hospitalization, patient also noted with A-fib RVR and was symptomatic.  Received home dose of metoprolol which was increased to 25 mg daily with improvement of symptoms.  Patient continued on Eliquis.  Ortho consulted for left humerus fracture.  Patient was given options of surgical versus conservative nonoperative management.  Patient decided conservative nonoperative management.  Patient is in a sling.  Patient will follow-up with Ortho in 2 weeks with repeat x-ray.  UTI treated with ceftriaxone transition to cefdinir with urine culture negative, blood cultures negative.  CT head for confusion showed no acute abnormalities.    Assessment/Plan:  A-fib with RVR-stable  -patient  noted with A-fib RVR and was symptomatic.  Received  home dose of metoprolol which was increased to 25 mg daily with improvement of symptoms.  Patient continued on Eliquis.    Left Humerus fracture  Left ninth/anterior, 10th rib fracture  - Ortho consulted.  Conservative versus operative intervention discussed.  Patient decided with conservative management.  Patient will wear a sling and follow-up with Ortho in 2 weeks    Hypotension-resolved    Fall  UTI  - Urine culture negative, blood cultures negative.  Patient received ceftriaxone transition to cefdinir    Confusion-resolved  - Noted on presentation with CT head negative for acute abnormality    COPD    Parkinson's disease  - Sinemet      Discharge Follow Up Recommendations for labs/diagnostics:  Follow-up with PCP in 1 week  Follow-up with June Oconnell 6/19/24 @ 1:20pm (Ortho)    Day of Discharge     HPI:   Patient sitting up in bed, pleasant, talkative.  Patient reports left arm pain controlled.  Denies any other issues.      Vital Signs:   Temp:  [97.4 °F (36.3 °C)-98.8 °F (37.1 °C)] 98.5 °F (36.9 °C)  Heart Rate:  [60-90] 75  Resp:  [16-18] 16  BP: (116-140)/(44-72) 121/51     Physical Exam:  Constitutional: Awake, alert, NAD, Frail appearing  HENT: NCAT, mucous membranes moist  Respiratory: Clear to auscultation bilaterally, nonlabored respirations   Cardiovascular: IRR, no murmurs, rubs, or gallops HR 67  Gastrointestinal: Positive bowel sounds, soft, nontender, nondistended  Musculoskeletal: No bilateral ankle edema. LUE in sling  Psychiatric: Appropriate affect, cooperative  Neurologic: Oriented x 3, COTTRELL,  speech clear  Skin: No rashes      Pertinent  and/or Most Recent Results     Results from last 7 days   Lab Units 06/03/24  0334 06/02/24  0526 06/01/24  1149   WBC 10*3/mm3 7.41 9.17 7.61   HEMOGLOBIN g/dL 9.1* 8.9* 10.2*   HEMATOCRIT % 29.3* 29.4* 33.4*   PLATELETS 10*3/mm3 256 229 253   SODIUM mmol/L 139 141 140   POTASSIUM mmol/L 3.7 4.3 4.1   CHLORIDE mmol/L 106 107 103   CO2 mmol/L 21.0* 24.0 26.0  "  BUN mg/dL 16 16 18   CREATININE mg/dL 0.54* 0.69 0.72   GLUCOSE mg/dL 97 96 90   CALCIUM mg/dL 8.2* 8.1* 9.2     Results from last 7 days   Lab Units 06/01/24  1149   BILIRUBIN mg/dL 0.5   ALK PHOS U/L 96   ALT (SGPT) U/L <5   AST (SGOT) U/L 15           Invalid input(s): \"TG\", \"LDLCALC\", \"LDLREALC\"  Results from last 7 days   Lab Units 06/01/24  1530 06/01/24  1220 06/01/24  1149   HSTROP T ng/L  --  16*  --    LACTATE mmol/L 1.7  --  2.1*       Brief Urine Lab Results  (Last result in the past 365 days)        Color   Clarity   Blood   Leuk Est   Nitrite   Protein   CREAT   Urine HCG        06/01/24 1157 Yellow   Turbid   Small (1+)   Large (3+)   Positive   30 mg/dL (1+)                   Microbiology Results Abnormal       Procedure Component Value - Date/Time    Blood Culture - Blood, Arm, Right [[id number]]  (Normal) Collected: 06/01/24 1302    Lab Status: Preliminary result Specimen: Blood from Arm, Right Updated: 06/05/24 1315     Blood Culture No growth at 4 days    Narrative:      Less than seven (7) mL's of blood was collected.  Insufficient quantity may yield false negative results.    Blood Culture - Blood, Wrist, Left [[id number]]  (Normal) Collected: 06/01/24 1358    Lab Status: Preliminary result Specimen: Blood from Wrist, Left Updated: 06/04/24 1500     Blood Culture No growth at 3 days    Narrative:      Less than seven (7) mL's of blood was collected.  Insufficient quantity may yield false negative results.            Imaging Results (All)       Procedure Component Value Units Date/Time    CT Head Without Contrast [[id number]] Collected: 06/02/24 0439     Updated: 06/02/24 0443    Narrative:      CT HEAD WO CONTRAST    Date of Exam: 6/2/2024 4:30 AM EDT    Indication: increased confusion.    Comparison: None available.    Technique: Axial CT images were obtained of the head without contrast administration.  Automated exposure control and iterative construction methods were " used.      Findings:  There is no evidence of hemorrhage. There is no mass effect or midline shift.    There is no extracerebral collection.    Ventricles are normal in size and configuration for patient's stated age.      Posterior fossa is within normal limits.    Calvarium and skull base appear intact.   Visualized sinuses show no air fluid levels. Visualized orbits are unremarkable.      Impression:      Impression:  No acute intracranial process.        Electronically Signed: Dina Basilio MD    6/2/2024 4:40 AM EDT    Workstation ID: [id number]    XR Chest 1 View [[id number]] Collected: 06/01/24 1251     Updated: 06/01/24 1255    Narrative:      XR CHEST 1 VW    Date of Exam: 6/1/2024 12:09 PM EDT    Indication: Weak/Dizzy/AMS triage protocol    Comparison: 1/31/2024    Findings: No focal consolidation. No pneumothorax or pleural effusion. Cardiac size is normal. The visualized clavicles appear intact. No displaced rib fractures. The visualized upper abdomen is normal.      Impression:      Impression: No acute cardiopulmonary disease.      Electronically Signed: Aristeo Greene MD    6/1/2024 12:52 PM EDT    Workstation ID: [id number]    XR Shoulder 2+ View Left [[id number]] Collected: 06/01/24 1243     Updated: 06/01/24 1252    Narrative:      XR SHOULDER 2+ VW LEFT, XR ELBOW 3+ VW LEFT, XR HUMERUS LEFT    Date of Exam: 6/1/2024 12:10 PM EDT    Indication: trauma    Comparison: Chest x-ray 1/31/2024    Findings:  Left shoulder: There is an acute fracture of the proximal humerus with impaction at the surgical neck and poorly visualized greater tuberosity fracture involvement. There is approximately 1.7 cm of impaction at the surgical neck. The humeral head   maintains articulation with the glenoid. No additional acute fractures are seen at the shoulder. The acromioclavicular joint is congruent. The bones are demineralized.    There there is an acute segmental fracture of the left ninth rib with fracture at the  posterior and anterolateral rib. There is an acute mildly displaced fracture of the anterolateral left 10th rib.    Left humerus: The mid and distal humeral shaft appears normal.    Left elbow: No definite soft tissue swelling. No evidence of elbow joint effusion. Joint spaces are preserved. No acute fracture or malalignment.      Impression:      Impression:  Acute impacted proximal humeral fracture.    Acute mildly displaced segmental fracture of the left ninth rib and fracture of the anterior left 10th rib.      Electronically Signed: Royer Quintero MD    6/1/2024 12:49 PM EDT    Workstation ID: [id number]    XR Humerus Left [[id number]] Collected: 06/01/24 1243     Updated: 06/01/24 1252    Narrative:      XR SHOULDER 2+ VW LEFT, XR ELBOW 3+ VW LEFT, XR HUMERUS LEFT    Date of Exam: 6/1/2024 12:10 PM EDT    Indication: trauma    Comparison: Chest x-ray 1/31/2024    Findings:  Left shoulder: There is an acute fracture of the proximal humerus with impaction at the surgical neck and poorly visualized greater tuberosity fracture involvement. There is approximately 1.7 cm of impaction at the surgical neck. The humeral head   maintains articulation with the glenoid. No additional acute fractures are seen at the shoulder. The acromioclavicular joint is congruent. The bones are demineralized.    There there is an acute segmental fracture of the left ninth rib with fracture at the posterior and anterolateral rib. There is an acute mildly displaced fracture of the anterolateral left 10th rib.    Left humerus: The mid and distal humeral shaft appears normal.    Left elbow: No definite soft tissue swelling. No evidence of elbow joint effusion. Joint spaces are preserved. No acute fracture or malalignment.      Impression:      Impression:  Acute impacted proximal humeral fracture.    Acute mildly displaced segmental fracture of the left ninth rib and fracture of the anterior left 10th rib.      Electronically Signed: Royer  MD Ingrid    6/1/2024 12:49 PM EDT    Workstation ID: [id number]    XR Elbow 3+ View Left [[id number]] Collected: 06/01/24 1243     Updated: 06/01/24 1252    Narrative:      XR SHOULDER 2+ VW LEFT, XR ELBOW 3+ VW LEFT, XR HUMERUS LEFT    Date of Exam: 6/1/2024 12:10 PM EDT    Indication: trauma    Comparison: Chest x-ray 1/31/2024    Findings:  Left shoulder: There is an acute fracture of the proximal humerus with impaction at the surgical neck and poorly visualized greater tuberosity fracture involvement. There is approximately 1.7 cm of impaction at the surgical neck. The humeral head   maintains articulation with the glenoid. No additional acute fractures are seen at the shoulder. The acromioclavicular joint is congruent. The bones are demineralized.    There there is an acute segmental fracture of the left ninth rib with fracture at the posterior and anterolateral rib. There is an acute mildly displaced fracture of the anterolateral left 10th rib.    Left humerus: The mid and distal humeral shaft appears normal.    Left elbow: No definite soft tissue swelling. No evidence of elbow joint effusion. Joint spaces are preserved. No acute fracture or malalignment.      Impression:      Impression:  Acute impacted proximal humeral fracture.    Acute mildly displaced segmental fracture of the left ninth rib and fracture of the anterior left 10th rib.      Electronically Signed: Royer Quintero MD    6/1/2024 12:49 PM EDT    Workstation ID: [id number]                        Pending Labs       Order Current Status    Blood Culture - Blood, Arm, Right Preliminary result    Blood Culture - Blood, Wrist, Left Preliminary result          Discharge Details        Discharge Medications        New Medications        Instructions Start Date   cefdinir 300 MG capsule  Commonly known as: OMNICEF   300 mg, Oral, Every 12 Hours Scheduled             Continue These Medications        Instructions Start Date   apixaban 2.5 MG tablet  tablet  Commonly known as: ELIQUIS   2.5 mg, Oral, 2 Times Daily      carbidopa-levodopa  MG per tablet  Commonly known as: SINEMET   1 tablet, Oral, 3 Times Daily      fluticasone 50 MCG/ACT nasal spray  Commonly known as: FLONASE   2 sprays, Nasal, Daily PRN      metoprolol tartrate 25 MG tablet  Commonly known as: LOPRESSOR   12.5 mg, Oral, 2 Times Daily      traZODone 50 MG tablet  Commonly known as: DESYREL   100 mg, Oral, Nightly               No Known Allergies      Discharge Disposition:  Home-Health Care [facility]    Discharge Diet:  Diet Order   Procedures    Diet: Cardiac; Healthy Heart (2-3 Na+); Fluid Consistency: Thin (IDDSI 0)         Discharge Activity:   Activity Instructions       Activity as Tolerated      Other Activity Instructions      Patient to remain in sling of LUE until follow-up with Dr. Harrington    Up WIth Assist                CODE STATUS:    Code Status and Medical Interventions:   Ordered at: 06/01/24 1539     Medical Intervention Limits:    NO intubation (DNI)     Level Of Support Discussed With:    Patient     Code Status (Patient has no pulse and is not breathing):    No CPR (Do Not Attempt to Resuscitate)     Medical Interventions (Patient has pulse or is breathing):    Limited Support         No future appointments.    Additional Instructions for the Follow-ups that You Need to Schedule       Call MD With Problems / Concerns   As directed      Instructions: Call provider for chest pain, heart palpitations, shortness of breath.    Order Comments: Instructions: Call provider for chest pain, heart palpitations, shortness of breath.         Discharge Follow-up with PCP   As directed       Currently Documented PCP:    Nemo Aopnte PA    PCP Phone Number:    [phone]     Follow Up Details: Follow up with PCP in 1 week        Discharge Follow-up with Specified Provider: Dr. Harrington; 2 Weeks   As directed      To: Dr. Harrington   Follow Up: 2 Weeks        Discharge Follow-up with Specified  Provider: Anish MATHIAS (ortho)   As directed      To: Anish MATHIAS (ortho)   Follow Up Details: 6/19/24 1:20 pm                Time Spent on Discharge:  35 minutes    Electronically signed by LAVERNE Degroot, 06/05/24, 11:09 AM EDT.

## 2024-06-05 NOTE — THERAPY TREATMENT NOTE
Patient Name: Fanny Boyce  : 1940    MRN: [id number]                              Today's Date: 2024       Admit Date: 2024    Visit Dx:     ICD-10-CM ICD-9-CM   1. Acute UTI  N39.0 599.0   2. Other closed nondisplaced fracture of proximal end of left humerus, initial encounter  S42.295A 812.09   3. Closed fracture of multiple ribs of left side, initial encounter  S22.42XA 807.09   4. Fall, initial encounter  W19.XXXA E888.9   5. At high risk for falls  Z91.81 V15.88   6. Visual hallucinations  R44.1 368.16   7. Generalized weakness  R53.1 780.79   8. Elevated lactic acid level  R79.89 276.2   9. Elevated troponin  R79.89 790.6     Patient Active Problem List   Diagnosis    Atrial fibrillation    COPD (chronic obstructive pulmonary disease)    Dementia    Parkinson's disease    Fracture of left humerus with routine healing     Past Medical History:   Diagnosis Date    Atrial fibrillation     COPD (chronic obstructive pulmonary disease)     Dementia     History of lung cancer     Parkinson disease     Pulmonary embolism      Past Surgical History:   Procedure Laterality Date    JOINT REPLACEMENT      LUNG CANCER SURGERY        General Information       Row Name 24 1210          OT Time and Intention    Document Type therapy note (daily note)  -JY     Mode of Treatment occupational therapy;individual therapy  -       Row Name 24 1210          General Information    Patient Profile Reviewed yes  -JY     Existing Precautions/Restrictions fall;non-weight bearing;left;shoulder;other (see comments)  non op L proximal humerus fx, LUE NWB in simple sling, L 9th anterior and 10th rib fxs, no active L sholder ROM, h/o UTIs, PD, posterior lean  -JY     Barriers to Rehab medically complex;previous functional deficit;cognitive status  -JY       Row Name 24 1210          Cognition    Orientation Status (Cognition) oriented to;person;place;disoriented to;time  -       Row Name 24 1210           Safety Issues, Functional Mobility    Safety Issues Affecting Function (Mobility) ability to follow commands;awareness of need for assistance;insight into deficits/self-awareness;judgment;problem-solving;safety precaution awareness;safety precautions follow-through/compliance;sequencing abilities  -JY     Impairments Affecting Function (Mobility) balance;cognition;endurance/activity tolerance;strength;range of motion (ROM);postural/trunk control;coordination;motor control  -JY     Cognitive Impairments, Mobility Safety/Performance attention;awareness, need for assistance;insight into deficits/self-awareness;judgment;problem-solving/reasoning;safety precaution awareness;safety precaution follow-through;sequencing abilities  -JY     Comment, Safety Issues/Impairments (Mobility) pt alert and able to follow simple commands; safe mobility limited by narrow HEENA, scissoring feet, forward flexion and posterior lean  -JY               User Key  (r) = Recorded By, (t) = Taken By, (c) = Cosigned By      Initials Name Provider Type    Jaleesa Lazaro OT Occupational Therapist                     Mobility/ADL's       Row Name 06/05/24 1213          Bed Mobility    Bed Mobility supine-sit;scooting/bridging  -JY     Scooting/Bridging [place] (Bed Mobility) maximum assist (25% patient effort);verbal cues  -JY     Supine-Sit [place] (Bed Mobility) maximum assist (25% patient effort);2 person assist;verbal cues  -JY     Bed Mobility, Safety Issues decreased use of arms for pushing/pulling;impaired trunk control for bed mobility  -JY     Assistive Device (Bed Mobility) draw sheet;head of bed elevated;bed rails  -JCONNOR     Comment, (Bed Mobility) set pt up to exit bed at R side to allow for weight bearing to push self up at RUE; with HOB elevated, bed rail available at R side and draw sheet A pt able to partially advance LEs to EOB, pt attempted push through RUE however not able to upright self and req'd max A x 2  for UB and LB mgmt and draw sheet to bring hips forward for symmetry for stability; pt reported feeling LH at EOB, /51, improved with extended time sitting at EOB  -       Row Name 06/05/24 1213          Transfers    Transfers sit-stand transfer;stand-sit transfer;toilet transfer;bed-chair transfer  -JY     Comment, (Transfers) pt attempted to sit prior to readiness; consistently needed cues for more wide HEENA, weight shift anteriorly to offset posterior lean and for most available upright position for stability w/ tactile cues at hips and upward head, neck; pt req'd less A and cues in t/f from BSC to recliner vs EOB> recliner  -[facility] Name 06/05/24 1213          Bed-Chair Transfer    Bed-Chair [place] (Transfers) minimum assist (75% patient effort);moderate assist (50% patient effort);2 person assist;verbal cues;nonverbal cues (demo/gesture)  -JY     Assistive Device (Bed-Chair Transfers) other (see comments)  RUE support and at L side of gait belt  -       Row Name 06/05/24 1213          Sit-Stand Transfer    Sit-Stand [place] (Transfers) minimum assist (75% patient effort);2 person assist;verbal cues;nonverbal cues (demo/gesture)  -JY     Assistive Device (Sit-Stand Transfers) other (see comments)  RUE support and at L side of gait belt  -JY     Comment, (Sit-Stand Transfer) cues for feet underneath self prior to standing with supplemental blocking for stability, cues for posture to stand more upright once in standing  -[employer]       [facility] Name 06/05/24 1213          Stand-Sit Transfer    Stand-Sit [place] (Transfers) minimum assist (75% patient effort);2 person assist;verbal cues;nonverbal cues (demo/gesture)  -JY     Assistive Device (Stand-Sit Transfers) other (see comments)  RUE support and at L side of gait belt  -[employer]       Row Name 06/05/24 1213          Toilet Transfer    Type (Toilet Transfer) stand pivot/stand step  -JY     [place] Level (Toilet Transfer) moderate assist (50%  patient effort);2 person assist;verbal cues  -JY     Assistive Device (Toilet Transfer) commode, bedside without drop arms;other (see comments)  RUE support, support at L side of gait belt too  -JY     Comment, (Toilet Transfer) set pt up to t/f EOB > BSC positioned at pt's R side; req'd cues for hand placement and seq to promote pushing through RUE to stand and reliant on RUE support in turn, u/a to weight shift and advance LEs to aid in stepping toward BSC and instead largely pivoted on feet and BSC brought closer to pt  -[employer]       Row Name 06/05/24 1213          Functional Mobility    Functional Mobility- Ind. Level not tested  -JY     Functional Mobility- Comment defer to PT for specifics; pt presented with difficulty in fxl t/fs with narrow HEENA, difficulty with weight shifting and advancing stepping thus fxl mobility not progressed  -[employer]       Row Name 06/05/24 1213          Activities of Daily Living    BADL Assessment/Intervention upper body dressing;lower body dressing;bathing;toileting  -[employer]       Row Name 06/05/24 1213          Mobility    Extremity Weight-bearing Status left upper extremity  -JY     Left Upper Extremity (Weight-bearing Status) non weight-bearing (NWB)   -[employer]       Row Name 06/05/24 1213          Lower Body Dressing Assessment/Training    [place] Level (Lower Body Dressing) don;socks;dependent (less than 25% patient effort);verbal cues;other (see comments)  mgmt for improved fit and alignment for safety  -JY     Position (Lower Body Dressing) supine  -JY       Row Name 06/05/24 1213          Upper Body Dressing Assessment/Training    [place] Level (Upper Body Dressing) doff;don;pajama/robe;maximum assist (25% patient effort);other (see comments)  sling w/ dependent A  -JY     Assistive Devices (Upper Body Dressing) other (see comments)  vicente tech  -JY     Position (Upper Body Dressing) supported sitting  -JY     Comment, (Upper Body Dressing) reviewed with pt and educated dtr  (present at bedside) on optimal position, tech, seq for UBD given more impacted LUE and while adhering to all LUE precautions with need for significant A for safety; situationally pt limited at least impacted RUE with IV placement; pt req'd cues for recall on optimal seq; educated dtr on sling d/d and adjustment for optimal fit and support  -JY       Row Name 06/05/24 1213          Bathing Assessment/Intervention    [place] Level (Bathing) other (see comments);dependent (less than 25% patient effort);verbal cues;nonverbal cues (demo/gesture)  -JY     Assistive Devices (Bathing) other (see comments)  vicente tech  -JY     Position (Bathing) supported sitting  -JY     Comment, (Bathing) reviewed with pt and educated dtr (present this date, sitting at bedside) optimal position, tech, seq for L axilla care/bathing while adhering to LUE precautions throughout; requires significant A for safety i.e. to ensure no shoulder AROM  -J       Row Name 06/05/24 1213          Toileting Assessment/Training    [place] Level (Toileting) adjust/manage clothing;perform perineal hygiene;dependent (less than 25% patient effort)  -JY     Position (Toileting) supported standing;unsupported sitting  -JY     Comment, (Toileting) pt u/a to release RUE support and is immobilized at LUE to reach away from HEENA for safe clothing mgmt or posterior hygiene, req'd dep care throughout  -JY               User Key  (r) = Recorded By, (t) = Taken By, (c) = Cosigned By      Initials Name Provider Type    Jaleesa Lazaro OT Occupational Therapist                   Obj/Interventions       Row Name 06/05/24 1235          Elbow/Forearm (Therapeutic Exercise)    Elbow/Forearm (Therapeutic Exercise) AAROM (active assistive range of motion)  -     Elbow/Forearm AROM (Therapeutic Exercise) left;supination;pronation;sitting;10 repetitions  -     Elbow/Forearm AAROM (Therapeutic Exercise) left;flexion;extension;sitting;10 repetitions  -JY       Row  Name 06/05/24 1235          Wrist (Therapeutic Exercise)    Wrist (Therapeutic Exercise) AROM (active range of motion)  -JY     Wrist AROM (Therapeutic Exercise) left;flexion;extension;10 repetitions  -JY       Row Name 06/05/24 1235          Hand (Therapeutic Exercise)    Hand (Therapeutic Exercise) AROM (active range of motion)  -JY     Hand AROM/AAROM (Therapeutic Exercise) left;AROM (active range of motion);finger flexion;finger extension;10 repetitions  -JY       Row Name 06/05/24 1235          Motor Skills    Motor Skills functional endurance  -JY     Functional Endurance decreased activity tolerance toward more dynamic demands; pt reported feeling LH following bed mobility and present (yet improved) during other more dynamic tasks  -JY     Therapeutic Exercise elbow/forearm;wrist;hand;other (see comments)  facilitated ROMAN TE as ordered by ortho  -JY       Row Name 06/05/24 1235          Balance    Balance Assessment sitting static balance;sitting dynamic balance;standing static balance;standing dynamic balance  -JY     Static Sitting Balance standby assist  -JY     Dynamic Sitting Balance contact guard  -JY     Position, Sitting Balance unsupported;sitting edge of bed  -JY     Static Standing Balance minimal assist;2-person assist;verbal cues  -JY     Dynamic Standing Balance minimal assist;moderate assist;2-person assist;verbal cues  -JY     Position/Device Used, Standing Balance supported;other (see comments)  RUE support  -JY     Balance Interventions sitting;standing;static;dynamic;sit to stand;supported;occupation based/functional task  -JY     Comment, Balance presented with posterior lean in initial standing resulting in more cues for anterior weight shift and general postural cues for stability  -JY               User Key  (r) = Recorded By, (t) = Taken By, (c) = Cosigned By      Initials Name Provider Type    Jaleesa Lazaro OT Occupational Therapist                   Goals/Plan    No  documentation.                  Clinical Impression       Row Name 06/05/24 1239          Pain Assessment    Pretreatment Pain Rating 0/10 - no pain  -JY     Posttreatment Pain Rating 0/10 - no pain  -JY     Pre/Posttreatment Pain Comment denies any pain and tolerated all OT interventions  -JY     Pain Intervention(s) Repositioned;Ambulation/increased activity;Rest  -JY       Row Name 06/05/24 1239          Plan of Care Review    Plan of Care Reviewed With patient;daughter  -JY     Progress no change  -JY     Outcome Evaluation Pt participatory in OT interventions, continues to be limited by immobilized LUE in sling. Reviewed with pt and this date educated daughter on optimal position, tech, seq for ADLs, sling mgmt, related t/fs and mobility while adhering to LUE precautions throughout. Pt req'd max A x 2 for supine > sitting and max A x 1 for scooting to EOB for improved stability at EOB. Req'd mod A x 2 to min A x 2 for fxl t/fs from EOB> BSC and BSC > recliner with RUE support and freqent cues for more wide HEENA, weight shifting to offset the posterior lean and advance stepping. Pt req'd gross max A to dep A for UBD, sling mgmt, L axilla care and toileting tasks as pt u/a to reach away from HEENA in standing w/o increased fall risk. Pt able to tolerate A/AROM at LUE hand, wrist, elbow. Pt is still below baseline occupational performance and at increased risk for falls. Continue to recommend SNF at d/c when medically ready.  -JY       Row Name 06/05/24 1239          Therapy Assessment/Plan (OT)    Rehab Potential (OT) good, to achieve stated therapy goals  -J     Criteria for Skilled Therapeutic Interventions Met (OT) yes;skilled treatment is necessary  -JY     Therapy Frequency (OT) daily  -JY       Row Name 06/05/24 1239          Therapy Plan Review/Discharge Plan (OT)    Anticipated Discharge Disposition (OT) skilled nursing facility  -JY       Row Name 06/05/24 1239          Vital Signs    Pre Systolic BP Rehab  140  -JY     Pre Treatment Diastolic BP 64  -JY     Intra Systolic BP Rehab 121  -JY     Intra Treatment Diastolic BP 51  -JY     Pretreatment Heart Rate (beats/min) 66  -JY     Posttreatment Heart Rate (beats/min) 68  -JY     Pre SpO2 (%) 95  -JY     O2 Delivery Pre Treatment room air  -JY     O2 Delivery Intra Treatment room air  -JY     Post SpO2 (%) 98  -JY     O2 Delivery Post Treatment room air  -JY     Pre Patient Position Supine  -JY     Intra Patient Position Standing  -JY     Post Patient Position Sitting  -JY       Row Name 06/05/24 1239          Positioning and Restraints    Pre-Treatment Position in bed  -JY     Post Treatment Position chair  -JY     In Chair notified nsg;reclined;call light within reach;encouraged to call for assist;exit alarm on;with family/caregiver;waffle cushion;legs elevated;heels elevated  -JY               User Key  (r) = Recorded By, (t) = Taken By, (c) = Cosigned By      Initials Name Provider Type    Jaleesa Lazaro OT Occupational Therapist                   Outcome Measures       Row Name 06/05/24 1358          How much help from another is currently needed...    Putting on and taking off regular lower body clothing? 1  -JY     Bathing (including washing, rinsing, and drying) 1  -JY     Toileting (which includes using toilet bed pan or urinal) 1  -JY     Putting on and taking off regular upper body clothing 2  -JY     Taking care of personal grooming (such as brushing teeth) 3  -JY     Eating meals 3  -JY     AM-PAC 6 Clicks Score (OT) 11  -JY       Row Name 06/05/24 1358          Functional Assessment    Outcome Measure Options AM-PAC 6 Clicks Daily Activity (OT)  -JY               User Key  (r) = Recorded By, (t) = Taken By, (c) = Cosigned By      Initials Name Provider Type    Jaleesa Lazaro OT Occupational Therapist                    Occupational Therapy Education       Title: PT OT SLP Therapies (In Progress)       Topic: Occupational Therapy (In Progress)        Point: ADL training (In Progress)       Description:   Instruct learner(s) on proper safety adaptation and remediation techniques during self care or transfers.   Instruct in proper use of assistive devices.                  Learning Progress Summary             Patient Acceptance, E,D, NR by EDWARD at 6/5/2024 1012    Acceptance, E,D, NR by EDWARD at 6/3/2024 0953    Acceptance, E, NR by ANDRES at 6/2/2024 1153   Family Acceptance, E,D, NR by EDWARD at 6/5/2024 1012                         Point: Home exercise program (In Progress)       Description:   Instruct learner(s) on appropriate technique for monitoring, assisting and/or progressing therapeutic exercises/activities.                  Learning Progress Summary             Patient Acceptance, E,D, NR by EDWARD at 6/5/2024 1012    Acceptance, E,D, NR by EDWARD at 6/3/2024 0953    Acceptance, E, NR by ANDRES at 6/2/2024 1153   Family Acceptance, E,D, NR by EDWARD at 6/5/2024 1012                         Point: Precautions (In Progress)       Description:   Instruct learner(s) on prescribed precautions during self-care and functional transfers.                  Learning Progress Summary             Patient Acceptance, E,D, NR by EDWARD at 6/5/2024 1012    Acceptance, E,D, NR by EDWARD at 6/3/2024 0953    Acceptance, E, NR by ANDRES at 6/2/2024 1153   Family Acceptance, E,D, NR by EDWARD at 6/5/2024 1012                         Point: Body mechanics (In Progress)       Description:   Instruct learner(s) on proper positioning and spine alignment during self-care, functional mobility activities and/or exercises.                  Learning Progress Summary             Patient Acceptance, E,D, NR by EDWARD at 6/5/2024 1012    Acceptance, E,D, NR by EDWARD at 6/3/2024 0953    Acceptance, E, NR by  at 6/2/2024 1153   Family Acceptance, E,D, NR by EDWARD at 6/5/2024 1012                                         User Key       Initials Effective Dates Name Provider Type Discipline    JY 06/16/21 -  Jaleesa Mckeon, OT  Occupational Therapist OT     10/14/22 -  Rhianna Lake OT Occupational Therapist OT                  OT Recommendation and Plan  Therapy Frequency (OT): daily  Plan of Care Review  Plan of Care Reviewed With: patient, daughter  Progress: no change  Outcome Evaluation: Pt participatory in OT interventions, continues to be limited by immobilized LUE in sling. Reviewed with pt and this date educated daughter on optimal position, tech, seq for ADLs, sling mgmt, related t/fs and mobility while adhering to LUE precautions throughout. Pt req'd max A x 2 for supine > sitting and max A x 1 for scooting to EOB for improved stability at EOB. Req'd mod A x 2 to min A x 2 for fxl t/fs from EOB> BSC and BSC > recliner with RUE support and freqent cues for more wide HEENA, weight shifting to offset the posterior lean and advance stepping. Pt req'd gross max A to dep A for UBD, sling mgmt, L axilla care and toileting tasks as pt u/a to reach away from HEENA in standing w/o increased fall risk. Pt able to tolerate A/AROM at LUE hand, wrist, elbow. Pt is still below baseline occupational performance and at increased risk for falls. Continue to recommend SNF at d/c when medically ready.     Time Calculation:         Time Calculation- OT       Row Name 06/05/24 1401             Time Calculation- OT    OT Start Time 1012  -JY      OT Received On 06/05/24  -JY      OT Goal Re-Cert Due Date 06/12/24  -JY         Timed Charges    47361 - OT Therapeutic Exercise Minutes 15  -JY      51836 - OT Therapeutic Activity Minutes 23  -JY      63203 - OT Self Care/Mgmt Minutes 30  -JY         Total Minutes    Timed Charges Total Minutes 68  -JY       Total Minutes 68  -JY                User Key  (r) = Recorded By, (t) = Taken By, (c) = Cosigned By      Initials Name Provider Type    Jaleesa Lazaro OT Occupational Therapist                  Therapy Charges for Today       Code Description Service Date Service Provider Modifiers Qty     29021785345 HC OT THER PROC EA 15 MIN 6/5/2024 Jaleesa Mckeon OT GO 1    94430384884 HC OT THERAPEUTIC ACT EA 15 MIN 6/5/2024 Jaleesa Mckeon OT GO 2    51263872368 HC OT SELF CARE/MGMT/TRAIN EA 15 MIN 6/5/2024 Jaleesa Mckeon OT GO 2    81257729932 HC OT THER SUPP EA 15 MIN 6/5/2024 Jaleesa Mckeon OT GO 2                 Jaleesa Mckeon OT  6/5/2024   H Plasty Text: Given the location of the defect, shape of the defect and the proximity to free margins a H-plasty was deemed most appropriate for repair.  Using a sterile surgical marker, the appropriate advancement arms of the H-plasty were drawn incorporating the defect and placing the expected incisions within the relaxed skin tension lines where possible. The area thus outlined was incised deep to adipose tissue with a #15 scalpel blade. The skin margins were undermined to an appropriate distance in all directions utilizing iris scissors.  The opposing advancement arms were then advanced into place in opposite direction and anchored with interrupted buried subcutaneous sutures.

## 2024-06-05 NOTE — CASE MANAGEMENT/SOCIAL WORK
Continued Stay Note  [facility]     Patient Name: Fanny Boyce  MRN: [id number]  Today's Date: 6/5/2024    Admit Date: 6/1/2024    Plan: Home with    Discharge Plan       Row Name 06/05/24 1136       Plan    Plan Comments After daughter watched pt participate with PT she has now decided she is agreeable for rehab for pt prior to returning home. She has requested referrals to [facility] and [facility]. [facility] does not have any beds at this time.                   Discharge Codes    No documentation.                 Expected Discharge Date and Time       Expected Discharge Date Expected Discharge Time    Jun 4, 2024               Yuki Girard RN

## 2024-06-05 NOTE — PLAN OF CARE
Goal Outcome Evaluation:  Plan of Care Reviewed With: patient, daughter        Progress: no change  Outcome Evaluation: Pt participatory in OT interventions, continues to be limited by immobilized LUE in sling. Reviewed with pt and this date educated daughter on optimal position, tech, seq for ADLs, sling mgmt, related t/fs and mobility while adhering to LUE precautions throughout. Pt req'd max A x 2 for supine > sitting and max A x 1 for scooting to EOB for improved stability at EOB. Req'd mod A x 2 to min A x 2 for fxl t/fs from EOB> BSC and BSC > recliner with RUE support and freqent cues for more wide HEENA, weight shifting to offset the posterior lean and advance stepping. Pt req'd gross max A to dep A for UBD, sling mgmt, L axilla care and toileting tasks as pt u/a to reach away from HEENA in standing w/o increased fall risk. Pt able to tolerate A/AROM at LUE hand, wrist, elbow. Pt is still below baseline occupational performance and at increased risk for falls. Continue to recommend SNF at d/c when medically ready.      Anticipated Discharge Disposition (OT): skilled nursing facility

## 2024-06-05 NOTE — OUTREACH NOTE
Prep Survey      Flowsheet Row Responses   [facility] facility patient discharged from? [facility]   Is LACE score < 7 ? No   Eligibility Not Eligible   What are the reasons patient is not eligible? [facility]   Does the patient have one of the following disease processes/diagnoses(primary or secondary)? Other   Prep survey completed? Yes            Maggie DAVIS - Registered Nurse

## 2024-06-05 NOTE — CASE MANAGEMENT/SOCIAL WORK
Case Management Discharge Note      Final Note: Pt has been approved to transfer to [facility]. Family to transport. Report can be called to [phone]. Pt in agreement with plan    Provided Post Acute Provider List?: Yes  Post Acute Provider List: Inpatient Rehab  Provided Post Acute Provider Quality & Resource List?: Yes  Post Acute Provider Quality and Resource List: Inpatient Rehab  Delivered To: Patient  Method of Delivery: In person    Selected Continued Care - Admitted Since 6/1/2024       Destination    No services have been selected for the patient.                Durable Medical Equipment    No services have been selected for the patient.                Dialysis/Infusion    No services have been selected for the patient.                Home Medical Care    No services have been selected for the patient.                Therapy    No services have been selected for the patient.                Community Resources    No services have been selected for the patient.                Community & DME    No services have been selected for the patient.                         Final Discharge Disposition Code: 62 - inpatient rehab facility

## 2024-06-06 LAB
BACTERIA SPEC AEROBE CULT: NORMAL
BACTERIA SPEC AEROBE CULT: NORMAL

## 2025-02-09 ENCOUNTER — HOSPITAL ENCOUNTER (INPATIENT)
Facility: HOSPITAL | Age: 85
LOS: 8 days | Discharge: NURSING, REHAB OR LONG-TERM CARE | DRG: 521 | End: 2025-02-18
Attending: EMERGENCY MEDICINE | Admitting: ORTHOPAEDIC SURGERY
Payer: MEDICARE

## 2025-02-09 ENCOUNTER — APPOINTMENT (OUTPATIENT)
Dept: GENERAL RADIOLOGY | Facility: HOSPITAL | Age: 85
DRG: 521 | End: 2025-02-09
Payer: MEDICARE

## 2025-02-09 ENCOUNTER — APPOINTMENT (OUTPATIENT)
Dept: CT IMAGING | Facility: HOSPITAL | Age: 85
DRG: 521 | End: 2025-02-09
Payer: MEDICARE

## 2025-02-09 DIAGNOSIS — S72.001A CLOSED FRACTURE OF NECK OF RIGHT FEMUR, INITIAL ENCOUNTER: Primary | ICD-10-CM

## 2025-02-09 DIAGNOSIS — Z86.711 HISTORY OF PULMONARY EMBOLISM: ICD-10-CM

## 2025-02-09 DIAGNOSIS — Z86.79 HISTORY OF ATRIAL FIBRILLATION: ICD-10-CM

## 2025-02-09 DIAGNOSIS — Z79.01 ANTICOAGULATED: ICD-10-CM

## 2025-02-09 LAB
ABO GROUP BLD: NORMAL
ALBUMIN SERPL-MCNC: 4.1 G/DL (ref 3.5–5.2)
ALBUMIN/GLOB SERPL: 1.4 G/DL
ALP SERPL-CCNC: 92 U/L (ref 39–117)
ALT SERPL W P-5'-P-CCNC: 5 U/L (ref 1–33)
ANION GAP SERPL CALCULATED.3IONS-SCNC: 9 MMOL/L (ref 5–15)
AST SERPL-CCNC: 18 U/L (ref 1–32)
BASOPHILS # BLD AUTO: 0.05 10*3/MM3 (ref 0–0.2)
BASOPHILS NFR BLD AUTO: 0.5 % (ref 0–1.5)
BILIRUB SERPL-MCNC: 0.2 MG/DL (ref 0–1.2)
BLD GP AB SCN SERPL QL: NEGATIVE
BUN SERPL-MCNC: 19 MG/DL (ref 8–23)
BUN/CREAT SERPL: 28.8 (ref 7–25)
CALCIUM SPEC-SCNC: 9.5 MG/DL (ref 8.6–10.5)
CHLORIDE SERPL-SCNC: 102 MMOL/L (ref 98–107)
CO2 SERPL-SCNC: 30 MMOL/L (ref 22–29)
CREAT SERPL-MCNC: 0.66 MG/DL (ref 0.57–1)
DEPRECATED RDW RBC AUTO: 45.1 FL (ref 37–54)
EGFRCR SERPLBLD CKD-EPI 2021: 85.6 ML/MIN/1.73
EOSINOPHIL # BLD AUTO: 0.1 10*3/MM3 (ref 0–0.4)
EOSINOPHIL NFR BLD AUTO: 1.1 % (ref 0.3–6.2)
ERYTHROCYTE [DISTWIDTH] IN BLOOD BY AUTOMATED COUNT: 13.6 % (ref 12.3–15.4)
GLOBULIN UR ELPH-MCNC: 2.9 GM/DL
GLUCOSE SERPL-MCNC: 116 MG/DL (ref 65–99)
HCT VFR BLD AUTO: 43 % (ref 34–46.6)
HGB BLD-MCNC: 13.5 G/DL (ref 12–15.9)
IMM GRANULOCYTES # BLD AUTO: 0.09 10*3/MM3 (ref 0–0.05)
IMM GRANULOCYTES NFR BLD AUTO: 1 % (ref 0–0.5)
LYMPHOCYTES # BLD AUTO: 1.17 10*3/MM3 (ref 0.7–3.1)
LYMPHOCYTES NFR BLD AUTO: 12.6 % (ref 19.6–45.3)
MCH RBC QN AUTO: 28.2 PG (ref 26.6–33)
MCHC RBC AUTO-ENTMCNC: 31.4 G/DL (ref 31.5–35.7)
MCV RBC AUTO: 89.8 FL (ref 79–97)
MONOCYTES # BLD AUTO: 0.39 10*3/MM3 (ref 0.1–0.9)
MONOCYTES NFR BLD AUTO: 4.2 % (ref 5–12)
NEUTROPHILS NFR BLD AUTO: 7.49 10*3/MM3 (ref 1.7–7)
NEUTROPHILS NFR BLD AUTO: 80.6 % (ref 42.7–76)
NRBC BLD AUTO-RTO: 0 /100 WBC (ref 0–0.2)
PLATELET # BLD AUTO: 235 10*3/MM3 (ref 140–450)
PMV BLD AUTO: 9.8 FL (ref 6–12)
POTASSIUM SERPL-SCNC: 3.9 MMOL/L (ref 3.5–5.2)
PROT SERPL-MCNC: 7 G/DL (ref 6–8.5)
RBC # BLD AUTO: 4.79 10*6/MM3 (ref 3.77–5.28)
RH BLD: POSITIVE
SODIUM SERPL-SCNC: 141 MMOL/L (ref 136–145)
T&S EXPIRATION DATE: NORMAL
WBC NRBC COR # BLD AUTO: 9.29 10*3/MM3 (ref 3.4–10.8)

## 2025-02-09 PROCEDURE — 72192 CT PELVIS W/O DYE: CPT

## 2025-02-09 PROCEDURE — 99285 EMERGENCY DEPT VISIT HI MDM: CPT

## 2025-02-09 PROCEDURE — G0378 HOSPITAL OBSERVATION PER HR: HCPCS

## 2025-02-09 PROCEDURE — 86901 BLOOD TYPING SEROLOGIC RH(D): CPT | Performed by: EMERGENCY MEDICINE

## 2025-02-09 PROCEDURE — 36415 COLL VENOUS BLD VENIPUNCTURE: CPT

## 2025-02-09 PROCEDURE — 85025 COMPLETE CBC W/AUTO DIFF WBC: CPT | Performed by: EMERGENCY MEDICINE

## 2025-02-09 PROCEDURE — 70450 CT HEAD/BRAIN W/O DYE: CPT

## 2025-02-09 PROCEDURE — 72170 X-RAY EXAM OF PELVIS: CPT

## 2025-02-09 PROCEDURE — 80053 COMPREHEN METABOLIC PANEL: CPT | Performed by: EMERGENCY MEDICINE

## 2025-02-09 PROCEDURE — 86900 BLOOD TYPING SEROLOGIC ABO: CPT | Performed by: EMERGENCY MEDICINE

## 2025-02-09 PROCEDURE — 73552 X-RAY EXAM OF FEMUR 2/>: CPT

## 2025-02-09 PROCEDURE — 25010000002 MORPHINE PER 10 MG: Performed by: EMERGENCY MEDICINE

## 2025-02-09 PROCEDURE — 93005 ELECTROCARDIOGRAM TRACING: CPT | Performed by: EMERGENCY MEDICINE

## 2025-02-09 PROCEDURE — 71045 X-RAY EXAM CHEST 1 VIEW: CPT

## 2025-02-09 PROCEDURE — 86850 RBC ANTIBODY SCREEN: CPT | Performed by: EMERGENCY MEDICINE

## 2025-02-09 RX ORDER — SODIUM CHLORIDE 9 MG/ML
75 INJECTION, SOLUTION INTRAVENOUS CONTINUOUS
Status: ACTIVE | OUTPATIENT
Start: 2025-02-09 | End: 2025-02-10

## 2025-02-09 RX ORDER — IPRATROPIUM BROMIDE AND ALBUTEROL SULFATE 2.5; .5 MG/3ML; MG/3ML
3 SOLUTION RESPIRATORY (INHALATION) EVERY 4 HOURS PRN
Status: DISCONTINUED | OUTPATIENT
Start: 2025-02-09 | End: 2025-02-18 | Stop reason: HOSPADM

## 2025-02-09 RX ORDER — SODIUM CHLORIDE 0.9 % (FLUSH) 0.9 %
10 SYRINGE (ML) INJECTION AS NEEDED
Status: DISCONTINUED | OUTPATIENT
Start: 2025-02-09 | End: 2025-02-18 | Stop reason: HOSPADM

## 2025-02-09 RX ORDER — MORPHINE SULFATE 2 MG/ML
2 INJECTION, SOLUTION INTRAMUSCULAR; INTRAVENOUS ONCE
Status: COMPLETED | OUTPATIENT
Start: 2025-02-09 | End: 2025-02-09

## 2025-02-09 RX ORDER — ACETAMINOPHEN 325 MG/1
650 TABLET ORAL EVERY 4 HOURS PRN
Status: DISCONTINUED | OUTPATIENT
Start: 2025-02-09 | End: 2025-02-18 | Stop reason: HOSPADM

## 2025-02-09 RX ORDER — ACETAMINOPHEN 160 MG/5ML
650 SOLUTION ORAL EVERY 4 HOURS PRN
Status: DISCONTINUED | OUTPATIENT
Start: 2025-02-09 | End: 2025-02-18 | Stop reason: HOSPADM

## 2025-02-09 RX ORDER — HYDROCODONE BITARTRATE AND ACETAMINOPHEN 5; 325 MG/1; MG/1
1 TABLET ORAL EVERY 6 HOURS PRN
Status: DISPENSED | OUTPATIENT
Start: 2025-02-09 | End: 2025-02-14

## 2025-02-09 RX ORDER — SODIUM CHLORIDE 9 MG/ML
40 INJECTION, SOLUTION INTRAVENOUS AS NEEDED
Status: DISCONTINUED | OUTPATIENT
Start: 2025-02-09 | End: 2025-02-18 | Stop reason: HOSPADM

## 2025-02-09 RX ORDER — NITROGLYCERIN 0.4 MG/1
0.4 TABLET SUBLINGUAL
Status: DISCONTINUED | OUTPATIENT
Start: 2025-02-09 | End: 2025-02-18 | Stop reason: HOSPADM

## 2025-02-09 RX ORDER — ACETAMINOPHEN 650 MG/1
650 SUPPOSITORY RECTAL EVERY 4 HOURS PRN
Status: DISCONTINUED | OUTPATIENT
Start: 2025-02-09 | End: 2025-02-18 | Stop reason: HOSPADM

## 2025-02-09 RX ORDER — SODIUM CHLORIDE 0.9 % (FLUSH) 0.9 %
10 SYRINGE (ML) INJECTION EVERY 12 HOURS SCHEDULED
Status: DISCONTINUED | OUTPATIENT
Start: 2025-02-09 | End: 2025-02-18 | Stop reason: HOSPADM

## 2025-02-09 RX ORDER — NALOXONE HCL 0.4 MG/ML
0.4 VIAL (ML) INJECTION
Status: DISCONTINUED | OUTPATIENT
Start: 2025-02-09 | End: 2025-02-18 | Stop reason: HOSPADM

## 2025-02-09 RX ORDER — ONDANSETRON 2 MG/ML
4 INJECTION INTRAMUSCULAR; INTRAVENOUS EVERY 6 HOURS PRN
Status: DISCONTINUED | OUTPATIENT
Start: 2025-02-09 | End: 2025-02-18 | Stop reason: HOSPADM

## 2025-02-09 RX ORDER — MORPHINE SULFATE 2 MG/ML
2 INJECTION, SOLUTION INTRAMUSCULAR; INTRAVENOUS EVERY 4 HOURS PRN
Status: ACTIVE | OUTPATIENT
Start: 2025-02-09 | End: 2025-02-14

## 2025-02-09 RX ADMIN — MORPHINE SULFATE 2 MG: 2 INJECTION, SOLUTION INTRAMUSCULAR; INTRAVENOUS at 22:10

## 2025-02-09 NOTE — LETTER
EMS Transport Request  For use at [facility], [place], [place], [place], and [facility] only   Patient Name: Fanny Boyce : 1940   Weight:54.3 kg (119 lb 9.6 oz) Pick-up Location: [facility] BLS/ALS: BLS/ALS: BLS   Insurance: MEDICARE Auth End Date:    Pre-Cert #: D/C Summary complete:    Destination: Other [facility]   Contact Precautions: None   Equipment (O2, Fluids, etc.): O2, settings 3L per NC continuous   Arrive By Date/Time: Tuesday, 25, , pt has to have bed vacated before transfer. Stretcher/WC: Stretcher   CM Requesting: Jennifer Casillas RN Ext: [phone]   Notes/Medical Necessity:      ______________________________________________________________________    *Only 2 patient bags OR 1 carry-on size bag are permitted.  Wheelchairs and walkers CANNOT transported with the patient. Acknowledge: Yes

## 2025-02-10 ENCOUNTER — APPOINTMENT (OUTPATIENT)
Dept: GENERAL RADIOLOGY | Facility: HOSPITAL | Age: 85
DRG: 521 | End: 2025-02-10
Payer: MEDICARE

## 2025-02-10 ENCOUNTER — ANESTHESIA EVENT (OUTPATIENT)
Dept: PERIOP | Facility: HOSPITAL | Age: 85
End: 2025-02-10
Payer: MEDICARE

## 2025-02-10 ENCOUNTER — ANESTHESIA (OUTPATIENT)
Dept: PERIOP | Facility: HOSPITAL | Age: 85
End: 2025-02-10
Payer: MEDICARE

## 2025-02-10 ENCOUNTER — ANESTHESIA EVENT CONVERTED (OUTPATIENT)
Dept: ANESTHESIOLOGY | Facility: HOSPITAL | Age: 85
DRG: 521 | End: 2025-02-10
Payer: MEDICARE

## 2025-02-10 LAB
ABO GROUP BLD: NORMAL
ALBUMIN SERPL-MCNC: 3.6 G/DL (ref 3.5–5.2)
ALBUMIN/GLOB SERPL: 1.4 G/DL
ALP SERPL-CCNC: 79 U/L (ref 39–117)
ALT SERPL W P-5'-P-CCNC: <5 U/L (ref 1–33)
ANION GAP SERPL CALCULATED.3IONS-SCNC: 9 MMOL/L (ref 5–15)
AST SERPL-CCNC: 16 U/L (ref 1–32)
BASOPHILS # BLD AUTO: 0.04 10*3/MM3 (ref 0–0.2)
BASOPHILS NFR BLD AUTO: 0.5 % (ref 0–1.5)
BILIRUB SERPL-MCNC: 0.3 MG/DL (ref 0–1.2)
BUN SERPL-MCNC: 18 MG/DL (ref 8–23)
BUN/CREAT SERPL: 28.6 (ref 7–25)
CALCIUM SPEC-SCNC: 9.1 MG/DL (ref 8.6–10.5)
CHLORIDE SERPL-SCNC: 104 MMOL/L (ref 98–107)
CO2 SERPL-SCNC: 28 MMOL/L (ref 22–29)
CREAT SERPL-MCNC: 0.63 MG/DL (ref 0.57–1)
DEPRECATED RDW RBC AUTO: 44.4 FL (ref 37–54)
EGFRCR SERPLBLD CKD-EPI 2021: 86.5 ML/MIN/1.73
EOSINOPHIL # BLD AUTO: 0.06 10*3/MM3 (ref 0–0.4)
EOSINOPHIL NFR BLD AUTO: 0.7 % (ref 0.3–6.2)
ERYTHROCYTE [DISTWIDTH] IN BLOOD BY AUTOMATED COUNT: 13.7 % (ref 12.3–15.4)
GLOBULIN UR ELPH-MCNC: 2.6 GM/DL
GLUCOSE SERPL-MCNC: 125 MG/DL (ref 65–99)
HCT VFR BLD AUTO: 39.1 % (ref 34–46.6)
HGB BLD-MCNC: 12.3 G/DL (ref 12–15.9)
IMM GRANULOCYTES # BLD AUTO: 0.04 10*3/MM3 (ref 0–0.05)
IMM GRANULOCYTES NFR BLD AUTO: 0.5 % (ref 0–0.5)
INR PPP: 0.97 (ref 0.89–1.12)
LYMPHOCYTES # BLD AUTO: 0.67 10*3/MM3 (ref 0.7–3.1)
LYMPHOCYTES NFR BLD AUTO: 7.7 % (ref 19.6–45.3)
MAGNESIUM SERPL-MCNC: 1.7 MG/DL (ref 1.6–2.4)
MCH RBC QN AUTO: 27.9 PG (ref 26.6–33)
MCHC RBC AUTO-ENTMCNC: 31.5 G/DL (ref 31.5–35.7)
MCV RBC AUTO: 88.7 FL (ref 79–97)
MONOCYTES # BLD AUTO: 0.27 10*3/MM3 (ref 0.1–0.9)
MONOCYTES NFR BLD AUTO: 3.1 % (ref 5–12)
NEUTROPHILS NFR BLD AUTO: 7.6 10*3/MM3 (ref 1.7–7)
NEUTROPHILS NFR BLD AUTO: 87.5 % (ref 42.7–76)
NRBC BLD AUTO-RTO: 0 /100 WBC (ref 0–0.2)
PLATELET # BLD AUTO: 186 10*3/MM3 (ref 140–450)
PMV BLD AUTO: 10.1 FL (ref 6–12)
POTASSIUM SERPL-SCNC: 3.8 MMOL/L (ref 3.5–5.2)
POTASSIUM SERPL-SCNC: 4.3 MMOL/L (ref 3.5–5.2)
PROT SERPL-MCNC: 6.2 G/DL (ref 6–8.5)
PROTHROMBIN TIME: 13 SECONDS (ref 12.2–14.5)
RBC # BLD AUTO: 4.41 10*6/MM3 (ref 3.77–5.28)
RH BLD: POSITIVE
SODIUM SERPL-SCNC: 141 MMOL/L (ref 136–145)
WBC NRBC COR # BLD AUTO: 8.68 10*3/MM3 (ref 3.4–10.8)

## 2025-02-10 PROCEDURE — 25010000002 PROPOFOL 10 MG/ML EMULSION: Performed by: ANESTHESIOLOGY

## 2025-02-10 PROCEDURE — C1713 ANCHOR/SCREW BN/BN,TIS/BN: HCPCS | Performed by: ORTHOPAEDIC SURGERY

## 2025-02-10 PROCEDURE — 86900 BLOOD TYPING SEROLOGIC ABO: CPT

## 2025-02-10 PROCEDURE — C1776 JOINT DEVICE (IMPLANTABLE): HCPCS | Performed by: ORTHOPAEDIC SURGERY

## 2025-02-10 PROCEDURE — 25010000002 ONDANSETRON PER 1 MG: Performed by: ANESTHESIOLOGY

## 2025-02-10 PROCEDURE — 83735 ASSAY OF MAGNESIUM: CPT | Performed by: INTERNAL MEDICINE

## 2025-02-10 PROCEDURE — 25810000003 SODIUM CHLORIDE 0.9 % SOLUTION: Performed by: INTERNAL MEDICINE

## 2025-02-10 PROCEDURE — 85025 COMPLETE CBC W/AUTO DIFF WBC: CPT | Performed by: INTERNAL MEDICINE

## 2025-02-10 PROCEDURE — 72170 X-RAY EXAM OF PELVIS: CPT

## 2025-02-10 PROCEDURE — 85610 PROTHROMBIN TIME: CPT | Performed by: INTERNAL MEDICINE

## 2025-02-10 PROCEDURE — 25810000003 LACTATED RINGERS PER 1000 ML: Performed by: ANESTHESIOLOGY

## 2025-02-10 PROCEDURE — C1755 CATHETER, INTRASPINAL: HCPCS | Performed by: ORTHOPAEDIC SURGERY

## 2025-02-10 PROCEDURE — 86901 BLOOD TYPING SEROLOGIC RH(D): CPT

## 2025-02-10 PROCEDURE — 25010000002 DEXAMETHASONE PER 1 MG: Performed by: ANESTHESIOLOGY

## 2025-02-10 PROCEDURE — 25010000002 ROPIVACAINE HCL-NACL 0.2-0.9 % SOLUTION: Performed by: NURSE ANESTHETIST, CERTIFIED REGISTERED

## 2025-02-10 PROCEDURE — 25010000002 ROPIVACAINE PER 1 MG: Performed by: NURSE ANESTHETIST, CERTIFIED REGISTERED

## 2025-02-10 PROCEDURE — 25010000002 LIDOCAINE PF 1% 1 % SOLUTION: Performed by: ANESTHESIOLOGY

## 2025-02-10 PROCEDURE — 25810000003 LACTATED RINGERS PER 1000 ML: Performed by: ORTHOPAEDIC SURGERY

## 2025-02-10 PROCEDURE — 25010000002 CEFAZOLIN PER 500 MG: Performed by: ORTHOPAEDIC SURGERY

## 2025-02-10 PROCEDURE — 25010000002 SUGAMMADEX 200 MG/2ML SOLUTION: Performed by: ANESTHESIOLOGY

## 2025-02-10 PROCEDURE — 80053 COMPREHEN METABOLIC PANEL: CPT | Performed by: INTERNAL MEDICINE

## 2025-02-10 PROCEDURE — 0SRR019 REPLACEMENT OF RIGHT HIP JOINT, FEMORAL SURFACE WITH METAL SYNTHETIC SUBSTITUTE, CEMENTED, OPEN APPROACH: ICD-10-PCS | Performed by: ORTHOPAEDIC SURGERY

## 2025-02-10 PROCEDURE — 84132 ASSAY OF SERUM POTASSIUM: CPT | Performed by: ORTHOPAEDIC SURGERY

## 2025-02-10 DEVICE — IMPLANTABLE DEVICE: Type: IMPLANTABLE DEVICE | Site: HIP | Status: FUNCTIONAL

## 2025-02-10 DEVICE — SUT FW #2 W/TPR NDL 1/2 CIR 38IN 97CM 26.5MM BLU: Type: IMPLANTABLE DEVICE | Site: HIP | Status: FUNCTIONAL

## 2025-02-10 DEVICE — SYNERGY CEMENTED FEMORAL COMPONENT                                    SZ 14
Type: IMPLANTABLE DEVICE | Site: HIP | Status: FUNCTIONAL
Brand: SYNERGY

## 2025-02-10 DEVICE — COBALT CHROME 12/14 TAPER FEMORAL                                    HEAD 28MM - 3: Type: IMPLANTABLE DEVICE | Site: HIP | Status: FUNCTIONAL

## 2025-02-10 DEVICE — SMARTSET HIGH PERFORMANCE MV MEDIUM VISCOSITY BONE CEMENT 40G
Type: IMPLANTABLE DEVICE | Site: HIP | Status: FUNCTIONAL
Brand: SMARTSET

## 2025-02-10 DEVICE — TANDEM BIPOLAR COBALT CHROME 46MM                                    OUTER DIAMETER 28MM INNER DIAMETER
Type: IMPLANTABLE DEVICE | Site: HIP | Status: FUNCTIONAL
Brand: TANDEM

## 2025-02-10 DEVICE — INVIS DISTAL CENTRALIZER SIZE 11MM
Type: IMPLANTABLE DEVICE | Site: HIP | Status: FUNCTIONAL
Brand: INVIS

## 2025-02-10 DEVICE — 1.5MM POLYMER CABLE WITH TI CLASP
Type: IMPLANTABLE DEVICE | Site: HIP | Status: FUNCTIONAL
Brand: SUPERCABLE® ISO-ELASTIC™ CERCLAGE SYSTEM

## 2025-02-10 RX ORDER — TRANEXAMIC ACID 100 MG/ML
INJECTION, SOLUTION INTRAVENOUS AS NEEDED
Status: DISCONTINUED | OUTPATIENT
Start: 2025-02-10 | End: 2025-02-10 | Stop reason: SURG

## 2025-02-10 RX ORDER — EPHEDRINE SULFATE 50 MG/ML
INJECTION INTRAVENOUS AS NEEDED
Status: DISCONTINUED | OUTPATIENT
Start: 2025-02-10 | End: 2025-02-10 | Stop reason: SURG

## 2025-02-10 RX ORDER — IPRATROPIUM BROMIDE AND ALBUTEROL SULFATE 2.5; .5 MG/3ML; MG/3ML
3 SOLUTION RESPIRATORY (INHALATION) ONCE AS NEEDED
Status: DISCONTINUED | OUTPATIENT
Start: 2025-02-10 | End: 2025-02-10 | Stop reason: HOSPADM

## 2025-02-10 RX ORDER — SODIUM CHLORIDE 9 MG/ML
9 INJECTION, SOLUTION INTRAVENOUS AS NEEDED
Status: DISCONTINUED | OUTPATIENT
Start: 2025-02-10 | End: 2025-02-10 | Stop reason: HOSPADM

## 2025-02-10 RX ORDER — SODIUM CHLORIDE 0.9 % (FLUSH) 0.9 %
10 SYRINGE (ML) INJECTION AS NEEDED
Status: DISCONTINUED | OUTPATIENT
Start: 2025-02-10 | End: 2025-02-10 | Stop reason: HOSPADM

## 2025-02-10 RX ORDER — HYDROCODONE BITARTRATE AND ACETAMINOPHEN 5; 325 MG/1; MG/1
1 TABLET ORAL ONCE AS NEEDED
Status: DISCONTINUED | OUTPATIENT
Start: 2025-02-10 | End: 2025-02-10 | Stop reason: HOSPADM

## 2025-02-10 RX ORDER — HYDRALAZINE HYDROCHLORIDE 20 MG/ML
5 INJECTION INTRAMUSCULAR; INTRAVENOUS
Status: DISCONTINUED | OUTPATIENT
Start: 2025-02-10 | End: 2025-02-10 | Stop reason: HOSPADM

## 2025-02-10 RX ORDER — SODIUM CHLORIDE 0.9 % (FLUSH) 0.9 %
3-10 SYRINGE (ML) INJECTION AS NEEDED
Status: DISCONTINUED | OUTPATIENT
Start: 2025-02-10 | End: 2025-02-10 | Stop reason: HOSPADM

## 2025-02-10 RX ORDER — MIDAZOLAM HYDROCHLORIDE 1 MG/ML
0.5 INJECTION, SOLUTION INTRAMUSCULAR; INTRAVENOUS
Status: DISCONTINUED | OUTPATIENT
Start: 2025-02-10 | End: 2025-02-10 | Stop reason: HOSPADM

## 2025-02-10 RX ORDER — DEXAMETHASONE SODIUM PHOSPHATE 4 MG/ML
INJECTION, SOLUTION INTRA-ARTICULAR; INTRALESIONAL; INTRAMUSCULAR; INTRAVENOUS; SOFT TISSUE AS NEEDED
Status: DISCONTINUED | OUTPATIENT
Start: 2025-02-10 | End: 2025-02-10 | Stop reason: SURG

## 2025-02-10 RX ORDER — PROPOFOL 10 MG/ML
VIAL (ML) INTRAVENOUS AS NEEDED
Status: DISCONTINUED | OUTPATIENT
Start: 2025-02-10 | End: 2025-02-10 | Stop reason: SURG

## 2025-02-10 RX ORDER — TRANEXAMIC ACID 10 MG/ML
1000 INJECTION, SOLUTION INTRAVENOUS ONCE
Status: DISCONTINUED | OUTPATIENT
Start: 2025-02-10 | End: 2025-02-10

## 2025-02-10 RX ORDER — ROPIVACAINE HYDROCHLORIDE 2 MG/ML
INJECTION, SOLUTION EPIDURAL; INFILTRATION; PERINEURAL CONTINUOUS
Status: DISCONTINUED | OUTPATIENT
Start: 2025-02-10 | End: 2025-02-18 | Stop reason: HOSPADM

## 2025-02-10 RX ORDER — FAMOTIDINE 20 MG/1
20 TABLET, FILM COATED ORAL ONCE
Status: DISCONTINUED | OUTPATIENT
Start: 2025-02-10 | End: 2025-02-10 | Stop reason: HOSPADM

## 2025-02-10 RX ORDER — ONDANSETRON 2 MG/ML
INJECTION INTRAMUSCULAR; INTRAVENOUS AS NEEDED
Status: DISCONTINUED | OUTPATIENT
Start: 2025-02-10 | End: 2025-02-10 | Stop reason: SURG

## 2025-02-10 RX ORDER — SODIUM CHLORIDE, SODIUM LACTATE, POTASSIUM CHLORIDE, CALCIUM CHLORIDE 600; 310; 30; 20 MG/100ML; MG/100ML; MG/100ML; MG/100ML
9 INJECTION, SOLUTION INTRAVENOUS CONTINUOUS
Status: ACTIVE | OUTPATIENT
Start: 2025-02-10 | End: 2025-02-11

## 2025-02-10 RX ORDER — LABETALOL HYDROCHLORIDE 5 MG/ML
5 INJECTION, SOLUTION INTRAVENOUS
Status: DISCONTINUED | OUTPATIENT
Start: 2025-02-10 | End: 2025-02-10 | Stop reason: HOSPADM

## 2025-02-10 RX ORDER — TRAZODONE HYDROCHLORIDE 100 MG/1
100 TABLET ORAL NIGHTLY
Status: DISCONTINUED | OUTPATIENT
Start: 2025-02-10 | End: 2025-02-12

## 2025-02-10 RX ORDER — BUPIVACAINE HCL/0.9 % NACL/PF 0.125 %
PLASTIC BAG, INJECTION (ML) EPIDURAL AS NEEDED
Status: DISCONTINUED | OUTPATIENT
Start: 2025-02-10 | End: 2025-02-10 | Stop reason: SURG

## 2025-02-10 RX ORDER — FAMOTIDINE 10 MG/ML
20 INJECTION, SOLUTION INTRAVENOUS ONCE
Status: COMPLETED | OUTPATIENT
Start: 2025-02-10 | End: 2025-02-10

## 2025-02-10 RX ORDER — LIDOCAINE HYDROCHLORIDE 10 MG/ML
INJECTION, SOLUTION EPIDURAL; INFILTRATION; INTRACAUDAL; PERINEURAL AS NEEDED
Status: DISCONTINUED | OUTPATIENT
Start: 2025-02-10 | End: 2025-02-10 | Stop reason: SURG

## 2025-02-10 RX ORDER — ROPIVACAINE HYDROCHLORIDE 5 MG/ML
INJECTION, SOLUTION EPIDURAL; INFILTRATION; PERINEURAL
Status: COMPLETED | OUTPATIENT
Start: 2025-02-10 | End: 2025-02-10

## 2025-02-10 RX ORDER — SODIUM CHLORIDE 0.9 % (FLUSH) 0.9 %
10 SYRINGE (ML) INJECTION EVERY 12 HOURS SCHEDULED
Status: DISCONTINUED | OUTPATIENT
Start: 2025-02-10 | End: 2025-02-10 | Stop reason: HOSPADM

## 2025-02-10 RX ORDER — FENTANYL CITRATE 50 UG/ML
50 INJECTION, SOLUTION INTRAMUSCULAR; INTRAVENOUS
Status: DISCONTINUED | OUTPATIENT
Start: 2025-02-10 | End: 2025-02-10 | Stop reason: HOSPADM

## 2025-02-10 RX ORDER — ROCURONIUM BROMIDE 10 MG/ML
INJECTION, SOLUTION INTRAVENOUS AS NEEDED
Status: DISCONTINUED | OUTPATIENT
Start: 2025-02-10 | End: 2025-02-10 | Stop reason: SURG

## 2025-02-10 RX ORDER — ONDANSETRON 2 MG/ML
4 INJECTION INTRAMUSCULAR; INTRAVENOUS ONCE AS NEEDED
Status: DISCONTINUED | OUTPATIENT
Start: 2025-02-10 | End: 2025-02-10 | Stop reason: HOSPADM

## 2025-02-10 RX ORDER — SODIUM CHLORIDE, SODIUM LACTATE, POTASSIUM CHLORIDE, CALCIUM CHLORIDE 600; 310; 30; 20 MG/100ML; MG/100ML; MG/100ML; MG/100ML
9 INJECTION, SOLUTION INTRAVENOUS CONTINUOUS
Status: ACTIVE | OUTPATIENT
Start: 2025-02-11 | End: 2025-02-11

## 2025-02-10 RX ORDER — NALOXONE HCL 0.4 MG/ML
0.4 VIAL (ML) INJECTION AS NEEDED
Status: DISCONTINUED | OUTPATIENT
Start: 2025-02-10 | End: 2025-02-10 | Stop reason: HOSPADM

## 2025-02-10 RX ORDER — LIDOCAINE HYDROCHLORIDE 10 MG/ML
0.5 INJECTION, SOLUTION EPIDURAL; INFILTRATION; INTRACAUDAL; PERINEURAL ONCE AS NEEDED
Status: DISCONTINUED | OUTPATIENT
Start: 2025-02-10 | End: 2025-02-10 | Stop reason: HOSPADM

## 2025-02-10 RX ORDER — CARBIDOPA AND LEVODOPA 25; 100 MG/1; MG/1
1 TABLET ORAL 3 TIMES DAILY
Status: DISCONTINUED | OUTPATIENT
Start: 2025-02-10 | End: 2025-02-18 | Stop reason: HOSPADM

## 2025-02-10 RX ORDER — HYDROMORPHONE HYDROCHLORIDE 1 MG/ML
0.5 INJECTION, SOLUTION INTRAMUSCULAR; INTRAVENOUS; SUBCUTANEOUS
Status: DISCONTINUED | OUTPATIENT
Start: 2025-02-10 | End: 2025-02-10 | Stop reason: HOSPADM

## 2025-02-10 RX ORDER — SODIUM CHLORIDE 0.9 % (FLUSH) 0.9 %
3 SYRINGE (ML) INJECTION EVERY 12 HOURS SCHEDULED
Status: DISCONTINUED | OUTPATIENT
Start: 2025-02-10 | End: 2025-02-10 | Stop reason: HOSPADM

## 2025-02-10 RX ORDER — OXYCODONE AND ACETAMINOPHEN 7.5; 325 MG/1; MG/1
1 TABLET ORAL EVERY 4 HOURS PRN
Status: DISCONTINUED | OUTPATIENT
Start: 2025-02-10 | End: 2025-02-10 | Stop reason: HOSPADM

## 2025-02-10 RX ORDER — SODIUM CHLORIDE, SODIUM LACTATE, POTASSIUM CHLORIDE, CALCIUM CHLORIDE 600; 310; 30; 20 MG/100ML; MG/100ML; MG/100ML; MG/100ML
9 INJECTION, SOLUTION INTRAVENOUS ONCE
Status: COMPLETED | OUTPATIENT
Start: 2025-02-10 | End: 2025-02-10

## 2025-02-10 RX ORDER — FAMOTIDINE 10 MG/ML
20 INJECTION, SOLUTION INTRAVENOUS ONCE
Status: DISCONTINUED | OUTPATIENT
Start: 2025-02-10 | End: 2025-02-10 | Stop reason: HOSPADM

## 2025-02-10 RX ADMIN — METOPROLOL TARTRATE 12.5 MG: 25 TABLET, FILM COATED ORAL at 12:34

## 2025-02-10 RX ADMIN — TRANEXAMIC ACID 1000 MG: 100 INJECTION, SOLUTION INTRAVENOUS at 18:35

## 2025-02-10 RX ADMIN — Medication 1000 MG: at 19:46

## 2025-02-10 RX ADMIN — SODIUM CHLORIDE 75 ML/HR: 9 INJECTION, SOLUTION INTRAVENOUS at 01:25

## 2025-02-10 RX ADMIN — Medication 100 MCG: at 17:10

## 2025-02-10 RX ADMIN — EPHEDRINE SULFATE 10 MG: 50 INJECTION INTRAVENOUS at 17:39

## 2025-02-10 RX ADMIN — ROPIVACAINE HYDROCHLORIDE 25 ML: 5 INJECTION, SOLUTION EPIDURAL; INFILTRATION; PERINEURAL at 15:06

## 2025-02-10 RX ADMIN — DEXAMETHASONE SODIUM PHOSPHATE 4 MG: 4 INJECTION INTRA-ARTICULAR; INTRALESIONAL; INTRAMUSCULAR; INTRAVENOUS; SOFT TISSUE at 16:59

## 2025-02-10 RX ADMIN — TRAZODONE HYDROCHLORIDE 100 MG: 100 TABLET ORAL at 01:25

## 2025-02-10 RX ADMIN — Medication 100 MCG: at 18:00

## 2025-02-10 RX ADMIN — Medication 100 MCG: at 17:20

## 2025-02-10 RX ADMIN — Medication 200 MCG: at 17:28

## 2025-02-10 RX ADMIN — ONDANSETRON 4 MG: 2 INJECTION INTRAMUSCULAR; INTRAVENOUS at 18:41

## 2025-02-10 RX ADMIN — TRAZODONE HYDROCHLORIDE 100 MG: 100 TABLET ORAL at 22:25

## 2025-02-10 RX ADMIN — FAMOTIDINE 20 MG: 10 INJECTION, SOLUTION INTRAVENOUS at 14:47

## 2025-02-10 RX ADMIN — Medication 10 ML: at 08:09

## 2025-02-10 RX ADMIN — Medication 200 MCG: at 17:01

## 2025-02-10 RX ADMIN — EPHEDRINE SULFATE 5 MG: 50 INJECTION INTRAVENOUS at 18:14

## 2025-02-10 RX ADMIN — SODIUM CHLORIDE, SODIUM LACTATE, POTASSIUM CHLORIDE, CALCIUM CHLORIDE 9 ML/HR: 20; 30; 600; 310 INJECTION, SOLUTION INTRAVENOUS at 14:47

## 2025-02-10 RX ADMIN — Medication 100 MCG: at 18:11

## 2025-02-10 RX ADMIN — SODIUM CHLORIDE 2 G: 900 INJECTION INTRAVENOUS at 16:55

## 2025-02-10 RX ADMIN — METOPROLOL TARTRATE 12.5 MG: 25 TABLET, FILM COATED ORAL at 01:25

## 2025-02-10 RX ADMIN — TRANEXAMIC ACID 1000 MG: 100 INJECTION, SOLUTION INTRAVENOUS at 17:01

## 2025-02-10 RX ADMIN — Medication 100 MCG: at 16:45

## 2025-02-10 RX ADMIN — SODIUM CHLORIDE, POTASSIUM CHLORIDE, SODIUM LACTATE AND CALCIUM CHLORIDE: 600; 310; 30; 20 INJECTION, SOLUTION INTRAVENOUS at 16:34

## 2025-02-10 RX ADMIN — CARBIDOPA AND LEVODOPA 1 TABLET: 25; 100 TABLET ORAL at 08:09

## 2025-02-10 RX ADMIN — CARBIDOPA AND LEVODOPA 1 TABLET: 25; 100 TABLET ORAL at 12:34

## 2025-02-10 RX ADMIN — PROPOFOL 80 MG: 10 INJECTION, EMULSION INTRAVENOUS at 16:43

## 2025-02-10 RX ADMIN — Medication 200 MCG: at 16:55

## 2025-02-10 RX ADMIN — SUGAMMADEX 200 MG: 100 INJECTION, SOLUTION INTRAVENOUS at 18:53

## 2025-02-10 RX ADMIN — ACETAMINOPHEN 650 MG: 650 SOLUTION ORAL at 08:28

## 2025-02-10 RX ADMIN — LIDOCAINE HYDROCHLORIDE 40 MG: 10 INJECTION, SOLUTION EPIDURAL; INFILTRATION; INTRACAUDAL; PERINEURAL at 16:43

## 2025-02-10 RX ADMIN — ROCURONIUM BROMIDE 50 MG: 10 INJECTION INTRAVENOUS at 16:43

## 2025-02-10 RX ADMIN — Medication 1 MCG: at 17:29

## 2025-02-10 NOTE — PLAN OF CARE
Problem: Adult Inpatient Plan of Care  Goal: Plan of Care Review  Outcome: Progressing  Flowsheets (Taken 2/10/2025 0442)  Progress: no change  Plan of Care Reviewed With: patient  Goal: Patient-Specific Goal (Individualized)  Outcome: Progressing  Goal: Absence of Hospital-Acquired Illness or Injury  Outcome: Progressing  Intervention: Identify and Manage Fall Risk  Recent Flowsheet Documentation  Taken 2/10/2025 0245 by Eleazar Cerda RN  Safety Promotion/Fall Prevention: safety round/check completed  Intervention: Prevent Skin Injury  Recent Flowsheet Documentation  Taken 2/10/2025 0245 by Eleazar Cerda RN  Body Position: supine  Intervention: Prevent and Manage VTE (Venous Thromboembolism) Risk  Recent Flowsheet Documentation  Taken 2/10/2025 0245 by Eleazar Cerda RN  VTE Prevention/Management:   SCDs (sequential compression devices) on   bilateral  Goal: Optimal Comfort and Wellbeing  Outcome: Progressing  Goal: Readiness for Transition of Care  Outcome: Progressing     Problem: Skin Injury Risk Increased  Goal: Skin Health and Integrity  Outcome: Progressing  Intervention: Optimize Skin Protection  Recent Flowsheet Documentation  Taken 2/10/2025 0245 by Eleazar Creda RN  Activity Management: bedrest  Head of Bed (HOB) Positioning: HOB elevated     Problem: Fall Injury Risk  Goal: Absence of Fall and Fall-Related Injury  Outcome: Progressing  Intervention: Promote Injury-Free Environment  Recent Flowsheet Documentation  Taken 2/10/2025 0245 by Eleazar Cerda RN  Safety Promotion/Fall Prevention: safety round/check completed   Goal Outcome Evaluation:  Plan of Care Reviewed With: patient        Progress: no change     GERTRUDE since arrival, VSS, plan of care ongoing

## 2025-02-10 NOTE — PROGRESS NOTES
[facility] Medicine Services  PROGRESS NOTE    Patient Name: Fanny Boyce  : 1940  MRN: [id number]    Date of Admission: 2025  Primary Care Physician: Nemo Aponte PA    Subjective   Subjective     CC:  Hip fx    HPI:  No events overnight.  Going to OR today.      Objective   Objective     Vital Signs:   Temp:  [97.2 °F (36.2 °C)-99.5 °F (37.5 °C)] 98.3 °F (36.8 °C)  Heart Rate:  [59-77] 62  Resp:  [16-18] 16  BP: (101-142)/(51-83) 130/55  Flow (L/min) (Oxygen Therapy):  [2] 2     Physical Exam:  Constitutional: No acute distress, sleeping  HENT: NCAT, mucous membranes moist  Respiratory: Clear to auscultation bilaterally, respiratory effort normal   Cardiovascular: RRR, no murmurs, rubs, or gallops  Gastrointestinal: Positive bowel sounds, soft, nontender, nondistended  Musculoskeletal: No bilateral ankle edema  Psychiatric: sleeping  Neurologic: sleeping  Skin: No rashes      Results Reviewed:  LAB RESULTS:      Lab 02/10/25  0742 02/10/25  0741 25   WBC  --  8.68 9.29   HEMOGLOBIN  --  12.3 13.5   HEMATOCRIT  --  39.1 43.0   PLATELETS  --  186 235   NEUTROS ABS  --  7.60* 7.49*   IMMATURE GRANS (ABS)  --  0.04 0.09*   LYMPHS ABS  --  0.67* 1.17   MONOS ABS  --  0.27 0.39   EOS ABS  --  0.06 0.10   MCV  --  88.7 89.8   PROTIME 13.0  --   --          Lab 02/10/25  0742 25   SODIUM 141 141   POTASSIUM 4.3 3.9   CHLORIDE 104 102   CO2 28.0 30.0*   ANION GAP 9.0 9.0   BUN 18 19   CREATININE 0.63 0.66   EGFR 86.5 85.6   GLUCOSE 125* 116*   CALCIUM 9.1 9.5   MAGNESIUM 1.7  --          Lab 02/10/25  0742 25   TOTAL PROTEIN 6.2 7.0   ALBUMIN 3.6 4.1   GLOBULIN 2.6 2.9   ALT (SGPT) <5 5   AST (SGOT) 16 18   BILIRUBIN 0.3 0.2   ALK PHOS 79 92         Lab 02/10/25  0742   PROTIME 13.0   INR 0.97             Lab 02/10/25  0742 25   ABO TYPING O O   RH TYPING Positive Positive   ANTIBODY SCREEN  --  Negative         Brief Urine Lab  Results  (Last result in the past 365 days)        Color   Clarity   Blood   Leuk Est   Nitrite   Protein   CREAT   Urine HCG        06/01/24 1157 Yellow   Turbid   Small (1+)   Large (3+)   Positive   30 mg/dL (1+)                   Microbiology Results Abnormal       None            CT Head Without Contrast    Result Date: 2/9/2025  CT HEAD WO CONTRAST, CT PELVIS WO CONTRAST Date of Exam: 2/9/2025 8:21 PM EST Indication: fall, unknown head injury. Comparison: 6/2/2024. Technique: Axial CT images were obtained of the head and pelvis without contrast administration.  Automated exposure control and iterative construction methods were used. Findings: Head: There is no evidence of hemorrhage. There is no mass effect or midline shift. There is no extracerebral collection. Ventricles are normal in size and configuration for patient's stated age.  Posterior fossa is within normal limits. Calvarium and skull base appear intact.   Visualized sinuses show no air fluid levels. A small amount of secretions present within the right sphenoid sinus. Visualized orbits are unremarkable. Pelvis: There is a mildly displaced and impacted fracture of the right femoral neck. Mild varus angulation is present. Total left hip arthroplasty present which appears intact. There is no evidence of additional fracture. No evidence of dislocation. Intrapelvic contents demonstrate no acute process. Diverticulosis present within the sigmoid colon. No significant inflammatory changes identified. Soft tissues appear grossly unremarkable.     Impression: Impression: 1.No acute intracranial process. 2.Mildly displaced and impacted fracture of the right femoral neck. Electronically Signed: Dina Basilio MD  2/9/2025 8:45 PM EST  Workstation ID: [id number]    CT Pelvis Without Contrast    Result Date: 2/9/2025  CT HEAD WO CONTRAST, CT PELVIS WO CONTRAST Date of Exam: 2/9/2025 8:21 PM EST Indication: fall, unknown head injury. Comparison: 6/2/2024. Technique:  Axial CT images were obtained of the head and pelvis without contrast administration.  Automated exposure control and iterative construction methods were used. Findings: Head: There is no evidence of hemorrhage. There is no mass effect or midline shift. There is no extracerebral collection. Ventricles are normal in size and configuration for patient's stated age.  Posterior fossa is within normal limits. Calvarium and skull base appear intact.   Visualized sinuses show no air fluid levels. A small amount of secretions present within the right sphenoid sinus. Visualized orbits are unremarkable. Pelvis: There is a mildly displaced and impacted fracture of the right femoral neck. Mild varus angulation is present. Total left hip arthroplasty present which appears intact. There is no evidence of additional fracture. No evidence of dislocation. Intrapelvic contents demonstrate no acute process. Diverticulosis present within the sigmoid colon. No significant inflammatory changes identified. Soft tissues appear grossly unremarkable.     Impression: Impression: 1.No acute intracranial process. 2.Mildly displaced and impacted fracture of the right femoral neck. Electronically Signed: Dina Basilio MD  2/9/2025 8:45 PM EST  Workstation ID: [id number]    XR Femur 2 View Right    Result Date: 2/9/2025  XR FEMUR 2 VW RIGHT, XR PELVIS 1 OR 2 VW, XR CHEST 1 VW Date of Exam: 2/9/2025 7:57 PM EST Indication: fall, R hip injury Comparison: None available. Findings: Right femur: There is a mildly displaced and impacted fracture of the right femoral neck. No significant angulation identified. Remaining portions of the femur appear intact. Total right knee arthroplasty present which appears intact. No knee joint effusion. Pelvis: No additional fracture identified. Left hip hemiarthroplasty present. CHEST: There are no airspace consolidations. No pleural fluid. No pneumothorax. The pulmonary vasculature appears within normal limits. The  cardiac and mediastinal silhouette appear unremarkable. No acute osseous abnormality identified. Hiatal hernia present.     Impression: Impression: Mildly displaced and impacted fracture of the right femoral neck. No additional fracture identified within the pelvis. No acute cardiopulmonary process. Electronically Signed: Dina Basilio MD  2/9/2025 8:27 PM EST  Workstation ID: [id number]    XR Chest 1 View    Result Date: 2/9/2025  XR FEMUR 2 VW RIGHT, XR PELVIS 1 OR 2 VW, XR CHEST 1 VW Date of Exam: 2/9/2025 7:57 PM EST Indication: fall, R hip injury Comparison: None available. Findings: Right femur: There is a mildly displaced and impacted fracture of the right femoral neck. No significant angulation identified. Remaining portions of the femur appear intact. Total right knee arthroplasty present which appears intact. No knee joint effusion. Pelvis: No additional fracture identified. Left hip hemiarthroplasty present. CHEST: There are no airspace consolidations. No pleural fluid. No pneumothorax. The pulmonary vasculature appears within normal limits. The cardiac and mediastinal silhouette appear unremarkable. No acute osseous abnormality identified. Hiatal hernia present.     Impression: Impression: Mildly displaced and impacted fracture of the right femoral neck. No additional fracture identified within the pelvis. No acute cardiopulmonary process. Electronically Signed: Dina Basilio MD  2/9/2025 8:27 PM EST  Workstation ID: [id number]    XR Pelvis 1 or 2 View    Result Date: 2/9/2025  XR FEMUR 2 VW RIGHT, XR PELVIS 1 OR 2 VW, XR CHEST 1 VW Date of Exam: 2/9/2025 7:57 PM EST Indication: fall, R hip injury Comparison: None available. Findings: Right femur: There is a mildly displaced and impacted fracture of the right femoral neck. No significant angulation identified. Remaining portions of the femur appear intact. Total right knee arthroplasty present which appears intact. No knee joint effusion. Pelvis: No additional  fracture identified. Left hip hemiarthroplasty present. CHEST: There are no airspace consolidations. No pleural fluid. No pneumothorax. The pulmonary vasculature appears within normal limits. The cardiac and mediastinal silhouette appear unremarkable. No acute osseous abnormality identified. Hiatal hernia present.     Impression: Impression: Mildly displaced and impacted fracture of the right femoral neck. No additional fracture identified within the pelvis. No acute cardiopulmonary process. Electronically Signed: Dina Basilio MD  2/9/2025 8:27 PM EST  Workstation ID: [id number]         Current medications:  Scheduled Meds:carbidopa-levodopa, 1 tablet, Oral, TID  metoprolol tartrate, 12.5 mg, Oral, BID  sodium chloride, 10 mL, Intravenous, Q12H  traZODone, 100 mg, Oral, Nightly      Continuous Infusions:sodium chloride, 75 mL/hr, Last Rate: 75 mL/hr (02/10/25 0125)      PRN Meds:.  acetaminophen **OR** acetaminophen **OR** acetaminophen    Calcium Replacement - Follow Nurse / BPA Driven Protocol    HYDROcodone-acetaminophen    ipratropium-albuterol    Magnesium Standard Dose Replacement - Follow Nurse / BPA Driven Protocol    Morphine **AND** naloxone    nitroglycerin    ondansetron    Phosphorus Replacement - Follow Nurse / BPA Driven Protocol    Potassium Replacement - Follow Nurse / BPA Driven Protocol    sodium chloride    sodium chloride    Assessment & Plan   Assessment & Plan     Active Hospital Problems    Diagnosis  POA    **Fracture of femoral neck, right [S72.001A]  Yes      Resolved Hospital Problems   No resolved problems to display.        Brief Hospital Course to date:  Fanny Boyce is a 84 y.o. female with a past medical history of COPD, A-fib, Parkinson's disease and dementia admitted after a presumed mechanical fall that resulted in a right hip fracture.    Right femoral neck fracture  - N.p.o. planning for OR today  - Pain control with oral agents, IV if needed.  - Hold Eliquis from home regimen  for now.  --PT/OT    Atrial fibrillation  - Continue metoprolol from home regimen.  - Continue telemetry.  - Hold Eliquis for now ahead of possible right hip surgery.     COPD  - Not currently in exacerbation.  - No medications listed for this on her reviewed home medication list.  - Continue as needed DuoNebs.     Parkinson's disease  Dementia  - Continue carbidopa/levodopa from home regimen.  - Continue trazodone at night from home regimen.      Expected Discharge Location and Transportation: Pending  Expected Discharge   Expected Discharge Date: 2/12/2025; Expected Discharge Time:      VTE Prophylaxis:  Mechanical VTE prophylaxis orders are present.         AM-PAC 6 Clicks Score (PT): 12 (02/10/25 1160)    CODE STATUS:   Code Status and Medical Interventions: CPR (Attempt to Resuscitate); Full Support   Ordered at: 02/09/25 9226     Code Status (Patient has no pulse and is not breathing):    CPR (Attempt to Resuscitate)     Medical Interventions (Patient has pulse or is breathing):    Full Support       Karo Matta MD  02/10/25

## 2025-02-10 NOTE — PROGRESS NOTES
"          Clinical Nutrition Assessment     Patient Name: Fanny Boyce  YOB: 1940  MRN: [id number]  Date of Encounter: 02/10/25 15:18 EST  Admission date: 2/9/2025  Reason for Visit: Identified at risk by screening criteria, Nonhealing wound or pressure ulcer    Assessment   Nutrition Assessment   Admission Diagnosis:  Fracture of femoral neck, right [S72.001A]    Problem List:    Fracture of femoral neck, right      PMH:   She  has a past medical history of Atrial fibrillation, COPD (chronic obstructive pulmonary disease), Dementia, History of lung cancer, Parkinson disease, and Pulmonary embolism.    PSH:  She  has a past surgical history that includes Joint replacement and Lung cancer surgery.    Applicable Nutrition History:   Skin: healing PI on sacrum    Anthropometrics     Height: Height: 152.4 cm (60\")  Last Filed Weight: Weight: 52.1 kg (114 lb 12.8 oz) (02/09/25 2225)  Method: Weight Method: Stated  BMI: BMI (Calculated): 22.4    UBW:  CONNER  Weight change: No significant changes with limited wt hx     Weight      Weight (kg) Weight (lbs) Weight Method   1/31/2024 53.071 kg  117 lb  Stated    3/13/2024 53.071 kg  117 lb  Estimated    6/1/2024 53.071 kg  117 lb  Stated    2/9/2025 53.071 kg  117 lb  Stated     52.073 kg  114 lb 12.8 oz       Nutrition Focused Physical Exam    Date:  2/10       Unable to perform due to Pt unable to participate at time of visit     Subjective   Reported/Observed/Food/Nutrition Related History:     Patient not in room at time of visit, also per chart poor historian. Nutrition hx obtained from daughter, Lety, via phone. Dtr reports patient typically has a good appetite, no changes noted. Would likely do better with softer texture diet while here as she does not have bottom dentures (they often fall out). Dtr often gives pt stool softeners, last BM Saturday. NKFA. Pt does drink Ensure, dtr thinks pt would drink Boost.    Current Nutrition Prescription   PO: " NPO Diet NPO Type: Strict NPO  Oral Nutrition Supplement: N/A  Intake: Insufficient data    Assessment & Plan   Nutrition Diagnosis   Date:  2/10            Updated:    Problem No nutrition diagnosis at this time    Etiology    Signs/Symptoms    Status:       Goal:   Nutrition to support treatment and Establish PO      Nutrition Intervention      Follow treatment progress, Care plan reviewed, Advise alternate selection, Advised available snacks, Interview for preferences, Encourage intake, Supplement provided    Follow for PO establishment  NFPE when feasible  Trial Boost Plus with breakfast, vanilla    Monitoring/Evaluation:   Per protocol, I&O, PO intake, Supplement intake, Pertinent labs, Weight, Symptoms, POC/GOC    Sandra Hodges RD  Time Spent: 30m

## 2025-02-10 NOTE — H&P
"    [facility] Medicine Services  HISTORY AND PHYSICAL    Patient Name: Fanny Boyce  : 1940  MRN: [id number]  Primary Care Physician: Nemo Aponte PA  Date of admission: 2025      Subjective   Subjective     Chief Complaint:  Fall, right hip pain    HPI:  Fanny Boyce is a 84 y.o. female who states that she fell earlier this afternoon.  She states she fell due to \"losing my balance.\"  She denies tripping or slipping on any object, also denies any symptomatology causative of the fall, i.e. dizziness/lightheadedness, weakness, or syncope.  She states she impacted her right side and did not attempt ambulation due to pain in the right hip after the fall.  She came to the ED for further evaluation soon after, where workup included imaging studies that revealed a right femoral neck fracture.  She denies chest pain, shortness of breath, fever/chills, nausea/emesis, abdominal pain, bowel habit change, slurred speech/facial droop, focal weakness, or syncope.  She is confused and has history of dementia, so she is not a reliable historian; there is no family or caregiver present during my visit.  She does not recall much of her medical history, so prior records were used to obtain this.  Her medical history is evidently significant for COPD, atrial fibrillation, Parkinson disease, and dementia.      Personal History     Past Medical History:   Diagnosis Date    Atrial fibrillation     COPD (chronic obstructive pulmonary disease)     Dementia     History of lung cancer     Parkinson disease     Pulmonary embolism            Past Surgical History:   Procedure Laterality Date    JOINT REPLACEMENT      LUNG CANCER SURGERY         Family History: family history includes Aneurysm in her father; Heart disease in her father and mother.     Social History:  reports that she has quit smoking. Her smoking use included cigarettes. She has a 40 pack-year smoking history. She has never used " smokeless tobacco. She reports that she does not drink alcohol and does not use drugs.  Social History     Social History Narrative    Not on file       Medications:  Available home medication information reviewed.  apixaban, carbidopa-levodopa, fluticasone, metoprolol tartrate, and traZODone    No Known Allergies    Objective   Objective     Vital Signs:   Temp:  [97.2 °F (36.2 °C)] 97.2 °F (36.2 °C)  Heart Rate:  [59-69] 67  Resp:  [18] 18  BP: (123-135)/(51-83) 128/51  Flow (L/min) (Oxygen Therapy):  [2] 2       Physical Exam   Constitutional: Awake, alert, NAD.  Eyes: PERRLA, sclerae anicteric, no conjunctival injection  HENT: NCAT, mucous membranes moist  Neck: Supple, no thyromegaly, no lymphadenopathy, trachea midline  Respiratory: Clear to auscultation bilaterally, nonlabored respirations   Cardiovascular: RRR, no murmurs, rubs, or gallops, palpable pedal pulses bilaterally  Gastrointestinal: Positive bowel sounds, soft, nontender, nondistended  Musculoskeletal: No bilateral ankle edema, no clubbing or cyanosis to extremities.  NVI at the toes on the right, normal cap refill there.  RLE is kept with very minimal external rotation at the hip and minimal flexion at the knee.  Psychiatric: Appropriate affect, cooperative  Neurologic: Oriented to person/city but not to name of this facility or year, strength symmetric in all extremities though RLE is not tested. Cranial Nerves grossly intact to confrontation, speech quiet but clear  Skin: No rashes, poor turgor.    Result Review:  I have personally reviewed the results from the time of this admission to 2/9/2025 23:07 EST and agree with these findings:  [x]  Laboratory list / accordion  []  Microbiology  [x]  Radiology  [x]  EKG/Telemetry   []  Cardiology/Vascular   []  Pathology  [x]  Old records  []  Other:  Most notable findings include: I reviewed chest x-ray which is a single AP view and by my read shows nothing acute.  Reviewed radiology reports from x-ray  femur, x-ray pelvis, CT head and CT pelvis.  I reviewed EKG which by my read shows sinus bradycardia, ventricular rate just under 60 bpm, normal axis, nonspecific ST/T wave changes, some artifact on the study.      LAB RESULTS:      Lab 02/09/25 1959   WBC 9.29   HEMOGLOBIN 13.5   HEMATOCRIT 43.0   PLATELETS 235   NEUTROS ABS 7.49*   IMMATURE GRANS (ABS) 0.09*   LYMPHS ABS 1.17   MONOS ABS 0.39   EOS ABS 0.10   MCV 89.8         Lab 02/09/25 1959   SODIUM 141   POTASSIUM 3.9   CHLORIDE 102   CO2 30.0*   ANION GAP 9.0   BUN 19   CREATININE 0.66   EGFR 85.6   GLUCOSE 116*   CALCIUM 9.5         Lab 02/09/25 1959   TOTAL PROTEIN 7.0   ALBUMIN 4.1   GLOBULIN 2.9   ALT (SGPT) 5   AST (SGOT) 18   BILIRUBIN 0.2   ALK PHOS 92                 Lab 02/09/25 2010   ABO TYPING O   RH TYPING Positive   ANTIBODY SCREEN Negative         UA          3/13/2024    22:54 6/1/2024    11:57   Urinalysis   Squamous Epithelial Cells, UA 0-2  0-2    Specific Gravity, UA 1.011  1.013    Ketones, UA Negative  Negative    Blood, UA Trace  Small (1+)    Leukocytes, UA Trace  Large (3+)    Nitrite, UA Negative  Positive    RBC, UA 6-10  11-20    WBC, UA 6-10  Too Numerous to Count    Bacteria, UA None Seen  4+        Microbiology Results (last 10 days)       ** No results found for the last 240 hours. **            CT Head Without Contrast    Result Date: 2/9/2025  CT HEAD WO CONTRAST, CT PELVIS WO CONTRAST Date of Exam: 2/9/2025 8:21 PM EST Indication: fall, unknown head injury. Comparison: 6/2/2024. Technique: Axial CT images were obtained of the head and pelvis without contrast administration.  Automated exposure control and iterative construction methods were used. Findings: Head: There is no evidence of hemorrhage. There is no mass effect or midline shift. There is no extracerebral collection. Ventricles are normal in size and configuration for patient's stated age.  Posterior fossa is within normal limits. Calvarium and skull base appear  intact.   Visualized sinuses show no air fluid levels. A small amount of secretions present within the right sphenoid sinus. Visualized orbits are unremarkable. Pelvis: There is a mildly displaced and impacted fracture of the right femoral neck. Mild varus angulation is present. Total left hip arthroplasty present which appears intact. There is no evidence of additional fracture. No evidence of dislocation. Intrapelvic contents demonstrate no acute process. Diverticulosis present within the sigmoid colon. No significant inflammatory changes identified. Soft tissues appear grossly unremarkable.     Impression: Impression: 1.No acute intracranial process. 2.Mildly displaced and impacted fracture of the right femoral neck. Electronically Signed: Dina Basilio MD  2/9/2025 8:45 PM EST  Workstation ID: [id number]    CT Pelvis Without Contrast    Result Date: 2/9/2025  CT HEAD WO CONTRAST, CT PELVIS WO CONTRAST Date of Exam: 2/9/2025 8:21 PM EST Indication: fall, unknown head injury. Comparison: 6/2/2024. Technique: Axial CT images were obtained of the head and pelvis without contrast administration.  Automated exposure control and iterative construction methods were used. Findings: Head: There is no evidence of hemorrhage. There is no mass effect or midline shift. There is no extracerebral collection. Ventricles are normal in size and configuration for patient's stated age.  Posterior fossa is within normal limits. Calvarium and skull base appear intact.   Visualized sinuses show no air fluid levels. A small amount of secretions present within the right sphenoid sinus. Visualized orbits are unremarkable. Pelvis: There is a mildly displaced and impacted fracture of the right femoral neck. Mild varus angulation is present. Total left hip arthroplasty present which appears intact. There is no evidence of additional fracture. No evidence of dislocation. Intrapelvic contents demonstrate no acute process. Diverticulosis present  within the sigmoid colon. No significant inflammatory changes identified. Soft tissues appear grossly unremarkable.     Impression: Impression: 1.No acute intracranial process. 2.Mildly displaced and impacted fracture of the right femoral neck. Electronically Signed: Dina Basilio MD  2/9/2025 8:45 PM EST  Workstation ID: [id number]    XR Femur 2 View Right    Result Date: 2/9/2025  XR FEMUR 2 VW RIGHT, XR PELVIS 1 OR 2 VW, XR CHEST 1 VW Date of Exam: 2/9/2025 7:57 PM EST Indication: fall, R hip injury Comparison: None available. Findings: Right femur: There is a mildly displaced and impacted fracture of the right femoral neck. No significant angulation identified. Remaining portions of the femur appear intact. Total right knee arthroplasty present which appears intact. No knee joint effusion. Pelvis: No additional fracture identified. Left hip hemiarthroplasty present. CHEST: There are no airspace consolidations. No pleural fluid. No pneumothorax. The pulmonary vasculature appears within normal limits. The cardiac and mediastinal silhouette appear unremarkable. No acute osseous abnormality identified. Hiatal hernia present.     Impression: Impression: Mildly displaced and impacted fracture of the right femoral neck. No additional fracture identified within the pelvis. No acute cardiopulmonary process. Electronically Signed: Dina Basilio MD  2/9/2025 8:27 PM EST  Workstation ID: [id number]    XR Chest 1 View    Result Date: 2/9/2025  XR FEMUR 2 VW RIGHT, XR PELVIS 1 OR 2 VW, XR CHEST 1 VW Date of Exam: 2/9/2025 7:57 PM EST Indication: fall, R hip injury Comparison: None available. Findings: Right femur: There is a mildly displaced and impacted fracture of the right femoral neck. No significant angulation identified. Remaining portions of the femur appear intact. Total right knee arthroplasty present which appears intact. No knee joint effusion. Pelvis: No additional fracture identified. Left hip hemiarthroplasty present.  CHEST: There are no airspace consolidations. No pleural fluid. No pneumothorax. The pulmonary vasculature appears within normal limits. The cardiac and mediastinal silhouette appear unremarkable. No acute osseous abnormality identified. Hiatal hernia present.     Impression: Impression: Mildly displaced and impacted fracture of the right femoral neck. No additional fracture identified within the pelvis. No acute cardiopulmonary process. Electronically Signed: Dina Basilio MD  2/9/2025 8:27 PM EST  Workstation ID: [id number]    XR Pelvis 1 or 2 View    Result Date: 2/9/2025  XR FEMUR 2 VW RIGHT, XR PELVIS 1 OR 2 VW, XR CHEST 1 VW Date of Exam: 2/9/2025 7:57 PM EST Indication: fall, R hip injury Comparison: None available. Findings: Right femur: There is a mildly displaced and impacted fracture of the right femoral neck. No significant angulation identified. Remaining portions of the femur appear intact. Total right knee arthroplasty present which appears intact. No knee joint effusion. Pelvis: No additional fracture identified. Left hip hemiarthroplasty present. CHEST: There are no airspace consolidations. No pleural fluid. No pneumothorax. The pulmonary vasculature appears within normal limits. The cardiac and mediastinal silhouette appear unremarkable. No acute osseous abnormality identified. Hiatal hernia present.     Impression: Impression: Mildly displaced and impacted fracture of the right femoral neck. No additional fracture identified within the pelvis. No acute cardiopulmonary process. Electronically Signed: Dina Basilio MD  2/9/2025 8:27 PM EST  Workstation ID: [id number]         Assessment & Plan   Assessment & Plan       Fracture of femoral neck, right      84F with right femoral neck fracture    Right femoral neck fracture  - N.p.o. after midnight.  - Pain control with oral agents, IV if needed.  - Up with assistance only.  - Hold Eliquis from home regimen for now.  - Ortho called by the ED, will follow up  recommendations, evidently surgery to take place either Monday 2/10 or Tuesday 2/11 due to patient's use of Eliquis.    Atrial fibrillation  - Continue metoprolol from home regimen.  - Continue telemetry.  - Hold Eliquis for now ahead of possible right hip surgery.    COPD  - Not currently in exacerbation.  - No medications listed for this on her reviewed home medication list.  - Will add as needed DuoNebs.    Parkinson's disease  Dementia  - Continue carbidopa/levodopa from home regimen.  - Continue trazodone at night from home regimen.          Total time spent: 81 minutes  Time spent includes time reviewing chart, face-to-face time, counseling patient/family/caregiver, ordering medications/tests/procedures, communicating with other health care professionals, documenting clinical information in the electronic health record, and coordination of care.     VTE Prophylaxis:  Mechanical VTE prophylaxis orders are present.          CODE STATUS:  full  Code Status and Medical Interventions: CPR (Attempt to Resuscitate); Full Support   Ordered at: 02/09/25 2226     Code Status (Patient has no pulse and is not breathing):    CPR (Attempt to Resuscitate)     Medical Interventions (Patient has pulse or is breathing):    Full Support       Expected Discharge   Tbsheldon Smart III, DO  02/09/25

## 2025-02-10 NOTE — ED NOTES
Fanny Boyce    Nursing Report ED to Floor:  Mental status: A&OX3   Ambulatory status: NON AMBULATORY AT THIS TIME  Oxygen Therapy:  RA-2LNC  Cardiac Rhythm: NSR  Admitted from: HOME  Safety Concerns:  HIGH FALL RISK,  DEMENTIA, RIGHT HIP FX  Precautions: NA  Social Issues: NA  ED Room #:  08    ED Nurse Phone Extension - [phone] or may call [phone].      HPI:   Chief Complaint   Patient presents with    Fall    Hip Pain       Past Medical History:  Past Medical History:   Diagnosis Date    Atrial fibrillation     COPD (chronic obstructive pulmonary disease)     Dementia     History of lung cancer     Parkinson disease     Pulmonary embolism         Past Surgical History:  Past Surgical History:   Procedure Laterality Date    JOINT REPLACEMENT      LUNG CANCER SURGERY          Admitting Doctor:   No admitting provider for patient encounter.    Consulting Provider(s):  Consults       No orders found from 1/11/2025 to 2/10/2025.             Admitting Diagnosis:   The primary encounter diagnosis was Closed fracture of neck of right femur, initial encounter. Diagnoses of Anticoagulated, History of atrial fibrillation, and History of pulmonary embolism were also pertinent to this visit.    Most Recent Vitals:   Vitals:    02/09/25 2100 02/09/25 2129 02/09/25 2130 02/09/25 2200   BP: 135/63  124/60 128/51   BP Location:       Patient Position:       Pulse: 67 67 69 67   Resp:       Temp:       TempSrc:       SpO2: 91% 92% 92% 91%   Weight:       Height:           Active LDAs/IV Access:   Lines, Drains & Airways       Active LDAs       Name Placement date Placement time Site Days    Peripheral IV 02/09/25 2025 Anterior;Left Forearm 02/09/25 2025  Forearm  less than 1                    Labs (abnormal labs have a star):   Labs Reviewed   COMPREHENSIVE METABOLIC PANEL - Abnormal; Notable for the following components:       Result Value    Glucose 116 (*)     CO2 30.0 (*)     BUN/Creatinine Ratio 28.8 (*)     All other  components within normal limits    Narrative:     GFR Categories in Chronic Kidney Disease (CKD)      GFR Category          GFR (mL/min/1.73)    Interpretation  G1                     90 or greater         Normal or high (1)  G2                      60-89                Mild decrease (1)  G3a                   45-59                Mild to moderate decrease  G3b                   30-44                Moderate to severe decrease  G4                    15-29                Severe decrease  G5                    14 or less           Kidney failure          (1)In the absence of evidence of kidney disease, neither GFR category G1 or G2 fulfill the criteria for CKD.    eGFR calculation 2021 CKD-EPI creatinine equation, which does not include race as a factor   CBC WITH AUTO DIFFERENTIAL - Abnormal; Notable for the following components:    MCHC 31.4 (*)     Neutrophil % 80.6 (*)     Lymphocyte % 12.6 (*)     Monocyte % 4.2 (*)     Immature Grans % 1.0 (*)     Neutrophils, Absolute 7.49 (*)     Immature Grans, Absolute 0.09 (*)     All other components within normal limits   TYPE AND SCREEN   ABORH 2ND SPECIMEN VERIFICATION   CBC AND DIFFERENTIAL    Narrative:     The following orders were created for panel order CBC & Differential.  Procedure                               Abnormality         Status                     ---------                               -----------         ------                     CBC Auto Differential[[id number]]        Abnormal            Final result                 Please view results for these tests on the individual orders.       Meds Given in ED:   Medications   morphine injection 2 mg (2 mg Intravenous Given 2/9/25 2210)     No current facility-administered medications for this encounter.       Last NIH score:                                                          Dysphagia screening results:  Patient Factors Component (Dysphagia:Stroke or Rule-out)  Best Eye Response: 4-->(E4) spontaneous  (02/09/25 2026)  Best Motor Response: 6-->(M6) obeys commands (02/09/25 2026)  Best Verbal Response: 4-->(V4) confused (02/09/25 2026)  [place] Coma Scale Score: 14 (02/09/25 2026)     [place] Coma Scale:  No data recorded     CIWA:        Restraint Type:            Isolation Status:  No active isolations

## 2025-02-10 NOTE — ANESTHESIA PROCEDURE NOTES
Airway  Urgency: elective    Date/Time: 2/10/2025 4:45 PM  Airway not difficult    General Information and Staff    Patient location during procedure: OR  SRNA: Banner, Karina, SRNA  Indications and Patient Condition  Indications for airway management: airway protection    Preoxygenated: yes  MILS not maintained throughout  Mask difficulty assessment: 2 - vent by mask + OA or adjuvant +/- NMBA    Final Airway Details  Final airway type: endotracheal airway      Successful airway: ETT  Cuffed: yes   Successful intubation technique: video laryngoscopy  Endotracheal tube insertion site: oral  Blade: Mahajan  Blade size: 3  ETT size (mm): 7.0  Cormack-Lehane Classification: grade I - full view of glottis  Placement verified by: chest auscultation and capnometry   Measured from: lips  ETT/EBT  to lips (cm): 20  Number of attempts at approach: 1  Assessment: lips, teeth, and gum same as pre-op and atraumatic intubation    Additional Comments  Negative epigastric sounds, Breath sound equal bilaterally with symmetric chest rise and fall

## 2025-02-10 NOTE — ED PROVIDER NOTES
[place]    EMERGENCY DEPARTMENT ENCOUNTER      Pt Name: Fanny Boyce  MRN: [id number]  YOB: 1940  Date of evaluation: 2/9/2025  Provider: Hardeep Mancia MD    CHIEF COMPLAINT       Chief Complaint   Patient presents with    Fall    Hip Pain         HISTORY OF PRESENT ILLNESS   Fanny Boyce is a 84 y.o. female who presents to the emergency department after a fall from standing onto her bottom. She is now complaining of severe pain in the R hip. Unknown if patient hit her head. She complains of no area of pain aside from her R hip. VSS during transport.       Nursing notes were reviewed.    REVIEW OF SYSTEMS     ROS:  A chief complaint appropriate review of systems was completed and is negative except as noted in the HPI.      PAST MEDICAL HISTORY     Past Medical History:   Diagnosis Date    Atrial fibrillation     COPD (chronic obstructive pulmonary disease)     Dementia     History of lung cancer     Parkinson disease     Pulmonary embolism          SURGICAL HISTORY       Past Surgical History:   Procedure Laterality Date    HIP HEMIARTHROPLASTY Right 2/10/2025    Procedure: HIP HEMIARTHROPLASTY RIGHT;  Surgeon: Frandy Patino MD;  Location: [facility];  Service: Orthopedics;  Laterality: Right;    JOINT REPLACEMENT      LUNG CANCER SURGERY           CURRENT MEDICATIONS       Current Facility-Administered Medications:     acetaminophen (TYLENOL) tablet 650 mg, 650 mg, Oral, Q4H PRN **OR** acetaminophen (TYLENOL) 160 MG/5ML oral solution 650 mg, 650 mg, Oral, Q4H PRN, 650 mg at 02/10/25 0828 **OR** acetaminophen (TYLENOL) suppository 650 mg, 650 mg, Rectal, Q4H PRN, Frandy Patino MD    apixaban (ELIQUIS) tablet 2.5 mg, 2.5 mg, Oral, Q12H, Brandie Berg, PharmD    sennosides-docusate (PERICOLACE) 8.6-50 MG per tablet 2 tablet, 2 tablet, Oral, BID, 2 tablet at 02/12/25 0831 **AND** polyethylene glycol (MIRALAX) packet 17 g, 17 g, Oral, Daily PRN, 17 g at 02/12/25 0831 **AND**  bisacodyl (DULCOLAX) EC tablet 5 mg, 5 mg, Oral, Daily PRN **AND** bisacodyl (DULCOLAX) suppository 10 mg, 10 mg, Rectal, Daily PRN, Karo Matta MD    Calcium Replacement - Follow Nurse / BPA Driven Protocol, , Not Applicable, PRN, Frandy Patino MD    carbidopa-levodopa (SINEMET)  MG per tablet 1 tablet, 1 tablet, Oral, TID, Franyd Patino MD, 1 tablet at 02/12/25 1751    HYDROcodone-acetaminophen (NORCO) 5-325 MG per tablet 1 tablet, 1 tablet, Oral, Q6H PRN, Frandy Patnio MD, 1 tablet at 02/11/25 0816    ipratropium-albuterol (DUO-NEB) nebulizer solution 3 mL, 3 mL, Nebulization, Q4H PRN, Frandy Patino MD, 3 mL at 02/12/25 1745    Magnesium Standard Dose Replacement - Follow Nurse / BPA Driven Protocol, , Not Applicable, PRN, Frandy Pation MD    metoprolol tartrate (LOPRESSOR) half tablet 12.5 mg, 12.5 mg, Oral, BID, Frandy Patino MD, 12.5 mg at 02/12/25 0831    morphine injection 2 mg, 2 mg, Intravenous, Q4H PRN **AND** naloxone (NARCAN) injection 0.4 mg, 0.4 mg, Intravenous, Q5 Min PRN, Frandy Patino MD    nitroglycerin (NITROSTAT) SL tablet 0.4 mg, 0.4 mg, Sublingual, Q5 Min PRN, Frandy Patino MD    ondansetron (ZOFRAN) injection 4 mg, 4 mg, Intravenous, Q6H PRN, Frandy Patino MD    Phosphorus Replacement - Follow Nurse / BPA Driven Protocol, , Not Applicable, PRN, Frandy Patino MD    Potassium Replacement - Follow Nurse / BPA Driven Protocol, , Not Applicable, PRN, Frandy Patino MD    ropivacaine (NAROPIN) 0.2 % infusion (INFUSYSTEM), , Peripheral Nerve, Continuous, Frandy Patino MD, 1,000 mg at 02/10/25 1946    sodium chloride 0.9 % flush 10 mL, 10 mL, Intravenous, Q12H, Frandy Patino MD, 10 mL at 02/12/25 0831    sodium chloride 0.9 % flush 10 mL, 10 mL, Intravenous, PRN, Frandy Patino MD    sodium chloride 0.9 % infusion 40 mL, 40 mL, Intravenous, PRN, Frandy Patino MD     traZODone (DESYREL) tablet 50 mg, 50 mg, Oral, Nightly PRN, Lorna Gamez MD    ALLERGIES     Patient has no known allergies.    FAMILY HISTORY       Family History   Problem Relation Age of Onset    Heart disease Mother     Heart disease Father     Aneurysm Father           SOCIAL HISTORY       Social History     Socioeconomic History    Marital status: Legally    Tobacco Use    Smoking status: Former     Current packs/day: 1.00     Average packs/day: 1 pack/day for 40.0 years (40.0 ttl pk-yrs)     Types: Cigarettes    Smokeless tobacco: Never   Vaping Use    Vaping status: Never Used   Substance and Sexual Activity    Alcohol use: Never    Drug use: Never    Sexual activity: Not Currently         PHYSICAL EXAM    (up to 7 for level 4, 8 or more for level 5)     Vitals:    02/12/25 1740 02/12/25 1742 02/12/25 1745 02/12/25 1758   BP:       BP Location:       Patient Position:       Pulse:   106 107   Resp:   18    Temp:       TempSrc:       SpO2: (!) 87% 100% 97% (!) 88%   Weight:       Height:           General: Awake, alert, no acute distress.  HEENT: Conjunctivae normal.  Neck: Trachea midline.  Cardiac: Heart regular rate  Lungs: Normal effort  Chest wall: No deformity  Abdomen: Non-distended  Musculoskeletal: Significant tenderness to the R prox femur w shortening. DP/PT 1+ and comparable to the opposite side. Motor and sensory function intact. No other area of tenderness.  Neuro: Alert   Dermatology: Skin is warm and dry  Psych: Mentation is grossly normal, cognition is grossly normal. Affect is appropriate.        DIAGNOSTIC RESULTS     EKG: All EKGs are interpreted by the Emergency Department Physician who either signs or Co-signs this chart in the absence of a cardiologist.    ECG 12 Lead Pre-Op / Pre-Procedure   Final Result   Test Reason : Pre-Op / Pre-Procedure   Blood Pressure :   */*   mmHG   Vent. Rate :  59 BPM     Atrial Rate :  59 BPM      P-R Int : 200 ms          QRS Dur : 126 ms        QT Int : 458 ms       P-R-T Axes : 103  79 -16 degrees     QTcB Int : 453 ms      Sinus bradycardia   Right bundle branch block   T wave abnormality, consider inferior ischemia   Abnormal ECG   When compared with ECG of 04-Jun-2024 09:00,   Sinus rhythm has replaced Atrial fibrillation   Vent. rate has decreased by  72 bpm   Confirmed by CAROLINE VILLATORO ([id number]) on 2/11/2025 1:53:15 AM      Referred By: edmd           Confirmed By: CAROLINE VILLATORO      ECG 12 Lead Pre-Op / Pre-Procedure    (Results Pending)         RADIOLOGY:   [x] Radiologist's Report Reviewed:  XR Pelvis 1 or 2 View   Final Result   Impression:   Expected changes of total right hip arthroplasty without evidence of complication.         Electronically Signed: Duke Tafoya MD     2/10/2025 8:58 PM EST     Workstation ID: [id number]      CT Head Without Contrast   Final Result   Impression:   1.No acute intracranial process.   2.Mildly displaced and impacted fracture of the right femoral neck.                  Electronically Signed: Dina Basilio MD     2/9/2025 8:45 PM EST     Workstation ID: [id number]      CT Pelvis Without Contrast   Final Result   Impression:   1.No acute intracranial process.   2.Mildly displaced and impacted fracture of the right femoral neck.                  Electronically Signed: Dina Basilio MD     2/9/2025 8:45 PM EST     Workstation ID: [id number]      XR Chest 1 View   Final Result   Impression:   Mildly displaced and impacted fracture of the right femoral neck. No additional fracture identified within the pelvis. No acute cardiopulmonary process.               Electronically Signed: Dina Basilio MD     2/9/2025 8:27 PM EST     Workstation ID: [id number]      XR Femur 2 View Right   Final Result   Impression:   Mildly displaced and impacted fracture of the right femoral neck. No additional fracture identified within the pelvis. No acute cardiopulmonary process.               Electronically Signed: Dina Basilio MD     2/9/2025 8:27  PM EST     Workstation ID: [id number]      XR Pelvis 1 or 2 View   Final Result   Impression:   Mildly displaced and impacted fracture of the right femoral neck. No additional fracture identified within the pelvis. No acute cardiopulmonary process.               Electronically Signed: Dina Basilio MD     2/9/2025 8:27 PM EST     Workstation ID: [id number]          I ordered and independently reviewed the above noted radiographic studies.        LABS:    I have reviewed and interpreted all of the currently available lab results from this visit (if applicable):  Results for orders placed or performed during the hospital encounter of 02/09/25   Comprehensive Metabolic Panel    Collection Time: 02/09/25  7:59 PM    Specimen: Blood   Result Value Ref Range    Glucose 116 (H) 65 - 99 mg/dL    BUN 19 8 - 23 mg/dL    Creatinine 0.66 0.57 - 1.00 mg/dL    Sodium 141 136 - 145 mmol/L    Potassium 3.9 3.5 - 5.2 mmol/L    Chloride 102 98 - 107 mmol/L    CO2 30.0 (H) 22.0 - 29.0 mmol/L    Calcium 9.5 8.6 - 10.5 mg/dL    Total Protein 7.0 6.0 - 8.5 g/dL    Albumin 4.1 3.5 - 5.2 g/dL    ALT (SGPT) 5 1 - 33 U/L    AST (SGOT) 18 1 - 32 U/L    Alkaline Phosphatase 92 39 - 117 U/L    Total Bilirubin 0.2 0.0 - 1.2 mg/dL    Globulin 2.9 gm/dL    A/G Ratio 1.4 g/dL    BUN/Creatinine Ratio 28.8 (H) 7.0 - 25.0    Anion Gap 9.0 5.0 - 15.0 mmol/L    eGFR 85.6 >60.0 mL/min/1.73   CBC Auto Differential    Collection Time: 02/09/25  7:59 PM    Specimen: Blood   Result Value Ref Range    WBC 9.29 3.40 - 10.80 10*3/mm3    RBC 4.79 3.77 - 5.28 10*6/mm3    Hemoglobin 13.5 12.0 - 15.9 g/dL    Hematocrit 43.0 34.0 - 46.6 %    MCV 89.8 79.0 - 97.0 fL    MCH 28.2 26.6 - 33.0 pg    MCHC 31.4 (L) 31.5 - 35.7 g/dL    RDW 13.6 12.3 - 15.4 %    RDW-SD 45.1 37.0 - 54.0 fl    MPV 9.8 6.0 - 12.0 fL    Platelets 235 140 - 450 10*3/mm3    Neutrophil % 80.6 (H) 42.7 - 76.0 %    Lymphocyte % 12.6 (L) 19.6 - 45.3 %    Monocyte % 4.2 (L) 5.0 - 12.0 %    Eosinophil % 1.1  0.3 - 6.2 %    Basophil % 0.5 0.0 - 1.5 %    Immature Grans % 1.0 (H) 0.0 - 0.5 %    Neutrophils, Absolute 7.49 (H) 1.70 - 7.00 10*3/mm3    Lymphocytes, Absolute 1.17 0.70 - 3.10 10*3/mm3    Monocytes, Absolute 0.39 0.10 - 0.90 10*3/mm3    Eosinophils, Absolute 0.10 0.00 - 0.40 10*3/mm3    Basophils, Absolute 0.05 0.00 - 0.20 10*3/mm3    Immature Grans, Absolute 0.09 (H) 0.00 - 0.05 10*3/mm3    nRBC 0.0 0.0 - 0.2 /100 WBC   Type & Screen    Collection Time: 02/09/25  8:10 PM    Specimen: Blood   Result Value Ref Range    ABO Type O     RH type Positive     Antibody Screen Negative     T&S Expiration Date 2/12/2025 11:59:59 PM    ECG 12 Lead Pre-Op / Pre-Procedure    Collection Time: 02/09/25  8:10 PM   Result Value Ref Range    QT Interval 458 ms    QTC Interval 453 ms   CBC Auto Differential    Collection Time: 02/10/25  7:41 AM    Specimen: Blood   Result Value Ref Range    WBC 8.68 3.40 - 10.80 10*3/mm3    RBC 4.41 3.77 - 5.28 10*6/mm3    Hemoglobin 12.3 12.0 - 15.9 g/dL    Hematocrit 39.1 34.0 - 46.6 %    MCV 88.7 79.0 - 97.0 fL    MCH 27.9 26.6 - 33.0 pg    MCHC 31.5 31.5 - 35.7 g/dL    RDW 13.7 12.3 - 15.4 %    RDW-SD 44.4 37.0 - 54.0 fl    MPV 10.1 6.0 - 12.0 fL    Platelets 186 140 - 450 10*3/mm3    Neutrophil % 87.5 (H) 42.7 - 76.0 %    Lymphocyte % 7.7 (L) 19.6 - 45.3 %    Monocyte % 3.1 (L) 5.0 - 12.0 %    Eosinophil % 0.7 0.3 - 6.2 %    Basophil % 0.5 0.0 - 1.5 %    Immature Grans % 0.5 0.0 - 0.5 %    Neutrophils, Absolute 7.60 (H) 1.70 - 7.00 10*3/mm3    Lymphocytes, Absolute 0.67 (L) 0.70 - 3.10 10*3/mm3    Monocytes, Absolute 0.27 0.10 - 0.90 10*3/mm3    Eosinophils, Absolute 0.06 0.00 - 0.40 10*3/mm3    Basophils, Absolute 0.04 0.00 - 0.20 10*3/mm3    Immature Grans, Absolute 0.04 0.00 - 0.05 10*3/mm3    nRBC 0.0 0.0 - 0.2 /100 WBC   Comprehensive Metabolic Panel    Collection Time: 02/10/25  7:42 AM    Specimen: Blood   Result Value Ref Range    Glucose 125 (H) 65 - 99 mg/dL    BUN 18 8 - 23 mg/dL     Creatinine 0.63 0.57 - 1.00 mg/dL    Sodium 141 136 - 145 mmol/L    Potassium 4.3 3.5 - 5.2 mmol/L    Chloride 104 98 - 107 mmol/L    CO2 28.0 22.0 - 29.0 mmol/L    Calcium 9.1 8.6 - 10.5 mg/dL    Total Protein 6.2 6.0 - 8.5 g/dL    Albumin 3.6 3.5 - 5.2 g/dL    ALT (SGPT) <5 1 - 33 U/L    AST (SGOT) 16 1 - 32 U/L    Alkaline Phosphatase 79 39 - 117 U/L    Total Bilirubin 0.3 0.0 - 1.2 mg/dL    Globulin 2.6 gm/dL    A/G Ratio 1.4 g/dL    BUN/Creatinine Ratio 28.6 (H) 7.0 - 25.0    Anion Gap 9.0 5.0 - 15.0 mmol/L    eGFR 86.5 >60.0 mL/min/1.73   Magnesium    Collection Time: 02/10/25  7:42 AM    Specimen: Blood   Result Value Ref Range    Magnesium 1.7 1.6 - 2.4 mg/dL   Protime-INR    Collection Time: 02/10/25  7:42 AM    Specimen: Blood   Result Value Ref Range    Protime 13.0 12.2 - 14.5 Seconds    INR 0.97 0.89 - 1.12   ABO RH Specimen Verification    Collection Time: 02/10/25  7:42 AM    Specimen: Blood   Result Value Ref Range    ABO Type O     RH type Positive    Potassium    Collection Time: 02/10/25  9:55 PM    Specimen: Blood   Result Value Ref Range    Potassium 3.8 3.5 - 5.2 mmol/L   Basic Metabolic Panel    Collection Time: 02/11/25 10:01 AM    Specimen: Blood   Result Value Ref Range    Glucose 147 (H) 65 - 99 mg/dL    BUN 15 8 - 23 mg/dL    Creatinine 0.69 0.57 - 1.00 mg/dL    Sodium 138 136 - 145 mmol/L    Potassium 4.4 3.5 - 5.2 mmol/L    Chloride 101 98 - 107 mmol/L    CO2 24.0 22.0 - 29.0 mmol/L    Calcium 8.8 8.6 - 10.5 mg/dL    BUN/Creatinine Ratio 21.7 7.0 - 25.0    Anion Gap 13.0 5.0 - 15.0 mmol/L    eGFR 84.6 >60.0 mL/min/1.73   CBC Auto Differential    Collection Time: 02/11/25 10:01 AM    Specimen: Blood   Result Value Ref Range    WBC 10.83 (H) 3.40 - 10.80 10*3/mm3    RBC 3.71 (L) 3.77 - 5.28 10*6/mm3    Hemoglobin 10.6 (L) 12.0 - 15.9 g/dL    Hematocrit 33.0 (L) 34.0 - 46.6 %    MCV 88.9 79.0 - 97.0 fL    MCH 28.6 26.6 - 33.0 pg    MCHC 32.1 31.5 - 35.7 g/dL    RDW 13.6 12.3 - 15.4 %     RDW-SD 44.4 37.0 - 54.0 fl    MPV 10.3 6.0 - 12.0 fL    Platelets 187 140 - 450 10*3/mm3    Neutrophil % 88.2 (H) 42.7 - 76.0 %    Lymphocyte % 6.0 (L) 19.6 - 45.3 %    Monocyte % 4.5 (L) 5.0 - 12.0 %    Eosinophil % 0.4 0.3 - 6.2 %    Basophil % 0.4 0.0 - 1.5 %    Immature Grans % 0.5 0.0 - 0.5 %    Neutrophils, Absolute 9.56 (H) 1.70 - 7.00 10*3/mm3    Lymphocytes, Absolute 0.65 (L) 0.70 - 3.10 10*3/mm3    Monocytes, Absolute 0.49 0.10 - 0.90 10*3/mm3    Eosinophils, Absolute 0.04 0.00 - 0.40 10*3/mm3    Basophils, Absolute 0.04 0.00 - 0.20 10*3/mm3    Immature Grans, Absolute 0.05 0.00 - 0.05 10*3/mm3    nRBC 0.0 0.0 - 0.2 /100 WBC   CBC Auto Differential    Collection Time: 02/12/25  9:01 AM    Specimen: Blood   Result Value Ref Range    WBC 6.48 3.40 - 10.80 10*3/mm3    RBC 3.71 (L) 3.77 - 5.28 10*6/mm3    Hemoglobin 10.2 (L) 12.0 - 15.9 g/dL    Hematocrit 33.1 (L) 34.0 - 46.6 %    MCV 89.2 79.0 - 97.0 fL    MCH 27.5 26.6 - 33.0 pg    MCHC 30.8 (L) 31.5 - 35.7 g/dL    RDW 14.0 12.3 - 15.4 %    RDW-SD 45.2 37.0 - 54.0 fl    MPV 10.9 6.0 - 12.0 fL    Platelets 146 140 - 450 10*3/mm3    Neutrophil % 87.6 (H) 42.7 - 76.0 %    Lymphocyte % 5.6 (L) 19.6 - 45.3 %    Monocyte % 4.2 (L) 5.0 - 12.0 %    Eosinophil % 1.5 0.3 - 6.2 %    Basophil % 0.8 0.0 - 1.5 %    Immature Grans % 0.3 0.0 - 0.5 %    Neutrophils, Absolute 5.68 1.70 - 7.00 10*3/mm3    Lymphocytes, Absolute 0.36 (L) 0.70 - 3.10 10*3/mm3    Monocytes, Absolute 0.27 0.10 - 0.90 10*3/mm3    Eosinophils, Absolute 0.10 0.00 - 0.40 10*3/mm3    Basophils, Absolute 0.05 0.00 - 0.20 10*3/mm3    Immature Grans, Absolute 0.02 0.00 - 0.05 10*3/mm3    nRBC 0.0 0.0 - 0.2 /100 WBC        If labs were ordered, I independently reviewed the results and considered them in treating the patient.      EMERGENCY DEPARTMENT COURSE and DIFFERENTIAL DIAGNOSIS/MDM:   Vitals:  AS OF 18:12 EST    BP - 113/59  HR - 107  TEMP - 98.2 °F (36.8 °C) (Oral)  O2 SATS - (!)  88%        Discussion below represents my analysis of pertinent findings related to patient's condition, differential diagnosis, treatment plan and final disposition.      Differential diagnosis:  The differential diagnosis associated with the patient's presentation includes: hip fracture, hip dislocation, hip contusion      Independent interpretations (ECG/rhythm strip/X-ray/US/CT scan): I independently interpreted the pt R hip XR and cardiac moniotr - evidence of R hip fx and the patient is in NSR      Additional sources:  Discussed/obtained information from independent historians:   [] Spouse:   [] Parent:   [] Friend:   [x] EMS: Report was taken from EMS - VSS during transport   [] Other:  External (non-ED) record review:   [] Inpatient record:   [] Office record:   [] Outpatient record:   [] Prior Outpatient labs:   [] Prior Outpatient radiology:   [] Primary Care record:   [] Outside ED record:   [] Other:       Patient's care impacted by:   [] Diabetes   [] Hypertension   [] Coronary Artery Disease   [] Cancer   [x] Other: Afib, PE, dementia    Care significantly affected by Social Determinants of Health (housing and economic circumstances, unemployment)    [] Yes     [x] No   If yes, Patient's care significantly limited by  Social Determinants of Health including:    [] Inadequate housing    [] Low income    [] Alcoholism and drug addiction in family    [] Problems related to primary support group    [] Unemployment    [] Problems related to employment    [] Other Social Determinants of Health:       Consideration of admission/observation vs discharge: Pt w hip fracture and warrants admission for further management      I considered prescription management with:    [x] Pain medication: Patient given IV morphine with improvement in pain   [] Antiviral:   [] Antibiotic:   [] Other:    ED Course:    ED Course as of 02/12/25 1812   Sun Feb 09, 2025 2116 Discussed case with orthopedics Dr. Patino.  Deciding on  surgery tomorrow versus the following day due to Eliquis.  Requests hospitalist admission. [NS]   2117 Patient presents after mechanical fall from home with right sided femoral neck fracture. Patient has some dementia and it was unwitnessed, she is on anticoagulation, and so obtained a head CT as well which was unremarkable. She has no additional injury. Spoke with Dr. Patino. He said he is still deciding on surgery tomorrow versus the following day due to patient being anticoagulated. [NS]   2117 Discussed case with hospitalist Dr. Smart.  Discussed history, presentation, workup.  Accept patient for admission. [NS]      ED Course User Index  [NS] Hardeep Mancia MD         PROCEDURES:  Procedures    CRITICAL CARE TIME        FINAL IMPRESSION      1. Closed fracture of neck of right femur, initial encounter    2. Anticoagulated    3. History of atrial fibrillation    4. History of pulmonary embolism          DISPOSITION/PLAN     ED Disposition       ED Disposition   Decision to Admit    Condition   --    Comment   Level of Care: Telemetry [5]   Diagnosis: Fracture of femoral neck, right [392964]   Admitting Physician: NADIA SMART III [[id number]]   Attending Physician: NADIA SMART III [[id number]]   Is patient appropriate for Inpatient Observation Unit?: No [0]   Bed Request Comments: tele obs not cdu                   Comment: Please note this report has been produced using speech recognition software.      Hardeep Mancia MD  Attending Emergency Physician             Hardeep Mancia MD  02/12/25 [id number]

## 2025-02-10 NOTE — ANESTHESIA PROCEDURE NOTES
Peripheral Block      Patient reassessed immediately prior to procedure    Start time: 2/10/2025 2:54 PM  Stop time: 2/10/2025 3:06 PM  Reason for block: at surgeon's request and post-op pain management  Performed by  CRNA/CAA: Cam Sanchez, CRNA  Assisted by: Karo Almanza RN  Preanesthetic Checklist  Completed: patient identified, IV checked, site marked, risks and benefits discussed, surgical consent, monitors and equipment checked, pre-op evaluation and timeout performed  Prep:  Pt Position: supine  Sterile barriers:cap, gloves, mask and washed/disinfected hands  Prep: ChloraPrep  Patient monitoring: blood pressure monitoring, continuous pulse oximetry and EKG  Procedure  Performed under: local infiltration  Guidance:ultrasound guided    ULTRASOUND INTERPRETATION.  Using ultrasound guidance a 20 G gauge needle was placed in close proximity to the nerve, at which point, under ultrasound guidance anesthetic was injected in the area of the nerve and spread of the anesthesia was seen on ultrasound in close proximity thereto.  There were no abnormalities seen on ultrasound; a digital image was taken; and the patient tolerated the procedure with no complications. Images:still images obtained, printed/placed on chart    Laterality:right  Block Type:fascia iliaca compartment  Injection Technique:catheter  Needle Type:echogenic and Tuohy  Needle Gauge:18 G  Resistance on Injection: none  Catheter Size:20 G (20g)  Cath Depth at skin: 7 cm    Medications Used: ropivacaine (NAROPIN) 0.5 % injection - Injection   25 mL - 2/10/2025 3:06:00 PM      Medications  Preservative Free Saline:25ml    Post Assessment  Injection Assessment: negative aspiration for heme, no paresthesia on injection and incremental injection  Patient Tolerance:comfortable throughout block  Complications:no  Additional Notes  CKAFASCIAILIACA: CATHETER  A high-frequency linear transducer, with sterile cover, was placed in parasagittal plane on top of  "the Anterior Superior Iliac Spine (ASIS) and moved medially to identify the Internal Oblique muscle, Sartorius muscle, Iliacus Muscle, Fascia Iliaca (FI) and Fascia Latae. The insertion site was prepped and draped in sterile fashion. Skin and cutaneous tissue was infiltrated with 2-5 ml of 1% Lidocaine. Using ultrasound-guidance, an 18-gauge Contiplex Ultra 360 Touhy needle was advanced in plane from caudad to cephalad. Preservative-free normal saline was utilized for hydro-dissection of tissue, advancement of Touhy, and to confirm final needle placement below FI. Local anesthetic in incremental 3-5 ml injections. Aspiration every 5 ml to prevent intravascular injection. Injection was completed with negative aspiration of blood and negative intravascular injection. Injection pressures were normal with minimal resistance. A 20-gauge Contiplex Echo catheter was placed through the needle and advance out the tip of the Touhy 3-5 cm. The Touhy needle was then removed, and final catheter position verified below the FI. The catheter was secured in the usual fashion with skin glue, benzoin, steri-strips, CHG tegaderm and Label noting \"Nerve Block Catheter\". Jerk tape applied at yellow connector and catheter connection.   Performed by: Cam Sanchez, CRNA            "

## 2025-02-10 NOTE — ANESTHESIA PREPROCEDURE EVALUATION
Anesthesia Evaluation     NPO Solid Status: > 8 hours  NPO Liquid Status: > 8 hours           Airway   Mallampati: I  TM distance: >3 FB  Neck ROM: full  No difficulty expected  Dental    (+) edentulous    Pulmonary     breath sounds clear to auscultation  Cardiovascular   Exercise tolerance: poor (<4 METS)    Rhythm: regular  Rate: normal    (+) murmur      Neuro/Psych  GI/Hepatic/Renal/Endo      Musculoskeletal     Abdominal    Substance History      OB/GYN          Other                          Anesthesia Plan    ASA 3     general with block     intravenous induction     Anesthetic plan, risks, benefits, and alternatives have been provided, discussed and informed consent has been obtained with: patient.        CODE STATUS:    Code Status (Patient has no pulse and is not breathing): CPR (Attempt to Resuscitate)  Medical Interventions (Patient has pulse or is breathing): Full Support

## 2025-02-10 NOTE — CONSULTS
Orthopedic Consult      Patient: Fanny Boyce    Date of Admission: 2/9/2025  7:29 PM    YOB: 1940    Medical Record Number: [id number]    Attending Physician: Karo Matta MD    Consulting Physician: Frandy Patino MD      Chief Complaints: Fracture of femoral neck, right [S72.001A]      History of Present Illness: 84 y.o. female admitted to [facility] [place] with Fracture of femoral neck, right [S72.001A]. I was consulted for further evaluation and treatment of right hip fracture. Onset of symptoms was abrupt starting 1 day ago.  Symptoms are associated with pain.  Symptoms are aggravated by movement.   Symptoms improve with rest.  She does have some dementia but is able to provide some of her history.  The remainder is provided by her daughter as well as review of the chart.  She normally ambulates and lives with her daughter.  She reports falling after losing her balance.  She was unable to walk after that.     No Known Allergies     Home Medications:  Medications Prior to Admission   Medication Sig Dispense Refill Last Dose/Taking    apixaban (ELIQUIS) 2.5 MG tablet tablet Take 1 tablet by mouth 2 (Two) Times a Day.   Unknown    carbidopa-levodopa (SINEMET)  MG per tablet Take 1 tablet by mouth 3 (Three) Times a Day.       fluticasone (FLONASE) 50 MCG/ACT nasal spray 2 sprays into the nostril(s) as directed by provider Daily As Needed for Rhinitis or Allergies.       metoprolol tartrate (LOPRESSOR) 25 MG tablet Take 0.5 tablets by mouth 2 (Two) Times a Day.       traZODone (DESYREL) 50 MG tablet Take 2 tablets by mouth Every Night.       vitamin D3 125 MCG (5000 UT) capsule capsule Take 1 capsule by mouth Daily.            Past Medical History:   Diagnosis Date    Atrial fibrillation     COPD (chronic obstructive pulmonary disease)     Dementia     History of lung cancer     Parkinson disease     Pulmonary embolism         Past Surgical History:   Procedure Laterality  Date    JOINT REPLACEMENT      LUNG CANCER SURGERY          Social History     Occupational History    Not on file   Tobacco Use    Smoking status: Former     Current packs/day: 1.00     Average packs/day: 1 pack/day for 40.0 years (40.0 ttl pk-yrs)     Types: Cigarettes    Smokeless tobacco: Never   Vaping Use    Vaping status: Never Used   Substance and Sexual Activity    Alcohol use: Never    Drug use: Never    Sexual activity: Not Currently      Social History     Social History Narrative    Not on file        Family History   Problem Relation Age of Onset    Heart disease Mother     Heart disease Father     Aneurysm Father          Review of Systems:   As per the H/P which I have reviewed    Physical Exam: 84 y.o. female  General Appearance:    Alert, cooperative, in no acute distress                   Vitals:    02/10/25 1100 02/10/25 1110 02/10/25 1300 02/10/25 1431   BP:  130/58  125/67   BP Location:  Right arm  Right arm   Patient Position:  Lying  Lying   Pulse: 62 61 59 59   Resp:  16  16   Temp:  98.3 °F (36.8 °C)  97.3 °F (36.3 °C)   TempSrc:  Oral  Temporal   SpO2:  98% 98% 98%   Weight:       Height:            Head:    Normocephalic, without obvious abnormality, atraumatic      Right Upper Extremity:  No obvious deformity, painless ROM shoulder, elbow, wrist, no joint instability, normal distal strength and sensation to light touch, skin intact without cyanosis, clubbing, edema; +2 radial pulse  Left Upper Extremity:  No obvious deformity, painless ROM shoulder, elbow, wrist, no joint instability, normal distal strength and sensation to light touch, skin intact without cyanosis, clubbing, edema; +2 radial pulse  Right Lower Extremity: Marked irritability with gentle motion, hip internally rotated, painless ROM knee, ankle, compartments soft, normal distal strength and sensation to light touch, skin intact without cyanosis, clubbing, edema; +2 dorsalis pedis pulse  Left Lower Extremity:  No obvious  deformity, painless ROM hip, knee, ankle, compartments soft, normal distal strength and sensation to light touch, skin intact without cyanosis, clubbing, edema; +2 dorsalis pedis pulse         Diagnostic Tests:    I have reviewed the labs, radiology results and diagnostic studies: Yes    Results from last 7 days   Lab Units 02/10/25  0741   WBC 10*3/mm3 8.68   HEMOGLOBIN g/dL 12.3   PLATELETS 10*3/mm3 186     Results from last 7 days   Lab Units 02/10/25  0742   SODIUM mmol/L 141   POTASSIUM mmol/L 4.3   CO2 mmol/L 28.0   CREATININE mg/dL 0.63   GLUCOSE mg/dL 125*     X-rays pelvis and right hip I have personally viewed and interpreted which show a displaced right femoral neck fracture      Assessment: Right displaced femoral neck fracture  Patient Active Problem List   Diagnosis    Atrial fibrillation    COPD (chronic obstructive pulmonary disease)    Dementia    Parkinson's disease    Fracture of left humerus with routine healing    Fracture of femoral neck, right           Plan:  I discussed the findings with the patient in person and with her daughter by telephone and explained that unfortunately there is no good nonsurgical alternative.  I explained that surgical management is necessary to return to an ambulatory status.  Surgery involves partial hip replacement.  This is designed to allow weightbearing as tolerated immediately after the surgery.  The alternative of internal fixation or repair of the fracture has an approximately two thirds failure rate.  I discussed the rationale for partial hip replacement as opposed to total hip replacement especially in light of her fall risk due to greater stability.  I discussed the risks, benefits, alternatives and expectations including but not limited to anesthetic complications, death, heart attack, stroke, damage to nerves and blood vessels, leg length discrepancy, blood clots, and need for further surgeries.  All questions were answered and the patient and her  daughter wish to proceed.               Frandy Patino MD  02/10/25  15:44 EST

## 2025-02-11 LAB
ANION GAP SERPL CALCULATED.3IONS-SCNC: 13 MMOL/L (ref 5–15)
BASOPHILS # BLD AUTO: 0.04 10*3/MM3 (ref 0–0.2)
BASOPHILS NFR BLD AUTO: 0.4 % (ref 0–1.5)
BUN SERPL-MCNC: 15 MG/DL (ref 8–23)
BUN/CREAT SERPL: 21.7 (ref 7–25)
CALCIUM SPEC-SCNC: 8.8 MG/DL (ref 8.6–10.5)
CHLORIDE SERPL-SCNC: 101 MMOL/L (ref 98–107)
CO2 SERPL-SCNC: 24 MMOL/L (ref 22–29)
CREAT SERPL-MCNC: 0.69 MG/DL (ref 0.57–1)
DEPRECATED RDW RBC AUTO: 44.4 FL (ref 37–54)
EGFRCR SERPLBLD CKD-EPI 2021: 84.6 ML/MIN/1.73
EOSINOPHIL # BLD AUTO: 0.04 10*3/MM3 (ref 0–0.4)
EOSINOPHIL NFR BLD AUTO: 0.4 % (ref 0.3–6.2)
ERYTHROCYTE [DISTWIDTH] IN BLOOD BY AUTOMATED COUNT: 13.6 % (ref 12.3–15.4)
GLUCOSE SERPL-MCNC: 147 MG/DL (ref 65–99)
HCT VFR BLD AUTO: 33 % (ref 34–46.6)
HGB BLD-MCNC: 10.6 G/DL (ref 12–15.9)
IMM GRANULOCYTES # BLD AUTO: 0.05 10*3/MM3 (ref 0–0.05)
IMM GRANULOCYTES NFR BLD AUTO: 0.5 % (ref 0–0.5)
LYMPHOCYTES # BLD AUTO: 0.65 10*3/MM3 (ref 0.7–3.1)
LYMPHOCYTES NFR BLD AUTO: 6 % (ref 19.6–45.3)
MCH RBC QN AUTO: 28.6 PG (ref 26.6–33)
MCHC RBC AUTO-ENTMCNC: 32.1 G/DL (ref 31.5–35.7)
MCV RBC AUTO: 88.9 FL (ref 79–97)
MONOCYTES # BLD AUTO: 0.49 10*3/MM3 (ref 0.1–0.9)
MONOCYTES NFR BLD AUTO: 4.5 % (ref 5–12)
NEUTROPHILS NFR BLD AUTO: 88.2 % (ref 42.7–76)
NEUTROPHILS NFR BLD AUTO: 9.56 10*3/MM3 (ref 1.7–7)
NRBC BLD AUTO-RTO: 0 /100 WBC (ref 0–0.2)
PLATELET # BLD AUTO: 187 10*3/MM3 (ref 140–450)
PMV BLD AUTO: 10.3 FL (ref 6–12)
POTASSIUM SERPL-SCNC: 4.4 MMOL/L (ref 3.5–5.2)
QT INTERVAL: 458 MS
QTC INTERVAL: 453 MS
RBC # BLD AUTO: 3.71 10*6/MM3 (ref 3.77–5.28)
SODIUM SERPL-SCNC: 138 MMOL/L (ref 136–145)
WBC NRBC COR # BLD AUTO: 10.83 10*3/MM3 (ref 3.4–10.8)

## 2025-02-11 PROCEDURE — 97110 THERAPEUTIC EXERCISES: CPT

## 2025-02-11 PROCEDURE — 80048 BASIC METABOLIC PNL TOTAL CA: CPT | Performed by: PEDIATRICS

## 2025-02-11 PROCEDURE — 85025 COMPLETE CBC W/AUTO DIFF WBC: CPT | Performed by: PEDIATRICS

## 2025-02-11 PROCEDURE — 97162 PT EVAL MOD COMPLEX 30 MIN: CPT

## 2025-02-11 PROCEDURE — 25010000002 ENOXAPARIN PER 10 MG: Performed by: PEDIATRICS

## 2025-02-11 RX ORDER — POLYETHYLENE GLYCOL 3350 17 G/17G
17 POWDER, FOR SOLUTION ORAL DAILY PRN
Status: DISCONTINUED | OUTPATIENT
Start: 2025-02-11 | End: 2025-02-18 | Stop reason: HOSPADM

## 2025-02-11 RX ORDER — ENOXAPARIN SODIUM 100 MG/ML
30 INJECTION SUBCUTANEOUS EVERY 24 HOURS
Status: DISCONTINUED | OUTPATIENT
Start: 2025-02-11 | End: 2025-02-12

## 2025-02-11 RX ORDER — BISACODYL 10 MG
10 SUPPOSITORY, RECTAL RECTAL DAILY PRN
Status: DISCONTINUED | OUTPATIENT
Start: 2025-02-11 | End: 2025-02-18 | Stop reason: HOSPADM

## 2025-02-11 RX ORDER — AMOXICILLIN 250 MG
2 CAPSULE ORAL 2 TIMES DAILY
Status: DISCONTINUED | OUTPATIENT
Start: 2025-02-11 | End: 2025-02-18 | Stop reason: HOSPADM

## 2025-02-11 RX ORDER — BISACODYL 5 MG/1
5 TABLET, DELAYED RELEASE ORAL DAILY PRN
Status: DISCONTINUED | OUTPATIENT
Start: 2025-02-11 | End: 2025-02-18 | Stop reason: HOSPADM

## 2025-02-11 RX ADMIN — METOPROLOL TARTRATE 12.5 MG: 25 TABLET, FILM COATED ORAL at 08:16

## 2025-02-11 RX ADMIN — CARBIDOPA AND LEVODOPA 1 TABLET: 25; 100 TABLET ORAL at 16:41

## 2025-02-11 RX ADMIN — CARBIDOPA AND LEVODOPA 1 TABLET: 25; 100 TABLET ORAL at 08:16

## 2025-02-11 RX ADMIN — METOPROLOL TARTRATE 12.5 MG: 25 TABLET, FILM COATED ORAL at 21:30

## 2025-02-11 RX ADMIN — HYDROCODONE BITARTRATE AND ACETAMINOPHEN 1 TABLET: 5; 325 TABLET ORAL at 08:16

## 2025-02-11 RX ADMIN — SENNOSIDES AND DOCUSATE SODIUM 2 TABLET: 50; 8.6 TABLET ORAL at 08:35

## 2025-02-11 RX ADMIN — CARBIDOPA AND LEVODOPA 1 TABLET: 25; 100 TABLET ORAL at 11:44

## 2025-02-11 RX ADMIN — Medication 10 ML: at 08:16

## 2025-02-11 RX ADMIN — TRAZODONE HYDROCHLORIDE 100 MG: 100 TABLET ORAL at 21:30

## 2025-02-11 RX ADMIN — ENOXAPARIN SODIUM 30 MG: 30 INJECTION SUBCUTANEOUS at 16:40

## 2025-02-11 RX ADMIN — SENNOSIDES AND DOCUSATE SODIUM 2 TABLET: 50; 8.6 TABLET ORAL at 21:30

## 2025-02-11 NOTE — ANESTHESIA POSTPROCEDURE EVALUATION
Patient: Fanny Boyce    Procedure Summary       Date: 02/10/25 Room / Location:  [facility] /  [facility]    Anesthesia Start: 1634 Anesthesia Stop: 1914    Procedure: HIP HEMIARTHROPLASTY (Right: Hip) Diagnosis:       Traumatic closed displaced fracture of neck of femur, right, initial encounter      (Traumatic closed displaced fracture of neck of femur, right, initial encounter [[id number]])    Surgeons: Frandy Patino MD Provider: Harshil Mckeon MD    Anesthesia Type: general with block ASA Status: 3            Anesthesia Type: general with block    Vitals  No vitals data found for the desired time range.          Post Anesthesia Care and Evaluation    Patient location during evaluation: PACU  Patient participation: complete - patient cannot participate  Level of consciousness: lethargic  Pain score: 2  Pain management: adequate    Airway patency: patent  Anesthetic complications: No anesthetic complications  PONV Status: none  Cardiovascular status: acceptable  Respiratory status: acceptable, spontaneous ventilation and nasal cannula  Hydration status: acceptable

## 2025-02-11 NOTE — DISCHARGE INSTR - OTHER ORDERS
Financial Assistance  [facility] Phone: [phone]  [address] [place] [address]  [email or web address]  *Emergency/crisis intervention.  [facility] Phone: [phone]  [address] Toll Free: [phone]  [place] [address]  [email or web address]  [facility] [place] Phone: [phone]  [address] [place] [address]    Mental Health Services  [facility] Phone: [phone]  [address] [place] [address]  Crisis Phone Number: Toll Free: [phone]

## 2025-02-11 NOTE — CASE MANAGEMENT/SOCIAL WORK
Continued Stay Note  [facility]     Patient Name: Fanny Boyce  MRN: [id number]  Today's Date: 2/11/2025    Admit Date: 2/9/2025    Plan: Home vs [facility]   Discharge Plan       Row Name 02/11/25 1417       Plan    Plan Comments MSW notified by CM that pt had answered some SDOH questions with needs for utility assistance and mental health resources. MSW placed resources and information in pt's follow up instructions in EPIC.                     Discharge Codes    No documentation.                 Expected Discharge Date and Time       Expected Discharge Date Expected Discharge Time    Feb 12, 2025               JEROMY Roman

## 2025-02-11 NOTE — PLAN OF CARE
Goal Outcome Evaluation:  Plan of Care Reviewed With: patient           Outcome Evaluation: PT eval completed. Pt presents below baseline function d/t decreased RLE strength, generalized weakness, balance deficits, and decreased activity tolerance. Pt required maxA x2 for STS and SPT bed>chair w/ BUE support and KI donned. Pt would benefit from IP PT services while hospitalized. Recommend SNF at d/c.    Anticipated Discharge Disposition (PT): skilled nursing facility

## 2025-02-11 NOTE — PLAN OF CARE
Problem: Adult Inpatient Plan of Care  Goal: Plan of Care Review  Outcome: Progressing  Flowsheets (Taken 2/10/2025 0442)  Progress: no change  Goal: Patient-Specific Goal (Individualized)  Outcome: Progressing  Goal: Absence of Hospital-Acquired Illness or Injury  Outcome: Progressing  Intervention: Identify and Manage Fall Risk  Recent Flowsheet Documentation  Taken 2/11/2025 0200 by Eleazar Cerda RN  Safety Promotion/Fall Prevention: safety round/check completed  Taken 2/11/2025 0000 by Eleazar Cerda RN  Safety Promotion/Fall Prevention: safety round/check completed  Taken 2/10/2025 2200 by Eleazar Cerda RN  Safety Promotion/Fall Prevention: safety round/check completed  Taken 2/10/2025 2030 by Eleazar Cerda RN  Safety Promotion/Fall Prevention: safety round/check completed  Intervention: Prevent Skin Injury  Recent Flowsheet Documentation  Taken 2/11/2025 0300 by Eleazar Cerda RN  Body Position:   turned   right  Taken 2/10/2025 2030 by Eleazar Cerda RN  Body Position: position maintained  Goal: Optimal Comfort and Wellbeing  Outcome: Progressing  Goal: Readiness for Transition of Care  Outcome: Progressing     Problem: Skin Injury Risk Increased  Goal: Skin Health and Integrity  Outcome: Progressing  Intervention: Optimize Skin Protection  Recent Flowsheet Documentation  Taken 2/11/2025 0200 by Eleazar Cerda RN  Pressure Reduction Devices: pressure-redistributing mattress utilized  Taken 2/11/2025 0000 by Eleazar Cerda RN  Pressure Reduction Devices: pressure-redistributing mattress utilized  Taken 2/10/2025 2200 by Eleazar Cerda RN  Pressure Reduction Devices: pressure-redistributing mattress utilized  Taken 2/10/2025 2030 by Eleazar Cerda RN  Head of Bed (HOB) Positioning: HOB elevated  Taken 2/10/2025 2000 by Eleazar Cerda RN  Pressure Reduction Devices: pressure-redistributing mattress utilized     Problem: Fall Injury Risk  Goal: Absence of Fall and Fall-Related Injury  Outcome: Progressing  Intervention: Promote Injury-Free  "Environment  Recent Flowsheet Documentation  Taken 2/11/2025 0200 by Eleazar Cerda RN  Safety Promotion/Fall Prevention: safety round/check completed  Taken 2/11/2025 0000 by Eleazar Cerda RN  Safety Promotion/Fall Prevention: safety round/check completed  Taken 2/10/2025 2200 by Eleazar Cerda RN  Safety Promotion/Fall Prevention: safety round/check completed  Taken 2/10/2025 2030 by Eleazar Cerda RN  Safety Promotion/Fall Prevention: safety round/check completed   Goal Outcome Evaluation:  Plan of Care Reviewed With: patient        Progress: no change     Post sx, pain controlled,drowsy, arouses to voice. Offered food and drink, pt declined \"No thank you I just want to sleep.\" Plan of care ongoing                            "

## 2025-02-11 NOTE — OP NOTE
DATE OF OPERATION: 02/10/25  PREOPERATIVE DIAGNOSIS: Right displaced femoral neck fracture.      POSTOPERATIVE DIAGNOSIS: Right displaced femoral neck fracture.      PROCEDURE PERFORMED: Open treatment of displaced femoral neck fracture with hemiarthroplasty.      SURGEON: Frandy Patino MD      ASSISTANT: Shoaib Lozano DO, PGY-6 Sports Fellow      ANESTHESIA: General.      ESTIMATED BLOOD LOSS: 250 mL.      COMPLICATIONS: None.      DISPOSITION: To the recovery room in a stable condition.      IMPLANTS: Mancia-Nephew Synergy hip implant, size 14 standard femoral stem, cemented, with 28 -3 inner diameter head and 48 outer diameter head, bipolar.      INDICATIONS FOR PROCEDURE: This is a 84-year-old who sustained a displaced femoral neck fracture. After discussion of risks, benefits, and alternatives, surgery was recommended as a necessity. The patient understood and wished to proceed.      DESCRIPTION OF PROCEDURE: In the holding area, the patient identified the right hip as the correct operative extremity. This was initialed by the surgeon with the patient's acknowledgment. The patient was then taken to the operating room and placed in the supine position. Upon induction of adequate anesthesia, the patient was rolled to the lateral decubitus position with all bony prominences padded. The operative hip and lower extremity were prepped and draped in the usual sterile fashion. Timeout confirmed the correct patient and operative extremity and that antibiotics were on board. A standard posterolateral approach to the hip was carried out and was carried sharply through the skin and subcutaneous tissue. The fascia was opened in line with the incision and the gluteus adrianne was split and the trochanteric bursa was opened. Next, the short external rotators were exposed. The piriformis was tagged and released as were the remaining short external rotators. Capsulotomy was performed and the fracture was readily identified. The  head was removed. The remainder of the neck was resected to approximately 1 cm above the lesser tuberosity. The acetabulum was inspected and intact. The canal was entered, reamed and broached, and trialing was carried out. The appropriate size and position were chosen that allowed restoration of near approximate leg length as well as provided stability in the anterior as well as posteriorly to the position of sleep and 90° of flexion, adduction, and internal rotation.  A crack occurred in the femoral neck and the 14 stem did not have good purchase so the decision was made to cement. The final components were chosen and assembled and implanted after the canal was cleaned and prepped and cement was inserted and pressurized.  The hip was reduced carefully. The joint was Pulsavac irrigated. The capsule and piriformis were repaired with #2 FiberWire through drill holes in the greater trochanter. The fascia was closed with 0 Vicryl, subcutaneous tissue with 2-0 Vicryl, and skin with Monocryl and Dermabond. Sterile dressing was placed. Anesthesia was reversed. The patient was taken to the Recovery Room in stable condition. All instrument, needle, and sponge counts were correct.         Frandy Patino MD

## 2025-02-11 NOTE — PLAN OF CARE
Problem: Adult Inpatient Plan of Care  Goal: Absence of Hospital-Acquired Illness or Injury  Intervention: Identify and Manage Fall Risk  Description: Perform standard risk assessment on admission using a validated tool or comprehensive approach appropriate to the patient; reassess fall risk frequently, with change in status or transfer to another level of care.  Communicate risk to interprofessional healthcare team; ensure fall risk visible cue.  Determine need for increased observation, equipment and environmental modification, as well as use of supportive, nonskid footwear.  Adjust safety measures to individual needs and identified risk factors.  Reinforce the importance of active participation with fall risk prevention, safety, and physical activity with the patient and family.  Perform regular intentional rounding to assess need for position change, pain assessment and personal needs, including assistance with toileting.  Recent Flowsheet Documentation  Taken 2/11/2025 1400 by Hannah Kim, RN  Safety Promotion/Fall Prevention:   safety round/check completed   toileting scheduled   room organization consistent   muscle strengthening facilitated   nonskid shoes/slippers when out of bed   mobility aid in reach   lighting adjusted   fall prevention program maintained   elopement precautions   clutter free environment maintained   assistive device/personal items within reach   activity supervised  Taken 2/11/2025 1241 by Hannah Kim, RN  Safety Promotion/Fall Prevention:   safety round/check completed   toileting scheduled   room organization consistent   nonskid shoes/slippers when out of bed   mobility aid in reach   lighting adjusted   elopement precautions   fall prevention program maintained   clutter free environment maintained   assistive device/personal items within reach   activity supervised  Taken 2/11/2025 1000 by Hannah Kim, RN  Safety Promotion/Fall Prevention:   safety round/check  completed   toileting scheduled   room organization consistent   nonskid shoes/slippers when out of bed   mobility aid in reach   lighting adjusted   fall prevention program maintained   elopement precautions   clutter free environment maintained   assistive device/personal items within reach   activity supervised  Taken 2/11/2025 0816 by Hannah Kim, RN  Safety Promotion/Fall Prevention:   safety round/check completed   room organization consistent   toileting scheduled   nonskid shoes/slippers when out of bed   mobility aid in reach   lighting adjusted   gait belt   fall prevention program maintained   elopement precautions   clutter free environment maintained   assistive device/personal items within reach   activity supervised   Goal Outcome Evaluation:  Plan of Care Reviewed With: patient        Progress: improving          AOx4, forgetful to place and time, reoriented easily, VSS, 3LNC, infupump CDI, PO prn meds given, ice applied, aquacell CDI, allevyn intact on BLE and coccyx        Patient only has upper dentures  Daughter confirmed lowers dentures are at home

## 2025-02-11 NOTE — PROGRESS NOTES
[facility]    Acute pain service Inpatient Progress Note    Patient Name: Fanny Boyce  :  1940  MRN:  [id number]        Acute Pain  Service Inpatient Progress Note:    Analgesia:Good  Pain Score:0/10  LOC: alert and awake  Resp Status: room air  Cardiac: VS stable  Side Effects:None  Catheter Site:clean, dressing intact and dry  Cath type: peripheral nerve cath(InfuSystem)  Infusion rate: FICB: Basal: 1ml/hr, PIB: 10ml q 2h, PCA: 10ml q 30 minutes (1mL,8ml, 8ml InfuSystem Pump)  Catheter Plan:Catheter to remain Insitu and Continue catheter infusion rate unchanged  Comments:

## 2025-02-11 NOTE — PROGRESS NOTES
"Orthopedic Daily Progress Note      CC: None    Denies c/o    Physical Exam:  I have reviewed the vital signs.  Temp:  [97.2 °F (36.2 °C)-98.3 °F (36.8 °C)] 97.7 °F (36.5 °C)  Heart Rate:  [59-85] 85  Resp:  [16-18] 18  BP: ()/(37-83) 120/59    Objective:  Vital signs: (most recent): Blood pressure 120/59, pulse 85, temperature 97.7 °F (36.5 °C), temperature source Oral, resp. rate 18, height 152.4 cm (60\"), weight 52.1 kg (114 lb 12.8 oz), SpO2 92%.              General Appearance:    Alert, cooperative, no distress  Extremities: No clubbing, cyanosis, or edema to lower extremities  Pulses:  2+ in distal surgical extremity  Skin: Dressing Clean/dry/intact      Results Review:    I have reviewed the labs, radiology results and diagnostic studies:yes    Results from last 7 days   Lab Units 02/10/25  0741   WBC 10*3/mm3 8.68   HEMOGLOBIN g/dL 12.3   PLATELETS 10*3/mm3 186     Results from last 7 days   Lab Units 02/10/25  2155 02/10/25  0742   SODIUM mmol/L  --  141   POTASSIUM mmol/L 3.8 4.3   CO2 mmol/L  --  28.0   CREATININE mg/dL  --  0.63   GLUCOSE mg/dL  --  125*       I have reviewed the medications.    Xray Pelvis : R hip hemiarthroplasty in appropriate position    Assessment/Problem List  POD# 1 S/p right hip hemiarthroplasty for femoral neck fracture    Plan  PT/OT  DVT prophylaxis per hospitalist  Bandage on for 1 week  Ok to shower/bathe with bandage on once nerve block out  Placement when available.    Frandy Patino MD  02/11/25  07:54 EST            "

## 2025-02-11 NOTE — THERAPY EVALUATION
Patient Name: Fanny Boyce  : 1940    MRN: [id number]                              Today's Date: 2025       Admit Date: 2025    Visit Dx:     ICD-10-CM ICD-9-CM   1. Closed fracture of neck of right femur, initial encounter  S72.001A 820.8   2. Anticoagulated  Z79.01 V58.61   3. History of atrial fibrillation  Z86.79 V12.59   4. History of pulmonary embolism  Z86.711 V12.55     Patient Active Problem List   Diagnosis    Atrial fibrillation    COPD (chronic obstructive pulmonary disease)    Dementia    Parkinson's disease    Fracture of left humerus with routine healing    Fracture of femoral neck, right     Past Medical History:   Diagnosis Date    Atrial fibrillation     COPD (chronic obstructive pulmonary disease)     Dementia     History of lung cancer     Parkinson disease     Pulmonary embolism      Past Surgical History:   Procedure Laterality Date    HIP HEMIARTHROPLASTY Right 2/10/2025    Procedure: HIP HEMIARTHROPLASTY RIGHT;  Surgeon: Frandy Patino MD;  Location: [facility];  Service: Orthopedics;  Laterality: Right;    JOINT REPLACEMENT      LUNG CANCER SURGERY        General Information       Row Name 25 0934          Physical Therapy Time and Intention    Document Type evaluation  -     Mode of Treatment physical therapy  -       Row Name 25 0934          General Information    Patient Profile Reviewed yes  -     Prior Level of Function --   Pt is a questionable historian w/ no family present to provide history. States she uses a rollator for mobility  -     Existing Precautions/Restrictions fall;hip, posterior;right;oxygen therapy device and L/min;other (see comments)  R hip hemiarthroplasty (posterolateral approach); RLE WBAT; Fascia iliaca nerve catheter; KI 2/2 residual quad weakness; hx dementia, PD  -     Barriers to Rehab medically complex;previous functional deficit;cognitive status  -       Row Name 25 0934          Living Environment     People in Home child(cari), adult  daughter  -       Row Name 02/11/25 0934          Home Main Entrance    Number of Stairs, Main Entrance --  TBD  -[facility] Name 02/11/25 0934          Stairs Within Home, Primary    Number of Stairs, Within Home, Primary --  TBD  -[facility] Name 02/11/25 0934          Cognition    Orientation Status (Cognition) oriented to;person;place;situation;disoriented to;time;other (see comments)  conversational confusion  -[facility] Name 02/11/25 0934          Safety Issues/Impairments Affecting Functional Mobility    Safety Issues Affecting Function (Mobility) awareness of need for assistance;insight into deficits/self-awareness;safety precaution awareness;safety precautions follow-through/compliance;sequencing abilities;judgment  -     Impairments Affecting Function (Mobility) balance;cognition;endurance/activity tolerance;pain;range of motion (ROM);sensation/sensory awareness;strength  -     Cognitive Impairments, Mobility Safety/Performance awareness, need for assistance;insight into deficits/self-awareness;judgment;problem-solving/reasoning;safety precaution awareness;safety precaution follow-through;sequencing abilities  -               User Key  (r) = Recorded By, (t) = Taken By, (c) = Cosigned By      Initials Name Provider Type     Daisy Javed, PT Physical Therapist                   Mobility       Row Name 02/11/25 0854          Bed Mobility    Bed Mobility supine-sit  -     Supine-Sit [place] (Bed Mobility) maximum assist (25% patient effort);2 person assist;verbal cues  -     Assistive Device (Bed Mobility) bed rails;head of bed elevated  -     Comment, (Bed Mobility) VCs for hand placement and sequencing. Assist at BLEs and trunk. Assist to maintain PHP. KI donned 2/2 residual quad weakness. Pt reported lightheadedness in sitting w/ mild BP drop to 105/55  -       Row Name 02/11/25 0840          Transfers    Comment, (Transfers) VCs for hand  placement and sequencing. SPT bed>chair w/ BUE support. Pt reported lightheadedness after transfer w/ /53. RN notified  -       Row Name 02/11/25 0840          Bed-Chair Transfer    Bed-Chair [place] (Transfers) maximum assist (25% patient effort);2 person assist;verbal cues;nonverbal cues (demo/gesture)  -     Assistive Device (Bed-Chair Transfers) other (see comments)  BUE support  -     Comment, (Bed-Chair Transfer) SPT bed>chair. Unable to take steps d/t weakness  -       Row Name 02/11/25 0840          Sit-Stand Transfer    Sit-Stand [place] (Transfers) maximum assist (25% patient effort);2 person assist;verbal cues;nonverbal cues (demo/gesture)  -     Assistive Device (Sit-Stand Transfers) other (see comments)  BUE support  -     Comment, (Sit-Stand Transfer) STS from EOB  -[facility] Name 02/11/25 0840          Gait/Stairs (Locomotion)    [place] Level (Gait) unable to assess  -     Patient was able to Ambulate no, other medical factors prevent ambulation  -     Reason Patient was unable to Ambulate Excessive Weakness  -     Comment, (Gait/Stairs) Unable to progress to ambulation this date d/t weakness  -       Row Name 02/11/25 0840          Mobility    Extremity Weight-bearing Status right lower extremity  -     Right Lower Extremity (Weight-bearing Status) weight-bearing as tolerated (WBAT)  -               User Key  (r) = Recorded By, (t) = Taken By, (c) = Cosigned By      Initials Name Provider Type     Daisy Javed PT Physical Therapist                   Obj/Interventions       Row Name 02/11/25 0840          Range of Motion Comprehensive    General Range of Motion lower extremity range of motion deficits identified  -[facility] Name 02/11/25 0840          Strength Comprehensive (MMT)    General Manual Muscle Testing (MMT) Assessment lower extremity strength deficits identified  -     Comment, General Manual Muscle Testing (MMT) Assessment Pt able  to perform active R ankle DF/PF and weak quad set  -[facility] Name 02/11/25 0840          Motor Skills    Therapeutic Exercise hip;knee;ankle  -[facility] Name 02/11/25 0840          Hip (Therapeutic Exercise)    Hip (Therapeutic Exercise) strengthening exercise  -     Hip Strengthening (Therapeutic Exercise) right;aBduction;heel slides;10 repetitions  modA abduction, [place] heel slides  -[facility] Name 02/11/25 0840          Knee (Therapeutic Exercise)    Knee (Therapeutic Exercise) isometric exercises  -     Knee Isometrics (Therapeutic Exercise) right;quad sets;10 repetitions  -[facility] Name 02/11/25 0840          Ankle (Therapeutic Exercise)    Ankle (Therapeutic Exercise) AROM (active range of motion)  -     Ankle AROM (Therapeutic Exercise) bilateral;dorsiflexion;plantarflexion;10 repetitions  -[facility] Name 02/11/25 0840          Balance    Balance Assessment sitting static balance;sitting dynamic balance;standing static balance;standing dynamic balance  -     Static Sitting Balance contact guard  -     Dynamic Sitting Balance minimal assist  -     Position, Sitting Balance unsupported;sitting edge of bed  -     Static Standing Balance maximum assist;2-person assist;verbal cues  -     Dynamic Standing Balance maximum assist;2-person assist;verbal cues  -     Position/Device Used, Standing Balance supported  -     Balance Interventions sitting;standing;sit to stand;supported;static;dynamic  -[facility] Name 02/11/25 0840          Sensory Assessment (Somatosensory)    Sensory Assessment (Somatosensory) LE sensation intact  -               User Key  (r) = Recorded By, (t) = Taken By, (c) = Cosigned By      Initials Name Provider Type     Daisy Javed, PT Physical Therapist                   Goals/Plan       [facility] Name 02/11/25 0913          Bed Mobility Goal 1 (PT)    Activity/Assistive Device (Bed Mobility Goal 1, PT) bed mobility activities, all  -     [place]  Level/Cues Needed (Bed Mobility Goal 1, PT) moderate assist (50-74% patient effort)  -     Time Frame (Bed Mobility Goal 1, PT) short term goal (STG);5 days  -[facility] Name 02/11/25 0950          Transfer Goal 1 (PT)    Activity/Assistive Device (Transfer Goal 1, PT) sit-to-stand/stand-to-sit;bed-to-chair/chair-to-bed  -     [place] Level/Cues Needed (Transfer Goal 1, PT) minimum assist (75% or more patient effort)  -     Time Frame (Transfer Goal 1, PT) long term goal (LTG);10 days  -[facility] Name 02/11/25 0950          Gait Training Goal 1 (PT)    Activity/Assistive Device (Gait Training Goal 1, PT) gait (walking locomotion);assistive device use  -     [place] Level (Gait Training Goal 1, PT) minimum assist (75% or more patient effort)  -     Distance (Gait Training Goal 1, PT) 20 ft  -     Time Frame (Gait Training Goal 1, PT) long term goal (LTG);10 days  -[facility]       Row Name 02/11/25 0968          Therapy Assessment/Plan (PT)    Planned Therapy Interventions (PT) balance training;bed mobility training;gait training;home exercise program;neuromuscular re-education;patient/family education;postural re-education;ROM (range of motion);strengthening;stretching;transfer training  -               User Key  (r) = Recorded By, (t) = Taken By, (c) = Cosigned By      Initials Name Provider Type     Daisy Javed, JOAN Physical Therapist                   Clinical Impression       Row Name 02/11/25 0926          Pain    Pretreatment Pain Rating 0/10 - no pain  -     Posttreatment Pain Rating 0/10 - no pain  -     Pain Management Interventions cold applied  -LH       Row Name 02/11/25 0945          Plan of Care Review    Plan of Care Reviewed With patient  -     Outcome Evaluation PT eval completed. Pt presents below baseline function d/t decreased RLE strength, generalized weakness, balance deficits, and decreased activity tolerance. Pt required maxA x2 for STS and SPT bed>chair w/ BUE  support and KI donned. Pt would benefit from IP PT services while hospitalized. Recommend SNF at d/c.  -       Row Name 02/11/25 0949          Therapy Assessment/Plan (PT)    Patient/Family Therapy Goals Statement (PT) not verbalized  -     Rehab Potential (PT) fair  -     Criteria for Skilled Interventions Met (PT) yes;meets criteria;skilled treatment is necessary  -     Therapy Frequency (PT) daily  -     Predicted Duration of Therapy Intervention (PT) 10 days  -       Row Name 02/11/25 0949          Vital Signs    Pre Systolic BP Rehab 117  -LH     Pre Treatment Diastolic BP 70  -LH     Intra Systolic BP Rehab 105  -LH     Intra Treatment Diastolic BP 55  -LH     Post Systolic BP Rehab 108  -LH     Post Treatment Diastolic BP 53  -LH     Pre Patient Position Supine  -     Intra Patient Position Sitting  -     Post Patient Position Sitting  -       Row Name 02/11/25 0949          Positioning and Restraints    Pre-Treatment Position in bed  -     Post Treatment Position chair  -     In Chair notified nsg;reclined;sitting;call light within reach;encouraged to call for assist;exit alarm on;waffle cushion;on mechanical lift sling;legs elevated;ABD pillow;compression device  -               User Key  (r) = Recorded By, (t) = Taken By, (c) = Cosigned By      Initials Name Provider Type     Daisy Javed, PT Physical Therapist                   Outcome Measures       Row Name 02/11/25 0953 02/11/25 0816       How much help from another person do you currently need...    Turning from your back to your side while in flat bed without using bedrails? 2  - 3  -LB    Moving from lying on back to sitting on the side of a flat bed without bedrails? 2  - 2  -LB    Moving to and from a bed to a chair (including a wheelchair)? 2  - 2  -LB    Standing up from a chair using your arms (e.g., wheelchair, bedside chair)? 2  - 2  -LB    Climbing 3-5 steps with a railing? 1  - 1  -LB    To walk in  hospital room? 1  - 2  -LB    AM-PAC 6 Clicks Score (PT) 10  - 12  -LB    Highest Level of Mobility Goal 4 --> Transfer to chair/commode  - 4 --> Transfer to chair/commode  -      Row Name 02/11/25 0953          Functional Assessment    Outcome Measure Options AM-PAC 6 Clicks Basic Mobility (PT)  -               User Key  (r) = Recorded By, (t) = Taken By, (c) = Cosigned By      Initials Name Provider Type     Hannah Kim RN Registered Nurse     Daisy Javed, PT Physical Therapist                                 Physical Therapy Education       Title: PT OT SLP Therapies (In Progress)       Topic: Physical Therapy (Done)       Point: Mobility training (Done)       Learning Progress Summary            Patient Acceptance, E, VU,NR by  at 2/11/2025 0840                      Point: Home exercise program (Done)       Learning Progress Summary            Patient Acceptance, E, VU,NR by  at 2/11/2025 0840                      Point: Body mechanics (Done)       Learning Progress Summary            Patient Acceptance, E, VU,NR by  at 2/11/2025 0840                      Point: Precautions (Done)       Learning Progress Summary            Patient Acceptance, E, VU,NR by  at 2/11/2025 0840                                      User Key       Initials Effective Dates Name Provider Type Discipline     09/21/23 -  Daisy Javed, PT Physical Therapist PT                  PT Recommendation and Plan  Planned Therapy Interventions (PT): balance training, bed mobility training, gait training, home exercise program, neuromuscular re-education, patient/family education, postural re-education, ROM (range of motion), strengthening, stretching, transfer training  Outcome Evaluation: PT eval completed. Pt presents below baseline function d/t decreased RLE strength, generalized weakness, balance deficits, and decreased activity tolerance. Pt required maxA x2 for STS and SPT bed>chair w/ BUE support and KI  [place]. Pt would benefit from IP PT services while hospitalized. Recommend SNF at d/c.     Time Calculation:   PT Evaluation Complexity  History, PT Evaluation Complexity: 3 or more personal factors and/or comorbidities  Examination of Body Systems (PT Eval Complexity): total of 4 or more elements  Clinical Presentation (PT Evaluation Complexity): evolving  Clinical Decision Making (PT Evaluation Complexity): moderate complexity  Overall Complexity (PT Evaluation Complexity): moderate complexity     PT Charges       Row Name 02/11/25 0953             Time Calculation    Start Time 0840  -      PT Received On 02/11/25  -      PT Goal Re-Cert Due Date 02/21/25  -         Timed Charges    76551 - PT Therapeutic Exercise Minutes 8  -LH      12538 - PT Therapeutic Activity Minutes 5  -LH         Untimed Charges    PT Eval/Re-eval Minutes 50  -LH         Total Minutes    Timed Charges Total Minutes 13  -LH      Untimed Charges Total Minutes 50  -LH       Total Minutes 63  -LH                User Key  (r) = Recorded By, (t) = Taken By, (c) = Cosigned By      Initials Name Provider Type     Daisy Javed, PT Physical Therapist                  Therapy Charges for Today       Code Description Service Date Service Provider Modifiers Qty    02451690563 HC PT THER PROC EA 15 MIN 2/11/2025 Daisy Javed, PT GP 1    09709088393 HC PT EVAL MOD COMPLEXITY 4 2/11/2025 Daisy Javed, PT GP 1    87574871687 HC PT THER SUPP EA 15 MIN 2/11/2025 Daisy Javed, PT GP 3            PT G-Codes  Outcome Measure Options: AM-PAC 6 Clicks Basic Mobility (PT)  AM-PAC 6 Clicks Score (PT): 10  PT Discharge Summary  Anticipated Discharge Disposition (PT): skilled nursing facility    Daisy Javed PT  2/11/2025

## 2025-02-11 NOTE — PROGRESS NOTES
[facility] Medicine Services  PROGRESS NOTE    Patient Name: Fanny Boyce  : 1940  MRN: [id number]    Date of Admission: 2025  Primary Care Physician: Nemo Aponte PA    Subjective   Subjective     CC:  Hip fx    HPI:  No events overnight.  Pt doesn't c/o any pain.  Drinking a little, but not wanting to eat much.  Nursing without any concerns.        Objective   Objective     Vital Signs:   Temp:  [97.2 °F (36.2 °C)-99.9 °F (37.7 °C)] 97.6 °F (36.4 °C)  Heart Rate:  [59-89] 88  Resp:  [16-18] 18  BP: ()/(37-83) 114/63  Flow (L/min) (Oxygen Therapy):  [2-4] 3     Physical Exam:  Constitutional: No acute distress, awake and answering questions.  HENT: NCAT, mucous membranes moist, no dentures in place, at times difficult to understand.  Respiratory: Clear to auscultation bilaterally, respiratory effort normal   Cardiovascular: RRR, no murmurs, rubs, or gallops  Gastrointestinal: Positive bowel sounds, soft, nontender, nondistended  Musculoskeletal: No bilateral ankle edema, R bandage on hip in place.  Psychiatric: cooperative, appropriate affect  Neurologic: CN intact.  Oriented x 3.  Skin: No rashes      Results Reviewed:  LAB RESULTS:      Lab 25  1001 02/10/25  0742 02/10/25  0741 25   WBC 10.83*  --  8.68 9.29   HEMOGLOBIN 10.6*  --  12.3 13.5   HEMATOCRIT 33.0*  --  39.1 43.0   PLATELETS 187  --  186 235   NEUTROS ABS 9.56*  --  7.60* 7.49*   IMMATURE GRANS (ABS) 0.05  --  0.04 0.09*   LYMPHS ABS 0.65*  --  0.67* 1.17   MONOS ABS 0.49  --  0.27 0.39   EOS ABS 0.04  --  0.06 0.10   MCV 88.9  --  88.7 89.8   PROTIME  --  13.0  --   --          Lab 25  1001 02/10/25  2155 02/10/25  0742 25   SODIUM 138  --  141 141   POTASSIUM 4.4 3.8 4.3 3.9   CHLORIDE 101  --  104 102   CO2 24.0  --  28.0 30.0*   ANION GAP 13.0  --  9.0 9.0   BUN 15  --  18 19   CREATININE 0.69  --  0.63 0.66   EGFR 84.6  --  86.5 85.6   GLUCOSE 147*  --   125* 116*   CALCIUM 8.8  --  9.1 9.5   MAGNESIUM  --   --  1.7  --          Lab 02/10/25  0742 02/09/25 1959   TOTAL PROTEIN 6.2 7.0   ALBUMIN 3.6 4.1   GLOBULIN 2.6 2.9   ALT (SGPT) <5 5   AST (SGOT) 16 18   BILIRUBIN 0.3 0.2   ALK PHOS 79 92         Lab 02/10/25  0742   PROTIME 13.0   INR 0.97             Lab 02/10/25  0742 02/09/25 2010   ABO TYPING O O   RH TYPING Positive Positive   ANTIBODY SCREEN  --  Negative         Brief Urine Lab Results  (Last result in the past 365 days)        Color   Clarity   Blood   Leuk Est   Nitrite   Protein   CREAT   Urine HCG        06/01/24 1157 Yellow   Turbid   Small (1+)   Large (3+)   Positive   30 mg/dL (1+)                   Microbiology Results Abnormal       None            XR Pelvis 1 or 2 View    Result Date: 2/10/2025  XR PELVIS 1 OR 2 VW Date of Exam: 2/10/2025 8:35 PM EST Indication: postop Comparison: Pelvis radiographs 2/9/2025. Findings: Postsurgical changes of total right hip arthroplasty with hardware in expected position. Expected postsurgical soft tissue change. Remainder of exam is unchanged. No evidence of complication.     Impression: Impression: Expected changes of total right hip arthroplasty without evidence of complication. Electronically Signed: Duke Tafoya MD  2/10/2025 8:58 PM EST  Workstation ID: [id number]    Peripheral Block    Result Date: 2/10/2025  Cam Sanchez CRNA     2/10/2025  3:06 PM Peripheral Block Patient reassessed immediately prior to procedure Start time: 2/10/2025 2:54 PM Stop time: 2/10/2025 3:06 PM Reason for block: at surgeon's request and post-op pain management Performed by CRNA/CAA: Cam Sanchez CRNA Assisted by: Karo Almanza RN Preanesthetic Checklist Completed: patient identified, IV checked, site marked, risks and benefits discussed, surgical consent, monitors and equipment checked, pre-op evaluation and timeout performed Prep: Pt Position: supine Sterile barriers:cap, gloves, mask and washed/disinfected  hands Prep: ChloraPrep Patient monitoring: blood pressure monitoring, continuous pulse oximetry and EKG Procedure Performed under: local infiltration Guidance:ultrasound guided ULTRASOUND INTERPRETATION.  Using ultrasound guidance a 20 G gauge needle was placed in close proximity to the nerve, at which point, under ultrasound guidance anesthetic was injected in the area of the nerve and spread of the anesthesia was seen on ultrasound in close proximity thereto.  There were no abnormalities seen on ultrasound; a digital image was taken; and the patient tolerated the procedure with no complications. Images:still images obtained, printed/placed on chart Laterality:right Block Type:fascia iliaca compartment Injection Technique:catheter Needle Type:echogenic and Tuohy Needle Gauge:18 G Resistance on Injection: none Catheter Size:20 G (20g) Cath Depth at skin: 7 cm Medications Used: ropivacaine (NAROPIN) 0.5 % injection - Injection  25 mL - 2/10/2025 3:06:00 PM Medications Preservative Free Saline:25ml Post Assessment Injection Assessment: negative aspiration for heme, no paresthesia on injection and incremental injection Patient Tolerance:comfortable throughout block Complications:no Additional Notes CKAFASCIAILIACA: CATHETER A high-frequency linear transducer, with sterile cover, was placed in parasagittal plane on top of the Anterior Superior Iliac Spine (ASIS) and moved medially to identify the Internal Oblique muscle, Sartorius muscle, Iliacus Muscle, Fascia Iliaca (FI) and Fascia Latae. The insertion site was prepped and draped in sterile fashion. Skin and cutaneous tissue was infiltrated with 2-5 ml of 1% Lidocaine. Using ultrasound-guidance, an 18-gauge Contiplex Ultra 360 Touhy needle was advanced in plane from caudad to cephalad. Preservative-free normal saline was utilized for hydro-dissection of tissue, advancement of Touhy, and to confirm final needle placement below FI. Local anesthetic in incremental 3-5 ml  "injections. Aspiration every 5 ml to prevent intravascular injection. Injection was completed with negative aspiration of blood and negative intravascular injection. Injection pressures were normal with minimal resistance. A 20-gauge Contiplex Echo catheter was placed through the needle and advance out the tip of the Touhy 3-5 cm. The Touhy needle was then removed, and final catheter position verified below the FI. The catheter was secured in the usual fashion with skin glue, benzoin, steri-strips, CHG tegaderm and Label noting \"Nerve Block Catheter\". Jerk tape applied at yellow connector and catheter connection. Performed by: Cam Sanchez CRNA     CT Head Without Contrast    Result Date: 2/9/2025  CT HEAD WO CONTRAST, CT PELVIS WO CONTRAST Date of Exam: 2/9/2025 8:21 PM EST Indication: fall, unknown head injury. Comparison: 6/2/2024. Technique: Axial CT images were obtained of the head and pelvis without contrast administration.  Automated exposure control and iterative construction methods were used. Findings: Head: There is no evidence of hemorrhage. There is no mass effect or midline shift. There is no extracerebral collection. Ventricles are normal in size and configuration for patient's stated age.  Posterior fossa is within normal limits. Calvarium and skull base appear intact.   Visualized sinuses show no air fluid levels. A small amount of secretions present within the right sphenoid sinus. Visualized orbits are unremarkable. Pelvis: There is a mildly displaced and impacted fracture of the right femoral neck. Mild varus angulation is present. Total left hip arthroplasty present which appears intact. There is no evidence of additional fracture. No evidence of dislocation. Intrapelvic contents demonstrate no acute process. Diverticulosis present within the sigmoid colon. No significant inflammatory changes identified. Soft tissues appear grossly unremarkable.     Impression: Impression: 1.No acute " intracranial process. 2.Mildly displaced and impacted fracture of the right femoral neck. Electronically Signed: Dina Basilio MD  2/9/2025 8:45 PM EST  Workstation ID: [id number]    CT Pelvis Without Contrast    Result Date: 2/9/2025  CT HEAD WO CONTRAST, CT PELVIS WO CONTRAST Date of Exam: 2/9/2025 8:21 PM EST Indication: fall, unknown head injury. Comparison: 6/2/2024. Technique: Axial CT images were obtained of the head and pelvis without contrast administration.  Automated exposure control and iterative construction methods were used. Findings: Head: There is no evidence of hemorrhage. There is no mass effect or midline shift. There is no extracerebral collection. Ventricles are normal in size and configuration for patient's stated age.  Posterior fossa is within normal limits. Calvarium and skull base appear intact.   Visualized sinuses show no air fluid levels. A small amount of secretions present within the right sphenoid sinus. Visualized orbits are unremarkable. Pelvis: There is a mildly displaced and impacted fracture of the right femoral neck. Mild varus angulation is present. Total left hip arthroplasty present which appears intact. There is no evidence of additional fracture. No evidence of dislocation. Intrapelvic contents demonstrate no acute process. Diverticulosis present within the sigmoid colon. No significant inflammatory changes identified. Soft tissues appear grossly unremarkable.     Impression: Impression: 1.No acute intracranial process. 2.Mildly displaced and impacted fracture of the right femoral neck. Electronically Signed: Dina Basilio MD  2/9/2025 8:45 PM EST  Workstation ID: [id number]    XR Femur 2 View Right    Result Date: 2/9/2025  XR FEMUR 2 VW RIGHT, XR PELVIS 1 OR 2 VW, XR CHEST 1 VW Date of Exam: 2/9/2025 7:57 PM EST Indication: fall, R hip injury Comparison: None available. Findings: Right femur: There is a mildly displaced and impacted fracture of the right femoral neck. No  significant angulation identified. Remaining portions of the femur appear intact. Total right knee arthroplasty present which appears intact. No knee joint effusion. Pelvis: No additional fracture identified. Left hip hemiarthroplasty present. CHEST: There are no airspace consolidations. No pleural fluid. No pneumothorax. The pulmonary vasculature appears within normal limits. The cardiac and mediastinal silhouette appear unremarkable. No acute osseous abnormality identified. Hiatal hernia present.     Impression: Impression: Mildly displaced and impacted fracture of the right femoral neck. No additional fracture identified within the pelvis. No acute cardiopulmonary process. Electronically Signed: Dina Basilio MD  2/9/2025 8:27 PM EST  Workstation ID: [id number]    XR Chest 1 View    Result Date: 2/9/2025  XR FEMUR 2 VW RIGHT, XR PELVIS 1 OR 2 VW, XR CHEST 1 VW Date of Exam: 2/9/2025 7:57 PM EST Indication: fall, R hip injury Comparison: None available. Findings: Right femur: There is a mildly displaced and impacted fracture of the right femoral neck. No significant angulation identified. Remaining portions of the femur appear intact. Total right knee arthroplasty present which appears intact. No knee joint effusion. Pelvis: No additional fracture identified. Left hip hemiarthroplasty present. CHEST: There are no airspace consolidations. No pleural fluid. No pneumothorax. The pulmonary vasculature appears within normal limits. The cardiac and mediastinal silhouette appear unremarkable. No acute osseous abnormality identified. Hiatal hernia present.     Impression: Impression: Mildly displaced and impacted fracture of the right femoral neck. No additional fracture identified within the pelvis. No acute cardiopulmonary process. Electronically Signed: Dina Basilio MD  2/9/2025 8:27 PM EST  Workstation ID: [id number]    XR Pelvis 1 or 2 View    Result Date: 2/9/2025  XR FEMUR 2 VW RIGHT, XR PELVIS 1 OR 2 VW, XR CHEST 1 VW  Date of Exam: 2/9/2025 7:57 PM EST Indication: fall, R hip injury Comparison: None available. Findings: Right femur: There is a mildly displaced and impacted fracture of the right femoral neck. No significant angulation identified. Remaining portions of the femur appear intact. Total right knee arthroplasty present which appears intact. No knee joint effusion. Pelvis: No additional fracture identified. Left hip hemiarthroplasty present. CHEST: There are no airspace consolidations. No pleural fluid. No pneumothorax. The pulmonary vasculature appears within normal limits. The cardiac and mediastinal silhouette appear unremarkable. No acute osseous abnormality identified. Hiatal hernia present.     Impression: Impression: Mildly displaced and impacted fracture of the right femoral neck. No additional fracture identified within the pelvis. No acute cardiopulmonary process. Electronically Signed: Dina Basilio MD  2/9/2025 8:27 PM EST  Workstation ID: [id number]         Current medications:  Scheduled Meds:carbidopa-levodopa, 1 tablet, Oral, TID  metoprolol tartrate, 12.5 mg, Oral, BID  senna-docusate sodium, 2 tablet, Oral, BID  sodium chloride, 10 mL, Intravenous, Q12H  traZODone, 100 mg, Oral, Nightly      Continuous Infusions:lactated ringers, 9 mL/hr, Last Rate: 9 mL/hr (02/10/25 8664)  lactated ringers, 9 mL/hr  ropivacaine,       PRN Meds:.  acetaminophen **OR** acetaminophen **OR** acetaminophen    senna-docusate sodium **AND** polyethylene glycol **AND** bisacodyl **AND** bisacodyl    Calcium Replacement - Follow Nurse / BPA Driven Protocol    HYDROcodone-acetaminophen    ipratropium-albuterol    Magnesium Standard Dose Replacement - Follow Nurse / BPA Driven Protocol    Morphine **AND** naloxone    nitroglycerin    ondansetron    Phosphorus Replacement - Follow Nurse / BPA Driven Protocol    Potassium Replacement - Follow Nurse / BPA Driven Protocol    sodium chloride    sodium chloride    Assessment & Plan    Assessment & Plan     Active Hospital Problems    Diagnosis  POA    **Fracture of femoral neck, right [S72.001A]  Yes      Resolved Hospital Problems   No resolved problems to display.        Brief Hospital Course to date:  Fanny Boyce is a 84 y.o. female with a past medical history of COPD, A-fib, Parkinson's disease and dementia admitted after a presumed mechanical fall that resulted in a right hip fracture.    Right femoral neck fracture  - Pain control with oral agents, IV if needed.  - Hold Eliquis from home regimen for now.  Start lovenox for DVT px.  --PT/OT  --Drop in H/H.  Will monitor with repeat in AM.    Atrial fibrillation  - Continue metoprolol from home regimen.  - Continue telemetry.  - Hold Eliquis for now --await ok with ortho to restart.     COPD  - Not currently in exacerbation.  - No medications listed for this on her reviewed home medication list.  - Continue as needed DuoNebs.     Parkinson's disease  Dementia  - Continue carbidopa/levodopa from home regimen.  - Continue trazodone at night from home regimen.    Hx of prior UTI  --Will not send off UA due to no sx at this time.  Mental status seems normal.  She is at inc risk of asymptomatic bacteruria.      Expected Discharge Location and Transportation: Pending  Expected Discharge   Expected Discharge Date: 2/12/2025; Expected Discharge Time:      VTE Prophylaxis:  Mechanical VTE prophylaxis orders are present.         AM-PAC 6 Clicks Score (PT): 10 (02/11/25 2680)    CODE STATUS:   Code Status and Medical Interventions: CPR (Attempt to Resuscitate); Full Support   Ordered at: 02/09/25 7230     Code Status (Patient has no pulse and is not breathing):    CPR (Attempt to Resuscitate)     Medical Interventions (Patient has pulse or is breathing):    Full Support       Karo Matta MD  02/11/25

## 2025-02-11 NOTE — CASE MANAGEMENT/SOCIAL WORK
Discharge Planning Assessment  [facility]     Patient Name: Fanny Boyce  MRN: [id number]  Today's Date: 2/11/2025    Admit Date: 2/9/2025    Plan: Home vs [facility]   Discharge Needs Assessment       Row Name 02/11/25 1252       Living Environment    People in Home child(cari), adult    Current Living Arrangements home    Primary Care Provided by self    Provides Primary Care For no one, unable/limited ability to care for self    Family Caregiver if Needed child(cari), adult    Family Caregiver Names lulú Holley    Quality of Family Relationships helpful;involved;supportive    Able to Return to Prior Arrangements yes       Transition Planning    Patient/Family Anticipates Transition to home with family;home with help/services;inpatient rehabilitation facility    Patient/Family Anticipated Services at Transition        Discharge Needs Assessment    Equipment Currently Used at Home wheelchair;walker, rolling;rollator;hospital bed;commode  lift chair    Concerns to be Addressed denies needs/concerns at this time    Equipment Needed After Discharge none    Discharge Coordination/Progress Lives in a house in [place] with daughter Lore, needs assistance with ADLs.  Has a wheelchair, walker, bedside commode, hospital bed, lift chair.  Has had [employer] in the past.  Has an advanced directive.                   Discharge Plan       Row Name 02/11/25 1255       Plan    Plan Home vs [facility]    Patient/Family in Agreement with Plan yes    Plan Comments I spoke with Ms Boyce at bedside and her daughter Lore over the phone.  Ms Boyce resides in a house in [place] with Lore and needs assistance with ADLs. She has a wheelchair, walker, bedside commode, hospital bed, lift chair.  She has had [employer] in the past.  She does have an advanced directive on file.  Her insurance is Medicare with [employer] as secondary, and there have not been any  issues or lapses in coverage.  Her insurance does help with prescription costs.  Her PCP is Nemo Aponte, and she gets her prescriptions filled at [facility] in [place].  Lore states that their house is all one level, and she needs her mother to be able to push herself up, take two steps, and then sit down.  I advised Lore that PT has worked with Ms Boyce and they are recommending SNF.  Lore is agreeable to a referral to [facility].  I have called and given the referral to Lyudmila/GAL.  Case management will continue to follow.    Final Discharge Disposition Code 01 - home or self-care                  Continued Care and Services - Admitted Since 2/9/2025    No active coordination exists for this encounter.       Expected Discharge Date and Time       Expected Discharge Date Expected Discharge Time    Feb 12, 2025            Demographic Summary    No documentation.                  Functional Status       Row Name 02/11/25 1252       Functional Status    Usual Activity Tolerance moderate    Current Activity Tolerance fair       Functional Status, IADL    Medications assistive equipment and person    Meal Preparation assistive equipment and person    Housekeeping assistive equipment and person    Laundry assistive equipment and person    Shopping assistive equipment and person       Mental Status    General Appearance WDL WDL                   Psychosocial    No documentation.                  Abuse/Neglect    No documentation.                  Legal    No documentation.                  Substance Abuse    No documentation.                  Patient Forms    No documentation.                     Elicia Banerjee RN

## 2025-02-12 ENCOUNTER — APPOINTMENT (OUTPATIENT)
Dept: CT IMAGING | Facility: HOSPITAL | Age: 85
DRG: 521 | End: 2025-02-12
Payer: MEDICARE

## 2025-02-12 LAB
BASOPHILS # BLD AUTO: 0.05 10*3/MM3 (ref 0–0.2)
BASOPHILS NFR BLD AUTO: 0.8 % (ref 0–1.5)
DEPRECATED RDW RBC AUTO: 45.2 FL (ref 37–54)
EOSINOPHIL # BLD AUTO: 0.1 10*3/MM3 (ref 0–0.4)
EOSINOPHIL NFR BLD AUTO: 1.5 % (ref 0.3–6.2)
ERYTHROCYTE [DISTWIDTH] IN BLOOD BY AUTOMATED COUNT: 14 % (ref 12.3–15.4)
HCT VFR BLD AUTO: 33.1 % (ref 34–46.6)
HGB BLD-MCNC: 10.2 G/DL (ref 12–15.9)
IMM GRANULOCYTES # BLD AUTO: 0.02 10*3/MM3 (ref 0–0.05)
IMM GRANULOCYTES NFR BLD AUTO: 0.3 % (ref 0–0.5)
LYMPHOCYTES # BLD AUTO: 0.36 10*3/MM3 (ref 0.7–3.1)
LYMPHOCYTES NFR BLD AUTO: 5.6 % (ref 19.6–45.3)
MCH RBC QN AUTO: 27.5 PG (ref 26.6–33)
MCHC RBC AUTO-ENTMCNC: 30.8 G/DL (ref 31.5–35.7)
MCV RBC AUTO: 89.2 FL (ref 79–97)
MONOCYTES # BLD AUTO: 0.27 10*3/MM3 (ref 0.1–0.9)
MONOCYTES NFR BLD AUTO: 4.2 % (ref 5–12)
NEUTROPHILS NFR BLD AUTO: 5.68 10*3/MM3 (ref 1.7–7)
NEUTROPHILS NFR BLD AUTO: 87.6 % (ref 42.7–76)
NRBC BLD AUTO-RTO: 0 /100 WBC (ref 0–0.2)
PLATELET # BLD AUTO: 146 10*3/MM3 (ref 140–450)
PMV BLD AUTO: 10.9 FL (ref 6–12)
RBC # BLD AUTO: 3.71 10*6/MM3 (ref 3.77–5.28)
WBC NRBC COR # BLD AUTO: 6.48 10*3/MM3 (ref 3.4–10.8)

## 2025-02-12 PROCEDURE — 25510000001 IOPAMIDOL PER 1 ML: Performed by: INTERNAL MEDICINE

## 2025-02-12 PROCEDURE — 25010000002 ENOXAPARIN PER 10 MG: Performed by: PEDIATRICS

## 2025-02-12 PROCEDURE — 97530 THERAPEUTIC ACTIVITIES: CPT

## 2025-02-12 PROCEDURE — 97165 OT EVAL LOW COMPLEX 30 MIN: CPT

## 2025-02-12 PROCEDURE — 71275 CT ANGIOGRAPHY CHEST: CPT

## 2025-02-12 PROCEDURE — 94640 AIRWAY INHALATION TREATMENT: CPT

## 2025-02-12 PROCEDURE — 25810000003 SODIUM CHLORIDE 0.9 % SOLUTION: Performed by: INTERNAL MEDICINE

## 2025-02-12 PROCEDURE — 85025 COMPLETE CBC W/AUTO DIFF WBC: CPT | Performed by: PEDIATRICS

## 2025-02-12 PROCEDURE — 94799 UNLISTED PULMONARY SVC/PX: CPT

## 2025-02-12 RX ORDER — SODIUM CHLORIDE 9 MG/ML
100 INJECTION, SOLUTION INTRAVENOUS CONTINUOUS
Status: ACTIVE | OUTPATIENT
Start: 2025-02-12 | End: 2025-02-12

## 2025-02-12 RX ORDER — IOPAMIDOL 755 MG/ML
73 INJECTION, SOLUTION INTRAVASCULAR
Status: COMPLETED | OUTPATIENT
Start: 2025-02-12 | End: 2025-02-12

## 2025-02-12 RX ORDER — TRAZODONE HYDROCHLORIDE 50 MG/1
50 TABLET ORAL NIGHTLY PRN
Status: DISCONTINUED | OUTPATIENT
Start: 2025-02-12 | End: 2025-02-18 | Stop reason: HOSPADM

## 2025-02-12 RX ADMIN — APIXABAN 2.5 MG: 2.5 TABLET, FILM COATED ORAL at 20:37

## 2025-02-12 RX ADMIN — CARBIDOPA AND LEVODOPA 1 TABLET: 25; 100 TABLET ORAL at 17:51

## 2025-02-12 RX ADMIN — SODIUM CHLORIDE 100 ML/HR: 9 INJECTION, SOLUTION INTRAVENOUS at 09:46

## 2025-02-12 RX ADMIN — METOPROLOL TARTRATE 12.5 MG: 25 TABLET, FILM COATED ORAL at 20:37

## 2025-02-12 RX ADMIN — IOPAMIDOL 73 ML: 755 INJECTION, SOLUTION INTRAVENOUS at 19:34

## 2025-02-12 RX ADMIN — SENNOSIDES AND DOCUSATE SODIUM 2 TABLET: 50; 8.6 TABLET ORAL at 08:31

## 2025-02-12 RX ADMIN — METOPROLOL TARTRATE 12.5 MG: 25 TABLET, FILM COATED ORAL at 08:31

## 2025-02-12 RX ADMIN — ENOXAPARIN SODIUM 30 MG: 30 INJECTION SUBCUTANEOUS at 14:51

## 2025-02-12 RX ADMIN — CARBIDOPA AND LEVODOPA 1 TABLET: 25; 100 TABLET ORAL at 08:31

## 2025-02-12 RX ADMIN — SENNOSIDES AND DOCUSATE SODIUM 2 TABLET: 50; 8.6 TABLET ORAL at 20:37

## 2025-02-12 RX ADMIN — IPRATROPIUM BROMIDE AND ALBUTEROL SULFATE 3 ML: 2.5; .5 SOLUTION RESPIRATORY (INHALATION) at 17:45

## 2025-02-12 RX ADMIN — Medication 10 ML: at 08:31

## 2025-02-12 RX ADMIN — POLYETHYLENE GLYCOL 3350 17 G: 17 POWDER, FOR SOLUTION ORAL at 08:31

## 2025-02-12 RX ADMIN — CARBIDOPA AND LEVODOPA 1 TABLET: 25; 100 TABLET ORAL at 11:05

## 2025-02-12 RX ADMIN — ACETAMINOPHEN 650 MG: 325 TABLET ORAL at 18:44

## 2025-02-12 NOTE — THERAPY EVALUATION
Patient Name: Fanny Boyce  : 1940    MRN: [id number]                              Today's Date: 2025       Admit Date: 2025    Visit Dx:     ICD-10-CM ICD-9-CM   1. Closed fracture of neck of right femur, initial encounter  S72.001A 820.8   2. Anticoagulated  Z79.01 V58.61   3. History of atrial fibrillation  Z86.79 V12.59   4. History of pulmonary embolism  Z86.711 V12.55     Patient Active Problem List   Diagnosis    Atrial fibrillation    COPD (chronic obstructive pulmonary disease)    Dementia    Parkinson's disease    Fracture of left humerus with routine healing    Fracture of femoral neck, right     Past Medical History:   Diagnosis Date    Atrial fibrillation     COPD (chronic obstructive pulmonary disease)     Dementia     History of lung cancer     Parkinson disease     Pulmonary embolism      Past Surgical History:   Procedure Laterality Date    HIP HEMIARTHROPLASTY Right 2/10/2025    Procedure: HIP HEMIARTHROPLASTY RIGHT;  Surgeon: Frandy Patino MD;  Location: [facility];  Service: Orthopedics;  Laterality: Right;    JOINT REPLACEMENT      LUNG CANCER SURGERY        General Information       Row Name 25 1452          OT Time and Intention    Document Type evaluation  -LR     Mode of Treatment occupational therapy  -LR       Row Name 25 1452          General Information    Patient Profile Reviewed yes  -LR     Prior Level of Function mod assist:;ADL's;min assist:;bed mobility;gait;dependent:;cooking;cleaning;driving;home management  Dtr in room. States that pt lives with her and is able to do complete ADLs such as UBD with minimal A, but more moderate assistance with bathing, toileting, etc.  -LR     Existing Precautions/Restrictions fall;hip, posterior;right;oxygen therapy device and L/min;other (see comments)  R hip hemiarthroplasty (posterolateral approach); RLE WBAT; Fascia iliaca nerve catheter; KI 2/2 residual quad weakness; hx dementia, PD  -LR     Barriers to  Rehab medically complex;previous functional deficit;cognitive status  -LR       Row Name 02/12/25 1452          Occupational Profile    Environmental Supports and Barriers (Occupational Profile) Dtr is at home 24/7 to assist pt and other family member. Dtr and pt have chair lift, rollator, FWW, and other equipment at home. Dtr states that there is no other equipment that she would need  -LR       Row Name 02/12/25 1452          Living Environment    People in Home child(cari), adult  -LR       Row Name 02/12/25 1452          Home Main Entrance    Number of Stairs, Main Entrance none;other (see comments)  no stairs through garage  -LR     Stair Railings, Main Entrance none  -LR       Row Name 02/12/25 1452          Stairs Within Home, Primary    Number of Stairs, Within Home, Primary none  -LR     Stair Railings, Within Home, Primary none  -LR       Row Name 02/12/25 1452          Cognition    Orientation Status (Cognition) oriented to;person;verbal cues/prompts needed for orientation;time  -LR       Row Name 02/12/25 1452          Safety Issues/Impairments Affecting Functional Mobility    Safety Issues Affecting Function (Mobility) safety precaution awareness;awareness of need for assistance;safety precautions follow-through/compliance;insight into deficits/self-awareness;sequencing abilities;judgment;problem-solving  -LR     Impairments Affecting Function (Mobility) balance;cognition;endurance/activity tolerance;pain;range of motion (ROM);sensation/sensory awareness;strength  -LR     Cognitive Impairments, Mobility Safety/Performance safety precaution follow-through;awareness, need for assistance;attention;sequencing abilities;judgment;insight into deficits/self-awareness;problem-solving/reasoning;safety precaution awareness  -LR               User Key  (r) = Recorded By, (t) = Taken By, (c) = Cosigned By      Initials Name Provider Type    LR Magali Barry, OT Occupational Therapist                      Mobility/ADL's       [facility] Name 02/12/25 1458          Bed Mobility    Comment, (Bed Mobility) [facility] on arrival  -[facility] Name 02/12/25 1458          Transfers    Transfers sit-stand transfer;stand-sit transfer  -LR       Row Name 02/12/25 1458          Sit-Stand Transfer    Sit-Stand Maries (Transfers) maximum assist (25% patient effort);2 person assist;verbal cues;nonverbal cues (demo/gesture)  -     Assistive Device (Sit-Stand Transfers) walker, front-wheeled  -[facility] Name 02/12/25 1458          Stand-Sit Transfer    Stand-Sit Maries (Transfers) maximum assist (25% patient effort);2 person assist;nonverbal cues (demo/gesture);verbal cues  -     Assistive Device (Stand-Sit Transfers) walker, front-wheeled  -[facility] Name 02/12/25 1458          Functional Mobility    Patient was able to Ambulate no, other medical factors prevent ambulation  -LR       Row Name 02/12/25 1458          Activities of Daily Living    BADL Assessment/Intervention grooming  -LR       Row Name 02/12/25 1458          Mobility    Extremity Weight-bearing Status right lower extremity  -LR     Right Lower Extremity (Weight-bearing Status) weight-bearing as tolerated (WBAT)  -LR       Row Name 02/12/25 1458          Grooming Assessment/Training    Maries Level (Grooming) wash face, hands;set up;verbal cues  -     Position (Grooming) supported sitting  -               User Key  (r) = Recorded By, (t) = Taken By, (c) = Cosigned By      Initials Name Provider Type    LR Magali Barry OT Occupational Therapist                   Obj/Interventions       [facility] Name 02/12/25 1532          Sensory Assessment (Somatosensory)    Sensory Assessment (Somatosensory) unable/difficult to assess  -LR       Row Name 02/12/25 1532          Vision Assessment/Intervention    Visual Impairment/Limitations WFL;corrective lenses full-time  -[facility] Name 02/12/25 1532          Range of Motion Comprehensive    General Range of  Motion bilateral upper extremity ROM WFL  -LR       Row Name 02/12/25 1532          Strength Comprehensive (MMT)    General Manual Muscle Testing (MMT) Assessment upper extremity strength deficits identified  -LR     Comment, General Manual Muscle Testing (MMT) Assessment BUE grossly 3/5 MMT  -LR       Row Name 02/12/25 1532          Balance    Balance Assessment sitting static balance;sitting dynamic balance;standing static balance;standing dynamic balance  -LR     Static Sitting Balance contact guard  -LR     Dynamic Sitting Balance minimal assist  -LR     Position, Sitting Balance supported;sitting edge of bed  -LR     Static Standing Balance maximum assist;2-person assist;verbal cues;non-verbal cues (demo/gesture)  -LR     Dynamic Standing Balance maximum assist;2-person assist;verbal cues;non-verbal cues (demo/gesture)  -LR     Position/Device Used, Standing Balance supported;walker, front-wheeled  -LR     Balance Interventions sitting;standing;supported;sit to stand;static;dynamic;occupation based/functional task;weight shifting activity  -LR     Comment, Balance Pt unable to correct posture and lean with VC or NVC  -LR               User Key  (r) = Recorded By, (t) = Taken By, (c) = Cosigned By      Initials Name Provider Type    LR Magali Barry, OT Occupational Therapist                   Goals/Plan       Row Name 02/12/25 1533          Transfer Goal 1 (OT)    Activity/Assistive Device (Transfer Goal 1, OT) sit-to-stand/stand-to-sit;bed-to-chair/chair-to-bed  -LR     S[place]y Level/Cues Needed (Transfer Goal 1, OT) minimum assist (75% or more patient effort);verbal cues required;tactile cues required  -LR     Time Frame (Transfer Goal 1, OT) long term goal (LTG);10 days  -LR     Strategies/Barriers (Transfers Goal 1, OT) demonstrating good posture  -LR     Progress/Outcome (Transfer Goal 1, OT) goal ongoing;new goal  -LR       Row Name 02/12/25 6043          Grooming Goal 1 (OT)    Activity/Device  (Grooming Goal 1, OT) wash face, hands;oral care  -LR     Daniels (Grooming Goal 1, OT) contact guard required  -LR     Time Frame (Grooming Goal 1, OT) short term goal (STG);5 days  -LR     Strategies/Barriers (Grooming Goal 1, OT) sink side  -LR     Progress/Outcome (Grooming Goal 1, OT) goal ongoing;new goal  -LR       Row Name 02/12/25 1539          Therapy Assessment/Plan (OT)    Planned Therapy Interventions (OT) activity tolerance training;adaptive equipment training;BADL retraining;cognitive/visual perception retraining;occupation/activity based interventions;ROM/therapeutic exercise;strengthening exercise;functional balance retraining;transfer/mobility retraining;patient/caregiver education/training;passive ROM/stretching;IADL retraining  -LR               User Key  (r) = Recorded By, (t) = Taken By, (c) = Cosigned By      Initials Name Provider Type    LR Magali Barry, OT Occupational Therapist                   Clinical Impression       Row Name 02/12/25 1534          Pain Assessment    Pretreatment Pain Rating 0/10 - no pain  -LR     Posttreatment Pain Rating 0/10 - no pain  -LR       Row Name 02/12/25 1534          Plan of Care Review    Plan of Care Reviewed With patient;daughter  -LR     Progress no change  -LR     Outcome Evaluation OT eval complete. Pt presenting below baseline function with ADL performance and functional mobility. Max Ax2 for STS and unable to correct posture with VC or NVC. Needing max A for remembering nerve cath precautions. Educated pt and dtr on PLB throughout session. Recommend IPOT POC and SNF at D/C to address balance deficits, postural control,  decreased strength/ROM, and safety awareness, but will monitor progress closely.  -LR       Row Name 02/12/25 1534          Therapy Assessment/Plan (OT)    Patient/Family Therapy Goal Statement (OT) Take steps  -LR     Rehab Potential (OT) good  -LR     Criteria for Skilled Therapeutic Interventions Met (OT) yes;meets  criteria  -LR     Therapy Frequency (OT) daily  -LR     Predicted Duration of Therapy Intervention (OT) 10 days  -LR       Row Name 02/12/25 1534          Therapy Plan Review/Discharge Plan (OT)    Anticipated Discharge Disposition (OT) skilled nursing facility  -LR       Row Name 02/12/25 1534          Vital Signs    Pre SpO2 (%) 85  -LR     O2 Delivery Pre Treatment nasal cannula  -LR     Intra SpO2 (%) 80  -LR     O2 Delivery Intra Treatment nasal cannula  -LR     Post SpO2 (%) 90  -LR     O2 Delivery Post Treatment nasal cannula  -LR     Pre Patient Position Sitting  -LR     Intra Patient Position Standing  -LR     Post Patient Position Sitting  -LR       Row Name 02/12/25 1534          Positioning and Restraints    Pre-Treatment Position sitting in chair/recliner  -LR     Post Treatment Position chair  -LR     In Chair notified nsg;reclined;call light within reach;encouraged to call for assist;exit alarm on;waffle cushion;with family/caregiver;legs elevated  -LR               User Key  (r) = Recorded By, (t) = Taken By, (c) = Cosigned By      Initials Name Provider Type    LR Magali Barry, OT Occupational Therapist                   Outcome Measures       Row Name 02/12/25 1540          How much help from another is currently needed...    Putting on and taking off regular lower body clothing? 2  -LR     Bathing (including washing, rinsing, and drying) 1  -LR     Toileting (which includes using toilet bed pan or urinal) 1  -LR     Putting on and taking off regular upper body clothing 3  -LR     Taking care of personal grooming (such as brushing teeth) 3  -LR     Eating meals 2  -LR     AM-PAC 6 Clicks Score (OT) 12  -LR       Row Name 02/12/25 0957 02/12/25 0831       How much help from another person do you currently need...    Turning from your back to your side while in flat bed without using bedrails? 2  -CK 2  -LB    Moving from lying on back to sitting on the side of a flat bed without bedrails? 2   -CK 2  -LB    Moving to and from a bed to a chair (including a wheelchair)? 2  -CK 2  -LB    Standing up from a chair using your arms (e.g., wheelchair, bedside chair)? 2  -CK 2  -LB    Climbing 3-5 steps with a railing? 1  -CK 1  -LB    To walk in hospital room? 1  -CK 1  -LB    AM-PAC 6 Clicks Score (PT) 10  -CK 10  -LB    Highest Level of Mobility Goal 4 --> Transfer to chair/commode  -CK 4 --> Transfer to chair/commode  -LB      Row Name 02/12/25 1540 02/12/25 0957       Functional Assessment    Outcome Measure Options AM-PAC 6 Clicks Daily Activity (OT)  -LR AM-PAC 6 Clicks Basic Mobility (PT)  -CK              User Key  (r) = Recorded By, (t) = Taken By, (c) = Cosigned By      Initials Name Provider Type    Hannah Beck, RN Registered Nurse    Erica Moseley, PT Physical Therapist    LR Magali Barry, OT Occupational Therapist                    Occupational Therapy Education       Title: PT OT SLP Therapies (In Progress)       Topic: Occupational Therapy (In Progress)       Point: ADL training (In Progress)       Description:   Instruct learner(s) on proper safety adaptation and remediation techniques during self care or transfers.   Instruct in proper use of assistive devices.                  Learning Progress Summary            Patient Acceptance, E, NR by LR at 2/12/2025 1541   Family Acceptance, E, NR by LR at 2/12/2025 1541                      Point: Home exercise program (In Progress)       Description:   Instruct learner(s) on appropriate technique for monitoring, assisting and/or progressing therapeutic exercises/activities.                  Learning Progress Summary            Patient Acceptance, E, NR by LR at 2/12/2025 1541   Family Acceptance, E, NR by LR at 2/12/2025 1541                      Point: Precautions (In Progress)       Description:   Instruct learner(s) on prescribed precautions during self-care and functional transfers.                  Learning Progress Summary             Patient Acceptance, E, NR by LR at 2/12/2025 1541   Family Acceptance, E, NR by LR at 2/12/2025 1541                      Point: Body mechanics (In Progress)       Description:   Instruct learner(s) on proper positioning and spine alignment during self-care, functional mobility activities and/or exercises.                  Learning Progress Summary            Patient Acceptance, E, NR by LR at 2/12/2025 1541   Family Acceptance, E, NR by LR at 2/12/2025 1541                                      User Key       Initials Effective Dates Name Provider Type Discipline    LR 10/09/24 -  Magali Barry, RADHA Occupational Therapist OT                  OT Recommendation and Plan  Planned Therapy Interventions (OT): activity tolerance training, adaptive equipment training, BADL retraining, cognitive/visual perception retraining, occupation/activity based interventions, ROM/therapeutic exercise, strengthening exercise, functional balance retraining, transfer/mobility retraining, patient/caregiver education/training, passive ROM/stretching, IADL retraining  Therapy Frequency (OT): daily  Plan of Care Review  Plan of Care Reviewed With: patient, daughter  Progress: no change  Outcome Evaluation: OT eval complete. Pt presenting below baseline function with ADL performance and functional mobility. Max Ax2 for STS and unable to correct posture with VC or NVC. Needing max A for remembering nerve cath precautions. Educated pt and dtr on PLB throughout session. Recommend IPOT POC and SNF at D/C to address balance deficits, postural control,  decreased strength/ROM, and safety awareness, but will monitor progress closely.     Time Calculation:   Evaluation Complexity (OT)  Review Occupational Profile/Medical/Therapy History Complexity: brief/low complexity  Assessment, Occupational Performance/Identification of Deficit Complexity: 1-3 performance deficits  Clinical Decision Making Complexity (OT): problem focused  assessment/low complexity  Overall Complexity of Evaluation (OT): low complexity     Time Calculation- OT       Row Name 02/12/25 1542             Time Calculation- OT    OT Start Time 1400  -LR      OT Received On 02/12/25  -LR      OT Goal Re-Cert Due Date 02/22/25  -LR         Untimed Charges    OT Eval/Re-eval Minutes 47  -LR         Total Minutes    Untimed Charges Total Minutes 47  -LR       Total Minutes 47  -LR                User Key  (r) = Recorded By, (t) = Taken By, (c) = Cosigned By      Initials Name Provider Type    LR Magali Barry OT Occupational Therapist                  Therapy Charges for Today       Code Description Service Date Service Provider Modifiers Qty    22012092587 HC OT EVAL LOW COMPLEXITY 4 2/12/2025 Magali Barry OT GO 1                 Magali Barry OT  2/12/2025

## 2025-02-12 NOTE — PROGRESS NOTES
[facility]    Acute pain service Inpatient Progress Note    Patient Name: Fanny Boyce  :  1940  MRN:  [id number]        Acute Pain  Service Inpatient Progress Note:    Analgesia:Excellent  Pain Score:0/10  LOC: alert and awake  Resp Status: room air  Cardiac: VS stable  Side Effects:None  Catheter Site:clean, dressing intact and dry  Cath type: peripheral nerve cath(InfuSystem)  Infusion rate: Fem/ Add: Basal: 1ml/hr, PIB: 8ml q 8h, PCA: 8ml q 30 min (1mL,10ml, 10ml InfuSystem Pump)  Dosing/Volume: ropivacaine 0.2%  Catheter Plan:Catheter to remain Insitu and Continue catheter infusion rate unchanged  Comments:

## 2025-02-12 NOTE — PROGRESS NOTES
"Orthopedic Daily Progress Note      CC: Doing better    Pain controlled  General: no fevers, chills  Abdomen: No nausea, vomiting, or diarrhea    No other complaints    Physical Exam:  I have reviewed the vital signs.  Temp:  [97.4 °F (36.3 °C)-99.6 °F (37.6 °C)] 98.1 °F (36.7 °C)  Heart Rate:  [] 79  Resp:  [18] 18  BP: (101-123)/(49-65) 101/49    Objective:  Vital signs: (most recent): Blood pressure 101/49, pulse 79, temperature 98.1 °F (36.7 °C), temperature source Oral, resp. rate 18, height 152.4 cm (60\"), weight 52.1 kg (114 lb 12.8 oz), SpO2 (!) 88%.              General Appearance:    Alert, cooperative, no distress  Extremities: No clubbing, cyanosis, or edema to lower extremities  Pulses:  2+ in distal surgical extremity  Skin: Dressing Clean/dry/intact      Results Review:    I have reviewed the labs, radiology results and diagnostic studies:    Results from last 7 days   Lab Units 02/12/25  0901   WBC 10*3/mm3 6.48   HEMOGLOBIN g/dL 10.2*   PLATELETS 10*3/mm3 146     Results from last 7 days   Lab Units 02/11/25  1001   SODIUM mmol/L 138   POTASSIUM mmol/L 4.4   CO2 mmol/L 24.0   CREATININE mg/dL 0.69   GLUCOSE mg/dL 147*       I have reviewed the medications.    Assessment/Problem List  POD# 2 s/p right hip hemiarthroplasty for femoral neck fracture    Plan  PT/OT  DVT prophylaxis per hospitalist - ok to resume eliquis  Bandage on for 1 week  Ok to shower/bathe with bandage on once nerve block out  Placement when available.    Frandy Patino MD  02/12/25  14:37 EST            "

## 2025-02-12 NOTE — CASE MANAGEMENT/SOCIAL WORK
Continued Stay Note  [facility]     Patient Name: Fanny Boyce  MRN: [id number]  Today's Date: 2/12/2025    Admit Date: 2/9/2025    Plan: [facility]   Discharge Plan       Row Name 02/12/25 1540       Plan    Plan [facility]    Patient/Family in Agreement with Plan yes    Plan Comments CM rec'd call from felicity Ortega Alicia. She will reach out to patient's daughter for further questions and then will present to their MD to see if she is appropriate for admission. CM following.    Final Discharge Disposition Code 03 - skilled nursing facility (SNF)                   Discharge Codes    No documentation.                 Expected Discharge Date and Time       Expected Discharge Date Expected Discharge Time    Feb 15, 2025               Petty Swift RN

## 2025-02-12 NOTE — PLAN OF CARE
Goal Outcome Evaluation:  Plan of Care Reviewed With: patient        Progress: no change          GERTRUDE overnight, O2 titrated down from 3 to 2.5 sat mid 90's, VSS, endorses no pain, plan of care ongoing

## 2025-02-12 NOTE — PLAN OF CARE
Goal Outcome Evaluation:  Plan of Care Reviewed With: patient        Progress: improving     No changes, 2-3L NC, VSS, no c/o of pain, infupump dry and intact, sat in chair most of the day, feeding assistance with each meal       Problem: Adult Inpatient Plan of Care  Goal: Absence of Hospital-Acquired Illness or Injury  Intervention: Identify and Manage Fall Risk  Description: Perform standard risk assessment on admission using a validated tool or comprehensive approach appropriate to the patient; reassess fall risk frequently, with change in status or transfer to another level of care.  Communicate risk to interprofessional healthcare team; ensure fall risk visible cue.  Determine need for increased observation, equipment and environmental modification, as well as use of supportive, nonskid footwear.  Adjust safety measures to individual needs and identified risk factors.  Reinforce the importance of active participation with fall risk prevention, safety, and physical activity with the patient and family.  Perform regular intentional rounding to assess need for position change, pain assessment and personal needs, including assistance with toileting.  Recent Flowsheet Documentation  Taken 2/12/2025 1640 by Hannah Kim, RN  Safety Promotion/Fall Prevention:   safety round/check completed   room organization consistent   nonskid shoes/slippers when out of bed   lighting adjusted   fall prevention program maintained   clutter free environment maintained   assistive device/personal items within reach  Taken 2/12/2025 1437 by Hannah Kim, RN  Safety Promotion/Fall Prevention:   safety round/check completed   room organization consistent   nonskid shoes/slippers when out of bed   mobility aid in reach   lighting adjusted   gait belt   fall prevention program maintained   elopement precautions   clutter free environment maintained   assistive device/personal items within reach   activity supervised  Taken 2/12/2025  1217 by Hannah Kim, RN  Safety Promotion/Fall Prevention:   safety round/check completed   room organization consistent   nonskid shoes/slippers when out of bed   mobility aid in reach   lighting adjusted   fall prevention program maintained   elopement precautions   clutter free environment maintained   assistive device/personal items within reach   activity supervised  Taken 2/12/2025 1030 by Hannah Kim, RN  Safety Promotion/Fall Prevention:   safety round/check completed   room organization consistent   nonskid shoes/slippers when out of bed   mobility aid in reach   lighting adjusted   fall prevention program maintained   clutter free environment maintained   assistive device/personal items within reach  Taken 2/12/2025 0831 by Hannah Kim, RN  Safety Promotion/Fall Prevention:   safety round/check completed   room organization consistent   muscle strengthening facilitated   nonskid shoes/slippers when out of bed   mobility aid in reach   lighting adjusted   gait belt   fall prevention program maintained   elopement precautions   assistive device/personal items within reach   clutter free environment maintained   activity supervised   toileting scheduled

## 2025-02-12 NOTE — PLAN OF CARE
Goal Outcome Evaluation:  Plan of Care Reviewed With: patient, daughter        Progress: no change  Outcome Evaluation: OT eval complete. Pt presenting below baseline function with ADL performance and functional mobility. Max Ax2 for STS and unable to correct posture with VC or NVC. Needing max A for remembering nerve cath precautions. Educated pt and dtr on PLB throughout session. Recommend IPOT POC and SNF at D/C to address balance deficits, postural control,  decreased strength/ROM, and safety awareness, but will monitor progress closely.    Anticipated Discharge Disposition (OT): skilled nursing facility

## 2025-02-12 NOTE — PROGRESS NOTES
[facility] Medicine Services  PROGRESS NOTE    Patient Name: Fanny Boyce  : 1940  MRN: [id number]    Date of Admission: 2025  Primary Care Physician: Nemo Aponte PA    Subjective   Subjective     CC:  F/U hip fracture    HPI:  Has eaten some today.  Denies any pain.      Objective   Objective     Vital Signs:   Temp:  [97.4 °F (36.3 °C)-99.6 °F (37.6 °C)] 99.6 °F (37.6 °C)  Heart Rate:  [] 71  Resp:  [18] 18  BP: (108-123)/(51-65) 116/51  Flow (L/min) (Oxygen Therapy):  [2-3] 3     Physical Exam:  Constitutional: No acute distress, awake, alert, sitting up in chair  HENT: NCAT, mucous membranes moist  Respiratory: Respiratory effort normal   Cardiovascular: RRR  Musculoskeletal: No bilateral ankle edema  Psychiatric: Appropriate affect, cooperative  Neurologic: Speech clear and fluent  Skin: No rashes on exposed surfaces    Results Reviewed:  LAB RESULTS:      Lab 25  0925  1001 02/10/25  0742 02/10/25  0741 25   WBC 6.48 10.83*  --  8.68 9.29   HEMOGLOBIN 10.2* 10.6*  --  12.3 13.5   HEMATOCRIT 33.1* 33.0*  --  39.1 43.0   PLATELETS 146 187  --  186 235   NEUTROS ABS 5.68 9.56*  --  7.60* 7.49*   IMMATURE GRANS (ABS) 0.02 0.05  --  0.04 0.09*   LYMPHS ABS 0.36* 0.65*  --  0.67* 1.17   MONOS ABS 0.27 0.49  --  0.27 0.39   EOS ABS 0.10 0.04  --  0.06 0.10   MCV 89.2 88.9  --  88.7 89.8   PROTIME  --   --  13.0  --   --          Lab 25  1001 02/10/25  2155 02/10/25  0742 25   SODIUM 138  --  141 141   POTASSIUM 4.4 3.8 4.3 3.9   CHLORIDE 101  --  104 102   CO2 24.0  --  28.0 30.0*   ANION GAP 13.0  --  9.0 9.0   BUN 15  --  18 19   CREATININE 0.69  --  0.63 0.66   EGFR 84.6  --  86.5 85.6   GLUCOSE 147*  --  125* 116*   CALCIUM 8.8  --  9.1 9.5   MAGNESIUM  --   --  1.7  --          Lab 02/10/25  0742 25   TOTAL PROTEIN 6.2 7.0   ALBUMIN 3.6 4.1   GLOBULIN 2.6 2.9   ALT (SGPT) <5 5   AST (SGOT) 16 18    BILIRUBIN 0.3 0.2   ALK PHOS 79 92         Lab 02/10/25  0742   PROTIME 13.0   INR 0.97             Lab 02/10/25  0742 02/09/25 2010   ABO TYPING O O   RH TYPING Positive Positive   ANTIBODY SCREEN  --  Negative         Brief Urine Lab Results  (Last result in the past 365 days)        Color   Clarity   Blood   Leuk Est   Nitrite   Protein   CREAT   Urine HCG        06/01/24 1157 Yellow   Turbid   Small (1+)   Large (3+)   Positive   30 mg/dL (1+)                   Microbiology Results Abnormal       None            XR Pelvis 1 or 2 View    Result Date: 2/10/2025  XR PELVIS 1 OR 2 VW Date of Exam: 2/10/2025 8:35 PM EST Indication: postop Comparison: Pelvis radiographs 2/9/2025. Findings: Postsurgical changes of total right hip arthroplasty with hardware in expected position. Expected postsurgical soft tissue change. Remainder of exam is unchanged. No evidence of complication.     Impression: Impression: Expected changes of total right hip arthroplasty without evidence of complication. Electronically Signed: Duke Tafoya MD  2/10/2025 8:58 PM EST  Workstation ID: [id number]    Peripheral Block    Result Date: 2/10/2025  Cam Sanchez CRNA     2/10/2025  3:06 PM Peripheral Block Patient reassessed immediately prior to procedure Start time: 2/10/2025 2:54 PM Stop time: 2/10/2025 3:06 PM Reason for block: at surgeon's request and post-op pain management Performed by CRNA/CAA: Cam Sanchez CRNA Assisted by: Karo Almanza RN Preanesthetic Checklist Completed: patient identified, IV checked, site marked, risks and benefits discussed, surgical consent, monitors and equipment checked, pre-op evaluation and timeout performed Prep: Pt Position: supine Sterile barriers:cap, gloves, mask and washed/disinfected hands Prep: ChloraPrep Patient monitoring: blood pressure monitoring, continuous pulse oximetry and EKG Procedure Performed under: local infiltration Guidance:ultrasound guided ULTRASOUND INTERPRETATION.   Using ultrasound guidance a 20 G gauge needle was placed in close proximity to the nerve, at which point, under ultrasound guidance anesthetic was injected in the area of the nerve and spread of the anesthesia was seen on ultrasound in close proximity thereto.  There were no abnormalities seen on ultrasound; a digital image was taken; and the patient tolerated the procedure with no complications. Images:still images obtained, printed/placed on chart Laterality:right Block Type:fascia iliaca compartment Injection Technique:catheter Needle Type:echogenic and Tuohy Needle Gauge:18 G Resistance on Injection: none Catheter Size:20 G (20g) Cath Depth at skin: 7 cm Medications Used: ropivacaine (NAROPIN) 0.5 % injection - Injection  25 mL - 2/10/2025 3:06:00 PM Medications Preservative Free Saline:25ml Post Assessment Injection Assessment: negative aspiration for heme, no paresthesia on injection and incremental injection Patient Tolerance:comfortable throughout block Complications:no Additional Notes CKAFASCIAILIACA: CATHETER A high-frequency linear transducer, with sterile cover, was placed in parasagittal plane on top of the Anterior Superior Iliac Spine (ASIS) and moved medially to identify the Internal Oblique muscle, Sartorius muscle, Iliacus Muscle, Fascia Iliaca (FI) and Fascia Latae. The insertion site was prepped and draped in sterile fashion. Skin and cutaneous tissue was infiltrated with 2-5 ml of 1% Lidocaine. Using ultrasound-guidance, an 18-gauge Contiplex Ultra 360 Touhy needle was advanced in plane from caudad to cephalad. Preservative-free normal saline was utilized for hydro-dissection of tissue, advancement of Touhy, and to confirm final needle placement below FI. Local anesthetic in incremental 3-5 ml injections. Aspiration every 5 ml to prevent intravascular injection. Injection was completed with negative aspiration of blood and negative intravascular injection. Injection pressures were normal with  "minimal resistance. A 20-gauge Contiplex Echo catheter was placed through the needle and advance out the tip of the Touhy 3-5 cm. The Touhy needle was then removed, and final catheter position verified below the FI. The catheter was secured in the usual fashion with skin glue, benzoin, steri-strips, CHG tegaderm and Label noting \"Nerve Block Catheter\". Jerk tape applied at yellow connector and catheter connection. Performed by: Cam Sanchez CRNA          Current medications:  Scheduled Meds:carbidopa-levodopa, 1 tablet, Oral, TID  enoxaparin, 30 mg, Subcutaneous, Q24H  metoprolol tartrate, 12.5 mg, Oral, BID  senna-docusate sodium, 2 tablet, Oral, BID  sodium chloride, 10 mL, Intravenous, Q12H      Continuous Infusions:ropivacaine,   sodium chloride, 100 mL/hr, Last Rate: 100 mL/hr (02/12/25 0946)      PRN Meds:.  acetaminophen **OR** acetaminophen **OR** acetaminophen    senna-docusate sodium **AND** polyethylene glycol **AND** bisacodyl **AND** bisacodyl    Calcium Replacement - Follow Nurse / BPA Driven Protocol    HYDROcodone-acetaminophen    ipratropium-albuterol    Magnesium Standard Dose Replacement - Follow Nurse / BPA Driven Protocol    Morphine **AND** naloxone    nitroglycerin    ondansetron    Phosphorus Replacement - Follow Nurse / BPA Driven Protocol    Potassium Replacement - Follow Nurse / BPA Driven Protocol    sodium chloride    sodium chloride    traZODone    Assessment & Plan   Assessment & Plan     Active Hospital Problems    Diagnosis  POA    **Fracture of femoral neck, right [S72.001A]  Yes    Atrial fibrillation [I48.91]  Yes    COPD (chronic obstructive pulmonary disease) [J44.9]  Yes    Dementia [F03.90]  Yes    Parkinson's disease [G20.A1]  Yes      Resolved Hospital Problems   No resolved problems to display.        Brief Hospital Course to date:  Fanny Boyce is a 84 y.o. female with a past medical history of COPD, A-fib, Parkinson's disease and dementia admitted after a presumed " mechanical fall that resulted in a right hip fracture.    This patient's problems and plans were partially entered by my partner and updated as appropriate by me 02/12/25.     Right femoral neck fracture  --S/P right hip hemiarthroplasty 2/10/25 by Dr. Patino  --Continue PT/OT  --Resume Eliquis    Atrial fibrillation  - Continue metoprolol and resume Eliquis.     COPD  - Not currently in exacerbation.  - Continue as needed DuoNebs.     Parkinson's disease  Dementia  - Continue carbidopa/levodopa  - Change trazodone nightly to PRN at lower dose due to drowsiness this morning and she takes it PRN at home.    Expected Discharge Location and Transportation: Pending  Expected Discharge   Expected Discharge Date: 2/15/2025; Expected Discharge Time:      VTE Prophylaxis:  Pharmacologic & mechanical VTE prophylaxis orders are present.         AM-PAC 6 Clicks Score (PT): 10 (02/12/25 9399)    CODE STATUS:   Code Status and Medical Interventions: CPR (Attempt to Resuscitate); Full Support   Ordered at: 02/09/25 3970     Code Status (Patient has no pulse and is not breathing):    CPR (Attempt to Resuscitate)     Medical Interventions (Patient has pulse or is breathing):    Full Support       Lorna Gamez MD  02/12/25

## 2025-02-12 NOTE — PLAN OF CARE
Goal Outcome Evaluation:  Plan of Care Reviewed With: patient        Progress: no change  Outcome Evaluation: Patient limited by increased lethargy and fatigue today. She was able to tolerate SPT and STS training with maxAx2 and BUE support with KI donned on RLE. IPPT remains indicated to address current deficits. Continue to recommend D/C to SNF.    Anticipated Discharge Disposition (PT): skilled nursing facility

## 2025-02-12 NOTE — THERAPY TREATMENT NOTE
Patient Name: Fanny Boyce  : 1940    MRN: [id number]                              Today's Date: 2025       Admit Date: 2025    Visit Dx:     ICD-10-CM ICD-9-CM   1. Closed fracture of neck of right femur, initial encounter  S72.001A 820.8   2. Anticoagulated  Z79.01 V58.61   3. History of atrial fibrillation  Z86.79 V12.59   4. History of pulmonary embolism  Z86.711 V12.55     Patient Active Problem List   Diagnosis    Atrial fibrillation    COPD (chronic obstructive pulmonary disease)    Dementia    Parkinson's disease    Fracture of left humerus with routine healing    Fracture of femoral neck, right     Past Medical History:   Diagnosis Date    Atrial fibrillation     COPD (chronic obstructive pulmonary disease)     Dementia     History of lung cancer     Parkinson disease     Pulmonary embolism      Past Surgical History:   Procedure Laterality Date    HIP HEMIARTHROPLASTY Right 2/10/2025    Procedure: HIP HEMIARTHROPLASTY RIGHT;  Surgeon: Frandy Patino MD;  Location: [facility];  Service: Orthopedics;  Laterality: Right;    JOINT REPLACEMENT      LUNG CANCER SURGERY        General Information       Row Name 25 9479          Physical Therapy Time and Intention    Document Type therapy note (daily note)  -CK     Mode of Treatment physical therapy;individual therapy  -CK       Row Name 25 4209          General Information    Patient Profile Reviewed yes  -CK     Existing Precautions/Restrictions fall;hip, posterior;right;oxygen therapy device and L/min;other (see comments)  R hip hemiarthroplasty (posterolateral approach); RLE WBAT; Fascia iliaca nerve catheter; KI 2/2 residual quad weakness; hx dementia, PD  -CK     Barriers to Rehab medically complex;previous functional deficit;cognitive status  -CK       Row Name 25 1984          Living Environment    Name(s) of People in Home increased lethargy today, patient opens eyes is able to state name, but falls back asleep  very quickly  -CK       Row Name 02/12/25 0950          Cognition    Orientation Status (Cognition) oriented to;person  -CK       Row Name 02/12/25 0950          Safety Issues/Impairments Affecting Functional Mobility    Safety Issues Affecting Function (Mobility) awareness of need for assistance;insight into deficits/self-awareness;safety precaution awareness;safety precautions follow-through/compliance;sequencing abilities  -CK     Impairments Affecting Function (Mobility) balance;cognition;endurance/activity tolerance;pain;range of motion (ROM);sensation/sensory awareness;strength  -CK               User Key  (r) = Recorded By, (t) = Taken By, (c) = Cosigned By      Initials Name Provider Type    CK Erica Sandoval, PT Physical Therapist                   Mobility       Row Name 02/12/25 0950          Bed Mobility    Bed Mobility supine-sit  -CK     Supine-Sit [place] (Bed Mobility) maximum assist (25% patient effort);2 person assist;verbal cues  -CK     Assistive Device (Bed Mobility) bed rails;head of bed elevated  -CK     Comment, (Bed Mobility) cues for sequencing, requires assist at BLEs and trunk to achieve EOB sitting, still mildly lethargic at EOB with /62  -CK       Row Name 02/12/25 0950          Transfers    Comment, (Transfers) KI donned in supine, doffed in chair. She was very fatigued and fell  back asleep quickly following mobility. RN aware  -CK       Row Name 02/12/25 0950          Bed-Chair Transfer    Bed-Chair [place] (Transfers) maximum assist (25% patient effort);2 person assist;verbal cues;nonverbal cues (demo/gesture)  -CK     Assistive Device (Bed-Chair Transfers) other (see comments)  BUE support  -CK     Comment, (Bed-Chair Transfer) stand pivot bed > chair to L side, posterior lean and flexed posture  -CK       Row Name 02/12/25 0950          Sit-Stand Transfer    Sit-Stand [place] (Transfers) maximum assist (25% patient effort);2 person assist;verbal  cues;nonverbal cues (demo/gesture)  -CK     Assistive Device (Sit-Stand Transfers) other (see comments)  BUE support  -CK     Comment, (Sit-Stand Transfer) x1 from EOB for SPT as above, x1 from chair with focus on upright posture with BLE weightbearing. Patient remained flexed at the hips, but was able to bear weight in BLEs with manual assist for foot placement and posture  -CK       Row Name 02/12/25 0950          Gait/Stairs (Locomotion)    [place] Level (Gait) unable to assess  -CK     Comment, (Gait/Stairs) not appropriate due to weakness and lethargy  -CK       Row Name 02/12/25 0950          Mobility    Extremity Weight-bearing Status right lower extremity  -CK     Right Lower Extremity (Weight-bearing Status) weight-bearing as tolerated (WBAT)  -CK               User Key  (r) = Recorded By, (t) = Taken By, (c) = Cosigned By      Initials Name Provider Type    CK Erica Sandoval, JOAN Physical Therapist                   Obj/Interventions       Row Name 02/12/25 0930          Motor Skills    Therapeutic Exercise knee;other (see comments)  patient fell asleep during AAROM of knee  -CK       Row Name 02/12/25 0953          Knee (Therapeutic Exercise)    Knee (Therapeutic Exercise) AAROM (active assistive range of motion)  -CK     Knee AAROM (Therapeutic Exercise) bilateral;flexion;extension;10 repetitions  -CK       Row Name 02/12/25 0945          Balance    Balance Assessment sitting static balance;standing static balance;standing dynamic balance  -CK     Static Sitting Balance contact guard  -CK     Position, Sitting Balance unsupported;sitting edge of bed  -CK     Static Standing Balance maximum assist;2-person assist  -CK     Dynamic Standing Balance maximum assist;2-person assist  -CK     Position/Device Used, Standing Balance supported;other (see comments)  BUE support  -CK     Comment, Balance flexed posture and mild posterior lean  -CK               User Key  (r) = Recorded By, (t) = Taken By, (c)  = Cosigned By      Initials Name Provider Type    CK Erica Sandoval PT Physical Therapist                   Goals/Plan    No documentation.                  Clinical Impression       Row Name 02/12/25 0954          Pain    Pain Location hip  -CK     Pain Side/Orientation right  -CK     Pain Management Interventions exercise or physical activity utilized;positioning techniques utilized;nursing notified  -CK     Response to Pain Interventions activity participation with increased pain  -CK     Additional Documentation Pain Scale: FACES Pre/Post-Treatment (Group)  -CK       Row Name 02/12/25 0954          Pain Scale: FACES Pre/Post-Treatment    Pain: FACES Scale, Pretreatment 0-->no hurt  -CK     Posttreatment Pain Rating 2-->hurts little bit  -CK       Row Name 02/12/25 0954          Plan of Care Review    Plan of Care Reviewed With patient  -CK     Progress no change  -CK     Outcome Evaluation Patient limited by increased lethargy and fatigue today. She was able to tolerate SPT and STS training with maxAx2 and BUE support with KI donned on RLE. IPPT remains indicated to address current deficits. Continue to recommend D/C to SNF.  -CK       Row Name 02/12/25 0954          Vital Signs    Pre Systolic BP Rehab 106  -CK     Pre Treatment Diastolic BP 53  -CK     Post Systolic BP Rehab 113  -CK     Post Treatment Diastolic BP 62  -CK     Pretreatment Heart Rate (beats/min) 88  -CK     Posttreatment Heart Rate (beats/min) 77  -CK     Pre SpO2 (%) 88  -CK     O2 Delivery Pre Treatment nasal cannula  -CK     Intra SpO2 (%) 85  -CK     O2 Delivery Intra Treatment nasal cannula  -CK     Post SpO2 (%) 92  -CK     O2 Delivery Post Treatment nasal cannula  -CK     Pre Patient Position Supine  -CK     Intra Patient Position Sitting  -CK     Post Patient Position Sitting  -CK       Row Name 02/12/25 0954          Positioning and Restraints    Pre-Treatment Position in bed  -CK     Post Treatment Position chair  -CK     In  Chair reclined;call light within reach;encouraged to call for assist;exit alarm on;waffle cushion;on mechanical lift sling;heels elevated;LUE elevated;RUE elevated;notified nsg  -CK               User Key  (r) = Recorded By, (t) = Taken By, (c) = Cosigned By      Initials Name Provider Type    Erica Moseley PT Physical Therapist                   Outcome Measures       Row Name 02/12/25 0957 02/12/25 0831       How much help from another person do you currently need...    Turning from your back to your side while in flat bed without using bedrails? 2  -CK 2  -LB    Moving from lying on back to sitting on the side of a flat bed without bedrails? 2  -CK 2  -LB    Moving to and from a bed to a chair (including a wheelchair)? 2  -CK 2  -LB    Standing up from a chair using your arms (e.g., wheelchair, bedside chair)? 2  -CK 2  -LB    Climbing 3-5 steps with a railing? 1  -CK 1  -LB    To walk in hospital room? 1  -CK 1  -LB    AM-PAC 6 Clicks Score (PT) 10  -CK 10  -LB    Highest Level of Mobility Goal 4 --> Transfer to chair/commode  -CK 4 --> Transfer to chair/commode  -LB      Row Name 02/12/25 0957          Functional Assessment    Outcome Measure Options AM-PAC 6 Clicks Basic Mobility (PT)  -CK               User Key  (r) = Recorded By, (t) = Taken By, (c) = Cosigned By      Initials Name Provider Type    Hannah Beck RN Registered Nurse    Erica Moseley PT Physical Therapist                                 Physical Therapy Education       Title: PT OT SLP Therapies (In Progress)       Topic: Physical Therapy (In Progress)       Point: Mobility training (In Progress)       Learning Progress Summary            Patient Acceptance, E, NR by  at 2/12/2025 0957    Acceptance, E, VU,NR by  at 2/11/2025 0840                      Point: Home exercise program (In Progress)       Learning Progress Summary            Patient Acceptance, E, NR by ADONIS at 2/12/2025 0957    Acceptance, E, VU,NR by  at  2/11/2025 0840                      Point: Body mechanics (In Progress)       Learning Progress Summary            Patient Acceptance, E, NR by  at 2/12/2025 0957    Acceptance, E, VU,NR by  at 2/11/2025 0840                      Point: Precautions (In Progress)       Learning Progress Summary            Patient Acceptance, E, NR by  at 2/12/2025 0957    Acceptance, E, VU,NR by  at 2/11/2025 0840                                      User Key       Initials Effective Dates Name Provider Type Discipline    CK 02/06/24 -  Erica Sandoval, PT Physical Therapist PT     09/21/23 -  Daisy Javed PT Physical Therapist PT                  PT Recommendation and Plan     Progress: no change  Outcome Evaluation: Patient limited by increased lethargy and fatigue today. She was able to tolerate SPT and STS training with maxAx2 and BUE support with KI donned on RLE. IPPT remains indicated to address current deficits. Continue to recommend D/C to SNF.     Time Calculation:         PT Charges       Row Name 02/12/25 0957             Time Calculation    Start Time 0840  -CK      PT Received On 02/12/25  -CK         Timed Charges    80761 - PT Therapeutic Exercise Minutes 5  -CK      77745 - PT Therapeutic Activity Minutes 20  -CK         Total Minutes    Timed Charges Total Minutes 25  -CK       Total Minutes 25  -CK                User Key  (r) = Recorded By, (t) = Taken By, (c) = Cosigned By      Initials Name Provider Type     Erica Sandoval, PT Physical Therapist                  Therapy Charges for Today       Code Description Service Date Service Provider Modifiers Qty    23695340587 HC PT THERAPEUTIC ACT EA 15 MIN 2/12/2025 Erica Sandoval, PT GP 2    75345547508 HC PT THER SUPP EA 15 MIN 2/12/2025 Erica Sandoval, PT GP 2            PT G-Codes  Outcome Measure Options: AM-PAC 6 Clicks Basic Mobility (PT)  AM-PAC 6 Clicks Score (PT): 10  PT Discharge Summary  Anticipated Discharge Disposition (PT):  skilled nursing facility    Erica Sandoval, PT  2/12/2025

## 2025-02-13 ENCOUNTER — APPOINTMENT (OUTPATIENT)
Dept: CARDIOLOGY | Facility: HOSPITAL | Age: 85
DRG: 521 | End: 2025-02-13
Payer: MEDICARE

## 2025-02-13 LAB
AV MEAN PRESS GRAD SYS DOP V1V2: 3.3 MMHG
AV VMAX SYS DOP: 131.7 CM/SEC
BH CV ECHO MEAS - AO MAX PG: 6.9 MMHG
BH CV ECHO MEAS - AO ROOT DIAM: 2.3 CM
BH CV ECHO MEAS - AO V2 VTI: 19 CM
BH CV ECHO MEAS - IVS/LVPW: 0.9 CM
BH CV ECHO MEAS - IVSD: 0.9 CM
BH CV ECHO MEAS - LA DIMENSION: 2.3 CM
BH CV ECHO MEAS - LAT PEAK E' VEL: 14.7 CM/SEC
BH CV ECHO MEAS - LV MAX PG: 1.72 MMHG
BH CV ECHO MEAS - LV MEAN PG: 0.8 MMHG
BH CV ECHO MEAS - LV V1 MAX: 65.5 CM/SEC
BH CV ECHO MEAS - LV V1 VTI: 7.5 CM
BH CV ECHO MEAS - LVIDD: 2.3 CM
BH CV ECHO MEAS - LVIDS: 1.5 CM
BH CV ECHO MEAS - LVOT DIAM: 1.9 CM
BH CV ECHO MEAS - LVPWD: 1 CM
BH CV ECHO MEAS - MED PEAK E' VEL: 9.5 CM/SEC
BH CV ECHO MEAS - MV DEC SLOPE: 713.8 CM/SEC2
BH CV ECHO MEAS - MV MAX PG: 4.1 MMHG
BH CV ECHO MEAS - MV MEAN PG: 1.6 MMHG
BH CV ECHO MEAS - MV V2 VTI: 11.6 CM
BH CV ECHO MEAS - PA ACC TIME: 0.07 SEC
BH CV ECHO MEAS - RAP SYSTOLE: 3 MMHG
BH CV ECHO MEAS - RVSP: 46 MMHG
BH CV ECHO MEAS - TAPSE (>1.6): 1.9 CM
BH CV ECHO MEAS - TR MAX PG: 43 MMHG
BH CV ECHO MEAS - TR MAX VEL: 329 CM/SEC
BH CV XLRA - RV BASE: 3.4 CM
BH CV XLRA - RV LENGTH: 5.3 CM
BH CV XLRA - RV MID: 3.1 CM
BH CV XLRA - TDI S': 12.3 CM/SEC
DEPRECATED RDW RBC AUTO: 45.2 FL (ref 37–54)
ERYTHROCYTE [DISTWIDTH] IN BLOOD BY AUTOMATED COUNT: 13.9 % (ref 12.3–15.4)
HCT VFR BLD AUTO: 28.5 % (ref 34–46.6)
HGB BLD-MCNC: 9 G/DL (ref 12–15.9)
LV EF 2D ECHO EST: 70 %
MCH RBC QN AUTO: 28.3 PG (ref 26.6–33)
MCHC RBC AUTO-ENTMCNC: 31.6 G/DL (ref 31.5–35.7)
MCV RBC AUTO: 89.6 FL (ref 79–97)
PLATELET # BLD AUTO: 135 10*3/MM3 (ref 140–450)
PMV BLD AUTO: 10.9 FL (ref 6–12)
RBC # BLD AUTO: 3.18 10*6/MM3 (ref 3.77–5.28)
WBC NRBC COR # BLD AUTO: 6.02 10*3/MM3 (ref 3.4–10.8)

## 2025-02-13 PROCEDURE — 25010000002 DIGOXIN PER 500 MCG: Performed by: INTERNAL MEDICINE

## 2025-02-13 PROCEDURE — 85027 COMPLETE CBC AUTOMATED: CPT | Performed by: INTERNAL MEDICINE

## 2025-02-13 PROCEDURE — P9612 CATHETERIZE FOR URINE SPEC: HCPCS

## 2025-02-13 PROCEDURE — 25010000002 FUROSEMIDE PER 20 MG: Performed by: INTERNAL MEDICINE

## 2025-02-13 PROCEDURE — 94799 UNLISTED PULMONARY SVC/PX: CPT

## 2025-02-13 PROCEDURE — 93306 TTE W/DOPPLER COMPLETE: CPT | Performed by: INTERNAL MEDICINE

## 2025-02-13 PROCEDURE — 93306 TTE W/DOPPLER COMPLETE: CPT

## 2025-02-13 PROCEDURE — 94761 N-INVAS EAR/PLS OXIMETRY MLT: CPT

## 2025-02-13 RX ORDER — FUROSEMIDE 10 MG/ML
40 INJECTION INTRAMUSCULAR; INTRAVENOUS ONCE
Status: COMPLETED | OUTPATIENT
Start: 2025-02-13 | End: 2025-02-13

## 2025-02-13 RX ORDER — DIGOXIN 0.25 MG/ML
250 INJECTION INTRAMUSCULAR; INTRAVENOUS ONCE
Status: COMPLETED | OUTPATIENT
Start: 2025-02-13 | End: 2025-02-13

## 2025-02-13 RX ORDER — METOPROLOL TARTRATE 1 MG/ML
5 INJECTION, SOLUTION INTRAVENOUS ONCE
Status: COMPLETED | OUTPATIENT
Start: 2025-02-13 | End: 2025-02-13

## 2025-02-13 RX ADMIN — Medication 10 ML: at 09:01

## 2025-02-13 RX ADMIN — APIXABAN 2.5 MG: 2.5 TABLET, FILM COATED ORAL at 21:16

## 2025-02-13 RX ADMIN — FUROSEMIDE 40 MG: 10 INJECTION, SOLUTION INTRAMUSCULAR; INTRAVENOUS at 11:00

## 2025-02-13 RX ADMIN — SENNOSIDES AND DOCUSATE SODIUM 2 TABLET: 50; 8.6 TABLET ORAL at 21:16

## 2025-02-13 RX ADMIN — METOPROLOL TARTRATE 12.5 MG: 25 TABLET, FILM COATED ORAL at 09:00

## 2025-02-13 RX ADMIN — DIGOXIN 250 MCG: 0.25 INJECTION INTRAMUSCULAR; INTRAVENOUS at 15:20

## 2025-02-13 RX ADMIN — IPRATROPIUM BROMIDE AND ALBUTEROL SULFATE 3 ML: 2.5; .5 SOLUTION RESPIRATORY (INHALATION) at 09:19

## 2025-02-13 RX ADMIN — HYDROCODONE BITARTRATE AND ACETAMINOPHEN 1 TABLET: 5; 325 TABLET ORAL at 18:44

## 2025-02-13 RX ADMIN — METOPROLOL TARTRATE 5 MG: 5 INJECTION INTRAVENOUS at 11:51

## 2025-02-13 RX ADMIN — APIXABAN 2.5 MG: 2.5 TABLET, FILM COATED ORAL at 09:00

## 2025-02-13 RX ADMIN — METOPROLOL TARTRATE 12.5 MG: 25 TABLET, FILM COATED ORAL at 21:16

## 2025-02-13 RX ADMIN — CARBIDOPA AND LEVODOPA 1 TABLET: 25; 100 TABLET ORAL at 09:00

## 2025-02-13 RX ADMIN — SENNOSIDES AND DOCUSATE SODIUM 2 TABLET: 50; 8.6 TABLET ORAL at 09:00

## 2025-02-13 RX ADMIN — CARBIDOPA AND LEVODOPA 1 TABLET: 25; 100 TABLET ORAL at 11:03

## 2025-02-13 RX ADMIN — CARBIDOPA AND LEVODOPA 1 TABLET: 25; 100 TABLET ORAL at 18:44

## 2025-02-13 RX ADMIN — POLYETHYLENE GLYCOL 3350 17 G: 17 POWDER, FOR SOLUTION ORAL at 09:01

## 2025-02-13 NOTE — CASE MANAGEMENT/SOCIAL WORK
Discharge Planning Assessment  [facility]     Patient Name: Fanny Boyce  MRN: [id number]  Today's Date: 2/13/2025    Admit Date: 2/9/2025    Plan: [facility], when medically ready       Discharge Plan       Row Name 02/13/25 1115       Plan    Plan [facility], when medically ready    Patient/Family in Agreement with Plan yes    Plan Comments Followed up with Ms. Boyce's daughter, Nabila, by phone, for discharge planning.  Per Lyudmila with [facility], the [facility] MD has accepted Ms. Boyce for IPR admission, when medically ready.    Nabila is agreeable to the DC plan.    CM will continue to follow.    Final Discharge Disposition Code 62 - inpatient rehab facility                  Continued Care and Services - Admitted Since 2/9/2025       Destination       Service Provider Request Status Services Address Phone Fax Patient Preferred    [facility] Pending - No Request Sent -- [address] [facility] [phone] --                  Expected Discharge Date and Time       Expected Discharge Date Expected Discharge Time    Feb 15, 2025             Jennifer Casillas RN

## 2025-02-13 NOTE — NURSING NOTE
Pt daughter Lety Lewis (POA) called around 1930 and in this conversation this nurse asked her to clarify the code status of the PT. She voiced her wish that the PT is to be NO CPR NO INTUBATION. She verbalized that she has the paperwork in her possession at her home. NP notified of this phone call and the wish of the POA Lety Lewis. NP informed by this nurse that the POA was very clear in her wishes and it was an informed decision. Pt code status is now NO CPR NO INTUBATION.

## 2025-02-13 NOTE — PROGRESS NOTES
[facility]    Acute pain service Inpatient Progress Note    Patient Name: Fanny Boyce  :  1940  MRN:  [id number]        Acute Pain  Service Inpatient Progress Note:    Analgesia:Good  Pain Score:0/10  LOC: alert and awake  Resp Status: room air  Cardiac: VS stable  Side Effects:None  Catheter Site:clean, dressing intact and dry  Cath type: peripheral nerve cath(InfuSystem)  Volume: 1mL,10ml, 10ml InfuSystem Pump.  Catheter Plan:Catheter to remain Insitu and Continue catheter infusion rate unchanged  Comments:

## 2025-02-13 NOTE — PROGRESS NOTES
[facility] Medicine Services  PROGRESS NOTE    Patient Name: Fanny Boyce  : 1940  MRN: [id number]    Date of Admission: 2025  Primary Care Physician: Nemo Aponte PA    Subjective   Subjective     CC:  F/U hip fracture    HPI:  She is feeling short of breath this morning.  Flipped into atrial fibrillation with RVR.      Objective   Objective     Vital Signs:   Temp:  [98.1 °F (36.7 °C)-100.4 °F (38 °C)] 98.1 °F (36.7 °C)  Heart Rate:  [] 70  Resp:  [18] 18  BP: (100-116)/(49-72) 112/68  Flow (L/min) (Oxygen Therapy):  [2-12] 2     Physical Exam:  Constitutional: No acute distress, awake, alert, laying in bed  HENT: NCAT, mucous membranes moist  Respiratory: Respiratory effort normal, bibasilar crackles   Cardiovascular: Irregular, atrial fibrillation with RVR on telemetry with heart rate in 120s-130s  Musculoskeletal: No bilateral ankle edema  Psychiatric: Appropriate affect, cooperative  Neurologic: Speech clear and fluent  Skin: No rashes on exposed surfaces    Results Reviewed:  LAB RESULTS:      Lab 25  0335 25  0901 25  1001 02/10/25  0742 02/10/25  0741 25   WBC 6.02 6.48 10.83*  --  8.68 9.29   HEMOGLOBIN 9.0* 10.2* 10.6*  --  12.3 13.5   HEMATOCRIT 28.5* 33.1* 33.0*  --  39.1 43.0   PLATELETS 135* 146 187  --  186 235   NEUTROS ABS  --  5.68 9.56*  --  7.60* 7.49*   IMMATURE GRANS (ABS)  --  0.02 0.05  --  0.04 0.09*   LYMPHS ABS  --  0.36* 0.65*  --  0.67* 1.17   MONOS ABS  --  0.27 0.49  --  0.27 0.39   EOS ABS  --  0.10 0.04  --  0.06 0.10   MCV 89.6 89.2 88.9  --  88.7 89.8   PROTIME  --   --   --  13.0  --   --          Lab 25  1001 02/10/25  2155 02/10/25  0742 25   SODIUM 138  --  141 141   POTASSIUM 4.4 3.8 4.3 3.9   CHLORIDE 101  --  104 102   CO2 24.0  --  28.0 30.0*   ANION GAP 13.0  --  9.0 9.0   BUN 15  --  18 19   CREATININE 0.69  --  0.63 0.66   EGFR 84.6  --  86.5 85.6   GLUCOSE 147*  --   125* 116*   CALCIUM 8.8  --  9.1 9.5   MAGNESIUM  --   --  1.7  --          Lab 02/10/25  0742 02/09/25 1959   TOTAL PROTEIN 6.2 7.0   ALBUMIN 3.6 4.1   GLOBULIN 2.6 2.9   ALT (SGPT) <5 5   AST (SGOT) 16 18   BILIRUBIN 0.3 0.2   ALK PHOS 79 92         Lab 02/10/25  0742   PROTIME 13.0   INR 0.97             Lab 02/10/25  0742 02/09/25 2010   ABO TYPING O O   RH TYPING Positive Positive   ANTIBODY SCREEN  --  Negative         Brief Urine Lab Results  (Last result in the past 365 days)        Color   Clarity   Blood   Leuk Est   Nitrite   Protein   CREAT   Urine HCG        06/01/24 1157 Yellow   Turbid   Small (1+)   Large (3+)   Positive   30 mg/dL (1+)                   Microbiology Results Abnormal       None            CT Angiogram Chest    Result Date: 2/12/2025  CT ANGIOGRAM CHEST Date of Exam: 2/12/2025 7:32 PM EST Indication: Hypoxia, a-fib, hip fracture.  Rule out PE. Comparison: None available. Technique: CTA of the chest was performed after the uneventful intravenous administration of 73 cc Isovue-370. Reconstructed coronal and sagittal images were also obtained. In addition, a 3-D volume rendered image was created for interpretation. Automated exposure control and iterative reconstruction methods were used. Findings: Pulmonary Arteries: Adequately opacified. Some distortion of peripheral pulmonary arteries due to patient motion. Within that limitation, no emboli demonstrated Helga/mediastinum: No adenopathy. Thoracic aorta normal in caliber. Mild coronary artery calcification. No pericardial effusion. Large hiatal hernia Lungs/pleura: Small bilateral pleural effusions with secondary atelectasis in the lower lobes. Status post right upper lobectomy. Mild interlobular septal thickening in the dependent lungs. Stable clustered nodules in the right middle lobe Upper Abdomen: No acute abnormality. Cyst or focal fat in the left hepatic lobe Bones/soft tissues: Severe wedge compression fracture of T12 with  posterior cortical bowing resulting in approximate 30% central canal stenosis. Inferior endplate compression fractures of T9 and T10     Impression: 1. No evidence of pulmonary embolism within the limits of motion 2. Small bilateral pleural effusions. Mild interlobular septal thickening in the dependent lungs suggests mild edema 3. Status post right upper lobectomy 4. Stable right middle lobe nodules compatible with benign etiology 5. Large hiatal hernia 6. Wedge compression fracture of T12 and inferior endplate compression fractures of T9 and T10 that are new from the prior study but of uncertain acuity Electronically Signed: Demian Trevino  2/12/2025 8:18 PM EST  Workstation ID: [id number]         Current medications:  Scheduled Meds:apixaban, 2.5 mg, Oral, Q12H  carbidopa-levodopa, 1 tablet, Oral, TID  metoprolol tartrate, 12.5 mg, Oral, BID  senna-docusate sodium, 2 tablet, Oral, BID  sodium chloride, 10 mL, Intravenous, Q12H      Continuous Infusions:ropivacaine,       PRN Meds:.  acetaminophen **OR** acetaminophen **OR** acetaminophen    senna-docusate sodium **AND** polyethylene glycol **AND** bisacodyl **AND** bisacodyl    Calcium Replacement - Follow Nurse / BPA Driven Protocol    HYDROcodone-acetaminophen    ipratropium-albuterol    Magnesium Standard Dose Replacement - Follow Nurse / BPA Driven Protocol    Morphine **AND** naloxone    nitroglycerin    ondansetron    Phosphorus Replacement - Follow Nurse / BPA Driven Protocol    Potassium Replacement - Follow Nurse / BPA Driven Protocol    sodium chloride    sodium chloride    traZODone    Assessment & Plan   Assessment & Plan     Active Hospital Problems    Diagnosis  POA    **Fracture of femoral neck, right [S72.001A]  Yes    Atrial fibrillation [I48.91]  Yes    COPD (chronic obstructive pulmonary disease) [J44.9]  Yes    Dementia [F03.90]  Yes    Parkinson's disease [G20.A1]  Yes      Resolved Hospital Problems   No resolved problems to display.         Brief Hospital Course to date:  Fanny Boyce is a 84 y.o. female with a past medical history of COPD, A-fib, Parkinson's disease and dementia admitted after a presumed mechanical fall that resulted in a right hip fracture.    This patient's problems and plans were partially entered by my partner and updated as appropriate by me 02/13/25.     Right femoral neck fracture  --S/P right hip hemiarthroplasty 2/10/25 by Dr. Patino  --Continue PT/OT  --Continue Eliquis    Atrial fibrillation with RVR  - Continue metoprolol and Eliquis.  -- IV metoprolol x 1 today    Acute respiratory failure with hypoxia  Pulmonary edema  -- CTA chest overnight negative for PE, does show small pleural effusions and pulmonary edema  -- Lasix 40mg IV now  -- Obtain echocardiogram     COPD  - Not currently in exacerbation.  - Continue as needed DuoNebs.     Parkinson's disease  Dementia  - Continue carbidopa/levodopa  - Changed trazodone nightly to PRN at lower dose due to drowsiness this morning and she takes it PRN at home.    Expected Discharge Location and Transportation: Pending  Expected Discharge   Expected Discharge Date: 2/15/2025; Expected Discharge Time:      VTE Prophylaxis:  Pharmacologic & mechanical VTE prophylaxis orders are present.         AM-PAC 6 Clicks Score (PT): 10 (02/12/25 0957)    CODE STATUS:   Code Status and Medical Interventions: No CPR (Do Not Attempt to Resuscitate); Limited Support; No intubation (DNI)   Ordered at: 02/12/25 2019     Medical Intervention Limits:    No intubation (DNI)     Level Of Support Discussed With:    Health Care Surrogate     Code Status (Patient has no pulse and is not breathing):    No CPR (Do Not Attempt to Resuscitate)     Medical Interventions (Patient has pulse or is breathing):    Limited Support       Lorna Gamez MD  02/13/25

## 2025-02-13 NOTE — PLAN OF CARE
Goal Outcome Evaluation:  Plan of Care Reviewed With: patient        Progress: improving     4LNC throughout day, patient states at times she still feels short of breath, afib with rvr not resolved, asymptomatic, adequate UOP, daughter at bedside, infupump CDI, no c/o of pain, unable to participate with PT because of elevated HR, AOx4, scds in place, abductor pillow placed periodically throughout shift        Problem: Adult Inpatient Plan of Care  Goal: Absence of Hospital-Acquired Illness or Injury  Intervention: Identify and Manage Fall Risk  Description: Perform standard risk assessment on admission using a validated tool or comprehensive approach appropriate to the patient; reassess fall risk frequently, with change in status or transfer to another level of care.  Communicate risk to interprofessional healthcare team; ensure fall risk visible cue.  Determine need for increased observation, equipment and environmental modification, as well as use of supportive, nonskid footwear.  Adjust safety measures to individual needs and identified risk factors.  Reinforce the importance of active participation with fall risk prevention, safety, and physical activity with the patient and family.  Perform regular intentional rounding to assess need for position change, pain assessment and personal needs, including assistance with toileting.  Recent Flowsheet Documentation  Taken 2/13/2025 1802 by Hannah Kim, RN  Safety Promotion/Fall Prevention:   safety round/check completed   room organization consistent   nonskid shoes/slippers when out of bed   mobility aid in reach   gait belt   lighting adjusted   fall prevention program maintained   elopement precautions   clutter free environment maintained   assistive device/personal items within reach   activity supervised  Taken 2/13/2025 1611 by Hannah Kim, RN  Safety Promotion/Fall Prevention:   safety round/check completed   room organization consistent   toileting  scheduled   nonskid shoes/slippers when out of bed   mobility aid in reach   fall prevention program maintained   elopement precautions   clutter free environment maintained   assistive device/personal items within reach   activity supervised   gait belt   lighting adjusted   muscle strengthening facilitated  Taken 2/13/2025 1432 by Hannah Kim, RN  Safety Promotion/Fall Prevention:   safety round/check completed   room organization consistent   nonskid shoes/slippers when out of bed   mobility aid in reach   lighting adjusted   fall prevention program maintained   elopement precautions   clutter free environment maintained   assistive device/personal items within reach   activity supervised  Taken 2/13/2025 1218 by Hannah Kim, RN  Safety Promotion/Fall Prevention:   safety round/check completed   toileting scheduled   room organization consistent   nonskid shoes/slippers when out of bed   mobility aid in reach   lighting adjusted   gait belt   fall prevention program maintained   elopement precautions   clutter free environment maintained   assistive device/personal items within reach   activity supervised  Taken 2/13/2025 1000 by Hannah Kim, RN  Safety Promotion/Fall Prevention:   safety round/check completed   room organization consistent   toileting scheduled   nonskid shoes/slippers when out of bed   mobility aid in reach   lighting adjusted   gait belt   fall prevention program maintained   elopement precautions   clutter free environment maintained   assistive device/personal items within reach   activity supervised  Taken 2/13/2025 0901 by Hannah Kim, RN  Safety Promotion/Fall Prevention:   safety round/check completed   toileting scheduled   room organization consistent   nonskid shoes/slippers when out of bed   mobility aid in reach   lighting adjusted   gait belt   fall prevention program maintained   elopement precautions   clutter free environment maintained   assistive device/personal  items within reach   activity supervised  Taken 2/13/2025 0732 by Hannah Kim RN  Safety Promotion/Fall Prevention:   safety round/check completed   room organization consistent   toileting scheduled   nonskid shoes/slippers when out of bed   mobility aid in reach   lighting adjusted   gait belt   fall prevention program maintained   elopement precautions   clutter free environment maintained   assistive device/personal items within reach   activity supervised

## 2025-02-13 NOTE — PROGRESS NOTES
"Orthopedic Daily Progress Note      CC: None    Pain controlled  General: no fevers, chills  Abdomen: no nausea, vomiting, or diarrhea    No other complaints    Physical Exam:  I have reviewed the vital signs.  Temp:  [98.1 °F (36.7 °C)-100.4 °F (38 °C)] 98.1 °F (36.7 °C)  Heart Rate:  [] 70  Resp:  [18] 18  BP: (100-116)/(49-72) 112/68    Objective:  Vital signs: (most recent): Blood pressure 112/68, pulse 70, temperature 98.1 °F (36.7 °C), temperature source Oral, resp. rate 18, height 152.4 cm (60\"), weight 52.1 kg (114 lb 12.8 oz), SpO2 93%.              General Appearance:    Alert, cooperative, no distress  Extremities: No clubbing, cyanosis, or edema to lower extremities  Pulses:  2+ in distal surgical extremity  Calves soft, NT      Results Review:    I have reviewed the labs, radiology results and diagnostic studies:yes    Results from last 7 days   Lab Units 02/13/25  0335   WBC 10*3/mm3 6.02   HEMOGLOBIN g/dL 9.0*   PLATELETS 10*3/mm3 135*     Results from last 7 days   Lab Units 02/11/25  1001   SODIUM mmol/L 138   POTASSIUM mmol/L 4.4   CO2 mmol/L 24.0   CREATININE mg/dL 0.69   GLUCOSE mg/dL 147*       I have reviewed the medications.    Assessment/Problem List  POD# 3 S/p Right hip hemiarthroplasty for femoral neck fracture    Plan  PT/OT  Decreasing Hb - monitor  DVT proph - Eliquis baseline, resumed  Placement - anytime from my standpoint  F/u 1 month  Bandage may be removed Monday  May shower but no immersion of incision in water      Frandy Patino MD  02/13/25  08:17 EST            "

## 2025-02-13 NOTE — PLAN OF CARE
Goal Outcome Evaluation:  Plan of Care Reviewed With: patient      Spoke with daughter Estela Lewis (POA) at length about PT code status, she informs me that the PT is a DNR, DNI.   VSS In and out cath once overnight at 0230 with 700ml output, Plan of care ongoing.         Problem: Adult Inpatient Plan of Care  Goal: Plan of Care Review  Outcome: Progressing  Flowsheets  Taken 2/12/2025 1643 by Hannah Kim RN  Plan of Care Reviewed With: patient  Taken 2/12/2025 1534 by Magali Barry, OT  Progress: no change  Goal: Patient-Specific Goal (Individualized)  Outcome: Progressing  Goal: Absence of Hospital-Acquired Illness or Injury  Outcome: Progressing  Intervention: Identify and Manage Fall Risk  Recent Flowsheet Documentation  Taken 2/13/2025 0200 by Eleazar Cerda RN  Safety Promotion/Fall Prevention: safety round/check completed  Taken 2/13/2025 0000 by Eleazar Cerda RN  Safety Promotion/Fall Prevention: safety round/check completed  Taken 2/12/2025 2200 by Eleazar Cerda RN  Safety Promotion/Fall Prevention: safety round/check completed  Taken 2/12/2025 2030 by Eleazar Cerda RN  Safety Promotion/Fall Prevention: safety round/check completed  Intervention: Prevent Skin Injury  Recent Flowsheet Documentation  Taken 2/13/2025 0115 by Eleazar Cerda RN  Body Position:   turned   left  Taken 2/12/2025 2030 by Eleazar Cerda RN  Body Position: position maintained  Goal: Optimal Comfort and Wellbeing  Outcome: Progressing  Goal: Readiness for Transition of Care  Outcome: Progressing     Problem: Skin Injury Risk Increased  Goal: Skin Health and Integrity  Outcome: Progressing  Intervention: Optimize Skin Protection  Recent Flowsheet Documentation  Taken 2/12/2025 2030 by Eleazar Cerda RN  Head of Bed (HOB) Positioning: HOB elevated     Problem: Fall Injury Risk  Goal: Absence of Fall and Fall-Related Injury  Outcome: Progressing  Intervention: Promote Injury-Free Environment  Recent Flowsheet Documentation  Taken 2/13/2025  0200 by Eleazar Cerda RN  Safety Promotion/Fall Prevention: safety round/check completed  Taken 2/13/2025 0000 by Eleazar Cerda RN  Safety Promotion/Fall Prevention: safety round/check completed  Taken 2/12/2025 2200 by Eleazar Cerda RN  Safety Promotion/Fall Prevention: safety round/check completed  Taken 2/12/2025 2030 by Eleazar Cerda RN  Safety Promotion/Fall Prevention: safety round/check completed

## 2025-02-14 ENCOUNTER — APPOINTMENT (OUTPATIENT)
Dept: GENERAL RADIOLOGY | Facility: HOSPITAL | Age: 85
DRG: 521 | End: 2025-02-14
Payer: MEDICARE

## 2025-02-14 PROBLEM — E43 SEVERE PROTEIN-CALORIE MALNUTRITION: Status: ACTIVE | Noted: 2025-02-14

## 2025-02-14 LAB
ANION GAP SERPL CALCULATED.3IONS-SCNC: 8 MMOL/L (ref 5–15)
BUN SERPL-MCNC: 16 MG/DL (ref 8–23)
BUN/CREAT SERPL: 30.8 (ref 7–25)
CALCIUM SPEC-SCNC: 8.3 MG/DL (ref 8.6–10.5)
CHLORIDE SERPL-SCNC: 99 MMOL/L (ref 98–107)
CO2 SERPL-SCNC: 29 MMOL/L (ref 22–29)
CREAT SERPL-MCNC: 0.52 MG/DL (ref 0.57–1)
DEPRECATED RDW RBC AUTO: 45.7 FL (ref 37–54)
EGFRCR SERPLBLD CKD-EPI 2021: 90.6 ML/MIN/1.73
ERYTHROCYTE [DISTWIDTH] IN BLOOD BY AUTOMATED COUNT: 13.8 % (ref 12.3–15.4)
GLUCOSE SERPL-MCNC: 127 MG/DL (ref 65–99)
HCT VFR BLD AUTO: 30.3 % (ref 34–46.6)
HGB BLD-MCNC: 9.3 G/DL (ref 12–15.9)
MAGNESIUM SERPL-MCNC: 1.7 MG/DL (ref 1.6–2.4)
MCH RBC QN AUTO: 27.9 PG (ref 26.6–33)
MCHC RBC AUTO-ENTMCNC: 30.7 G/DL (ref 31.5–35.7)
MCV RBC AUTO: 91 FL (ref 79–97)
PLATELET # BLD AUTO: 179 10*3/MM3 (ref 140–450)
PMV BLD AUTO: 10.5 FL (ref 6–12)
POTASSIUM SERPL-SCNC: 4.1 MMOL/L (ref 3.5–5.2)
RBC # BLD AUTO: 3.33 10*6/MM3 (ref 3.77–5.28)
SODIUM SERPL-SCNC: 136 MMOL/L (ref 136–145)
WBC NRBC COR # BLD AUTO: 7.33 10*3/MM3 (ref 3.4–10.8)

## 2025-02-14 PROCEDURE — 80048 BASIC METABOLIC PNL TOTAL CA: CPT | Performed by: INTERNAL MEDICINE

## 2025-02-14 PROCEDURE — 71045 X-RAY EXAM CHEST 1 VIEW: CPT

## 2025-02-14 PROCEDURE — 25010000002 FUROSEMIDE PER 20 MG: Performed by: INTERNAL MEDICINE

## 2025-02-14 PROCEDURE — 97530 THERAPEUTIC ACTIVITIES: CPT

## 2025-02-14 PROCEDURE — 97110 THERAPEUTIC EXERCISES: CPT

## 2025-02-14 PROCEDURE — 83735 ASSAY OF MAGNESIUM: CPT | Performed by: INTERNAL MEDICINE

## 2025-02-14 PROCEDURE — 25010000002 ROPIVACAINE HCL-NACL 0.2-0.9 % SOLUTION: Performed by: ORTHOPAEDIC SURGERY

## 2025-02-14 PROCEDURE — 85027 COMPLETE CBC AUTOMATED: CPT | Performed by: INTERNAL MEDICINE

## 2025-02-14 RX ORDER — FUROSEMIDE 10 MG/ML
40 INJECTION INTRAMUSCULAR; INTRAVENOUS ONCE
Status: COMPLETED | OUTPATIENT
Start: 2025-02-14 | End: 2025-02-14

## 2025-02-14 RX ADMIN — Medication 10 ML: at 15:53

## 2025-02-14 RX ADMIN — Medication 1000 MG: at 21:09

## 2025-02-14 RX ADMIN — CARBIDOPA AND LEVODOPA 1 TABLET: 25; 100 TABLET ORAL at 08:57

## 2025-02-14 RX ADMIN — BISACODYL 10 MG: 10 SUPPOSITORY RECTAL at 06:02

## 2025-02-14 RX ADMIN — CARBIDOPA AND LEVODOPA 1 TABLET: 25; 100 TABLET ORAL at 18:00

## 2025-02-14 RX ADMIN — CARBIDOPA AND LEVODOPA 1 TABLET: 25; 100 TABLET ORAL at 12:51

## 2025-02-14 RX ADMIN — APIXABAN 2.5 MG: 2.5 TABLET, FILM COATED ORAL at 21:06

## 2025-02-14 RX ADMIN — APIXABAN 2.5 MG: 2.5 TABLET, FILM COATED ORAL at 08:57

## 2025-02-14 RX ADMIN — METOPROLOL TARTRATE 12.5 MG: 25 TABLET, FILM COATED ORAL at 21:06

## 2025-02-14 RX ADMIN — METOPROLOL TARTRATE 12.5 MG: 25 TABLET, FILM COATED ORAL at 08:57

## 2025-02-14 RX ADMIN — SENNOSIDES AND DOCUSATE SODIUM 2 TABLET: 50; 8.6 TABLET ORAL at 21:06

## 2025-02-14 RX ADMIN — SENNOSIDES AND DOCUSATE SODIUM 2 TABLET: 50; 8.6 TABLET ORAL at 08:57

## 2025-02-14 RX ADMIN — FUROSEMIDE 40 MG: 10 INJECTION, SOLUTION INTRAMUSCULAR; INTRAVENOUS at 15:53

## 2025-02-14 NOTE — PLAN OF CARE
Goal Outcome Evaluation:  Plan of Care Reviewed With: patient        Progress: improving  Outcome Evaluation: Patient able to sustain standing and take a few steps. Much assistance need due to weakness. Continue to recommend SNF at D/C    Anticipated Discharge Disposition (PT): skilled nursing facility

## 2025-02-14 NOTE — PLAN OF CARE
Goal Outcome Evaluation:  Plan of Care Reviewed With: patient        Progress: no change  Outcome Evaluation: Pt continues to present below baseline with situational confusion, balance deficits, limited strength, and safety awareness. Max A x2 to transfer from chair to bed. Continue to recommend IPOT POC and SNF at D/C.    Anticipated Discharge Disposition (OT): skilled nursing facility

## 2025-02-14 NOTE — PROGRESS NOTES
[facility]    Acute pain service Inpatient Progress Note    Patient Name: Fanny Boyce  :  1940  MRN:  [id number]        Acute Pain  Service Inpatient Progress Note:    Analgesia:Excellent  Pain Score:0/10  LOC: alert and awake  Side Effects:None  Catheter Site:clean, dry and dressing intact  Cath type: peripheral nerve cath(InfuSystem)  Infusion rate: Fem/ Add: Basal: 1ml/hr, PIB: 8ml q 8h, PCA: 8ml q 30 min  Catheter Plan:Catheter to remain Insitu and RN to remove catheter

## 2025-02-14 NOTE — PROGRESS NOTES
"Orthopedic Daily Progress Note      CC: Rough night, but denies pain or specific c/o      Physical Exam:  I have reviewed the vital signs.  Temp:  [97.6 °F (36.4 °C)-99.1 °F (37.3 °C)] 99 °F (37.2 °C)  Heart Rate:  [] 73  Resp:  [18-20] 18  BP: ()/(51-72) 124/64    Objective:  Vital signs: (most recent): Blood pressure 124/64, pulse 73, temperature 99 °F (37.2 °C), temperature source Oral, resp. rate 18, height 152.4 cm (60\"), weight 52.1 kg (114 lb 12.8 oz), SpO2 95%.              General Appearance:    Alert, cooperative, no distress  Extremities: No clubbing, cyanosis, or edema to lower extremities  Pulses:  2+ in distal surgical extremity  Skin: Dressing Clean/dry/intact      Results Review:    I have reviewed the labs, radiology results and diagnostic studies:    Results from last 7 days   Lab Units 02/13/25  0335   WBC 10*3/mm3 6.02   HEMOGLOBIN g/dL 9.0*   PLATELETS 10*3/mm3 135*     Results from last 7 days   Lab Units 02/11/25  1001   SODIUM mmol/L 138   POTASSIUM mmol/L 4.4   CO2 mmol/L 24.0   CREATININE mg/dL 0.69   GLUCOSE mg/dL 147*       I have reviewed the medications.    Assessment/Problem List  POD# 4 S/p Right hip hemiarthroplasty for femoral neck fracture     Plan  PT/OT  Placement - anytime from my standpoint  F/u 1 month  Bandage may be removed Monday  May shower but no immersion of incision in water  Please call if can be of further assistance    Frandy Patino MD  02/14/25  08:06 EST            "

## 2025-02-14 NOTE — CASE MANAGEMENT/SOCIAL WORK
Discharge Planning Assessment  [facility]     Patient Name: Fanny Boyce  MRN: [id number]  Today's Date: 2/14/2025    Admit Date: 2/9/2025    Plan: [facility], when medically ready       Discharge Plan       Row Name 02/14/25 1349       Plan    Plan [facility], when medically ready    Patient/Family in Agreement with Plan yes    Plan Comments Ms. Boyce's DC plan is [facility], when medically ready.  The patient and her daughter, Nabila, are agreeable to the DC plan.   [facility] and [facility] will continue to follow.    Final Discharge Disposition Code 62 - inpatient rehab facility                  Continued Care and Services - Admitted Since 2/9/2025       Destination       Service Provider Request Status Services Address Phone Fax Patient Preferred    [facility] Pending - No Request Sent [facility] [address] [id number] [phone] --                  Expected Discharge Date and Time       Expected Discharge Date Expected Discharge Time    Feb 17, 2025             Jennifer Casillas RN

## 2025-02-14 NOTE — THERAPY TREATMENT NOTE
Patient Name: Fanny Boyce  : 1940    MRN: [id number]                              Today's Date: 2025       Admit Date: 2025    Visit Dx:     ICD-10-CM ICD-9-CM   1. Closed fracture of neck of right femur, initial encounter  S72.001A 820.8   2. Anticoagulated  Z79.01 V58.61   3. History of atrial fibrillation  Z86.79 V12.59   4. History of pulmonary embolism  Z86.711 V12.55     Patient Active Problem List   Diagnosis    Atrial fibrillation    COPD (chronic obstructive pulmonary disease)    Dementia    Parkinson's disease    Fracture of left humerus with routine healing    Fracture of femoral neck, right     Past Medical History:   Diagnosis Date    Atrial fibrillation     COPD (chronic obstructive pulmonary disease)     Dementia     History of lung cancer     Parkinson disease     Pulmonary embolism      Past Surgical History:   Procedure Laterality Date    HIP HEMIARTHROPLASTY Right 2/10/2025    Procedure: HIP HEMIARTHROPLASTY RIGHT;  Surgeon: Frandy Patino MD;  Location: [facility];  Service: Orthopedics;  Laterality: Right;    JOINT REPLACEMENT      LUNG CANCER SURGERY        General Information       Row Name 25 1135          Physical Therapy Time and Intention    Document Type therapy note (daily note)  -SC     Mode of Treatment physical therapy  -SC       Row Name 25 1135          General Information    Patient Profile Reviewed yes  -SC     Existing Precautions/Restrictions fall;hip, posterior;right;oxygen therapy device and L/min;other (see comments);brace worn when out of bed  nerve cath  -SC       Row Name 25 1135          Cognition    Orientation Status (Cognition) oriented to;person;verbal cues/prompts needed for orientation;situation;time;place  -SC       Row Name 25 1135          Safety Issues/Impairments Affecting Functional Mobility    Impairments Affecting Function (Mobility) balance;cognition;endurance/activity tolerance;pain;range of motion  (ROM);sensation/sensory awareness;strength  -SC     Comment, Safety Issues/Impairments (Mobility) alert, following commands,  -SC               User Key  (r) = Recorded By, (t) = Taken By, (c) = Cosigned By      Initials Name Provider Type    SC Sarah Easton, PT Physical Therapist                   Mobility       Row Name 02/14/25 1136          Bed Mobility    Bed Mobility supine-sit  -SC     Supine-Sit [place] (Bed Mobility) maximum assist (25% patient effort);2 person assist;verbal cues  -SC     Assistive Device (Bed Mobility) bed rails;head of bed elevated  -SC     Comment, (Bed Mobility) much assistance needed to get to edge of bed with cues for moving legs.  -SC       Row Name 02/14/25 1136          Bed-Chair Transfer    Bed-Chair [place] (Transfers) maximum assist (25% patient effort);2 person assist;verbal cues;nonverbal cues (demo/gesture)  -SC     Assistive Device (Bed-Chair Transfers) other (see comments)  Arjo  -SC       Row Name 02/14/25 1136          Sit-Stand Transfer    Sit-Stand [place] (Transfers) maximum assist (25% patient effort);2 person assist;verbal cues;nonverbal cues (demo/gesture)  -SC     Assistive Device (Sit-Stand Transfers) other (see comments)  arjo  -SC     Comment, (Sit-Stand Transfer) KI on for mobility. STS from eob using arjo. Cues for hand placement on ARJO. Time taken to readjust feet and work on upright posture.  -SC       Row Name 02/14/25 1136          Gait/Stairs (Locomotion)    [place] Level (Gait) 2 person assist;maximum assist (25% patient effort)  -SC     Assistive Device (Gait) other (see comments)  arjo  -SC     Distance in Feet (Gait) 4  -SC     Deviations/Abnormal Patterns (Gait) right sided deviations;antalgic;stride length decreased;weight shifting decreased  -SC     Comment, (Gait/Stairs) Cues for advancing legs with step to gait pattern. Required assistance every time to advance R leg. Fatigued quickly and chair bought up for sitting  -SC        [facility] Name 02/14/25 1136          Mobility    Extremity Weight-bearing Status right lower extremity  -SC     Right Lower Extremity (Weight-bearing Status) weight-bearing as tolerated (WBAT)  -SC               User Key  (r) = Recorded By, (t) = Taken By, (c) = Cosigned By      Initials Name Provider Type    SC Sarah Easton PT Physical Therapist                   Obj/Interventions       [facility] Name 02/14/25 1139          Motor Skills    Therapeutic Exercise hip  -SC       Row Name 02/14/25 1139          Hip (Therapeutic Exercise)    Hip (Therapeutic Exercise) AAROM (active assistive range of motion)  -SC     Hip Strengthening (Therapeutic Exercise) right;flexion;extension;aBduction;aDduction;10 repetitions  -SC       Row Name 02/14/25 1139          Knee (Therapeutic Exercise)    Knee (Therapeutic Exercise) AAROM (active assistive range of motion)  -SC     Knee AAROM (Therapeutic Exercise) bilateral;flexion;extension;sitting;10 repetitions  -SC       Row Name 02/14/25 1139          Balance    Dynamic Standing Balance 2-person assist;maximum assist  -SC     Position/Device Used, Standing Balance supported;other (see comments)  aRJO  -SC     Comment, Balance DIFFICULTY WT SHIFTING WITH KI ON  -SC               User Key  (r) = Recorded By, (t) = Taken By, (c) = Cosigned By      Initials Name Provider Type    SC Sarah Easton PT Physical Therapist                   Goals/Plan    No documentation.                  Clinical Impression       [facility] Name 02/14/25 1140          Pain    Pretreatment Pain Rating 0/10 - no pain  -SC     Posttreatment Pain Rating 0/10 - no pain  -SC       Row Name 02/14/25 1140          Plan of Care Review    Plan of Care Reviewed With patient  -SC     Progress improving  -SC     Outcome Evaluation Patient able to sustain standing and take a few steps. Much assistance need due to weakness. Continue to recommend SNF at D/C  -[facility] Name 02/14/25 1140          Therapy Assessment/Plan (PT)     Rehab Potential (PT) fair  -SC     Criteria for Skilled Interventions Met (PT) yes;meets criteria;skilled treatment is necessary  -SC     Therapy Frequency (PT) daily  -SC       Row Name 02/14/25 1140          Positioning and Restraints    Pre-Treatment Position in bed  -SC     Post Treatment Position chair  -SC     In Chair notified nsg;reclined;sitting;call light within reach;encouraged to call for assist;exit alarm on;with family/caregiver  -SC               User Key  (r) = Recorded By, (t) = Taken By, (c) = Cosigned By      Initials Name Provider Type    SC Sarah Easton PT Physical Therapist                   Outcome Measures       Row Name 02/14/25 1141          How much help from another person do you currently need...    Turning from your back to your side while in flat bed without using bedrails? 2  -SC     Moving from lying on back to sitting on the side of a flat bed without bedrails? 2  -SC     Moving to and from a bed to a chair (including a wheelchair)? 2  -SC     Standing up from a chair using your arms (e.g., wheelchair, bedside chair)? 2  -SC     Climbing 3-5 steps with a railing? 1  -SC     To walk in hospital room? 2  -SC     AM-PAC 6 Clicks Score (PT) 11  -SC     Highest Level of Mobility Goal 4 --> Transfer to chair/commode  -SC       Row Name 02/14/25 1141          Functional Assessment    Outcome Measure Options AM-PAC 6 Clicks Basic Mobility (PT)  -SC               User Key  (r) = Recorded By, (t) = Taken By, (c) = Cosigned By      Initials Name Provider Type    SC Sarah Easton PT Physical Therapist                                 Physical Therapy Education       Title: PT OT SLP Therapies (In Progress)       Topic: Physical Therapy (Done)       Point: Mobility training (Done)       Learning Progress Summary            Patient EagerJEREMY VU by SC at 2/14/2025 1142    Comment: re viewed benefits of activity    Acceptance, E, NR by CK at 2/12/2025 0957    Acceptance, E, VU,NR by  at  2/11/2025 0840                      Point: Home exercise program (Done)       Learning Progress Summary            Patient Eager, E, VU by SC at 2/14/2025 1142    Comment: re viewed benefits of activity    Acceptance, E, NR by  at 2/12/2025 0957    Acceptance, E, VU,NR by  at 2/11/2025 0840                      Point: Body mechanics (Done)       Learning Progress Summary            Patient Eager, E, VU by SC at 2/14/2025 1142    Comment: re viewed benefits of activity    Acceptance, E, NR by CK at 2/12/2025 0957    Acceptance, E, VU,NR by  at 2/11/2025 0840                      Point: Precautions (Done)       Learning Progress Summary            Patient Eager, E, VU by SC at 2/14/2025 1142    Comment: re viewed benefits of activity    Acceptance, E, NR by  at 2/12/2025 0957    Acceptance, E, VU,NR by  at 2/11/2025 0840                                      User Key       Initials Effective Dates Name Provider Type Discipline    SC 02/03/23 -  Sarah Easton, PT Physical Therapist PT     02/06/24 -  Erica Sandoval, PT Physical Therapist PT     09/21/23 -  Daisy Javed, PT Physical Therapist PT                  PT Recommendation and Plan     Progress: improving  Outcome Evaluation: Patient able to sustain standing and take a few steps. Much assistance need due to weakness. Continue to recommend SNF at D/C     Time Calculation:         PT Charges       Row Name 02/14/25 1033             Time Calculation    Start Time 1033  -SC      PT Received On 02/14/25  -SC      PT Goal Re-Cert Due Date 02/21/25  -SC         Timed Charges    26564 - PT Therapeutic Exercise Minutes 8  -SC      74338 - Gait Training Minutes  8  -SC      60841 - PT Therapeutic Activity Minutes 20  -SC         Total Minutes    Timed Charges Total Minutes 36  -SC       Total Minutes 36  -SC                User Key  (r) = Recorded By, (t) = Taken By, (c) = Cosigned By      Initials Name Provider Type    SC Sarah Easton, PT Physical  Therapist                  Therapy Charges for Today       Code Description Service Date Service Provider Modifiers Qty    53933640827  PT THER PROC EA 15 MIN 2/14/2025 Sarah Easton, PT GP 1    17133762934 HC PT THERAPEUTIC ACT EA 15 MIN 2/14/2025 Sarah Easton, PT GP 1    [id number]  PT THER SUPP EA 15 MIN 2/14/2025 Sarah Easton, PT GP 3            PT G-Codes  Outcome Measure Options: AM-PAC 6 Clicks Basic Mobility (PT)  AM-PAC 6 Clicks Score (PT): 11  AM-PAC 6 Clicks Score (OT): 12  PT Discharge Summary  Anticipated Discharge Disposition (PT): skilled nursing facility    Sarah Easton, PT  2/14/2025

## 2025-02-14 NOTE — PROGRESS NOTES
[facility] Medicine Services  PROGRESS NOTE    Patient Name: Fanny Boyce  : 1940  MRN: [id number]    Date of Admission: 2025  Primary Care Physician: Nemo Aponte PA    Subjective   Subjective     CC:  F/U hip fracture    HPI:  Still having some shortness of breath.  Heart rate has improved.      Objective   Objective     Vital Signs:   Temp:  [97.8 °F (36.6 °C)-99.1 °F (37.3 °C)] 99 °F (37.2 °C)  Heart Rate:  [] 73  Resp:  [18] 18  BP: ()/(51-72) 124/64  Flow (L/min) (Oxygen Therapy):  [4-6] 4     Physical Exam:  Constitutional: No acute distress, awake, alert, laying in bed on bed pan  HENT: NCAT, mucous membranes moist  Respiratory: Respiratory effort normal, CTAB  Cardiovascular: Irregular, HR improved to low 100s  Psychiatric: Appropriate affect, cooperative  Neurologic: Speech clear and fluent  Skin: No rashes on exposed surfaces    Results Reviewed:  LAB RESULTS:      Lab 25  0335 25  0901 25  1001 02/10/25  0742 02/10/25  0741 25   WBC 6.02 6.48 10.83*  --  8.68 9.29   HEMOGLOBIN 9.0* 10.2* 10.6*  --  12.3 13.5   HEMATOCRIT 28.5* 33.1* 33.0*  --  39.1 43.0   PLATELETS 135* 146 187  --  186 235   NEUTROS ABS  --  5.68 9.56*  --  7.60* 7.49*   IMMATURE GRANS (ABS)  --  0.02 0.05  --  0.04 0.09*   LYMPHS ABS  --  0.36* 0.65*  --  0.67* 1.17   MONOS ABS  --  0.27 0.49  --  0.27 0.39   EOS ABS  --  0.10 0.04  --  0.06 0.10   MCV 89.6 89.2 88.9  --  88.7 89.8   PROTIME  --   --   --  13.0  --   --          Lab 25  1001 02/10/25  2155 02/10/25  0742 25   SODIUM 138  --  141 141   POTASSIUM 4.4 3.8 4.3 3.9   CHLORIDE 101  --  104 102   CO2 24.0  --  28.0 30.0*   ANION GAP 13.0  --  9.0 9.0   BUN 15  --  18 19   CREATININE 0.69  --  0.63 0.66   EGFR 84.6  --  86.5 85.6   GLUCOSE 147*  --  125* 116*   CALCIUM 8.8  --  9.1 9.5   MAGNESIUM  --   --  1.7  --          Lab 02/10/25  0742 25   TOTAL  PROTEIN 6.2 7.0   ALBUMIN 3.6 4.1   GLOBULIN 2.6 2.9   ALT (SGPT) <5 5   AST (SGOT) 16 18   BILIRUBIN 0.3 0.2   ALK PHOS 79 92         Lab 02/10/25  0742   PROTIME 13.0   INR 0.97             Lab 02/10/25  0742 02/09/25 2010   ABO TYPING O O   RH TYPING Positive Positive   ANTIBODY SCREEN  --  Negative         Brief Urine Lab Results  (Last result in the past 365 days)        Color   Clarity   Blood   Leuk Est   Nitrite   Protein   CREAT   Urine HCG        06/01/24 1157 Yellow   Turbid   Small (1+)   Large (3+)   Positive   30 mg/dL (1+)                   Microbiology Results Abnormal       None            CT Angiogram Chest    Result Date: 2/12/2025  CT ANGIOGRAM CHEST Date of Exam: 2/12/2025 7:32 PM EST Indication: Hypoxia, a-fib, hip fracture.  Rule out PE. Comparison: None available. Technique: CTA of the chest was performed after the uneventful intravenous administration of 73 cc Isovue-370. Reconstructed coronal and sagittal images were also obtained. In addition, a 3-D volume rendered image was created for interpretation. Automated exposure control and iterative reconstruction methods were used. Findings: Pulmonary Arteries: Adequately opacified. Some distortion of peripheral pulmonary arteries due to patient motion. Within that limitation, no emboli demonstrated Helga/mediastinum: No adenopathy. Thoracic aorta normal in caliber. Mild coronary artery calcification. No pericardial effusion. Large hiatal hernia Lungs/pleura: Small bilateral pleural effusions with secondary atelectasis in the lower lobes. Status post right upper lobectomy. Mild interlobular septal thickening in the dependent lungs. Stable clustered nodules in the right middle lobe Upper Abdomen: No acute abnormality. Cyst or focal fat in the left hepatic lobe Bones/soft tissues: Severe wedge compression fracture of T12 with posterior cortical bowing resulting in approximate 30% central canal stenosis. Inferior endplate compression fractures of  T9 and T10     Impression: 1. No evidence of pulmonary embolism within the limits of motion 2. Small bilateral pleural effusions. Mild interlobular septal thickening in the dependent lungs suggests mild edema 3. Status post right upper lobectomy 4. Stable right middle lobe nodules compatible with benign etiology 5. Large hiatal hernia 6. Wedge compression fracture of T12 and inferior endplate compression fractures of T9 and T10 that are new from the prior study but of uncertain acuity Electronically Signed: Demian Belinda  2/12/2025 8:18 PM EST  Workstation ID: [id number]     Results for orders placed during the hospital encounter of 02/09/25    Adult Transthoracic Echo Complete W/ Cont if Necessary Per Protocol    Interpretation Summary    Left ventricular systolic function is normal. Estimated left ventricular EF = 70%    Moderate tricuspid valve regurgitation is present.    Estimated  right ventricular systolic pressure from tricuspid regurgitation is 46 mmHg.    There is a trivial pericardial effusion.      Current medications:  Scheduled Meds:apixaban, 2.5 mg, Oral, Q12H  carbidopa-levodopa, 1 tablet, Oral, TID  metoprolol tartrate, 12.5 mg, Oral, BID  senna-docusate sodium, 2 tablet, Oral, BID  sodium chloride, 10 mL, Intravenous, Q12H      Continuous Infusions:ropivacaine,       PRN Meds:.  acetaminophen **OR** acetaminophen **OR** acetaminophen    senna-docusate sodium **AND** polyethylene glycol **AND** bisacodyl **AND** bisacodyl    Calcium Replacement - Follow Nurse / BPA Driven Protocol    HYDROcodone-acetaminophen    ipratropium-albuterol    Magnesium Standard Dose Replacement - Follow Nurse / BPA Driven Protocol    Morphine **AND** naloxone    nitroglycerin    ondansetron    Phosphorus Replacement - Follow Nurse / BPA Driven Protocol    Potassium Replacement - Follow Nurse / BPA Driven Protocol    sodium chloride    sodium chloride    traZODone    Assessment & Plan   Assessment & Plan     Active Hospital  Problems    Diagnosis  POA    **Fracture of femoral neck, right [S72.001A]  Yes    Atrial fibrillation [I48.91]  Yes    COPD (chronic obstructive pulmonary disease) [J44.9]  Yes    Dementia [F03.90]  Yes    Parkinson's disease [G20.A1]  Yes      Resolved Hospital Problems   No resolved problems to display.        Brief Hospital Course to date:  Fanny Boyce is a 84 y.o. female with a past medical history of COPD, A-fib, Parkinson's disease and dementia admitted after a presumed mechanical fall that resulted in a right hip fracture.    This patient's problems and plans were partially entered by my partner and updated as appropriate by me 02/14/25.     Right femoral neck fracture  --S/P right hip hemiarthroplasty 2/10/25 by Dr. Patino  --Continue PT/OT  --Continue Eliquis    Atrial fibrillation with RVR-improved  - Continue metoprolol and Eliquis.  -- S/P IV metoprolol and digoxin x 1    Acute respiratory failure with hypoxia  Pulmonary edema  -- CTA chest negative for PE, does show small pleural effusions and pulmonary edema  -- S/P Lasix 40mg IV x 1  -- Echo with LVEF 70%  -- CXR today  -- Currently on 4L O2, wean as tolerated     COPD  - Not currently in exacerbation.  - Continue as needed DuoNebs.     Parkinson's disease  Dementia  - Continue carbidopa/levodopa  - Changed trazodone nightly to PRN at lower dose due to drowsiness     Expected Discharge Location and Transportation: Not medically ready for discharge  Expected Discharge   Expected Discharge Date: 2/17/2025; Expected Discharge Time:      VTE Prophylaxis:  Pharmacologic & mechanical VTE prophylaxis orders are present.         AM-PAC 6 Clicks Score (PT): 10 (02/13/25 2116)    CODE STATUS:   Code Status and Medical Interventions: No CPR (Do Not Attempt to Resuscitate); Limited Support; No intubation (DNI)   Ordered at: 02/12/25 2019     Medical Intervention Limits:    No intubation (DNI)     Level Of Support Discussed With:    Health Care Surrogate      Code Status (Patient has no pulse and is not breathing):    No CPR (Do Not Attempt to Resuscitate)     Medical Interventions (Patient has pulse or is breathing):    Limited Support       Lorna Gamez MD  02/14/25

## 2025-02-14 NOTE — THERAPY TREATMENT NOTE
Patient Name: Fanny Boyce  : 1940    MRN: [id number]                              Today's Date: 2025       Admit Date: 2025    Visit Dx:     ICD-10-CM ICD-9-CM   1. Closed fracture of neck of right femur, initial encounter  S72.001A 820.8   2. Anticoagulated  Z79.01 V58.61   3. History of atrial fibrillation  Z86.79 V12.59   4. History of pulmonary embolism  Z86.711 V12.55     Patient Active Problem List   Diagnosis    Atrial fibrillation    COPD (chronic obstructive pulmonary disease)    Dementia    Parkinson's disease    Fracture of left humerus with routine healing    Fracture of femoral neck, right    Severe protein-calorie malnutrition     Past Medical History:   Diagnosis Date    Atrial fibrillation     COPD (chronic obstructive pulmonary disease)     Dementia     History of lung cancer     Parkinson disease     Pulmonary embolism      Past Surgical History:   Procedure Laterality Date    HIP HEMIARTHROPLASTY Right 2/10/2025    Procedure: HIP HEMIARTHROPLASTY RIGHT;  Surgeon: Frandy Patino MD;  Location: [facility];  Service: Orthopedics;  Laterality: Right;    JOINT REPLACEMENT      LUNG CANCER SURGERY        General Information       Row Name 25 1521          OT Time and Intention    Document Type therapy note (daily note)  -LR     Mode of Treatment occupational therapy  -LR       Row Name 25 1521          General Information    Patient Profile Reviewed yes  -LR     Existing Precautions/Restrictions fall;hip, posterior;right;oxygen therapy device and L/min;other (see comments);brace worn when out of bed  nerve cath  -LR     Barriers to Rehab cognitive status;previous functional deficit;medically complex  -LR       Row Name 25 1521          Cognition    Orientation Status (Cognition) oriented to;person  -LR       Row Name 25 1521          Safety Issues/Impairments Affecting Functional Mobility    Safety Issues Affecting Function (Mobility) safety precaution  awareness;awareness of need for assistance;safety precautions follow-through/compliance;insight into deficits/self-awareness;judgment;sequencing abilities;problem-solving  -LR     Impairments Affecting Function (Mobility) balance;cognition;endurance/activity tolerance;pain;range of motion (ROM);sensation/sensory awareness;strength  -LR     Cognitive Impairments, Mobility Safety/Performance awareness, need for assistance;attention;safety precaution follow-through;insight into deficits/self-awareness;judgment;problem-solving/reasoning;safety precaution awareness;sequencing abilities  -LR               User Key  (r) = Recorded By, (t) = Taken By, (c) = Cosigned By      Initials Name Provider Type    LR Magali Barry, OT Occupational Therapist                     Mobility/ADL's       Row Name 02/14/25 1523          Bed Mobility    Comment, (Bed Mobility) UIC  -LR       Row Name 02/14/25 1523          Transfers    Transfers sit-stand transfer;stand-sit transfer;bed-chair transfer  -LR       Row Name 02/14/25 1523          Bed-Chair Transfer    Bed-Chair [place] (Transfers) maximum assist (25% patient effort);2 person assist;verbal cues;nonverbal cues (demo/gesture)  -LR     Assistive Device (Bed-Chair Transfers) other (see comments)  BUE support  -LR       Row Name 02/14/25 1523          Sit-Stand Transfer    Sit-Stand [place] (Transfers) 2 person assist;verbal cues;nonverbal cues (demo/gesture);moderate assist (50% patient effort)  -LR     Assistive Device (Sit-Stand Transfers) other (see comments)  UE support  -LR       Row Name 02/14/25 1523          Stand-Sit Transfer    Stand-Sit [place] (Transfers) maximum assist (25% patient effort);2 person assist;nonverbal cues (demo/gesture);verbal cues  -LR     Assistive Device (Stand-Sit Transfers) other (see comments)  BUE support  -LR       Row Name 02/14/25 1523          Functional Mobility    Patient was able to Ambulate no, other medical factors  prevent ambulation  -LR       Row Name 02/14/25 1523          Activities of Daily Living    BADL Assessment/Intervention toileting  -LR       Row Name 02/14/25 1523          Mobility    Extremity Weight-bearing Status right lower extremity  -LR     Right Lower Extremity (Weight-bearing Status) weight-bearing as tolerated (WBAT)  -LR       Row Name 02/14/25 1523          Toileting Assessment/Training    [place] Level (Toileting) perform perineal hygiene;change pad/brief;adjust/manage clothing;maximum assist (25% patient effort)  -LR     Position (Toileting) supported standing  -LR               User Key  (r) = Recorded By, (t) = Taken By, (c) = Cosigned By      Initials Name Provider Type    LR Magali Barry OT Occupational Therapist                   Obj/Interventions       Row Name 02/14/25 1527          Balance    Balance Assessment sitting static balance;sitting dynamic balance;standing static balance;standing dynamic balance  -LR     Static Sitting Balance contact guard  -LR     Dynamic Sitting Balance minimal assist  -LR     Position, Sitting Balance unsupported;supported;sitting in chair  -LR     Static Standing Balance maximum assist;2-person assist;non-verbal cues (demo/gesture);verbal cues  -LR     Dynamic Standing Balance maximum assist;2-person assist;verbal cues;non-verbal cues (demo/gesture)  -LR     Position/Device Used, Standing Balance supported  -LR     Balance Interventions sitting;standing;supported;static;dynamic;occupation based/functional task  -LR               User Key  (r) = Recorded By, (t) = Taken By, (c) = Cosigned By      Initials Name Provider Type    LR Magali Barry OT Occupational Therapist                   Goals/Plan    No documentation.                  Clinical Impression       Row Name 02/14/25 1528          Pain Assessment    Pretreatment Pain Rating 0/10 - no pain  -LR     Posttreatment Pain Rating 0/10 - no pain  -LR       Row Name 02/14/25 1528          Plan  of Care Review    Plan of Care Reviewed With patient  -LR     Progress no change  -LR     Outcome Evaluation Pt continues to present below baseline with situational confusion, balance deficits, limited strength, and safety awareness. Max A x2 to transfer from chair to bed. Continue to recommend IPOT POC and SNF at D/C.  -LR       Row Name 02/14/25 1528          Therapy Plan Review/Discharge Plan (OT)    Anticipated Discharge Disposition (OT) skilled nursing facility  -LR       Row Name 02/14/25 1528          Vital Signs    O2 Delivery Pre Treatment nasal cannula  -LR     O2 Delivery Intra Treatment nasal cannula  -LR     Post SpO2 (%) 93  -LR     O2 Delivery Post Treatment nasal cannula  -LR     Pre Patient Position Sitting  -LR     Intra Patient Position Standing  -LR     Post Patient Position Supine  -LR       Row Name 02/14/25 1528          Positioning and Restraints    Pre-Treatment Position sitting in chair/recliner  -LR     Post Treatment Position bed  -LR     In Bed exit alarm on;notified nsg;supine;fowlers;call light within reach;encouraged to call for assist  -LR               User Key  (r) = Recorded By, (t) = Taken By, (c) = Cosigned By      Initials Name Provider Type    LR Magali Barry, OT Occupational Therapist                   Outcome Measures       Row Name 02/14/25 1535          How much help from another is currently needed...    Putting on and taking off regular lower body clothing? 2  -LR     Bathing (including washing, rinsing, and drying) 1  -LR     Toileting (which includes using toilet bed pan or urinal) 1  -LR     Putting on and taking off regular upper body clothing 3  -LR     Taking care of personal grooming (such as brushing teeth) 3  -LR     Eating meals 3  -LR     AM-PAC 6 Clicks Score (OT) 13  -LR       Row Name 02/14/25 1141          How much help from another person do you currently need...    Turning from your back to your side while in flat bed without using bedrails? 2  -SC      Moving from lying on back to sitting on the side of a flat bed without bedrails? 2  -SC     Moving to and from a bed to a chair (including a wheelchair)? 2  -SC     Standing up from a chair using your arms (e.g., wheelchair, bedside chair)? 2  -SC     Climbing 3-5 steps with a railing? 1  -SC     To walk in hospital room? 2  -SC     AM-PAC 6 Clicks Score (PT) 11  -SC     Highest Level of Mobility Goal 4 --> Transfer to chair/commode  -SC       Row Name 02/14/25 1535 02/14/25 1141       Functional Assessment    Outcome Measure Options AM-PAC 6 Clicks Daily Activity (OT)  -LR AM-PAC 6 Clicks Basic Mobility (PT)  -SC              User Key  (r) = Recorded By, (t) = Taken By, (c) = Cosigned By      Initials Name Provider Type    Sarah Mendoza, PT Physical Therapist    LR Magali Barry, OT Occupational Therapist                    Occupational Therapy Education       Title: PT OT SLP Therapies (In Progress)       Topic: Occupational Therapy (In Progress)       Point: ADL training (In Progress)       Description:   Instruct learner(s) on proper safety adaptation and remediation techniques during self care or transfers.   Instruct in proper use of assistive devices.                  Learning Progress Summary            Patient Acceptance, E, NR by LR at 2/14/2025 1536    Acceptance, E, NR by LR at 2/12/2025 1541   Family Acceptance, E, NR by LR at 2/12/2025 1541                      Point: Home exercise program (In Progress)       Description:   Instruct learner(s) on appropriate technique for monitoring, assisting and/or progressing therapeutic exercises/activities.                  Learning Progress Summary            Patient Acceptance, E, NR by LR at 2/14/2025 1536    Acceptance, E, NR by LR at 2/12/2025 1541   Family Acceptance, E, NR by LR at 2/12/2025 1541                      Point: Precautions (In Progress)       Description:   Instruct learner(s) on prescribed precautions during self-care and  functional transfers.                  Learning Progress Summary            Patient Acceptance, E, NR by LR at 2/14/2025 1536    Acceptance, E, NR by LR at 2/12/2025 1541   Family Acceptance, E, NR by LR at 2/12/2025 1541                      Point: Body mechanics (In Progress)       Description:   Instruct learner(s) on proper positioning and spine alignment during self-care, functional mobility activities and/or exercises.                  Learning Progress Summary            Patient Acceptance, E, NR by LR at 2/14/2025 1536    Acceptance, E, NR by LR at 2/12/2025 1541   Family Acceptance, E, NR by LR at 2/12/2025 1541                                      User Key       Initials Effective Dates Name Provider Type Discipline    LR 10/09/24 -  Magali Barry, OT Occupational Therapist OT                  OT Recommendation and Plan  Planned Therapy Interventions (OT): activity tolerance training, adaptive equipment training, BADL retraining, cognitive/visual perception retraining, occupation/activity based interventions, ROM/therapeutic exercise, strengthening exercise, functional balance retraining, transfer/mobility retraining, patient/caregiver education/training, passive ROM/stretching, IADL retraining  Therapy Frequency (OT): daily  Plan of Care Review  Plan of Care Reviewed With: patient  Progress: no change  Outcome Evaluation: Pt continues to present below baseline with situational confusion, balance deficits, limited strength, and safety awareness. Max A x2 to transfer from chair to bed. Continue to recommend IPOT POC and SNF at D/C.     Time Calculation:   Evaluation Complexity (OT)  Review Occupational Profile/Medical/Therapy History Complexity: brief/low complexity  Assessment, Occupational Performance/Identification of Deficit Complexity: 1-3 performance deficits  Clinical Decision Making Complexity (OT): problem focused assessment/low complexity  Overall Complexity of Evaluation (OT): low  complexity     Time Calculation- OT       Row Name 02/14/25 1537 02/14/25 1033          Time Calculation- OT    OT Start Time 1436  -LR --     OT Received On 02/14/25  -LR --     OT Goal Re-Cert Due Date 02/22/25  -LR --        Timed Charges    39041 - Gait Training Minutes  -- 8  -SC     15676 - OT Therapeutic Activity Minutes 17  -LR --        Total Minutes    Timed Charges Total Minutes 17  -LR 8  -SC      Total Minutes 17  -LR 8  -SC               User Key  (r) = Recorded By, (t) = Taken By, (c) = Cosigned By      Initials Name Provider Type    SC Sarah Easton PT Physical Therapist    LR Magali Barry, RADHA Occupational Therapist                  Therapy Charges for Today       Code Description Service Date Service Provider Modifiers Qty    21326023716 HC OT THERAPEUTIC ACT EA 15 MIN 2/14/2025 Magali Barry OT GO 1    87579522011 HC OT THER SUPP EA 15 MIN 2/14/2025 Magali Barry OT GO 1                 Magali Barry OT  2/14/2025

## 2025-02-14 NOTE — PLAN OF CARE
Patient is A&Ox4,  4LNC throughout day.  Adequate UOP, daughter at bedside most of the shift. Infupump CDI, no c/o of pain.  Was able to participate with PT and remained in the chair for 4 hours.  Scds in place, abductor pillow placed periodically throughout shift.  Large BM noted.

## 2025-02-14 NOTE — PROGRESS NOTES
"          Clinical Nutrition Assessment     Patient Name: Fanny Boyce  YOB: 1940  MRN: [id number]  Date of Encounter: 02/14/25 13:15 EST  Admission date: 2/9/2025  Reason for Visit: Follow-up protocol    Assessment   Nutrition Assessment   Admission Diagnosis:  Fracture of femoral neck, right [S72.001A]    Problem List:    Fracture of femoral neck, right    Atrial fibrillation    COPD (chronic obstructive pulmonary disease)    Dementia    Parkinson's disease      PMH:   She  has a past medical history of Atrial fibrillation, COPD (chronic obstructive pulmonary disease), Dementia, History of lung cancer, Parkinson disease, and Pulmonary embolism.    PSH:  She  has a past surgical history that includes Joint replacement; Lung cancer surgery; and Hip hemiArthroplasty (Right, 2/10/2025).    Applicable Nutrition History:   Skin: healing PI on sacrum    Anthropometrics     Height: Height: 152.4 cm (60\")  Last Filed Weight: Weight: 52.1 kg (114 lb 12.8 oz) (02/09/25 2225)  Method: Weight Method: Stated  BMI: BMI (Calculated): 22.4    UBW:  [place]  Weight change: No significant changes with limited wt hx     Weight      Weight (kg) Weight (lbs) Weight Method   1/31/2024 53.071 kg  117 lb  Stated    3/13/2024 53.071 kg  117 lb  Estimated    6/1/2024 53.071 kg  117 lb  Stated    2/9/2025 53.071 kg  117 lb  Stated     52.073 kg  114 lb 12.8 oz       Nutrition Focused Physical Exam    Date:  02/14/25     Patient meets criteria for malnutrition diagnosis, see MSA note.     Subjective   Reported/Observed/Food/Nutrition Related History:   2/14  Patient sitting in chair at time of visit with daughter at bedside. Daughter reported her appetite has been low since surgery, but is starting to improve. Stated she ate a little of breakfast and all milk; little bit of dinner last night. Daughter reports that she worries she doesn't eat as much when she's not there. Daughter brought snacks, reports she sometimes has a few " bites. Daughter reported she is drinking the boost, says the want them more often.     2/10  Patient not in room at time of visit, also per chart poor historian. Nutrition hx obtained from daughter, Lety, via phone. Dtr reports patient typically has a good appetite, no changes noted. Would likely do better with softer texture diet while here as she does not have bottom dentures (they often fall out). Dtr often gives pt stool softeners, last BM Saturday. NKFA. Pt does drink Ensure, dtr thinks pt would drink Boost.    Current Nutrition Prescription   PO: Diet: Regular/House; Feeding Assistance - Nursing; Texture: Regular (IDDSI 7); Fluid Consistency: Thin (IDDSI 0)  Oral Nutrition Supplement: Boost x1 daily  Intake: 40% x 5 meals    Assessment & Plan   Nutrition Diagnosis   Date:  02/14/25  Updated:  Problem Malnutrition, chronic severe   Etiology Inability to consume adequate energy 2/2 chronic diseases   Signs/Symptoms severe muscle wasting and severe subcutaneous fat loss   Status: New     Goal:   Nutrition to support treatment and Increase intake    Nutrition Intervention      Follow treatment progress, Care plan reviewed, Interview for preferences, Encourage intake, Supplement provided    Change Boost to TID.  Follow to ensure adequate intake.     Monitoring/Evaluation:   Per protocol, I&O, PO intake, Supplement intake, Pertinent labs, Weight, Symptoms, POC/GOC    July Mancia  Time Spent: 20 min

## 2025-02-14 NOTE — PROGRESS NOTES
Malnutrition Severity Assessment    Patient Name:  Fanny Boyce  YOB: 1940  MRN: [id number]  Admit Date:  2/9/2025    Patient meets criteria for : (P) Severe Malnutrition    Comments:  Pt meets criteria for severe chronic malnutrition with following criteria: severe muscle wasting & subcutaneous fat loss. 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     Malnutrition Severity Assessment  Malnutrition Type: (P) Chronic Disease - Related Malnutrition  Malnutrition Type (Last 8 Hours)       Malnutrition Severity Assessment       Row Name 02/14/25 1330       Malnutrition Severity Assessment    Malnutrition Type Chronic Disease - Related Malnutrition (P)       Row Name 02/14/25 1330       Muscle Loss    Loss of Muscle Mass Findings Severe (P)     [place] Severe - deep hollowing/scooping, lack of muscle to touch, facial bones well defined (P)     Clavicle Bone Region Severe - protruding prominent bone (P)     Acromion Bone Region Severe - squared shoulders, bones, and acromion process protrusion prominent (P)     Dorsal Hand Region Moderate - slight depression (P)       Row Name 02/14/25 1330       Fat Loss    Subcutaneous Fat Loss Findings Severe (P)     Orbital Region  Severe - pronounced hollowness/depression, dark circles, loose saggy skin (P)     Upper Arm Region Severe - mostly skin, very little space between folds, fingers touch (P)       Row Name 02/14/25 1330       Criteria Met (Must meet criteria for severity in at least 2 of these categories: M Wasting, Fat Loss, Fluid, Secondary Signs, Wt. Status, Intake)    Patient meets criteria for  Severe Malnutrition (P)                     Electronically signed by:  July Mancia  02/14/25 13:31 EST

## 2025-02-15 PROBLEM — K44.9 HIATAL HERNIA: Status: ACTIVE | Noted: 2025-02-15

## 2025-02-15 LAB
ANION GAP SERPL CALCULATED.3IONS-SCNC: 10 MMOL/L (ref 5–15)
BUN SERPL-MCNC: 16 MG/DL (ref 8–23)
BUN/CREAT SERPL: 27.6 (ref 7–25)
CALCIUM SPEC-SCNC: 8.9 MG/DL (ref 8.6–10.5)
CHLORIDE SERPL-SCNC: 98 MMOL/L (ref 98–107)
CO2 SERPL-SCNC: 30 MMOL/L (ref 22–29)
CREAT SERPL-MCNC: 0.58 MG/DL (ref 0.57–1)
DEPRECATED RDW RBC AUTO: 45.3 FL (ref 37–54)
EGFRCR SERPLBLD CKD-EPI 2021: 88.3 ML/MIN/1.73
ERYTHROCYTE [DISTWIDTH] IN BLOOD BY AUTOMATED COUNT: 14.1 % (ref 12.3–15.4)
GLUCOSE SERPL-MCNC: 150 MG/DL (ref 65–99)
HCT VFR BLD AUTO: 32.9 % (ref 34–46.6)
HGB BLD-MCNC: 10.2 G/DL (ref 12–15.9)
MCH RBC QN AUTO: 27.5 PG (ref 26.6–33)
MCHC RBC AUTO-ENTMCNC: 31 G/DL (ref 31.5–35.7)
MCV RBC AUTO: 88.7 FL (ref 79–97)
PLATELET # BLD AUTO: 232 10*3/MM3 (ref 140–450)
PMV BLD AUTO: 10.7 FL (ref 6–12)
POTASSIUM SERPL-SCNC: 4.1 MMOL/L (ref 3.5–5.2)
RBC # BLD AUTO: 3.71 10*6/MM3 (ref 3.77–5.28)
SODIUM SERPL-SCNC: 138 MMOL/L (ref 136–145)
WBC NRBC COR # BLD AUTO: 7.96 10*3/MM3 (ref 3.4–10.8)

## 2025-02-15 PROCEDURE — 25010000002 FUROSEMIDE PER 20 MG: Performed by: INTERNAL MEDICINE

## 2025-02-15 PROCEDURE — 97110 THERAPEUTIC EXERCISES: CPT

## 2025-02-15 PROCEDURE — 25010000002 DIGOXIN PER 500 MCG: Performed by: INTERNAL MEDICINE

## 2025-02-15 PROCEDURE — 80048 BASIC METABOLIC PNL TOTAL CA: CPT | Performed by: INTERNAL MEDICINE

## 2025-02-15 PROCEDURE — 85027 COMPLETE CBC AUTOMATED: CPT | Performed by: INTERNAL MEDICINE

## 2025-02-15 RX ORDER — FUROSEMIDE 10 MG/ML
40 INJECTION INTRAMUSCULAR; INTRAVENOUS ONCE
Status: COMPLETED | OUTPATIENT
Start: 2025-02-15 | End: 2025-02-15

## 2025-02-15 RX ORDER — METOPROLOL TARTRATE 1 MG/ML
2.5 INJECTION, SOLUTION INTRAVENOUS ONCE
Status: COMPLETED | OUTPATIENT
Start: 2025-02-15 | End: 2025-02-15

## 2025-02-15 RX ORDER — METOPROLOL TARTRATE 1 MG/ML
2.5 INJECTION, SOLUTION INTRAVENOUS EVERY 6 HOURS
Status: DISCONTINUED | OUTPATIENT
Start: 2025-02-15 | End: 2025-02-18

## 2025-02-15 RX ORDER — DIGOXIN 0.25 MG/ML
250 INJECTION INTRAMUSCULAR; INTRAVENOUS ONCE
Status: COMPLETED | OUTPATIENT
Start: 2025-02-15 | End: 2025-02-15

## 2025-02-15 RX ADMIN — SENNOSIDES AND DOCUSATE SODIUM 2 TABLET: 50; 8.6 TABLET ORAL at 21:12

## 2025-02-15 RX ADMIN — METOPROLOL TARTRATE 2.5 MG: 5 INJECTION INTRAVENOUS at 13:25

## 2025-02-15 RX ADMIN — DIGOXIN 250 MCG: 0.25 INJECTION INTRAMUSCULAR; INTRAVENOUS at 18:42

## 2025-02-15 RX ADMIN — ACETAMINOPHEN 650 MG: 325 TABLET ORAL at 16:05

## 2025-02-15 RX ADMIN — APIXABAN 2.5 MG: 2.5 TABLET, FILM COATED ORAL at 21:12

## 2025-02-15 RX ADMIN — CARBIDOPA AND LEVODOPA 1 TABLET: 25; 100 TABLET ORAL at 16:55

## 2025-02-15 RX ADMIN — CARBIDOPA AND LEVODOPA 1 TABLET: 25; 100 TABLET ORAL at 11:56

## 2025-02-15 RX ADMIN — Medication 10 ML: at 08:05

## 2025-02-15 RX ADMIN — METOPROLOL TARTRATE 12.5 MG: 25 TABLET, FILM COATED ORAL at 08:05

## 2025-02-15 RX ADMIN — FUROSEMIDE 40 MG: 10 INJECTION, SOLUTION INTRAMUSCULAR; INTRAVENOUS at 09:08

## 2025-02-15 RX ADMIN — SENNOSIDES AND DOCUSATE SODIUM 2 TABLET: 50; 8.6 TABLET ORAL at 08:05

## 2025-02-15 RX ADMIN — CARBIDOPA AND LEVODOPA 1 TABLET: 25; 100 TABLET ORAL at 08:05

## 2025-02-15 RX ADMIN — METOPROLOL TARTRATE 2.5 MG: 5 INJECTION INTRAVENOUS at 03:46

## 2025-02-15 RX ADMIN — METOPROLOL TARTRATE 12.5 MG: 25 TABLET, FILM COATED ORAL at 21:12

## 2025-02-15 RX ADMIN — APIXABAN 2.5 MG: 2.5 TABLET, FILM COATED ORAL at 08:05

## 2025-02-15 NOTE — THERAPY TREATMENT NOTE
Patient Name: Fanny Boyce  : 1940    MRN: [id number]                              Today's Date: 2/15/2025       Admit Date: 2025    Visit Dx:     ICD-10-CM ICD-9-CM   1. Closed fracture of neck of right femur, initial encounter  S72.001A 820.8   2. Anticoagulated  Z79.01 V58.61   3. History of atrial fibrillation  Z86.79 V12.59   4. History of pulmonary embolism  Z86.711 V12.55     Patient Active Problem List   Diagnosis    Atrial fibrillation    COPD (chronic obstructive pulmonary disease)    Dementia    Parkinson's disease    Fracture of left humerus with routine healing    Fracture of femoral neck, right    Severe protein-calorie malnutrition     Past Medical History:   Diagnosis Date    Atrial fibrillation     COPD (chronic obstructive pulmonary disease)     Dementia     History of lung cancer     Parkinson disease     Pulmonary embolism      Past Surgical History:   Procedure Laterality Date    HIP HEMIARTHROPLASTY Right 2/10/2025    Procedure: HIP HEMIARTHROPLASTY RIGHT;  Surgeon: Frandy Patino MD;  Location: [facility];  Service: Orthopedics;  Laterality: Right;    JOINT REPLACEMENT      LUNG CANCER SURGERY        General Information       Row Name 02/15/25 1108          Physical Therapy Time and Intention    Document Type therapy note (daily note)  -AS     Mode of Treatment physical therapy  -AS       Row Name 02/15/25 1108          General Information    Patient Profile Reviewed yes  -AS     Existing Precautions/Restrictions fall;hip, posterior;right;oxygen therapy device and L/min;other (see comments);brace worn when out of bed  nerve catheter, knee immobilizer on when up  -AS     Barriers to Rehab medically complex;previous functional deficit;cognitive status  -AS       Row Name 02/15/25 1108          Living Environment    Name(s) of People in Home needs cues to keep eyes open during therapy, K.I donned prior to OOB activity  -AS       Row Name 02/15/25 1108          Cognition     Orientation Status (Cognition) oriented to;person;disoriented to;place;time  -AS       Row Name 02/15/25 1108          Safety Issues/Impairments Affecting Functional Mobility    Safety Issues Affecting Function (Mobility) awareness of need for assistance;safety precaution awareness;safety precautions follow-through/compliance;sequencing abilities;positioning of assistive device;insight into deficits/self-awareness;ability to follow commands  -AS     Impairments Affecting Function (Mobility) balance;cognition;endurance/activity tolerance;pain;range of motion (ROM);sensation/sensory awareness;strength  -AS     Cognitive Impairments, Mobility Safety/Performance awareness, need for assistance;insight into deficits/self-awareness;problem-solving/reasoning;safety precaution awareness;safety precaution follow-through;sequencing abilities  -AS     Comment, Safety Issues/Impairments (Mobility) following commands  -AS               User Key  (r) = Recorded By, (t) = Taken By, (c) = Cosigned By      Initials Name Provider Type    AS Katy Saucedo PTA Physical Therapist Assistant                   Mobility       Row Name 02/15/25 1110          Bed Mobility    Supine-Sit [place] (Bed Mobility) verbal cues;maximum assist (25% patient effort);1 person assist  -AS     Assistive Device (Bed Mobility) head of bed elevated;repositioning sheet  -AS     Comment, (Bed Mobility) cues for technique, supine>sit with max assist x1 and drawsheet to complete transfer  -AS       Row Name 02/15/25 1110          Transfers    Comment, (Transfers) completed 3 sit<>stand with max assist x1, assist to block L foot from sliding, cues to keep eyes open during transfer, with increased time at side of bed patient became more alert  -AS       Row Name 02/15/25 1110          Bed-Chair Transfer    Bed-Chair [place] (Transfers) verbal cues;maximum assist (25% patient effort);1 person assist  -AS     Assistive Device (Bed-Chair Transfers)  other (see comments)  -AS     Comment, (Bed-Chair Transfer) B UE support bed>recliner  -AS       Row Name 02/15/25 1110          Sit-Stand Transfer    Sit-Stand [place] (Transfers) verbal cues;maximum assist (25% patient effort);1 person assist  -AS     Assistive Device (Sit-Stand Transfers) other (see comments)  -AS     Comment, (Sit-Stand Transfer) B UE support completed sit<>stand from recliner for nursing to change bandage on bottom  -AS       Row Name 02/15/25 1110          Gait/Stairs (Locomotion)    [place] Level (Gait) unable to assess  -AS               User Key  (r) = Recorded By, (t) = Taken By, (c) = Cosigned By      Initials Name Provider Type    AS Katy Saucedo PTA Physical Therapist Assistant                   Obj/Interventions       Row Name 02/15/25 1112          Motor Skills    Therapeutic Exercise knee;ankle;hip  -AS       [place] [facility] Name 02/15/25 1112          Hip (Therapeutic Exercise)    Hip (Therapeutic Exercise) AAROM (active assistive range of motion)  -AS     Hip AAROM (Therapeutic Exercise) right;aBduction;aDduction;supine  -AS     Hip Strengthening (Therapeutic Exercise) right;heel slides;supine;10 repetitions  -AS       Row Name 02/15/25 1112          Ankle (Therapeutic Exercise)    Ankle (Therapeutic Exercise) AAROM (active assistive range of motion)  -AS     Ankle AAROM (Therapeutic Exercise) bilateral;dorsiflexion;plantarflexion;supine;10 repetitions  -AS       Row Name 02/15/25 1112          Balance    Dynamic Sitting Balance verbal cues;minimal assist;1-person assist  -AS     Position, Sitting Balance supported;sitting edge of bed  -AS     Dynamic Standing Balance verbal cues;maximum assist;1-person assist  -AS     Position/Device Used, Standing Balance supported;other (see comments)  -AS     Comment, Balance max assist x1 with B UE support, cues to prevent posterior lean and to keep L foot from sliding  -AS               User Key  (r) = Recorded By, (t) = Taken By, (c) =  Cosigned By      Initials Name Provider Type    AS Katy Saucedo PTA Physical Therapist Assistant                   Goals/Plan    No documentation.                  Clinical Impression       Row Name 02/15/25 1114          Pain    Pain Location hip  -AS     Pain Side/Orientation right  -AS     Pain Management Interventions activity modification encouraged;exercise or physical activity utilized  -AS     Response to Pain Interventions activity and movement patterns unchanged  -AS       Row Name 02/15/25 1114          Pain Scale: FACES Pre/Post-Treatment    Pain: FACES Scale, Pretreatment 0-->no hurt  -AS     Posttreatment Pain Rating 2-->hurts little bit  -AS       Row Name 02/15/25 1114          Plan of Care Review    Plan of Care Reviewed With patient  -AS     Progress no change  -AS     Outcome Evaluation Patient completed supine>sit with Max assist x1 and assist with B LE and at trunk to reach EOB, once sitting EOB completed 3 sit<>stands with max assist x1, assist to block L foot from sliding.  Verbal cues to keep eyes open during transfer, with increased time at side of bed patient became more alert, completed bed>recliner transfer with Max assist x1 and BUE support, cues for upright posture and to avoid pushing posteriorly during transfer. Further mobility limited by weakness, pain, balance and cognitive deficits, completed AAROM R LE exercises with cues and assist to correct. Recommend SNF at D/C for best functional outcome.  -AS       Row Name 02/15/25 1114          Positioning and Restraints    Pre-Treatment Position in bed  -AS     Post Treatment Position chair  -AS     In Chair reclined;call light within reach;encouraged to call for assist;exit alarm on;waffle cushion;pillow between legs  -AS               User Key  (r) = Recorded By, (t) = Taken By, (c) = Cosigned By      Initials Name Provider Type    AS Katy Saucedo PTA Physical Therapist Assistant                   Outcome Measures        Row Name 02/15/25 1119          How much help from another person do you currently need...    Turning from your back to your side while in flat bed without using bedrails? 2  -AS     Moving from lying on back to sitting on the side of a flat bed without bedrails? 2  -AS     Moving to and from a bed to a chair (including a wheelchair)? 2  -AS     Standing up from a chair using your arms (e.g., wheelchair, bedside chair)? 2  -AS     Climbing 3-5 steps with a railing? 1  -AS     To walk in hospital room? 1  -AS     AM-PAC 6 Clicks Score (PT) 10  -AS     Highest Level of Mobility Goal 4 --> Transfer to chair/commode  -AS       Row Name 02/15/25 1119          Functional Assessment    Outcome Measure Options AM-PAC 6 Clicks Basic Mobility (PT)  -AS               User Key  (r) = Recorded By, (t) = Taken By, (c) = Cosigned By      Initials Name Provider Type    AS Katy Saucedo, PTA Physical Therapist Assistant                                 Physical Therapy Education       Title: PT OT SLP Therapies (In Progress)       Topic: Physical Therapy (In Progress)       Point: Mobility training (In Progress)       Learning Progress Summary            Patient Acceptance, E, NR by AS at 2/15/2025 1119    Eager, E, VU by SC at 2/14/2025 1142    Comment: re viewed benefits of activity    Acceptance, E, NR by  at 2/12/2025 0957    Acceptance, E, VU,NR by  at 2/11/2025 0840                      Point: Home exercise program (In Progress)       Learning Progress Summary            Patient Acceptance, E, NR by AS at 2/15/2025 1119    Eager, E, VU by SC at 2/14/2025 1142    Comment: re viewed benefits of activity    Acceptance, E, NR by  at 2/12/2025 0957    Acceptance, E, VU,NR by  at 2/11/2025 0840                      Point: Body mechanics (In Progress)       Learning Progress Summary            Patient Acceptance, E, NR by AS at 2/15/2025 1119    Eager, E, VU by SC at 2/14/2025 1142    Comment: re viewed benefits of  activity    Acceptance, E, NR by  at 2/12/2025 0957    Acceptance, E, VU,NR by  at 2/11/2025 0840                      Point: Precautions (In Progress)       Learning Progress Summary            Patient Acceptance, E, NR by AS at 2/15/2025 1119    Eager, E, VU by SC at 2/14/2025 1142    Comment: re viewed benefits of activity    Acceptance, E, NR by  at 2/12/2025 0957    Acceptance, E, VU,NR by  at 2/11/2025 0840                                      User Key       Initials Effective Dates Name Provider Type Discipline    SC 02/03/23 -  Sarah Easton, PT Physical Therapist PT    AS 12/13/24 -  Katy Saucedo, PTA Physical Therapist Assistant PT     02/06/24 -  Erica Sandoval, PT Physical Therapist PT     09/21/23 -  Daisy Javed, PT Physical Therapist PT                  PT Recommendation and Plan     Progress: no change  Outcome Evaluation: Patient completed supine>sit with Max assist x1 and assist with B LE and at trunk to reach EOB, once sitting EOB completed 3 sit<>stands with max assist x1, assist to block L foot from sliding.  Verbal cues to keep eyes open during transfer, with increased time at side of bed patient became more alert, completed bed>recliner transfer with Max assist x1 and BUE support, cues for upright posture and to avoid pushing posteriorly during transfer. Further mobility limited by weakness, pain, balance and cognitive deficits, completed AAROM R LE exercises with cues and assist to correct. Recommend SNF at D/C for best functional outcome.     Time Calculation:         PT Charges       Row Name 02/15/25 1120 02/15/25 1119          Time Calculation    Start Time -- 1045  -AS     PT Received On -- 02/15/25  -AS     PT Goal Re-Cert Due Date -- 02/21/25  -AS        Timed Charges    88334 - PT Therapeutic Exercise Minutes 23  -AS 12  -AS     34316 - Gait Training Minutes  -- 11  -AS        Total Minutes    Timed Charges Total Minutes 23  -AS 23  -AS      Total Minutes  23  -AS 23  -AS               User Key  (r) = Recorded By, (t) = Taken By, (c) = Cosigned By      Initials Name Provider Type    AS Katy Saucedo PTA Physical Therapist Assistant                  Therapy Charges for Today       Code Description Service Date Service Provider Modifiers Qty    85173772652 HC PT THER PROC EA 15 MIN 2/15/2025 Katy Saucedo PTA GP 2            PT G-Codes  Outcome Measure Options: AM-PAC 6 Clicks Basic Mobility (PT)  AM-PAC 6 Clicks Score (PT): 10  AM-PAC 6 Clicks Score (OT): 13       Katy Saucedo PTA  2/15/2025

## 2025-02-15 NOTE — PROGRESS NOTES
[facility] Medicine Services  PROGRESS NOTE    Patient Name: Fanny Boyce  : 1940  MRN: [id number]    Date of Admission: 2025  Primary Care Physician: Nemo Aponte PA    Subjective   Subjective     CC:  F/U hip fracture    HPI:Patient has no complaints, she has been tachycardic, but denies symptoms      Objective   Objective     Vital Signs:   Temp:  [97.7 °F (36.5 °C)-99.5 °F (37.5 °C)] 98.4 °F (36.9 °C)  Heart Rate:  [] 122  Resp:  [18] 18  BP: (108-123)/(58-87) 115/87  Flow (L/min) (Oxygen Therapy):  [4] 4     Physical Exam:  Constitutional: No acute distress, awake, alert, up in the chair, reading a magazine  HENT: NCAT, mucous membranes moist  Respiratory: Respiratory effort normal, CTAB  Cardiovascular: Irregular, HR improved to low 120s  Psychiatric: Appropriate affect, cooperative  Neurologic: Speech clear and fluent  Skin: No rashes on exposed surfaces    Results Reviewed:  LAB RESULTS:      Lab 25  1230 25  0335 25  0901 25  1001 02/10/25  0742 02/10/25  0741 25   WBC 7.33 6.02 6.48 10.83*  --  8.68 9.29   HEMOGLOBIN 9.3* 9.0* 10.2* 10.6*  --  12.3 13.5   HEMATOCRIT 30.3* 28.5* 33.1* 33.0*  --  39.1 43.0   PLATELETS 179 135* 146 187  --  186 235   NEUTROS ABS  --   --  5.68 9.56*  --  7.60* 7.49*   IMMATURE GRANS (ABS)  --   --  0.02 0.05  --  0.04 0.09*   LYMPHS ABS  --   --  0.36* 0.65*  --  0.67* 1.17   MONOS ABS  --   --  0.27 0.49  --  0.27 0.39   EOS ABS  --   --  0.10 0.04  --  0.06 0.10   MCV 91.0 89.6 89.2 88.9  --  88.7 89.8   PROTIME  --   --   --   --  13.0  --   --          Lab 25  1230 25  1001 02/10/25  2155 02/10/25  0742 25   SODIUM 136 138  --  141 141   POTASSIUM 4.1 4.4 3.8 4.3 3.9   CHLORIDE 99 101  --  104 102   CO2 29.0 24.0  --  28.0 30.0*   ANION GAP 8.0 13.0  --  9.0 9.0   BUN 16 15  --  18 19   CREATININE 0.52* 0.69  --  0.63 0.66   EGFR 90.6 84.6  --  86.5 85.6    GLUCOSE 127* 147*  --  125* 116*   CALCIUM 8.3* 8.8  --  9.1 9.5   MAGNESIUM 1.7  --   --  1.7  --          Lab 02/10/25  0742 02/09/25 1959   TOTAL PROTEIN 6.2 7.0   ALBUMIN 3.6 4.1   GLOBULIN 2.6 2.9   ALT (SGPT) <5 5   AST (SGOT) 16 18   BILIRUBIN 0.3 0.2   ALK PHOS 79 92         Lab 02/10/25  0742   PROTIME 13.0   INR 0.97             Lab 02/10/25  0742 02/09/25 2010   ABO TYPING O O   RH TYPING Positive Positive   ANTIBODY SCREEN  --  Negative               Microbiology Results Abnormal       None            XR Chest 1 View    Result Date: 2/14/2025  XR CHEST 1 VW Date of Exam: 2/14/2025 10:03 AM EST Indication: Evaluate pulmonary edema Comparison: 2/9/2025 Findings: Since the prior study, the heart shadow has become enlarged and there is a diffuse mild to moderate perihilar edema pattern present. There appear to be small new pleural effusions as well. Large hiatal hernia is again seen. No dense lung consolidation or  evidence of pneumothorax is seen. Old irregularly healed left proximal humerus fracture is again noted.     Impression: Impression: 1. Interval development of congestive heart failure with mild to moderate pulmonary interstitial edema and minimal effusions. 2. Large hiatal hernia again noted. Electronically Signed: Kevin Kaminski MD  2/14/2025 10:33 AM EST  Workstation ID: [id number]     Results for orders placed during the hospital encounter of 02/09/25    Adult Transthoracic Echo Complete W/ Cont if Necessary Per Protocol    Interpretation Summary    Left ventricular systolic function is normal. Estimated left ventricular EF = 70%    Moderate tricuspid valve regurgitation is present.    Estimated  right ventricular systolic pressure from tricuspid regurgitation is 46 mmHg.    There is a trivial pericardial effusion.      Current medications:  Scheduled Meds:apixaban, 2.5 mg, Oral, Q12H  carbidopa-levodopa, 1 tablet, Oral, TID  metoprolol tartrate, 12.5 mg, Oral, BID  senna-docusate sodium, 2  tablet, Oral, BID  sodium chloride, 10 mL, Intravenous, Q12H      Continuous Infusions:ropivacaine,       PRN Meds:.  acetaminophen **OR** acetaminophen **OR** acetaminophen    senna-docusate sodium **AND** polyethylene glycol **AND** bisacodyl **AND** bisacodyl    Calcium Replacement - Follow Nurse / BPA Driven Protocol    ipratropium-albuterol    Magnesium Standard Dose Replacement - Follow Nurse / BPA Driven Protocol    [] Morphine **AND** naloxone    nitroglycerin    ondansetron    Phosphorus Replacement - Follow Nurse / BPA Driven Protocol    Potassium Replacement - Follow Nurse / BPA Driven Protocol    sodium chloride    sodium chloride    traZODone    Assessment & Plan   Assessment & Plan     Active Hospital Problems    Diagnosis  POA    **Fracture of femoral neck, right [S72.001A]  Yes    Severe protein-calorie malnutrition [E43]  Yes    Atrial fibrillation [I48.91]  Yes    COPD (chronic obstructive pulmonary disease) [J44.9]  Yes    Dementia [F03.90]  Yes    Parkinson's disease [G20.A1]  Yes      Resolved Hospital Problems   No resolved problems to display.        Brief Hospital Course to date:  Fanny Boyce is a 84 y.o. female with a past medical history of COPD, A-fib, Parkinson's disease and dementia admitted after a presumed mechanical fall that resulted in a right hip fracture.    This patient's problems and plans were partially entered by my partner and updated as appropriate by me 02/15/25.     Right femoral neck fracture  --S/P right hip hemiarthroplasty 2/10/25 by Dr. Patino  --Continue PT/OT  --Continue Eliquis    Atrial fibrillation with RVR-still poorly controlled but asymptomatic  - Continue metoprolol and Eliquis.  -- repeat IV metoprolol and digoxin x 1    Acute respiratory failure with hypoxia  Pulmonary edema  -- CTA chest negative for PE, does show small pleural effusions and pulmonary edema  -- Repeat Lasix 40mg IV x 1  -- Echo with LVEF 70%  -- CXR today  -- Currently on 4L O2,  wean as tolerated     COPD  - Not currently in exacerbation.  - Continue as needed DuoNebs.     Parkinson's disease  Dementia  - Continue carbidopa/levodopa  - Changed trazodone nightly to PRN at lower dose due to drowsiness     Expected Discharge Location and Transportation: Not medically ready for discharge  Expected Discharge   Expected Discharge Date: 2/17/2025; Expected Discharge Time:      VTE Prophylaxis:  Pharmacologic & mechanical VTE prophylaxis orders are present.         AM-PAC 6 Clicks Score (PT): 11 (02/14/25 2106)    CODE STATUS:   Code Status and Medical Interventions: No CPR (Do Not Attempt to Resuscitate); Limited Support; No intubation (DNI)   Ordered at: 02/12/25 2019     Medical Intervention Limits:    No intubation (DNI)     Level Of Support Discussed With:    Health Care Surrogate     Code Status (Patient has no pulse and is not breathing):    No CPR (Do Not Attempt to Resuscitate)     Medical Interventions (Patient has pulse or is breathing):    Limited Support       Inés Genao MD  02/15/25

## 2025-02-15 NOTE — PLAN OF CARE
Goal Outcome Evaluation:  Plan of Care Reviewed With: patient        Progress: no change  Outcome Evaluation: Patient completed supine>sit with Max assist x1 and assist with B LE and at trunk to reach EOB, once sitting EOB completed 3 sit<>stands with max assist x1, assist to block L foot from sliding.  Verbal cues to keep eyes open during transfer, with increased time at side of bed patient became more alert, completed bed>recliner transfer with Max assist x1 and BUE support, cues for upright posture and to avoid pushing posteriorly during transfer. Further mobility limited by weakness, pain, balance and cognitive deficits, completed AAROM R LE exercises with cues and assist to correct. Recommend SNF at D/C for best functional outcome.

## 2025-02-15 NOTE — PLAN OF CARE
Goal Outcome Evaluation:           Progress: declining     HR elevated during the night. One time dose of IV metoprolol given. On 4L NC. Nerve cath running as ordered. Pt seem very tired, slept but seemed restless.

## 2025-02-16 LAB
ANION GAP SERPL CALCULATED.3IONS-SCNC: 10 MMOL/L (ref 5–15)
BUN SERPL-MCNC: 19 MG/DL (ref 8–23)
BUN/CREAT SERPL: 40.4 (ref 7–25)
CALCIUM SPEC-SCNC: 8.6 MG/DL (ref 8.6–10.5)
CHLORIDE SERPL-SCNC: 98 MMOL/L (ref 98–107)
CO2 SERPL-SCNC: 30 MMOL/L (ref 22–29)
CREAT SERPL-MCNC: 0.47 MG/DL (ref 0.57–1)
EGFRCR SERPLBLD CKD-EPI 2021: 92.8 ML/MIN/1.73
GLUCOSE SERPL-MCNC: 106 MG/DL (ref 65–99)
MAGNESIUM SERPL-MCNC: 1.7 MG/DL (ref 1.6–2.4)
POTASSIUM SERPL-SCNC: 4.2 MMOL/L (ref 3.5–5.2)
SODIUM SERPL-SCNC: 138 MMOL/L (ref 136–145)

## 2025-02-16 PROCEDURE — 80048 BASIC METABOLIC PNL TOTAL CA: CPT | Performed by: NURSE PRACTITIONER

## 2025-02-16 PROCEDURE — 97530 THERAPEUTIC ACTIVITIES: CPT

## 2025-02-16 PROCEDURE — 25010000002 FUROSEMIDE PER 20 MG: Performed by: NURSE PRACTITIONER

## 2025-02-16 PROCEDURE — 83735 ASSAY OF MAGNESIUM: CPT | Performed by: NURSE PRACTITIONER

## 2025-02-16 PROCEDURE — 97110 THERAPEUTIC EXERCISES: CPT

## 2025-02-16 RX ORDER — FUROSEMIDE 10 MG/ML
40 INJECTION INTRAMUSCULAR; INTRAVENOUS ONCE
Status: COMPLETED | OUTPATIENT
Start: 2025-02-16 | End: 2025-02-16

## 2025-02-16 RX ADMIN — FUROSEMIDE 40 MG: 10 INJECTION, SOLUTION INTRAMUSCULAR; INTRAVENOUS at 15:34

## 2025-02-16 RX ADMIN — METOPROLOL TARTRATE 2.5 MG: 5 INJECTION INTRAVENOUS at 23:21

## 2025-02-16 RX ADMIN — CARBIDOPA AND LEVODOPA 1 TABLET: 25; 100 TABLET ORAL at 09:47

## 2025-02-16 RX ADMIN — METOPROLOL TARTRATE 2.5 MG: 5 INJECTION INTRAVENOUS at 05:08

## 2025-02-16 RX ADMIN — CARBIDOPA AND LEVODOPA 1 TABLET: 25; 100 TABLET ORAL at 17:19

## 2025-02-16 RX ADMIN — SENNOSIDES AND DOCUSATE SODIUM 2 TABLET: 50; 8.6 TABLET ORAL at 09:47

## 2025-02-16 RX ADMIN — SENNOSIDES AND DOCUSATE SODIUM 2 TABLET: 50; 8.6 TABLET ORAL at 21:45

## 2025-02-16 RX ADMIN — METOPROLOL TARTRATE 12.5 MG: 25 TABLET, FILM COATED ORAL at 09:47

## 2025-02-16 RX ADMIN — APIXABAN 2.5 MG: 2.5 TABLET, FILM COATED ORAL at 09:47

## 2025-02-16 RX ADMIN — Medication 10 ML: at 21:46

## 2025-02-16 RX ADMIN — APIXABAN 2.5 MG: 2.5 TABLET, FILM COATED ORAL at 21:45

## 2025-02-16 RX ADMIN — CARBIDOPA AND LEVODOPA 1 TABLET: 25; 100 TABLET ORAL at 11:33

## 2025-02-16 RX ADMIN — METOPROLOL TARTRATE 2.5 MG: 5 INJECTION INTRAVENOUS at 00:12

## 2025-02-16 RX ADMIN — METOPROLOL TARTRATE 12.5 MG: 25 TABLET, FILM COATED ORAL at 21:45

## 2025-02-16 RX ADMIN — METOPROLOL TARTRATE 2.5 MG: 5 INJECTION INTRAVENOUS at 11:33

## 2025-02-16 RX ADMIN — METOPROLOL TARTRATE 2.5 MG: 5 INJECTION INTRAVENOUS at 17:19

## 2025-02-16 NOTE — THERAPY TREATMENT NOTE
Patient Name: Fanny Boyce  : 1940    MRN: [id number]                              Today's Date: 2025       Admit Date: 2025    Visit Dx:     ICD-10-CM ICD-9-CM   1. Closed fracture of neck of right femur, initial encounter  S72.001A 820.8   2. Anticoagulated  Z79.01 V58.61   3. History of atrial fibrillation  Z86.79 V12.59   4. History of pulmonary embolism  Z86.711 V12.55     Patient Active Problem List   Diagnosis    Atrial fibrillation    COPD (chronic obstructive pulmonary disease)    Dementia    Parkinson's disease    Fracture of left humerus with routine healing    Fracture of femoral neck, right    Severe protein-calorie malnutrition    Hiatal hernia     Past Medical History:   Diagnosis Date    Atrial fibrillation     COPD (chronic obstructive pulmonary disease)     Dementia     History of lung cancer     Parkinson disease     Pulmonary embolism      Past Surgical History:   Procedure Laterality Date    HIP HEMIARTHROPLASTY Right 2/10/2025    Procedure: HIP HEMIARTHROPLASTY RIGHT;  Surgeon: Frandy Patino MD;  Location: [facility];  Service: Orthopedics;  Laterality: Right;    JOINT REPLACEMENT      LUNG CANCER SURGERY        General Information       Row Name 25 1006          Physical Therapy Time and Intention    Document Type therapy note (daily note)  -LR     Mode of Treatment physical therapy;individual therapy  -LR       Row Name 25 1006          General Information    Patient Profile Reviewed yes  -LR     Existing Precautions/Restrictions fall;right;hip, posterior;other (see comments);oxygen therapy device and L/min   s/p R hip vicente 2/10/25, R fascia iliaca nerve catheter  -LR     Barriers to Rehab medically complex;previous functional deficit;cognitive status  -LR       Row Name 25 1006          Cognition    Orientation Status (Cognition) oriented to;person;place;disoriented to;time  -LR       Row Name 25 1006          Safety Issues/Impairments  Affecting Functional Mobility    Safety Issues Affecting Function (Mobility) ability to follow commands;awareness of need for assistance;insight into deficits/self-awareness;sequencing abilities;safety precautions follow-through/compliance;judgment;problem-solving;safety precaution awareness  -LR     Impairments Affecting Function (Mobility) balance;cognition;endurance/activity tolerance;pain;range of motion (ROM);sensation/sensory awareness;strength;postural/trunk control;motor control;motor planning  -LR               User Key  (r) = Recorded By, (t) = Taken By, (c) = Cosigned By      Initials Name Provider Type    LR Tessy Martinez, PT Physical Therapist                   Mobility       Row Name 02/16/25 1006          Bed Mobility    Bed Mobility supine-sit  -LR     Supine-Sit [place] (Bed Mobility) verbal cues;maximum assist (25% patient effort);2 person assist  -LR     Assistive Device (Bed Mobility) head of bed elevated;repositioning sheet  -LR     Comment, (Bed Mobility) Verbal cues to move LEs towards EOB, repositioning sheet to assist in scooting hips towards EOB. Cues to push up from bed to raise trunk into sitting. Little assist noted from patient. Initially patient reported feeling dizzy. This improved with rest. /53.  -LR       Row Name 02/16/25 1006          Transfers    Comment, (Transfers) Verbal cues to push up from bed to stand and to reach back for chair to lower into sitting. Encouraged R LE weight bearing t/f. Cues to pivot on L foot to turn and sit in chair or to weight shift for steps to chair. Patient maintained R hip/knee flexed during t/f and did not bear weight on R LE to assist with t/f despite these cues.  -LR       Row Name 02/16/25 1006          Bed-Chair Transfer    Bed-Chair [place] (Transfers) verbal cues;maximum assist (25% patient effort);2 person assist  -LR     Assistive Device (Bed-Chair Transfers) other (see comments)  B UE support  -LR       Row Name  02/16/25 1006          Sit-Stand Transfer    Sit-Stand [place] (Transfers) verbal cues;maximum assist (25% patient effort);2 person assist  -LR     Assistive Device (Sit-Stand Transfers) other (see comments)  B UE support  -LR       Row Name 02/16/25 1006          Gait/Stairs (Locomotion)    [place] Level (Gait) unable to assess  -LR     Patient was able to Ambulate no, other medical factors prevent ambulation  -LR     Reason Patient was unable to Ambulate Excessive Weakness  patient unable to adequately weight bear on R LE to allow for forward ambulation  -LR     [place] Level (Stairs) not tested  -LR     Comment, (Gait/Stairs) See above.  -LR       Row Name 02/16/25 1006          Mobility    Extremity Weight-bearing Status right lower extremity  -LR     Right Lower Extremity (Weight-bearing Status) weight-bearing as tolerated (WBAT)  -LR               User Key  (r) = Recorded By, (t) = Taken By, (c) = Cosigned By      Initials Name Provider Type    LR Tessy Martinez, PT Physical Therapist                   Obj/Interventions       Row Name 02/16/25 1006          Motor Skills    Therapeutic Exercise ankle;knee;hip;other (see comments)  cues for technique; max assist to comlete last 50% of R LAQ, max assist R SLR, mod assist R heel slides, R hip abd  -LR       Row Name 02/16/25 1006          Hip (Therapeutic Exercise)    Hip (Therapeutic Exercise) strengthening exercise;isometric exercises  -LR     Hip Isometrics (Therapeutic Exercise) bilateral;gluteal sets;10 repetitions;sitting  -LR     Hip Strengthening (Therapeutic Exercise) bilateral;heel slides;aBduction;sitting  -LR       Row Name 02/16/25 1006          Knee (Therapeutic Exercise)    Knee (Therapeutic Exercise) strengthening exercise;isometric exercises  -LR     Knee Isometrics (Therapeutic Exercise) bilateral;quad sets;supine;10 repetitions  -LR     Knee Strengthening (Therapeutic Exercise) bilateral;SLR (straight leg raise);SAQ (short  arc quad);sitting;10 repetitions;LAQ (long arc quad)  -LR       Row Name 02/16/25 1006          Ankle (Therapeutic Exercise)    Ankle (Therapeutic Exercise) AROM (active range of motion)  -LR     Ankle AROM (Therapeutic Exercise) bilateral;dorsiflexion;plantarflexion;supine;10 repetitions  -LR       Row Name 02/16/25 1006          Balance    Balance Assessment sitting static balance;sitting dynamic balance;standing static balance;standing dynamic balance  -LR     Static Sitting Balance contact guard  -LR     Dynamic Sitting Balance minimal assist  -LR     Position, Sitting Balance supported;sitting edge of bed  -LR     Static Standing Balance maximum assist;2-person assist  -LR     Dynamic Standing Balance maximum assist;2-person assist  -LR     Position/Device Used, Standing Balance supported;other (see comments)  B UE support  -LR     Comment, Balance Patient initially requiring min assist to maintain static sitting but with repositioning and correct hand placement, patient was able to maintain static sitting balance for prolonged period of time without physical assist.  -LR               User Key  (r) = Recorded By, (t) = Taken By, (c) = Cosigned By      Initials Name Provider Type    LR Tessy Martinez, PT Physical Therapist                   Goals/Plan       Row Name 02/16/25 1006          Bed Mobility Goal 1 (PT)    Activity/Assistive Device (Bed Mobility Goal 1, PT) bed mobility activities, all  -LR     [place] Level/Cues Needed (Bed Mobility Goal 1, PT) moderate assist (50-74% patient effort)  -LR     Time Frame (Bed Mobility Goal 1, PT) short term goal (STG);5 days  -LR     Progress/Outcomes (Bed Mobility Goal 1, PT) progress slower than expected;goal ongoing  -LR       Row Name 02/16/25 1006          Transfer Goal 1 (PT)    Activity/Assistive Device (Transfer Goal 1, PT) sit-to-stand/stand-to-sit;bed-to-chair/chair-to-bed  -LR     [place] Level/Cues Needed (Transfer Goal 1, PT) minimum  assist (75% or more patient effort)  -LR     Time Frame (Transfer Goal 1, PT) long term goal (LTG);10 days  -LR     Progress/Outcome (Transfer Goal 1, PT) progress slower than expected;goal ongoing  -LR       Row Name 02/16/25 1006          Gait Training Goal 1 (PT)    Activity/Assistive Device (Gait Training Goal 1, PT) gait (walking locomotion);assistive device use  -LR     [place] Level (Gait Training Goal 1, PT) minimum assist (75% or more patient effort)  -LR     Distance (Gait Training Goal 1, PT) 20 ft  -LR     Time Frame (Gait Training Goal 1, PT) long term goal (LTG);10 days  -LR     Progress/Outcome (Gait Training Goal 1, PT) progress slower than expected;goal ongoing  -LR               User Key  (r) = Recorded By, (t) = Taken By, (c) = Cosigned By      Initials Name Provider Type    LR Tessy Martinez, PT Physical Therapist                   Clinical Impression       Row Name 02/16/25 1006          Pain    Pretreatment Pain Rating 0/10 - no pain  -LR     Posttreatment Pain Rating 0/10 - no pain  -LR     Pain Location hip  -LR     Pain Side/Orientation right  -LR       Row Name 02/16/25 1006          Plan of Care Review    Plan of Care Reviewed With patient  -LR     Progress no change  -LR     Outcome Evaluation Patient continues to demonstrated limited ability to tolerate weight bearing on R LE during OOB activities. Recommend focus t/f training on transitions from sit <->stand vs stand pivot. Good effort with LE ther ex. Improved sitting balance at EOB. Patient currently below functional baseline, demonstrating decreased functional mobility status, impaired balance, decreased endurance, and decreased strength. Will address these deficits to promote return to PLOF. Recommend SNF at d/c.  -LR       Row Name 02/16/25 1006          Therapy Assessment/Plan (PT)    Rehab Potential (PT) fair  -LR     Criteria for Skilled Interventions Met (PT) yes;meets criteria;skilled treatment is necessary  -LR      Therapy Frequency (PT) daily  -LR       Row Name 02/16/25 1006          Vital Signs    Intra Systolic BP Rehab 108  -LR     Intra Treatment Diastolic BP 53  -LR     Intra Patient Position Sitting  -LR       Row Name 02/16/25 1006          Positioning and Restraints    Pre-Treatment Position in bed  -LR     Post Treatment Position chair  -LR     In Chair notified nsg;reclined;sitting;call light within reach;encouraged to call for assist;exit alarm on;legs elevated;compression device;waffle cushion;on mechanical lift sling;pillow between legs  -LR               User Key  (r) = Recorded By, (t) = Taken By, (c) = Cosigned By      Initials Name Provider Type    Tessy Darling, PT Physical Therapist                   Outcome Measures       Row Name 02/16/25 1006 02/16/25 0950       How much help from another person do you currently need...    Turning from your back to your side while in flat bed without using bedrails? 2  -LR 2  -SJ    Moving from lying on back to sitting on the side of a flat bed without bedrails? 2  -LR 2  -SJ    Moving to and from a bed to a chair (including a wheelchair)? 2  -LR 2  -SJ    Standing up from a chair using your arms (e.g., wheelchair, bedside chair)? 2  -LR 1  -SJ    Climbing 3-5 steps with a railing? 1  -LR 1  -SJ    To walk in hospital room? 1  -LR 1  -SJ    AM-PAC 6 Clicks Score (PT) 10  -LR 9  -SJ    Highest Level of Mobility Goal 4 --> Transfer to chair/commode  -LR 3 --> Sit at edge of bed  -SJ      Row Name 02/16/25 1006          Functional Assessment    Outcome Measure Options AM-PAC 6 Clicks Basic Mobility (PT)  -LR               User Key  (r) = Recorded By, (t) = Taken By, (c) = Cosigned By      Initials Name Provider Type    Tessy Darling, JOAN Physical Therapist    Ginger Pemberton RN Registered Nurse                                 Physical Therapy Education       Title: PT OT SLP Therapies (In Progress)       Topic: Physical Therapy (Done)        Point: Mobility training (Done)       Learning Progress Summary            Patient Acceptance, E,D, VU,NR by LR at 2/16/2025 1006    Comment: Educated on posterior hip precautions, weight bearing status,HEP,safety with mobility, benefits of mobility and being OOB, correct supine to sit t/f technique, correct sit<->stand t/f technique, correct bed to chair t/f technique, and progression of POC.    Acceptance, E, NR by AS at 2/15/2025 1119    Eager, E, VU by SC at 2/14/2025 1142    Comment: re viewed benefits of activity    Acceptance, E, NR by CK at 2/12/2025 0957    Acceptance, E, VU,NR by  at 2/11/2025 0840                      Point: Home exercise program (Done)       Learning Progress Summary            Patient Acceptance, E,D, VU,NR by LR at 2/16/2025 1006    Comment: Educated on posterior hip precautions, weight bearing status,HEP,safety with mobility, benefits of mobility and being OOB, correct supine to sit t/f technique, correct sit<->stand t/f technique, correct bed to chair t/f technique, and progression of POC.    Acceptance, E, NR by AS at 2/15/2025 1119    Eager, E, VU by SC at 2/14/2025 1142    Comment: re viewed benefits of activity    Acceptance, E, NR by CK at 2/12/2025 0957    Acceptance, E, VU,NR by  at 2/11/2025 0840                      Point: Body mechanics (Done)       Learning Progress Summary            Patient Acceptance, E,D, VU,NR by LR at 2/16/2025 1006    Comment: Educated on posterior hip precautions, weight bearing status,HEP,safety with mobility, benefits of mobility and being OOB, correct supine to sit t/f technique, correct sit<->stand t/f technique, correct bed to chair t/f technique, and progression of POC.    Acceptance, E, NR by AS at 2/15/2025 1119    Eager, E, VU by SC at 2/14/2025 1142    Comment: re viewed benefits of activity    Acceptance, E, NR by CK at 2/12/2025 0957    Acceptance, E, VU,NR by  at 2/11/2025 0840                      Point: Precautions (Done)        Learning Progress Summary            Patient Acceptance, E,D, VU,NR by LR at 2/16/2025 1006    Comment: Educated on posterior hip precautions, weight bearing status,HEP,safety with mobility, benefits of mobility and being OOB, correct supine to sit t/f technique, correct sit<->stand t/f technique, correct bed to chair t/f technique, and progression of POC.    Acceptance, E, NR by AS at 2/15/2025 1119    Eager, E, VU by SC at 2/14/2025 1142    Comment: re viewed benefits of activity    Acceptance, E, NR by CK at 2/12/2025 0957    Acceptance, E, VU,NR by  at 2/11/2025 0840                                      User Key       Initials Effective Dates Name Provider Type Discipline    SC 02/03/23 -  Sarah Easton, PT Physical Therapist PT    LR 02/03/23 -  Tessy Martinez, PT Physical Therapist PT    AS 12/13/24 -  Katy Saucedo, PTA Physical Therapist Assistant PT     02/06/24 -  Erica Sandoval, PT Physical Therapist PT     09/21/23 -  Daisy Javed, PT Physical Therapist PT                  PT Recommendation and Plan     Progress: no change  Outcome Evaluation: Patient continues to demonstrated limited ability to tolerate weight bearing on R LE during OOB activities. Recommend focus t/f training on transitions from sit <->stand vs stand pivot. Good effort with LE ther ex. Improved sitting balance at EOB. Patient currently below functional baseline, demonstrating decreased functional mobility status, impaired balance, decreased endurance, and decreased strength. Will address these deficits to promote return to PLOF. Recommend SNF at d/c.     Time Calculation:         PT Charges       Row Name 02/16/25 1006             Time Calculation    Start Time 1006  -LR      PT Received On 02/16/25  -LR      PT Goal Re-Cert Due Date 02/21/25  -LR         Timed Charges    63733 - PT Therapeutic Exercise Minutes 10  -LR      57775 - PT Therapeutic Activity Minutes 13  -LR         Total Minutes    Timed  Charges Total Minutes 23  -LR       Total Minutes 23  -LR                User Key  (r) = Recorded By, (t) = Taken By, (c) = Cosigned By      Initials Name Provider Type    LR Tessy Martinez, PT Physical Therapist                  Therapy Charges for Today       Code Description Service Date Service Provider Modifiers Qty    21956410466  PT THER PROC EA 15 MIN 2/16/2025 Tessy Martinez, PT GP 1    [id number] HC PT THERAPEUTIC ACT EA 15 MIN 2/16/2025 Tessy Martinez, PT GP 1            PT G-Codes  Outcome Measure Options: AM-PAC 6 Clicks Basic Mobility (PT)  AM-PAC 6 Clicks Score (PT): 10  AM-PAC 6 Clicks Score (OT): 13  PT Discharge Summary  Anticipated Discharge Disposition (PT): skilled nursing facility    Tessy Martinez, PT  2/16/2025

## 2025-02-16 NOTE — PLAN OF CARE
Goal Outcome Evaluation:  Plan of Care Reviewed With: patient        Progress: no change  Outcome Evaluation: Patient continues to demonstrated limited ability to tolerate weight bearing on R LE during OOB activities. Recommend focus t/f training on transitions from sit <->stand vs stand pivot. Good effort with LE ther ex. Improved sitting balance at EOB. Patient currently below functional baseline, demonstrating decreased functional mobility status, impaired balance, decreased endurance, and decreased strength. Will address these deficits to promote return to PLOF. Recommend SNF at d/c.    Anticipated Discharge Disposition (PT): skilled nursing facility

## 2025-02-16 NOTE — PROGRESS NOTES
[facility] Medicine Services  PROGRESS NOTE    Patient Name: Fanny Boyce  : 1940  MRN: [id number]    Date of Admission: 2025  Primary Care Physician: Nemo Aponte PA    Subjective   Subjective     CC:  F/u hip fracture    HPI:  Patient sitting up in chair, sleeping, wakes to voice.  Denies hip pain.  Says she is doing okay.  On 3 L O2 and satting 100% so reduced O2 to 2 L.  Patient says she does not wear home oxygen. Tachycardic this morning but HR 69 at time of exam.      Objective   Objective     Vital Signs:   Temp:  [97.7 °F (36.5 °C)-98.6 °F (37 °C)] 98.6 °F (37 °C)  Heart Rate:  [] 64  Resp:  [18] 18  BP: ()/(54-68) 119/62  Flow (L/min) (Oxygen Therapy):  [3] 3     Physical Exam:  Constitutional: No acute distress, awake, alert  HENT: NCAT, mucous membranes moist  Respiratory: Diminished to auscultation bilaterally, respiratory effort normal, 2L NC  Cardiovascular: RRR, no murmurs, rubs, or gallops  Gastrointestinal: Positive bowel sounds, soft, nontender, nondistended  Musculoskeletal: No bilateral ankle edema, right hip nerve block  Psychiatric: Appropriate affect, cooperative  Neurologic: Oriented to person, place, moves all extremities, speech clear  Skin: No rashes      Results Reviewed:  LAB RESULTS:      Lab 02/15/25  0815 25  1230 25  0335 25  0901 25  1001 02/10/25  0742 02/10/25  0741 25  195   WBC 7.96 7.33 6.02 6.48 10.83*  --  8.68 9.29   HEMOGLOBIN 10.2* 9.3* 9.0* 10.2* 10.6*  --  12.3 13.5   HEMATOCRIT 32.9* 30.3* 28.5* 33.1* 33.0*  --  39.1 43.0   PLATELETS 232 179 135* 146 187  --  186 235   NEUTROS ABS  --   --   --  5.68 9.56*  --  7.60* 7.49*   IMMATURE GRANS (ABS)  --   --   --  0.02 0.05  --  0.04 0.09*   LYMPHS ABS  --   --   --  0.36* 0.65*  --  0.67* 1.17   MONOS ABS  --   --   --  0.27 0.49  --  0.27 0.39   EOS ABS  --   --   --  0.10 0.04  --  0.06 0.10   MCV 88.7 91.0 89.6 89.2 88.9  --  88.7  89.8   PROTIME  --   --   --   --   --  13.0  --   --          Lab 02/16/25  1044 02/15/25  0815 02/14/25  1230 02/11/25  1001 02/10/25  2155 02/10/25  0742   SODIUM 138 138 136 138  --  141   POTASSIUM 4.2 4.1 4.1 4.4 3.8 4.3   CHLORIDE 98 98 99 101  --  104   CO2 30.0* 30.0* 29.0 24.0  --  28.0   ANION GAP 10.0 10.0 8.0 13.0  --  9.0   BUN 19 16 16 15  --  18   CREATININE 0.47* 0.58 0.52* 0.69  --  0.63   EGFR 92.8 88.3 90.6 84.6  --  86.5   GLUCOSE 106* 150* 127* 147*  --  125*   CALCIUM 8.6 8.9 8.3* 8.8  --  9.1   MAGNESIUM 1.7  --  1.7  --   --  1.7         Lab 02/10/25  0742 02/09/25  1959   TOTAL PROTEIN 6.2 7.0   ALBUMIN 3.6 4.1   GLOBULIN 2.6 2.9   ALT (SGPT) <5 5   AST (SGOT) 16 18   BILIRUBIN 0.3 0.2   ALK PHOS 79 92         Lab 02/10/25  0742   PROTIME 13.0   INR 0.97             Lab 02/10/25  0742 02/09/25 2010   ABO TYPING O O   RH TYPING Positive Positive   ANTIBODY SCREEN  --  Negative         Brief Urine Lab Results  (Last result in the past 365 days)        Color   Clarity   Blood   Leuk Est   Nitrite   Protein   CREAT   Urine HCG        06/01/24 1157 Yellow   Turbid   Small (1+)   Large (3+)   Positive   30 mg/dL (1+)                   Microbiology Results Abnormal       None            No radiology results from the last 24 hrs    Results for orders placed during the hospital encounter of 02/09/25    Adult Transthoracic Echo Complete W/ Cont if Necessary Per Protocol    Interpretation Summary    Left ventricular systolic function is normal. Estimated left ventricular EF = 70%    Moderate tricuspid valve regurgitation is present.    Estimated  right ventricular systolic pressure from tricuspid regurgitation is 46 mmHg.    There is a trivial pericardial effusion.      Current medications:  Scheduled Meds:apixaban, 2.5 mg, Oral, Q12H  carbidopa-levodopa, 1 tablet, Oral, TID  furosemide, 40 mg, Intravenous, Once  metoprolol tartrate, 12.5 mg, Oral, BID  metoprolol tartrate, 2.5 mg, Intravenous,  Q6H  senna-docusate sodium, 2 tablet, Oral, BID  sodium chloride, 10 mL, Intravenous, Q12H      Continuous Infusions:ropivacaine,       PRN Meds:.  acetaminophen **OR** acetaminophen **OR** acetaminophen    senna-docusate sodium **AND** polyethylene glycol **AND** bisacodyl **AND** bisacodyl    Calcium Replacement - Follow Nurse / BPA Driven Protocol    ipratropium-albuterol    Magnesium Standard Dose Replacement - Follow Nurse / BPA Driven Protocol    [] Morphine **AND** naloxone    nitroglycerin    ondansetron    Phosphorus Replacement - Follow Nurse / BPA Driven Protocol    Potassium Replacement - Follow Nurse / BPA Driven Protocol    sodium chloride    sodium chloride    traZODone    Assessment & Plan   Assessment & Plan     Active Hospital Problems    Diagnosis  POA    **Fracture of femoral neck, right [S72.001A]  Yes    Hiatal hernia [K44.9]  Yes    Severe protein-calorie malnutrition [E43]  Yes    Atrial fibrillation [I48.91]  Yes    COPD (chronic obstructive pulmonary disease) [J44.9]  Yes    Dementia [F03.90]  Yes    Parkinson's disease [G20.A1]  Yes      Resolved Hospital Problems   No resolved problems to display.        Brief Hospital Course to date:  Fanny Boyce is a 84 y.o. female  with a past medical history of COPD, A-fib, Parkinson's disease and dementia admitted after a presumed mechanical fall that resulted in a right hip fracture.     This patient's problems and plans were partially entered by my partner and updated as appropriate by me 25.       Right femoral neck fracture  --S/P right hip hemiarthroplasty 2/10/25 by Dr. Patino  --Okay to remove bandage on Monday  --Continue PT/OT  --Continue Eliquis  --Follow up with Dr. Patnio in 1 month     Atrial fibrillation with RVR-still poorly controlled but asymptomatic  - Continue home metoprolol 12.5 mg BID and Eliquis for a-fib  -- IV metoprolol and digoxin x 1 on  and 2/15 for new onset a-fib with RVR  -- CXR 2/15 with  interstitial pulmonary edema, could be contributing to vs secondary to RVR  -- Check BMP, mag  -- AM BMP, mag, CBC     Acute respiratory failure with hypoxia  Pulmonary edema  -- CTA chest negative for PE, does show small pleural effusions and pulmonary edema  -- Repeat Lasix 40mg IV x 1 on 2/15, has received IV Lasix x 3 doses  -- Echo with LVEF 70%, moderate tricuspid valve regurg  -- CXR 2/15 shows interval development of congestive heart failure with mild to moderate pulmonary interstitial edema compared to CXR 2/10, minimal effusions  -- One-time dose Lasix 40 mg IV for findings on CXR, diminished breath sounds on exam, still on 3L O2  -- Continue to monitor for need for Lasix, down 4.3L per chart 2/16, monitor creatinine, AM BMP  --Strict I's & O's  --Currently on supplemental O2, does not wear home O2, wean as tolerated     COPD  - Not currently in exacerbation.  - Continue as needed DuoNebs.     Parkinson's disease  Dementia  - Continue carbidopa/levodopa  - Changed trazodone nightly to PRN at lower dose due to drowsiness       Expected Discharge Location and Transportation: [place], [facility]  Expected Discharge pending improvement in oxygenation, stable labs and VS  Expected Discharge Date: 2/18/2025; Expected Discharge Time:      VTE Prophylaxis:  Pharmacologic & mechanical VTE prophylaxis orders are present.         AM-PAC 6 Clicks Score (PT): 9 (02/16/25 0950)    CODE STATUS:   Code Status and Medical Interventions: No CPR (Do Not Attempt to Resuscitate); Limited Support; No intubation (DNI)   Ordered at: 02/12/25 2019     Medical Intervention Limits:    No intubation (DNI)     Level Of Support Discussed With:    Health Care Surrogate     Code Status (Patient has no pulse and is not breathing):    No CPR (Do Not Attempt to Resuscitate)     Medical Interventions (Patient has pulse or is breathing):    Limited Support       Fransisca Urena, APRN  02/16/25

## 2025-02-17 LAB
ANION GAP SERPL CALCULATED.3IONS-SCNC: 11 MMOL/L (ref 5–15)
BUN SERPL-MCNC: 21 MG/DL (ref 8–23)
BUN/CREAT SERPL: 37.5 (ref 7–25)
CALCIUM SPEC-SCNC: 9.3 MG/DL (ref 8.6–10.5)
CHLORIDE SERPL-SCNC: 97 MMOL/L (ref 98–107)
CO2 SERPL-SCNC: 30 MMOL/L (ref 22–29)
CREAT SERPL-MCNC: 0.56 MG/DL (ref 0.57–1)
DEPRECATED RDW RBC AUTO: 45.5 FL (ref 37–54)
EGFRCR SERPLBLD CKD-EPI 2021: 89 ML/MIN/1.73
ERYTHROCYTE [DISTWIDTH] IN BLOOD BY AUTOMATED COUNT: 14.2 % (ref 12.3–15.4)
GLUCOSE SERPL-MCNC: 151 MG/DL (ref 65–99)
HCT VFR BLD AUTO: 30.4 % (ref 34–46.6)
HGB BLD-MCNC: 9.4 G/DL (ref 12–15.9)
MAGNESIUM SERPL-MCNC: 1.8 MG/DL (ref 1.6–2.4)
MCH RBC QN AUTO: 27.5 PG (ref 26.6–33)
MCHC RBC AUTO-ENTMCNC: 30.9 G/DL (ref 31.5–35.7)
MCV RBC AUTO: 88.9 FL (ref 79–97)
PLATELET # BLD AUTO: 348 10*3/MM3 (ref 140–450)
PMV BLD AUTO: 10.3 FL (ref 6–12)
POTASSIUM SERPL-SCNC: 3.9 MMOL/L (ref 3.5–5.2)
RBC # BLD AUTO: 3.42 10*6/MM3 (ref 3.77–5.28)
SODIUM SERPL-SCNC: 138 MMOL/L (ref 136–145)
WBC NRBC COR # BLD AUTO: 9.1 10*3/MM3 (ref 3.4–10.8)

## 2025-02-17 PROCEDURE — 83735 ASSAY OF MAGNESIUM: CPT | Performed by: NURSE PRACTITIONER

## 2025-02-17 PROCEDURE — 97530 THERAPEUTIC ACTIVITIES: CPT

## 2025-02-17 PROCEDURE — 85027 COMPLETE CBC AUTOMATED: CPT | Performed by: NURSE PRACTITIONER

## 2025-02-17 PROCEDURE — 80048 BASIC METABOLIC PNL TOTAL CA: CPT | Performed by: NURSE PRACTITIONER

## 2025-02-17 PROCEDURE — 97110 THERAPEUTIC EXERCISES: CPT

## 2025-02-17 RX ADMIN — METOPROLOL TARTRATE 2.5 MG: 5 INJECTION INTRAVENOUS at 12:19

## 2025-02-17 RX ADMIN — APIXABAN 2.5 MG: 2.5 TABLET, FILM COATED ORAL at 09:50

## 2025-02-17 RX ADMIN — CARBIDOPA AND LEVODOPA 1 TABLET: 25; 100 TABLET ORAL at 09:50

## 2025-02-17 RX ADMIN — METOPROLOL TARTRATE 2.5 MG: 5 INJECTION INTRAVENOUS at 18:15

## 2025-02-17 RX ADMIN — SENNOSIDES AND DOCUSATE SODIUM 2 TABLET: 50; 8.6 TABLET ORAL at 09:50

## 2025-02-17 RX ADMIN — APIXABAN 2.5 MG: 2.5 TABLET, FILM COATED ORAL at 20:46

## 2025-02-17 RX ADMIN — ACETAMINOPHEN 650 MG: 325 TABLET ORAL at 15:09

## 2025-02-17 RX ADMIN — METOPROLOL TARTRATE 12.5 MG: 25 TABLET, FILM COATED ORAL at 09:50

## 2025-02-17 RX ADMIN — SENNOSIDES AND DOCUSATE SODIUM 2 TABLET: 50; 8.6 TABLET ORAL at 20:46

## 2025-02-17 RX ADMIN — CARBIDOPA AND LEVODOPA 1 TABLET: 25; 100 TABLET ORAL at 18:14

## 2025-02-17 RX ADMIN — METOPROLOL TARTRATE 12.5 MG: 25 TABLET, FILM COATED ORAL at 20:46

## 2025-02-17 RX ADMIN — CARBIDOPA AND LEVODOPA 1 TABLET: 25; 100 TABLET ORAL at 12:19

## 2025-02-17 RX ADMIN — Medication 10 ML: at 20:47

## 2025-02-17 RX ADMIN — METOPROLOL TARTRATE 2.5 MG: 5 INJECTION INTRAVENOUS at 04:56

## 2025-02-17 NOTE — PROGRESS NOTES
[facility] Medicine Services  PROGRESS NOTE    Patient Name: Fanny Boyce  : 1940  MRN: [id number]    Date of Admission: 2025  Primary Care Physician: Nemo Aponte PA    Subjective   Subjective     CC:  F/u hip fracture    HPI:  Patient sitting up in bed, sleepy, but woke easily and was chatty, not eating much      Objective   Objective     Vital Signs:   Temp:  [97.3 °F (36.3 °C)-98.6 °F (37 °C)] 97.3 °F (36.3 °C)  Heart Rate:  [] 83  Resp:  [18] 18  BP: (104-136)/(50-75) 136/75  Flow (L/min) (Oxygen Therapy):  [3] 3     Physical Exam:  Constitutional: No acute distress, sleepy  HENT: NCAT, mucous membranes moist  Respiratory: Diminished to auscultation bilaterally, respiratory effort normal, 2L NC  Cardiovascular: RRR, no murmurs, rubs, or gallops  Gastrointestinal: Positive bowel sounds, soft, nontender, nondistended  Musculoskeletal: No bilateral ankle edema, right hip nerve block  Psychiatric: Appropriate affect, cooperative  Neurologic: Oriented to person, place, moves all extremities, speech clear  Skin: No rashes      Results Reviewed:  LAB RESULTS:      Lab 02/15/25  0815 25  1230 25  0335 25  0901 25  1001   WBC 7.96 7.33 6.02 6.48 10.83*   HEMOGLOBIN 10.2* 9.3* 9.0* 10.2* 10.6*   HEMATOCRIT 32.9* 30.3* 28.5* 33.1* 33.0*   PLATELETS 232 179 135* 146 187   NEUTROS ABS  --   --   --  5.68 9.56*   IMMATURE GRANS (ABS)  --   --   --  0.02 0.05   LYMPHS ABS  --   --   --  0.36* 0.65*   MONOS ABS  --   --   --  0.27 0.49   EOS ABS  --   --   --  0.10 0.04   MCV 88.7 91.0 89.6 89.2 88.9         Lab 25  1044 02/15/25  0815 25  1230 25  1001 02/10/25  2155   SODIUM 138 138 136 138  --    POTASSIUM 4.2 4.1 4.1 4.4 3.8   CHLORIDE 98 98 99 101  --    CO2 30.0* 30.0* 29.0 24.0  --    ANION GAP 10.0 10.0 8.0 13.0  --    BUN 19 16 16 15  --    CREATININE 0.47* 0.58 0.52* 0.69  --    EGFR 92.8 88.3 90.6 84.6  --    GLUCOSE 106*  150* 127* 147*  --    CALCIUM 8.6 8.9 8.3* 8.8  --    MAGNESIUM 1.7  --  1.7  --   --                                Brief Urine Lab Results  (Last result in the past 365 days)        Color   Clarity   Blood   Leuk Est   Nitrite   Protein   CREAT   Urine HCG        24 1157 Yellow   Turbid   Small (1+)   Large (3+)   Positive   30 mg/dL (1+)                   Microbiology Results Abnormal       None            No radiology results from the last 24 hrs    Results for orders placed during the hospital encounter of 25    Adult Transthoracic Echo Complete W/ Cont if Necessary Per Protocol    Interpretation Summary    Left ventricular systolic function is normal. Estimated left ventricular EF = 70%    Moderate tricuspid valve regurgitation is present.    Estimated  right ventricular systolic pressure from tricuspid regurgitation is 46 mmHg.    There is a trivial pericardial effusion.      Current medications:  Scheduled Meds:apixaban, 2.5 mg, Oral, Q12H  carbidopa-levodopa, 1 tablet, Oral, TID  metoprolol tartrate, 12.5 mg, Oral, BID  metoprolol tartrate, 2.5 mg, Intravenous, Q6H  senna-docusate sodium, 2 tablet, Oral, BID  sodium chloride, 10 mL, Intravenous, Q12H      Continuous Infusions:ropivacaine,       PRN Meds:.  acetaminophen **OR** acetaminophen **OR** acetaminophen    senna-docusate sodium **AND** polyethylene glycol **AND** bisacodyl **AND** bisacodyl    Calcium Replacement - Follow Nurse / BPA Driven Protocol    ipratropium-albuterol    Magnesium Standard Dose Replacement - Follow Nurse / BPA Driven Protocol    [] Morphine **AND** naloxone    nitroglycerin    ondansetron    Phosphorus Replacement - Follow Nurse / BPA Driven Protocol    Potassium Replacement - Follow Nurse / BPA Driven Protocol    sodium chloride    sodium chloride    traZODone    Assessment & Plan   Assessment & Plan     Active Hospital Problems    Diagnosis  POA    **Fracture of femoral neck, right [S72.001A]  Yes     Hiatal hernia [K44.9]  Yes    Severe protein-calorie malnutrition [E43]  Yes    Atrial fibrillation [I48.91]  Yes    COPD (chronic obstructive pulmonary disease) [J44.9]  Yes    Dementia [F03.90]  Yes    Parkinson's disease [G20.A1]  Yes      Resolved Hospital Problems   No resolved problems to display.        Brief Hospital Course to date:  Fanny Boyce is a 84 y.o. female  with a past medical history of COPD, A-fib, Parkinson's disease and dementia admitted after a presumed mechanical fall that resulted in a right hip fracture.     This patient's problems and plans were partially entered by my partner and updated as appropriate by me 02/17/25.       Right femoral neck fracture  --S/P right hip hemiarthroplasty 2/10/25 by Dr. Patino  --Okay to remove bandage 2/17  --Continue PT/OT  --Continue Eliquis  --Follow up with Dr. Patino in 1 month     Atrial fibrillation with RVR-still poorly controlled but asymptomatic  - Continue home metoprolol 12.5 mg BID and Eliquis for a-fib  -- IV metoprolol and digoxin x 1 on 2/ 13 and 2/15 for new onset a-fib with RVR  -- CXR 2/15 with interstitial pulmonary edema, could be contributing to vs secondary to RVR     Acute respiratory failure with hypoxia  Pulmonary edema  -- CTA chest negative for PE, does show small pleural effusions and pulmonary edema  -- Repeat Lasix 40mg IV x 1 on 2/15, has received IV Lasix x 3 doses  -- Echo with LVEF 70%, moderate tricuspid valve regurg  -- CXR 2/15 shows interval development of congestive heart failure with mild to moderate pulmonary interstitial edema compared to CXR 2/10, minimal effusions  -- One-time dose Lasix 40 mg IV for findings on CXR, diminished breath sounds on exam, still on 3L O2  -- Continue to monitor for need for Lasix, down 4.3L per chart 2/16, monitor creatinine, AM BMP  --Strict I's & O's  --Currently on supplemental O2, does not wear home O2, wean as tolerated     COPD  - Not currently in exacerbation.  - Continue as  needed DuoNebs.     Parkinson's disease  Dementia  - Continue carbidopa/levodopa  - Changed trazodone nightly to PRN at lower dose due to drowsiness     Overall stable but slow progress- discussed poor prognosis with her daughter today. She would like patient to go to [facility], but otherwise would care for her at home.    Expected Discharge Location and Transportation: [place], [facility]  Expected Discharge pending improvement in oxygenation, stable labs and VS  Expected Discharge Date: 2/18/2025; Expected Discharge Time:      VTE Prophylaxis:  Pharmacologic & mechanical VTE prophylaxis orders are present.         AM-PAC 6 Clicks Score (PT): 10 (02/16/25 1006)    CODE STATUS:   Code Status and Medical Interventions: No CPR (Do Not Attempt to Resuscitate); Limited Support; No intubation (DNI)   Ordered at: 02/12/25 2019     Medical Intervention Limits:    No intubation (DNI)     Level Of Support Discussed With:    Health Care Surrogate     Code Status (Patient has no pulse and is not breathing):    No CPR (Do Not Attempt to Resuscitate)     Medical Interventions (Patient has pulse or is breathing):    Limited Support       Inés Genao MD  02/17/25

## 2025-02-17 NOTE — PLAN OF CARE
Goal Outcome Evaluation:  Plan of Care Reviewed With: patient, child        Progress: no change  Outcome Evaluation: Pt. continues to present below baseline function w/generalized weakness, balance deficits and decreased functional endurance affecting her ability to safely participate in functional mobility. She performed bed mobility and transfers w/max assist of 2. Activity limited by fatigue, weakness. Pt. tolerated ther-ex well. Continue acute PT POC to progress as able.    Anticipated Discharge Disposition (PT): skilled nursing facility

## 2025-02-17 NOTE — PROGRESS NOTES
[facility]    Acute pain service Inpatient Progress Note    Patient Name: Fanny Boyce  :  1940  MRN:  [id number]        Acute Pain  Service Inpatient Progress Note:    Analgesia:Good  Pain Score:0/10  LOC: alert and awake  Resp Status: room air  Cardiac: VS stable  Side Effects:None  Catheter Site:clean, dressing intact and dry  Cath type: peripheral nerve cath(InfuSystem)  Volume: 1mL,10ml, 10ml InfuSystem Pump.  Catheter Plan:Catheter to remain Insitu and Continue catheter infusion rate unchanged  Comments:

## 2025-02-17 NOTE — CASE MANAGEMENT/SOCIAL WORK
Case Management Discharge Note      Final Note:     Ms. Boyce has an inpatient rehab bed at [facility] tomorrow, Tuesday, 2/18/25, if medically ready.  Confirmed bed with Lyudmila from facility.      Updated Ms. Boyce's daughter, Nabila, by phone, and she is agreeable to the DC plan.      [facility] ambulance scheduled for tomorrow, Tuesday, at 12:45pm.  No PCS form needed.  The patient's family is going to meet her at [facility], so no family members to complete an EMS DNR form.      Call report to [facility] at ph [phone].    CM will fax the DC summary to fax [phone].      Thank you.         Selected Continued Care - Admitted Since 2/9/2025       Destination Coordination complete.      Service Provider Services Address Phone Fax Patient Preferred    [facility] [address] [id number] [phone] --                 Transportation Services  Ambulance: [facility]    Final Discharge Disposition Code: 62 - inpatient rehab facility

## 2025-02-17 NOTE — PLAN OF CARE
Problem: Adult Inpatient Plan of Care  Goal: Plan of Care Review  Outcome: Progressing  Flowsheets (Taken 2/16/2025 1006 by Tessy Martinez, PT)  Progress: no change  Plan of Care Reviewed With: patient  Goal: Patient-Specific Goal (Individualized)  Outcome: Progressing  Goal: Absence of Hospital-Acquired Illness or Injury  Outcome: Progressing  Intervention: Identify and Manage Fall Risk  Recent Flowsheet Documentation  Taken 2/17/2025 0200 by Eleazar Cerda RN  Safety Promotion/Fall Prevention: safety round/check completed  Taken 2/17/2025 0000 by Eleazar Cerda RN  Safety Promotion/Fall Prevention: safety round/check completed  Taken 2/16/2025 2200 by Eleazar Cerda RN  Safety Promotion/Fall Prevention: safety round/check completed  Taken 2/16/2025 2000 by Eleazar Cerda RN  Safety Promotion/Fall Prevention: activity supervised  Intervention: Prevent Skin Injury  Recent Flowsheet Documentation  Taken 2/17/2025 0300 by Eleazar Cerda RN  Body Position:   turned   right  Taken 2/16/2025 2300 by Eleazar Cerda RN  Body Position:   turned   supine  Taken 2/16/2025 2000 by Eleazar Cerda RN  Body Position: sitting up in bed  Goal: Optimal Comfort and Wellbeing  Outcome: Progressing  Goal: Readiness for Transition of Care  Outcome: Progressing     Problem: Skin Injury Risk Increased  Goal: Skin Health and Integrity  Outcome: Progressing  Intervention: Optimize Skin Protection  Recent Flowsheet Documentation  Taken 2/17/2025 0200 by Eleazar Cerda RN  Pressure Reduction Devices: pressure-redistributing mattress utilized  Taken 2/17/2025 0000 by Eleazar Cerda RN  Pressure Reduction Devices: pressure-redistributing mattress utilized  Taken 2/16/2025 2200 by Eleazar Cerda RN  Pressure Reduction Devices: pressure-redistributing mattress utilized  Taken 2/16/2025 2000 by Eleazar Cerda RN  Head of Bed (HOB) Positioning: HOB elevated  Pressure Reduction Devices: specialty bed utilized     Problem: Fall Injury Risk  Goal: Absence of Fall and  Fall-Related Injury  Outcome: Progressing  Intervention: Promote Injury-Free Environment  Recent Flowsheet Documentation  Taken 2/17/2025 0200 by Eleazar Cerda RN  Safety Promotion/Fall Prevention: safety round/check completed  Taken 2/17/2025 0000 by Eleazar Cerda RN  Safety Promotion/Fall Prevention: safety round/check completed  Taken 2/16/2025 2200 by Eleazar Cerda RN  Safety Promotion/Fall Prevention: safety round/check completed  Taken 2/16/2025 2000 by Eleazar Cerda RN  Safety Promotion/Fall Prevention: activity supervised     Problem: Comorbidity Management  Goal: Maintenance of COPD Symptom Control  Outcome: Progressing  Goal: Maintenance of Heart Failure Symptom Control  Outcome: Progressing  Goal: Blood Pressure in Desired Range  Outcome: Progressing  Goal: Maintenance of Osteoarthritis Symptom Control  Outcome: Progressing   Goal Outcome Evaluation:  Plan of Care Reviewed With: patient        Progress: no change     GERTRUDE overnight, VSS, Plan of care ongoing

## 2025-02-17 NOTE — THERAPY TREATMENT NOTE
Patient Name: Fanny Boyce  : 1940    MRN: [id number]                              Today's Date: 2025       Admit Date: 2025    Visit Dx:     ICD-10-CM ICD-9-CM   1. Closed fracture of neck of right femur, initial encounter  S72.001A 820.8   2. Anticoagulated  Z79.01 V58.61   3. History of atrial fibrillation  Z86.79 V12.59   4. History of pulmonary embolism  Z86.711 V12.55     Patient Active Problem List   Diagnosis    Atrial fibrillation    COPD (chronic obstructive pulmonary disease)    Dementia    Parkinson's disease    Fracture of left humerus with routine healing    Fracture of femoral neck, right    Severe protein-calorie malnutrition    Hiatal hernia     Past Medical History:   Diagnosis Date    Atrial fibrillation     COPD (chronic obstructive pulmonary disease)     Dementia     History of lung cancer     Parkinson disease     Pulmonary embolism      Past Surgical History:   Procedure Laterality Date    HIP HEMIARTHROPLASTY Right 2/10/2025    Procedure: HIP HEMIARTHROPLASTY RIGHT;  Surgeon: Frandy Patino MD;  Location: [facility];  Service: Orthopedics;  Laterality: Right;    JOINT REPLACEMENT      LUNG CANCER SURGERY        General Information       Row Name 25 1445          Physical Therapy Time and Intention    Document Type therapy note (daily note)  -SS     Mode of Treatment physical therapy  -SS       Row Name 25 1445          General Information    Patient Profile Reviewed yes  -SS     Existing Precautions/Restrictions fall;right;hip, posterior;other (see comments);oxygen therapy device and L/min  R hip vicente  -SS     Barriers to Rehab medically complex;previous functional deficit  -SS       Row Name 25 1445          Cognition    Orientation Status (Cognition) oriented x 3  -SS       Row Name 25 1445          Safety Issues/Impairments Affecting Functional Mobility    Safety Issues Affecting Function (Mobility) awareness of need for assistance;insight  into deficits/self-awareness;judgment;positioning of assistive device;problem-solving;safety precaution awareness;safety precautions follow-through/compliance;sequencing abilities  -     Impairments Affecting Function (Mobility) balance;cognition;endurance/activity tolerance;pain;range of motion (ROM);sensation/sensory awareness;strength;postural/trunk control;motor control;motor planning  -     Cognitive Impairments, Mobility Safety/Performance awareness, need for assistance;insight into deficits/self-awareness;problem-solving/reasoning;judgment;safety precaution awareness;safety precaution follow-through;sequencing abilities  -     Comment, Safety Issues/Impairments (Mobility) increased processing time  -               User Key  (r) = Recorded By, (t) = Taken By, (c) = Cosigned By      Initials Name Provider Type     Ginger Adame PT Physical Therapist                   Mobility       Row Name 02/17/25 1447          Bed Mobility    Bed Mobility scooting/bridging;supine-sit  -     Scooting/Bridging [place] (Bed Mobility) maximum assist (25% patient effort);2 person assist;verbal cues;nonverbal cues (demo/gesture)  -     Supine-Sit [place] (Bed Mobility) verbal cues;maximum assist (25% patient effort);2 person assist;nonverbal cues (demo/gesture)  -     Assistive Device (Bed Mobility) head of bed elevated;repositioning sheet  -     Comment, (Bed Mobility) V/TC for sequencing  -       Row Name 02/17/25 1447          Bed-Chair Transfer    Bed-Chair [place] (Transfers) verbal cues;maximum assist (25% patient effort);2 person assist  -     Assistive Device (Bed-Chair Transfers) other (see comments)  BUE support  -     Comment, (Bed-Chair Transfer) V/TC for hand placement, physical assist required for sequencing/LE advancement; very forward flexed/kyphotic posture  -       Row Name 02/17/25 1447          Sit-Stand Transfer    Sit-Stand [place] (Transfers) verbal  cues;maximum assist (25% patient effort);2 person assist  -     Assistive Device (Sit-Stand Transfers) other (see comments);walker, front-wheeled  -     Comment, (Sit-Stand Transfer) V/TC for hand placement, LE set up, lowering with eccentric control  -       Row Name 02/17/25 1447          Gait/Stairs (Locomotion)    [place] Level (Gait) unable to assess  -     Comment, (Gait/Stairs) not appropriate to assess as pt. unable to fully achieve upright posture; performed standing weight shifting  -       Row Name 02/17/25 1447          Mobility    Extremity Weight-bearing Status right lower extremity  -     Right Lower Extremity (Weight-bearing Status) weight-bearing as tolerated (WBAT)  -               User Key  (r) = Recorded By, (t) = Taken By, (c) = Cosigned By      Initials Name Provider Type     Ginger Adame, PT Physical Therapist                   Obj/Interventions       Row Name 02/17/25 1516          Motor Skills    Therapeutic Exercise hip;knee;ankle  -       Row Name 02/17/25 1516          Hip (Therapeutic Exercise)    Hip (Therapeutic Exercise) isometric exercises;strengthening exercise  -     Hip Isometrics (Therapeutic Exercise) bilateral;gluteal sets;10 repetitions  -     Hip Strengthening (Therapeutic Exercise) bilateral;aBduction;heel slides;10 repetitions  min assist  -       Row Name 02/17/25 1516          Knee (Therapeutic Exercise)    Knee (Therapeutic Exercise) isometric exercises;strengthening exercise  -     Knee Isometrics (Therapeutic Exercise) bilateral;quad sets;10 repetitions  -     Knee Strengthening (Therapeutic Exercise) bilateral;SLR (straight leg raise);SAQ (short arc quad);LAQ (long arc quad);10 repetitions  min assist SLR  -       Row Name 02/17/25 1516          Ankle (Therapeutic Exercise)    Ankle (Therapeutic Exercise) AROM (active range of motion)  -     Ankle AROM (Therapeutic Exercise) bilateral;plantarflexion;dorsiflexion;10 repetitions   -[facility] Name 02/17/25 1516          Balance    Balance Assessment sitting static balance;sitting dynamic balance;standing static balance;standing dynamic balance  -     Static Sitting Balance contact guard  -     Dynamic Sitting Balance minimal assist  -     Position, Sitting Balance unsupported;sitting edge of bed  -     Static Standing Balance maximum assist;2-person assist  -SS     Dynamic Standing Balance maximum assist;2-person assist  -SS     Position/Device Used, Standing Balance supported;walker, front-wheeled;other (see comments)  HHA  -     Balance Interventions sitting;standing;sit to stand;supported;static;dynamic  -SS               User Key  (r) = Recorded By, (t) = Taken By, (c) = Cosigned By      Initials Name Provider Type     Ginger Adame, PT Physical Therapist                   Goals/Plan    No documentation.                  Clinical Impression       Row Name 02/17/25 1517          Pain    Pretreatment Pain Rating 0/10 - no pain  -     Posttreatment Pain Rating 0/10 - no pain  -     Pain Management Interventions cold applied  -SS       Row Name 02/17/25 1517          Plan of Care Review    Plan of Care Reviewed With patient;child  -     Progress no change  -     Outcome Evaluation Pt. continues to present below baseline function w/generalized weakness, balance deficits and decreased functional endurance affecting her ability to safely participate in functional mobility. She performed bed mobility and transfers w/max assist of 2. Activity limited by fatigue, weakness. Pt. tolerated ther-ex well. Continue acute PT POC to progress as able.  -[facility] Name 02/17/25 1517          Therapy Assessment/Plan (PT)    Rehab Potential (PT) fair  -     Criteria for Skilled Interventions Met (PT) yes;meets criteria;skilled treatment is necessary  -     Therapy Frequency (PT) daily  -[facility] Name 02/17/25 1517          Vital Signs    Pre Systolic BP Rehab 118  -SS     Pre  Treatment Diastolic BP 51  -SS     Pretreatment Heart Rate (beats/min) 73  -SS     Pre SpO2 (%) 99  -SS     O2 Delivery Pre Treatment nasal cannula  -SS     Pre Patient Position Supine  -SS       Row Name 02/17/25 1517          Positioning and Restraints    Pre-Treatment Position in bed  -SS     Post Treatment Position chair  -SS     In Chair notified nsg;reclined;call light within reach;encouraged to call for assist;exit alarm on;with family/caregiver;waffle cushion;on mechanical lift sling;legs elevated;pillow between legs  -               User Key  (r) = Recorded By, (t) = Taken By, (c) = Cosigned By      Initials Name Provider Type     Ginger Adame PT Physical Therapist                   Outcome Measures       Row Name 02/17/25 1520          How much help from another person do you currently need...    Turning from your back to your side while in flat bed without using bedrails? 2  -SS     Moving from lying on back to sitting on the side of a flat bed without bedrails? 2  -SS     Moving to and from a bed to a chair (including a wheelchair)? 2  -SS     Standing up from a chair using your arms (e.g., wheelchair, bedside chair)? 2  -SS     Climbing 3-5 steps with a railing? 1  -SS     To walk in hospital room? 1  -SS     AM-PAC 6 Clicks Score (PT) 10  -SS     Highest Level of Mobility Goal 4 --> Transfer to chair/commode  -       Row Name 02/17/25 1520          Functional Assessment    Outcome Measure Options AM-PAC 6 Clicks Basic Mobility (PT)  -               User Key  (r) = Recorded By, (t) = Taken By, (c) = Cosigned By      Initials Name Provider Type    Ginger Bundy PT Physical Therapist                                 Physical Therapy Education       Title: PT OT SLP Therapies (In Progress)       Topic: Physical Therapy (Done)       Point: Mobility training (Done)       Learning Progress Summary            Patient Acceptance, E, ELVIN,ISAAC,NR by  at 2/17/2025 1520    Comment: Reviewed  safety/technique w/bed mobility, transfers, HEP, PT POC    Acceptance, E,D, VU,NR by LR at 2/16/2025 1006    Comment: Educated on posterior hip precautions, weight bearing status,HEP,safety with mobility, benefits of mobility and being OOB, correct supine to sit t/f technique, correct sit<->stand t/f technique, correct bed to chair t/f technique, and progression of POC.    Acceptance, E, NR by AS at 2/15/2025 1119    Eager, E, VU by SC at 2/14/2025 1142    Comment: re viewed benefits of activity    Acceptance, E, NR by CK at 2/12/2025 0957    Acceptance, E, VU,NR by  at 2/11/2025 0840   Family Acceptance, E, VU,DU,NR by SS at 2/17/2025 1520    Comment: Reviewed safety/technique w/bed mobility, transfers, HEP, PT POC                      Point: Home exercise program (Done)       Learning Progress Summary            Patient Acceptance, E, VU,DU,NR by SS at 2/17/2025 1520    Comment: Reviewed safety/technique w/bed mobility, transfers, HEP, PT POC    Acceptance, E,D, VU,NR by LR at 2/16/2025 1006    Comment: Educated on posterior hip precautions, weight bearing status,HEP,safety with mobility, benefits of mobility and being OOB, correct supine to sit t/f technique, correct sit<->stand t/f technique, correct bed to chair t/f technique, and progression of POC.    Acceptance, E, NR by AS at 2/15/2025 1119    Eager, E, VU by SC at 2/14/2025 1142    Comment: re viewed benefits of activity    Acceptance, E, NR by CK at 2/12/2025 0957    Acceptance, E, VU,NR by  at 2/11/2025 0840   Family Acceptance, E, VU,DU,NR by SS at 2/17/2025 1520    Comment: Reviewed safety/technique w/bed mobility, transfers, HEP, PT POC                      Point: Body mechanics (Done)       Learning Progress Summary            Patient Acceptance, E, VU,DU,NR by SS at 2/17/2025 1520    Comment: Reviewed safety/technique w/bed mobility, transfers, HEP, PT POC    Acceptance, E,D, VU,NR by LR at 2/16/2025 1006    Comment: Educated on posterior hip  precautions, weight bearing status,HEP,safety with mobility, benefits of mobility and being OOB, correct supine to sit t/f technique, correct sit<->stand t/f technique, correct bed to chair t/f technique, and progression of POC.    Acceptance, E, NR by AS at 2/15/2025 1119    Eager, E, VU by SC at 2/14/2025 1142    Comment: re viewed benefits of activity    Acceptance, E, NR by CK at 2/12/2025 0957    Acceptance, E, VU,NR by  at 2/11/2025 0840   Family Acceptance, E, VU,DU,NR by  at 2/17/2025 1520    Comment: Reviewed safety/technique w/bed mobility, transfers, HEP, PT POC                      Point: Precautions (Done)       Learning Progress Summary            Patient Acceptance, E, VU,DU,NR by  at 2/17/2025 1520    Comment: Reviewed safety/technique w/bed mobility, transfers, HEP, PT POC    Acceptance, E,D, VU,NR by  at 2/16/2025 1006    Comment: Educated on posterior hip precautions, weight bearing status,HEP,safety with mobility, benefits of mobility and being OOB, correct supine to sit t/f technique, correct sit<->stand t/f technique, correct bed to chair t/f technique, and progression of POC.    Acceptance, E, NR by AS at 2/15/2025 1119    Eager, E, VU by SC at 2/14/2025 1142    Comment: re viewed benefits of activity    Acceptance, E, NR by CK at 2/12/2025 0957    Acceptance, E, VU,NR by  at 2/11/2025 0840   Family Acceptance, E, VU,DU,NR by  at 2/17/2025 1520    Comment: Reviewed safety/technique w/bed mobility, transfers, HEP, PT POC                                      User Key       Initials Effective Dates Name Provider Type Discipline    SC 02/03/23 -  Sarah Easton, PT Physical Therapist PT    LR 02/03/23 -  Tessy Martinez, PT Physical Therapist PT    AS 12/13/24 -  Katy Saucedo, PTA Physical Therapist Assistant PT     06/01/21 -  Ginger Adame, PT Physical Therapist PT     02/06/24 -  Erica Sandoval, PT Physical Therapist PT     09/21/23 -  Daisy Javed, PT  Physical Therapist PT                  PT Recommendation and Plan     Progress: no change  Outcome Evaluation: Pt. continues to present below baseline function w/generalized weakness, balance deficits and decreased functional endurance affecting her ability to safely participate in functional mobility. She performed bed mobility and transfers w/max assist of 2. Activity limited by fatigue, weakness. Pt. tolerated ther-ex well. Continue acute PT POC to progress as able.     Time Calculation:         PT Charges       Row Name 02/17/25 1521             Time Calculation    Start Time 1418  -SS      PT Received On 02/17/25  -SS         Timed Charges    69898 - PT Therapeutic Exercise Minutes 10  -SS      07696 - PT Therapeutic Activity Minutes 16  -SS         Total Minutes    Timed Charges Total Minutes 26  -SS       Total Minutes 26  -SS                User Key  (r) = Recorded By, (t) = Taken By, (c) = Cosigned By      Initials Name Provider Type    SS Ginger Adame PT Physical Therapist                  Therapy Charges for Today       Code Description Service Date Service Provider Modifiers Qty    66130133346 HC PT THER PROC EA 15 MIN 2/17/2025 Ginger Adame, PT GP 1    33954574547 HC PT THERAPEUTIC ACT EA 15 MIN 2/17/2025 Ginger Adame, PT GP 1    50054334951 HC PT THER SUPP EA 15 MIN 2/17/2025 Ginger Adame, PT GP 2            PT G-Codes  Outcome Measure Options: AM-PAC 6 Clicks Basic Mobility (PT)  AM-PAC 6 Clicks Score (PT): 10  AM-PAC 6 Clicks Score (OT): 13  PT Discharge Summary  Anticipated Discharge Disposition (PT): skilled nursing facility    Ginger Adame PT  2/17/2025

## 2025-02-18 VITALS
TEMPERATURE: 98.2 F | DIASTOLIC BLOOD PRESSURE: 50 MMHG | HEART RATE: 82 BPM | OXYGEN SATURATION: 100 % | HEIGHT: 60 IN | BODY MASS INDEX: 23.38 KG/M2 | SYSTOLIC BLOOD PRESSURE: 114 MMHG | WEIGHT: 119.1 LBS | RESPIRATION RATE: 18 BRPM

## 2025-02-18 RX ORDER — IPRATROPIUM BROMIDE AND ALBUTEROL SULFATE 2.5; .5 MG/3ML; MG/3ML
3 SOLUTION RESPIRATORY (INHALATION) EVERY 4 HOURS PRN
Start: 2025-02-18

## 2025-02-18 RX ORDER — METOPROLOL TARTRATE 25 MG/1
25 TABLET, FILM COATED ORAL 2 TIMES DAILY
Status: DISCONTINUED | OUTPATIENT
Start: 2025-02-18 | End: 2025-02-18 | Stop reason: HOSPADM

## 2025-02-18 RX ORDER — ACETAMINOPHEN 325 MG/1
650 TABLET ORAL EVERY 6 HOURS PRN
Status: ON HOLD
Start: 2025-02-18 | End: 2025-02-26

## 2025-02-18 RX ORDER — POLYETHYLENE GLYCOL 3350 17 G/17G
17 POWDER, FOR SOLUTION ORAL DAILY PRN
Start: 2025-02-18

## 2025-02-18 RX ORDER — METOPROLOL TARTRATE 25 MG/1
25 TABLET, FILM COATED ORAL 2 TIMES DAILY
Start: 2025-02-18 | End: 2025-02-26 | Stop reason: HOSPADM

## 2025-02-18 RX ORDER — AMOXICILLIN 250 MG
2 CAPSULE ORAL 2 TIMES DAILY
Start: 2025-02-18

## 2025-02-18 RX ORDER — METOPROLOL TARTRATE 25 MG/1
25 TABLET, FILM COATED ORAL ONCE
Status: DISCONTINUED | OUTPATIENT
Start: 2025-02-18 | End: 2025-02-18 | Stop reason: HOSPADM

## 2025-02-18 RX ADMIN — CARBIDOPA AND LEVODOPA 1 TABLET: 25; 100 TABLET ORAL at 08:31

## 2025-02-18 RX ADMIN — Medication 10 ML: at 08:31

## 2025-02-18 RX ADMIN — METOPROLOL TARTRATE 2.5 MG: 5 INJECTION INTRAVENOUS at 04:26

## 2025-02-18 RX ADMIN — BISACODYL 10 MG: 10 SUPPOSITORY RECTAL at 09:52

## 2025-02-18 RX ADMIN — CARBIDOPA AND LEVODOPA 1 TABLET: 25; 100 TABLET ORAL at 11:22

## 2025-02-18 RX ADMIN — SENNOSIDES AND DOCUSATE SODIUM 2 TABLET: 50; 8.6 TABLET ORAL at 08:31

## 2025-02-18 RX ADMIN — APIXABAN 2.5 MG: 2.5 TABLET, FILM COATED ORAL at 08:31

## 2025-02-18 NOTE — DISCHARGE SUMMARY
[facility] Medicine Services  DISCHARGE SUMMARY    Patient Name: Fanny Boyce  : 1940  MRN: [id number]    Date of Admission: 2025  7:29 PM  Date of Discharge:  2025  Primary Care Physician: Nemo Aponte PA    Consults       No orders found from 2025 to 2/10/2025.            Hospital Course     Presenting Problem:     Active Hospital Problems    Diagnosis  POA    **Fracture of femoral neck, right [S72.001A]  Yes    Hiatal hernia [K44.9]  Yes    Severe protein-calorie malnutrition [E43]  Yes    Atrial fibrillation [I48.91]  Yes    COPD (chronic obstructive pulmonary disease) [J44.9]  Yes    Dementia [F03.90]  Yes    Parkinson's disease [G20.A1]  Yes      Resolved Hospital Problems   No resolved problems to display.      --------------------final diagnoses-------------------  Right femoral neck fracture (s/p right hip hemiarthroplasty 2/10/25 by Dr. Patino)  Hx afib, w/ rvr this admission (now rate controlled)  Acute HFpEF/volume overload (s/p iv diuresis)  -echo w/ EF 70%, mod TR)  -was likely due to rvr, appears resolved for now; monitor for need for future prn diuretic  COPD, now currently exacerbated  Parkinsons  Dementia  ---------------------------------------------------------        Hospital Course:  Fanny Boyce is a 84 y.o. female w/ hx copd, afib, parkinsons, dementia who sustained a presumed mechanical fall that resulted in right hip fracture. Underwent orif on 2/10/25. Had some afib w/ rvr and volume overload which improved w/ rate control and prn diuresis. Patient now being discharged to rehab facility. F/u orthopedic surgery Dr. Patino 4 weeks post-op. W/u pcp 1 week after discharge from rehab      Discharge Follow Up Recommendations for outpatient labs/diagnostics:   Pcp 1 week after d/c from rehab  Dr. Patino (orthopedic surgery) 4 weeks post-op    Day of Discharge     HPI:   Feeling ok. No pain. Denies dyspnea. Tolerating po, no  nausea    Review of Systems  No f/c    Vital Signs:   Temp:  [97.8 °F (36.6 °C)-99 °F (37.2 °C)] 97.8 °F (36.6 °C)  Heart Rate:  [63-95] 67  Resp:  [18] 18  BP: (114-132)/(48-93) 116/50  Flow (L/min) (Oxygen Therapy):  [3] 3      Physical Exam:  Constitutional:Alert, sitting up eating, comfortable appearing, no distress  Psych:Normal/appropriate affect  HEENT:NCAT, oropharynx clear  Neck: neck supple, full range of motion  Neuro: Face symmetric, speech clear, equal , moves all extremities  Cardiac: RRR; No pretibial pitting edema  Resp: lungs clear  GI: abd soft, nontender to palpation  Skin: No extremity rash  Musculoskeletal/extremities: no cyanosis of extremities; no significant ankle edema      Pertinent  and/or Most Recent Results     LAB RESULTS:      Lab 02/17/25  1244 02/15/25  0815 02/14/25  1230 02/13/25  0335 02/12/25  0901 02/11/25  1001   WBC 9.10 7.96 7.33 6.02 6.48 10.83*   HEMOGLOBIN 9.4* 10.2* 9.3* 9.0* 10.2* 10.6*   HEMATOCRIT 30.4* 32.9* 30.3* 28.5* 33.1* 33.0*   PLATELETS 348 232 179 135* 146 187   NEUTROS ABS  --   --   --   --  5.68 9.56*   IMMATURE GRANS (ABS)  --   --   --   --  0.02 0.05   LYMPHS ABS  --   --   --   --  0.36* 0.65*   MONOS ABS  --   --   --   --  0.27 0.49   EOS ABS  --   --   --   --  0.10 0.04   MCV 88.9 88.7 91.0 89.6 89.2 88.9         Lab 02/17/25  1244 02/16/25  1044 02/15/25  0815 02/14/25  1230 02/11/25  1001   SODIUM 138 138 138 136 138   POTASSIUM 3.9 4.2 4.1 4.1 4.4   CHLORIDE 97* 98 98 99 101   CO2 30.0* 30.0* 30.0* 29.0 24.0   ANION GAP 11.0 10.0 10.0 8.0 13.0   BUN 21 19 16 16 15   CREATININE 0.56* 0.47* 0.58 0.52* 0.69   EGFR 89.0 92.8 88.3 90.6 84.6   GLUCOSE 151* 106* 150* 127* 147*   CALCIUM 9.3 8.6 8.9 8.3* 8.8   MAGNESIUM 1.8 1.7  --  1.7  --                          Brief Urine Lab Results  (Last result in the past 365 days)        Color   Clarity   Blood   Leuk Est   Nitrite   Protein   CREAT   Urine HCG        06/01/24 1157 Yellow   Turbid   Small  (1+)   Large (3+)   Positive   30 mg/dL (1+)                 Microbiology Results (last 10 days)       ** No results found for the last 240 hours. **            XR Chest 1 View    Result Date: 2/14/2025  XR CHEST 1 VW Date of Exam: 2/14/2025 10:03 AM EST Indication: Evaluate pulmonary edema Comparison: 2/9/2025 Findings: Since the prior study, the heart shadow has become enlarged and there is a diffuse mild to moderate perihilar edema pattern present. There appear to be small new pleural effusions as well. Large hiatal hernia is again seen. No dense lung consolidation or  evidence of pneumothorax is seen. Old irregularly healed left proximal humerus fracture is again noted.     Impression: 1. Interval development of congestive heart failure with mild to moderate pulmonary interstitial edema and minimal effusions. 2. Large hiatal hernia again noted. Electronically Signed: Kevin Kaminski MD  2/14/2025 10:33 AM EST  Workstation ID: [id number]    CT Angiogram Chest    Result Date: 2/12/2025  CT ANGIOGRAM CHEST Date of Exam: 2/12/2025 7:32 PM EST Indication: Hypoxia, a-fib, hip fracture.  Rule out PE. Comparison: None available. Technique: CTA of the chest was performed after the uneventful intravenous administration of 73 cc Isovue-370. Reconstructed coronal and sagittal images were also obtained. In addition, a 3-D volume rendered image was created for interpretation. Automated exposure control and iterative reconstruction methods were used. Findings: Pulmonary Arteries: Adequately opacified. Some distortion of peripheral pulmonary arteries due to patient motion. Within that limitation, no emboli demonstrated Helga/mediastinum: No adenopathy. Thoracic aorta normal in caliber. Mild coronary artery calcification. No pericardial effusion. Large hiatal hernia Lungs/pleura: Small bilateral pleural effusions with secondary atelectasis in the lower lobes. Status post right upper lobectomy. Mild interlobular septal thickening in the  dependent lungs. Stable clustered nodules in the right middle lobe Upper Abdomen: No acute abnormality. Cyst or focal fat in the left hepatic lobe Bones/soft tissues: Severe wedge compression fracture of T12 with posterior cortical bowing resulting in approximate 30% central canal stenosis. Inferior endplate compression fractures of T9 and T10     1. No evidence of pulmonary embolism within the limits of motion 2. Small bilateral pleural effusions. Mild interlobular septal thickening in the dependent lungs suggests mild edema 3. Status post right upper lobectomy 4. Stable right middle lobe nodules compatible with benign etiology 5. Large hiatal hernia 6. Wedge compression fracture of T12 and inferior endplate compression fractures of T9 and T10 that are new from the prior study but of uncertain acuity Electronically Signed: Demian Trevino  2/12/2025 8:18 PM EST  Workstation ID: [id number]    XR Pelvis 1 or 2 View    Result Date: 2/10/2025  XR PELVIS 1 OR 2 VW Date of Exam: 2/10/2025 8:35 PM EST Indication: postop Comparison: Pelvis radiographs 2/9/2025. Findings: Postsurgical changes of total right hip arthroplasty with hardware in expected position. Expected postsurgical soft tissue change. Remainder of exam is unchanged. No evidence of complication.     Impression: Expected changes of total right hip arthroplasty without evidence of complication. Electronically Signed: Duke Tafoya MD  2/10/2025 8:58 PM EST  Workstation ID: [id number]    Peripheral Block    Result Date: 2/10/2025  Cam Sanchez CRNA     2/10/2025  3:06 PM Peripheral Block Patient reassessed immediately prior to procedure Start time: 2/10/2025 2:54 PM Stop time: 2/10/2025 3:06 PM Reason for block: at surgeon's request and post-op pain management Performed by CRNA/CAA: Cam Sanchez CRNA Assisted by: Karo Almanza RN Preanesthetic Checklist Completed: patient identified, IV checked, site marked, risks and benefits discussed, surgical consent,  monitors and equipment checked, pre-op evaluation and timeout performed Prep: Pt Position: supine Sterile barriers:cap, gloves, mask and washed/disinfected hands Prep: ChloraPrep Patient monitoring: blood pressure monitoring, continuous pulse oximetry and EKG Procedure Performed under: local infiltration Guidance:ultrasound guided ULTRASOUND INTERPRETATION.  Using ultrasound guidance a 20 G gauge needle was placed in close proximity to the nerve, at which point, under ultrasound guidance anesthetic was injected in the area of the nerve and spread of the anesthesia was seen on ultrasound in close proximity thereto.  There were no abnormalities seen on ultrasound; a digital image was taken; and the patient tolerated the procedure with no complications. Images:still images obtained, printed/placed on chart Laterality:right Block Type:fascia iliaca compartment Injection Technique:catheter Needle Type:echogenic and Tuohy Needle Gauge:18 G Resistance on Injection: none Catheter Size:20 G (20g) Cath Depth at skin: 7 cm Medications Used: ropivacaine (NAROPIN) 0.5 % injection - Injection  25 mL - 2/10/2025 3:06:00 PM Medications Preservative Free Saline:25ml Post Assessment Injection Assessment: negative aspiration for heme, no paresthesia on injection and incremental injection Patient Tolerance:comfortable throughout block Complications:no Additional Notes CKAFASCIAILIACA: CATHETER A high-frequency linear transducer, with sterile cover, was placed in parasagittal plane on top of the Anterior Superior Iliac Spine (ASIS) and moved medially to identify the Internal Oblique muscle, Sartorius muscle, Iliacus Muscle, Fascia Iliaca (FI) and Fascia Latae. The insertion site was prepped and draped in sterile fashion. Skin and cutaneous tissue was infiltrated with 2-5 ml of 1% Lidocaine. Using ultrasound-guidance, an 18-gauge Education Elementsiplex Ultra 360 Touhy needle was advanced in plane from caudad to cephalad. Preservative-free normal  "saline was utilized for hydro-dissection of tissue, advancement of Touhy, and to confirm final needle placement below FI. Local anesthetic in incremental 3-5 ml injections. Aspiration every 5 ml to prevent intravascular injection. Injection was completed with negative aspiration of blood and negative intravascular injection. Injection pressures were normal with minimal resistance. A 20-gauge Contiplex Echo catheter was placed through the needle and advance out the tip of the Touhy 3-5 cm. The Touhy needle was then removed, and final catheter position verified below the FI. The catheter was secured in the usual fashion with skin glue, benzoin, steri-strips, CHG tegaderm and Label noting \"Nerve Block Catheter\". Jerk tape applied at yellow connector and catheter connection. Performed by: Cam Sanchez CRNA     CT Head Without Contrast    Result Date: 2/9/2025  CT HEAD WO CONTRAST, CT PELVIS WO CONTRAST Date of Exam: 2/9/2025 8:21 PM EST Indication: fall, unknown head injury. Comparison: 6/2/2024. Technique: Axial CT images were obtained of the head and pelvis without contrast administration.  Automated exposure control and iterative construction methods were used. Findings: Head: There is no evidence of hemorrhage. There is no mass effect or midline shift. There is no extracerebral collection. Ventricles are normal in size and configuration for patient's stated age.  Posterior fossa is within normal limits. Calvarium and skull base appear intact.   Visualized sinuses show no air fluid levels. A small amount of secretions present within the right sphenoid sinus. Visualized orbits are unremarkable. Pelvis: There is a mildly displaced and impacted fracture of the right femoral neck. Mild varus angulation is present. Total left hip arthroplasty present which appears intact. There is no evidence of additional fracture. No evidence of dislocation. Intrapelvic contents demonstrate no acute process. Diverticulosis present " within the sigmoid colon. No significant inflammatory changes identified. Soft tissues appear grossly unremarkable.     Impression: 1.No acute intracranial process. 2.Mildly displaced and impacted fracture of the right femoral neck. Electronically Signed: Dina Basilio MD  2/9/2025 8:45 PM EST  Workstation ID: [id number]    CT Pelvis Without Contrast    Result Date: 2/9/2025  CT HEAD WO CONTRAST, CT PELVIS WO CONTRAST Date of Exam: 2/9/2025 8:21 PM EST Indication: fall, unknown head injury. Comparison: 6/2/2024. Technique: Axial CT images were obtained of the head and pelvis without contrast administration.  Automated exposure control and iterative construction methods were used. Findings: Head: There is no evidence of hemorrhage. There is no mass effect or midline shift. There is no extracerebral collection. Ventricles are normal in size and configuration for patient's stated age.  Posterior fossa is within normal limits. Calvarium and skull base appear intact.   Visualized sinuses show no air fluid levels. A small amount of secretions present within the right sphenoid sinus. Visualized orbits are unremarkable. Pelvis: There is a mildly displaced and impacted fracture of the right femoral neck. Mild varus angulation is present. Total left hip arthroplasty present which appears intact. There is no evidence of additional fracture. No evidence of dislocation. Intrapelvic contents demonstrate no acute process. Diverticulosis present within the sigmoid colon. No significant inflammatory changes identified. Soft tissues appear grossly unremarkable.     Impression: 1.No acute intracranial process. 2.Mildly displaced and impacted fracture of the right femoral neck. Electronically Signed: Dina Basilio MD  2/9/2025 8:45 PM EST  Workstation ID: [id number]    XR Femur 2 View Right    Result Date: 2/9/2025  XR FEMUR 2 VW RIGHT, XR PELVIS 1 OR 2 VW, XR CHEST 1 VW Date of Exam: 2/9/2025 7:57 PM EST Indication: fall, R hip injury  Comparison: None available. Findings: Right femur: There is a mildly displaced and impacted fracture of the right femoral neck. No significant angulation identified. Remaining portions of the femur appear intact. Total right knee arthroplasty present which appears intact. No knee joint effusion. Pelvis: No additional fracture identified. Left hip hemiarthroplasty present. CHEST: There are no airspace consolidations. No pleural fluid. No pneumothorax. The pulmonary vasculature appears within normal limits. The cardiac and mediastinal silhouette appear unremarkable. No acute osseous abnormality identified. Hiatal hernia present.     Impression: Mildly displaced and impacted fracture of the right femoral neck. No additional fracture identified within the pelvis. No acute cardiopulmonary process. Electronically Signed: Dina Basilio MD  2/9/2025 8:27 PM EST  Workstation ID: [id number]    XR Chest 1 View    Result Date: 2/9/2025  XR FEMUR 2 VW RIGHT, XR PELVIS 1 OR 2 VW, XR CHEST 1 VW Date of Exam: 2/9/2025 7:57 PM EST Indication: fall, R hip injury Comparison: None available. Findings: Right femur: There is a mildly displaced and impacted fracture of the right femoral neck. No significant angulation identified. Remaining portions of the femur appear intact. Total right knee arthroplasty present which appears intact. No knee joint effusion. Pelvis: No additional fracture identified. Left hip hemiarthroplasty present. CHEST: There are no airspace consolidations. No pleural fluid. No pneumothorax. The pulmonary vasculature appears within normal limits. The cardiac and mediastinal silhouette appear unremarkable. No acute osseous abnormality identified. Hiatal hernia present.     Impression: Mildly displaced and impacted fracture of the right femoral neck. No additional fracture identified within the pelvis. No acute cardiopulmonary process. Electronically Signed: Dina Basilio MD  2/9/2025 8:27 PM EST  Workstation ID:  [id number]    XR Pelvis 1 or 2 View    Result Date: 2/9/2025  XR FEMUR 2 VW RIGHT, XR PELVIS 1 OR 2 VW, XR CHEST 1 VW Date of Exam: 2/9/2025 7:57 PM EST Indication: fall, R hip injury Comparison: None available. Findings: Right femur: There is a mildly displaced and impacted fracture of the right femoral neck. No significant angulation identified. Remaining portions of the femur appear intact. Total right knee arthroplasty present which appears intact. No knee joint effusion. Pelvis: No additional fracture identified. Left hip hemiarthroplasty present. CHEST: There are no airspace consolidations. No pleural fluid. No pneumothorax. The pulmonary vasculature appears within normal limits. The cardiac and mediastinal silhouette appear unremarkable. No acute osseous abnormality identified. Hiatal hernia present.     Impression: Mildly displaced and impacted fracture of the right femoral neck. No additional fracture identified within the pelvis. No acute cardiopulmonary process. Electronically Signed: Dina Basilio MD  2/9/2025 8:27 PM EST  Workstation ID: [id number]             Results for orders placed during the hospital encounter of 02/09/25    Adult Transthoracic Echo Complete W/ Cont if Necessary Per Protocol    Interpretation Summary    Left ventricular systolic function is normal. Estimated left ventricular EF = 70%    Moderate tricuspid valve regurgitation is present.    Estimated  right ventricular systolic pressure from tricuspid regurgitation is 46 mmHg.    There is a trivial pericardial effusion.      Plan for Follow-up of Pending Labs/Results:     Discharge Details        Discharge Medications        New Medications        Instructions Start Date   acetaminophen 325 MG tablet  Commonly known as: TYLENOL   650 mg, Oral, Every 6 Hours PRN      ipratropium-albuterol 0.5-2.5 mg/3 ml nebulizer  Commonly known as: DUO-NEB   3 mL, Nebulization, Every 4 Hours PRN      polyethylene glycol 17 g packet  Commonly known as:  MIRALAX   17 g, Oral, Daily PRN      sennosides-docusate 8.6-50 MG per tablet  Commonly known as: PERICOLACE   2 tablets, Oral, 2 Times Daily             Changes to Medications        Instructions Start Date   metoprolol tartrate 25 MG tablet  Commonly known as: LOPRESSOR  What changed: how much to take   25 mg, Oral, 2 Times Daily             Continue These Medications        Instructions Start Date   apixaban 2.5 MG tablet tablet  Commonly known as: ELIQUIS   2.5 mg, 2 Times Daily      carbidopa-levodopa  MG per tablet  Commonly known as: SINEMET   1 tablet, 3 Times Daily      fluticasone 50 MCG/ACT nasal spray  Commonly known as: FLONASE   2 sprays, Daily PRN      traZODone 50 MG tablet  Commonly known as: DESYREL   100 mg, Nightly      vitamin D3 125 MCG (5000 UT) capsule capsule   5,000 Units, Daily               No Known Allergies      Discharge Disposition:  Skilled Nursing Facility (DC - External)    Diet:  Hospital:  Diet Order   Procedures    Diet: Regular/House; Feeding Assistance - Nursing; Texture: Regular (IDDSI 7); Fluid Consistency: Thin (IDDSI 0)            Activity:             CODE STATUS:    Code Status and Medical Interventions: No CPR (Do Not Attempt to Resuscitate); Limited Support; No intubation (DNI)   Ordered at: 02/12/25 2019     Medical Intervention Limits:    No intubation (DNI)     Level Of Support Discussed With:    Health Care Surrogate     Code Status (Patient has no pulse and is not breathing):    No CPR (Do Not Attempt to Resuscitate)     Medical Interventions (Patient has pulse or is breathing):    Limited Support       Future Appointments   Date Time Provider Department Center   2/18/2025 12:30 PM MED 8  [facility]       Additional Instructions for the Follow-ups that You Need to Schedule       Discharge Follow-up with PCP   As directed       Currently Documented PCP:    Nemo pAonte PA    PCP Phone Number:    [phone]     Follow Up Details: 1 week after  discharge from rehab facility        Discharge Follow-up with Specialty: Dr. Patino (orthopedic surgery) 4 weeks post-op   As directed      Specialty: Dr. Patino (orthopedic surgery) 4 weeks post-op                      Everett Bridges MD  02/18/25      Time Spent on Discharge:  I spent  35  minutes on this discharge activity which included: face-to-face encounter with the patient, reviewing the data in the system, coordination of the care with the nursing staff as well as consultants, documentation, and entering orders.

## 2025-02-18 NOTE — DISCHARGE PLACEMENT REQUEST
"Fanny Campbell \"Monica\" (84 y.o. Female)       Date of Birth   1940    Social Security Number       [facility] [address]    Home Phone   [phone]    MRN   [id number]       Muslim   None    Marital Status   Legally                             Admission Date   2/9/25    Admission Type   Emergency    Admitting Provider   Everett Bridges MD    Attending Provider   Everett Bridges MD    Department, Room/Bed   [facility] 3G, S358/1       Discharge Date       Discharge Disposition   Skilled Nursing Facility (DC - External)    Discharge Destination                                 Attending Provider: Everett Bridges MD    Allergies: No Known Allergies    Isolation: None   Infection: None   Code Status: No CPR    Ht: 152.4 cm (60\")   Wt: 54 kg (119 lb 1.6 oz)    Admission Cmt: None   Principal Problem: Fracture of femoral neck, right [S72.001A]                   Active Insurance as of 2/9/2025       Primary Coverage       Payor Plan Insurance Group Employer/Plan Group    MEDICARE MEDICARE A & B        Payor Plan Address Payor Plan Phone Number Payor Plan Fax Number Effective Dates    [address] [phone]  9/1/2001 - None Entered    [facility] [id number]         Subscriber Name Subscriber Birth Date Member ID       FANNY CAMPBELL 1940 [id number]               Secondary Coverage       Payor Plan Insurance Group Employer/Plan Group    HUMANA HUMANA [facility]              [id number]       Payor Plan Address Payor Plan Phone Number Payor Plan Fax Number Effective Dates    [address] [address]   1/1/2015 - None Entered    [facility] [id number]         Subscriber Name Subscriber Birth Date Member ID       FANNY CAMPBELL 1940 [id number]                     Emergency Contacts        (Rel.) Home Phone Work Phone Mobile Phone    Lety Lewis (Daughter) [phone] -- [phone]    Licha Pugh (Grandchild) -- -- --               "   Discharge Summary        Everett Bridges MD at 25 0857              [facility] Medicine Services  DISCHARGE SUMMARY    Patient Name: Fanny Boyce  : 1940  MRN: [id number]    Date of Admission: 2025  7:29 PM  Date of Discharge:  2025  Primary Care Physician: Nemo Aponte PA    Consults       No orders found from 2025 to 2/10/2025.            Hospital Course     Presenting Problem:     Active Hospital Problems    Diagnosis  POA    **Fracture of femoral neck, right [S72.001A]  Yes    Hiatal hernia [K44.9]  Yes    Severe protein-calorie malnutrition [E43]  Yes    Atrial fibrillation [I48.91]  Yes    COPD (chronic obstructive pulmonary disease) [J44.9]  Yes    Dementia [F03.90]  Yes    Parkinson's disease [G20.A1]  Yes      Resolved Hospital Problems   No resolved problems to display.      --------------------final diagnoses-------------------  Right femoral neck fracture (s/p right hip hemiarthroplasty 2/10/25 by Dr. Patino)  Hx afib, w/ rvr this admission (now rate controlled)  Acute HFpEF/volume overload (s/p iv diuresis)  -echo w/ EF 70%, mod TR)  -was likely due to rvr, appears resolved for now; monitor for need for future prn diuretic  COPD, now currently exacerbated  Parkinsons  Dementia  ---------------------------------------------------------        Hospital Course:  Fanny Boyce is a 84 y.o. female w/ hx copd, afib, parkinsons, dementia who sustained a presumed mechanical fall that resulted in right hip fracture. Underwent orif on 2/10/25. Had some afib w/ rvr and volume overload which improved w/ rate control and prn diuresis. Patient now being discharged to rehab facility. F/u orthopedic surgery Dr. Patino 4 weeks post-op. W/u pcp 1 week after discharge from rehab      Discharge Follow Up Recommendations for outpatient labs/diagnostics:   Pcp 1 week after d/c from rehab  Dr. Patino (orthopedic surgery) 4 weeks post-op    Day of Discharge      HPI:   Feeling ok. No pain. Denies dyspnea. Tolerating po, no nausea    Review of Systems  No f/c    Vital Signs:   Temp:  [97.8 °F (36.6 °C)-99 °F (37.2 °C)] 97.8 °F (36.6 °C)  Heart Rate:  [63-95] 67  Resp:  [18] 18  BP: (114-132)/(48-93) 116/50  Flow (L/min) (Oxygen Therapy):  [3] 3      Physical Exam:  Constitutional:Alert, sitting up eating, comfortable appearing, no distress  Psych:Normal/appropriate affect  HEENT:NCAT, oropharynx clear  Neck: neck supple, full range of motion  Neuro: Face symmetric, speech clear, equal , moves all extremities  Cardiac: RRR; No pretibial pitting edema  Resp: lungs clear  GI: abd soft, nontender to palpation  Skin: No extremity rash  Musculoskeletal/extremities: no cyanosis of extremities; no significant ankle edema      Pertinent  and/or Most Recent Results     LAB RESULTS:      Lab 02/17/25  1244 02/15/25  0815 02/14/25  1230 02/13/25  0335 02/12/25  0901 02/11/25  1001   WBC 9.10 7.96 7.33 6.02 6.48 10.83*   HEMOGLOBIN 9.4* 10.2* 9.3* 9.0* 10.2* 10.6*   HEMATOCRIT 30.4* 32.9* 30.3* 28.5* 33.1* 33.0*   PLATELETS 348 232 179 135* 146 187   NEUTROS ABS  --   --   --   --  5.68 9.56*   IMMATURE GRANS (ABS)  --   --   --   --  0.02 0.05   LYMPHS ABS  --   --   --   --  0.36* 0.65*   MONOS ABS  --   --   --   --  0.27 0.49   EOS ABS  --   --   --   --  0.10 0.04   MCV 88.9 88.7 91.0 89.6 89.2 88.9         Lab 02/17/25  1244 02/16/25  1044 02/15/25  0815 02/14/25  1230 02/11/25  1001   SODIUM 138 138 138 136 138   POTASSIUM 3.9 4.2 4.1 4.1 4.4   CHLORIDE 97* 98 98 99 101   CO2 30.0* 30.0* 30.0* 29.0 24.0   ANION GAP 11.0 10.0 10.0 8.0 13.0   BUN 21 19 16 16 15   CREATININE 0.56* 0.47* 0.58 0.52* 0.69   EGFR 89.0 92.8 88.3 90.6 84.6   GLUCOSE 151* 106* 150* 127* 147*   CALCIUM 9.3 8.6 8.9 8.3* 8.8   MAGNESIUM 1.8 1.7  --  1.7  --                          Brief Urine Lab Results  (Last result in the past 365 days)        Color   Clarity   Blood   Leuk Est   Nitrite   Protein    CREAT   Urine HCG        06/01/24 1157 Yellow   Turbid   Small (1+)   Large (3+)   Positive   30 mg/dL (1+)                 Microbiology Results (last 10 days)       ** No results found for the last 240 hours. **            XR Chest 1 View    Result Date: 2/14/2025  XR CHEST 1 VW Date of Exam: 2/14/2025 10:03 AM EST Indication: Evaluate pulmonary edema Comparison: 2/9/2025 Findings: Since the prior study, the heart shadow has become enlarged and there is a diffuse mild to moderate perihilar edema pattern present. There appear to be small new pleural effusions as well. Large hiatal hernia is again seen. No dense lung consolidation or  evidence of pneumothorax is seen. Old irregularly healed left proximal humerus fracture is again noted.     Impression: 1. Interval development of congestive heart failure with mild to moderate pulmonary interstitial edema and minimal effusions. 2. Large hiatal hernia again noted. Electronically Signed: Kevin Kaminski MD  2/14/2025 10:33 AM EST  Workstation ID: [id number]    CT Angiogram Chest    Result Date: 2/12/2025  CT ANGIOGRAM CHEST Date of Exam: 2/12/2025 7:32 PM EST Indication: Hypoxia, a-fib, hip fracture.  Rule out PE. Comparison: None available. Technique: CTA of the chest was performed after the uneventful intravenous administration of 73 cc Isovue-370. Reconstructed coronal and sagittal images were also obtained. In addition, a 3-D volume rendered image was created for interpretation. Automated exposure control and iterative reconstruction methods were used. Findings: Pulmonary Arteries: Adequately opacified. Some distortion of peripheral pulmonary arteries due to patient motion. Within that limitation, no emboli demonstrated Helga/mediastinum: No adenopathy. Thoracic aorta normal in caliber. Mild coronary artery calcification. No pericardial effusion. Large hiatal hernia Lungs/pleura: Small bilateral pleural effusions with secondary atelectasis in the lower lobes. Status post  right upper lobectomy. Mild interlobular septal thickening in the dependent lungs. Stable clustered nodules in the right middle lobe Upper Abdomen: No acute abnormality. Cyst or focal fat in the left hepatic lobe Bones/soft tissues: Severe wedge compression fracture of T12 with posterior cortical bowing resulting in approximate 30% central canal stenosis. Inferior endplate compression fractures of T9 and T10     1. No evidence of pulmonary embolism within the limits of motion 2. Small bilateral pleural effusions. Mild interlobular septal thickening in the dependent lungs suggests mild edema 3. Status post right upper lobectomy 4. Stable right middle lobe nodules compatible with benign etiology 5. Large hiatal hernia 6. Wedge compression fracture of T12 and inferior endplate compression fractures of T9 and T10 that are new from the prior study but of uncertain acuity Electronically Signed: Demian Trevino  2/12/2025 8:18 PM EST  Workstation ID: [id number]    XR Pelvis 1 or 2 View    Result Date: 2/10/2025  XR PELVIS 1 OR 2 VW Date of Exam: 2/10/2025 8:35 PM EST Indication: postop Comparison: Pelvis radiographs 2/9/2025. Findings: Postsurgical changes of total right hip arthroplasty with hardware in expected position. Expected postsurgical soft tissue change. Remainder of exam is unchanged. No evidence of complication.     Impression: Expected changes of total right hip arthroplasty without evidence of complication. Electronically Signed: Duke Tafoya MD  2/10/2025 8:58 PM EST  Workstation ID: [id number]    Peripheral Block    Result Date: 2/10/2025  Cam Sanchez CRNA     2/10/2025  3:06 PM Peripheral Block Patient reassessed immediately prior to procedure Start time: 2/10/2025 2:54 PM Stop time: 2/10/2025 3:06 PM Reason for block: at surgeon's request and post-op pain management Performed by CRNA/CAA: Cam Sanchez CRNA Assisted by: Karo Almanza RN Preanesthetic Checklist Completed: patient identified, IV  checked, site marked, risks and benefits discussed, surgical consent, monitors and equipment checked, pre-op evaluation and timeout performed Prep: Pt Position: supine Sterile barriers:cap, gloves, mask and washed/disinfected hands Prep: ChloraPrep Patient monitoring: blood pressure monitoring, continuous pulse oximetry and EKG Procedure Performed under: local infiltration Guidance:ultrasound guided ULTRASOUND INTERPRETATION.  Using ultrasound guidance a 20 G gauge needle was placed in close proximity to the nerve, at which point, under ultrasound guidance anesthetic was injected in the area of the nerve and spread of the anesthesia was seen on ultrasound in close proximity thereto.  There were no abnormalities seen on ultrasound; a digital image was taken; and the patient tolerated the procedure with no complications. Images:still images obtained, printed/placed on chart Laterality:right Block Type:fascia iliaca compartment Injection Technique:catheter Needle Type:echogenic and Tuohy Needle Gauge:18 G Resistance on Injection: none Catheter Size:20 G (20g) Cath Depth at skin: 7 cm Medications Used: ropivacaine (NAROPIN) 0.5 % injection - Injection  25 mL - 2/10/2025 3:06:00 PM Medications Preservative Free Saline:25ml Post Assessment Injection Assessment: negative aspiration for heme, no paresthesia on injection and incremental injection Patient Tolerance:comfortable throughout block Complications:no Additional Notes CKAFASCIAILIACA: CATHETER A high-frequency linear transducer, with sterile cover, was placed in parasagittal plane on top of the Anterior Superior Iliac Spine (ASIS) and moved medially to identify the Internal Oblique muscle, Sartorius muscle, Iliacus Muscle, Fascia Iliaca (FI) and Fascia Latae. The insertion site was prepped and draped in sterile fashion. Skin and cutaneous tissue was infiltrated with 2-5 ml of 1% Lidocaine. Using ultrasound-guidance, an 18-gauge Dokogeoiplex Ultra 360 Toy needle was  "advanced in plane from caudad to cephalad. Preservative-free normal saline was utilized for hydro-dissection of tissue, advancement of Touhy, and to confirm final needle placement below FI. Local anesthetic in incremental 3-5 ml injections. Aspiration every 5 ml to prevent intravascular injection. Injection was completed with negative aspiration of blood and negative intravascular injection. Injection pressures were normal with minimal resistance. A 20-gauge Contiplex Echo catheter was placed through the needle and advance out the tip of the Touhy 3-5 cm. The Touhy needle was then removed, and final catheter position verified below the FI. The catheter was secured in the usual fashion with skin glue, benzoin, steri-strips, CHG tegaderm and Label noting \"Nerve Block Catheter\". Jerk tape applied at yellow connector and catheter connection. Performed by: Cam Sanchez CRNA     CT Head Without Contrast    Result Date: 2/9/2025  CT HEAD WO CONTRAST, CT PELVIS WO CONTRAST Date of Exam: 2/9/2025 8:21 PM EST Indication: fall, unknown head injury. Comparison: 6/2/2024. Technique: Axial CT images were obtained of the head and pelvis without contrast administration.  Automated exposure control and iterative construction methods were used. Findings: Head: There is no evidence of hemorrhage. There is no mass effect or midline shift. There is no extracerebral collection. Ventricles are normal in size and configuration for patient's stated age.  Posterior fossa is within normal limits. Calvarium and skull base appear intact.   Visualized sinuses show no air fluid levels. A small amount of secretions present within the right sphenoid sinus. Visualized orbits are unremarkable. Pelvis: There is a mildly displaced and impacted fracture of the right femoral neck. Mild varus angulation is present. Total left hip arthroplasty present which appears intact. There is no evidence of additional fracture. No evidence of dislocation. " Intrapelvic contents demonstrate no acute process. Diverticulosis present within the sigmoid colon. No significant inflammatory changes identified. Soft tissues appear grossly unremarkable.     Impression: 1.No acute intracranial process. 2.Mildly displaced and impacted fracture of the right femoral neck. Electronically Signed: Dina Basilio MD  2/9/2025 8:45 PM EST  Workstation ID: [id number]    CT Pelvis Without Contrast    Result Date: 2/9/2025  CT HEAD WO CONTRAST, CT PELVIS WO CONTRAST Date of Exam: 2/9/2025 8:21 PM EST Indication: fall, unknown head injury. Comparison: 6/2/2024. Technique: Axial CT images were obtained of the head and pelvis without contrast administration.  Automated exposure control and iterative construction methods were used. Findings: Head: There is no evidence of hemorrhage. There is no mass effect or midline shift. There is no extracerebral collection. Ventricles are normal in size and configuration for patient's stated age.  Posterior fossa is within normal limits. Calvarium and skull base appear intact.   Visualized sinuses show no air fluid levels. A small amount of secretions present within the right sphenoid sinus. Visualized orbits are unremarkable. Pelvis: There is a mildly displaced and impacted fracture of the right femoral neck. Mild varus angulation is present. Total left hip arthroplasty present which appears intact. There is no evidence of additional fracture. No evidence of dislocation. Intrapelvic contents demonstrate no acute process. Diverticulosis present within the sigmoid colon. No significant inflammatory changes identified. Soft tissues appear grossly unremarkable.     Impression: 1.No acute intracranial process. 2.Mildly displaced and impacted fracture of the right femoral neck. Electronically Signed: Dina Basilio MD  2/9/2025 8:45 PM EST  Workstation ID: [id number]    XR Femur 2 View Right    Result Date: 2/9/2025  XR FEMUR 2 VW RIGHT, XR PELVIS 1 OR 2 VW, XR CHEST 1  VW Date of Exam: 2/9/2025 7:57 PM EST Indication: fall, R hip injury Comparison: None available. Findings: Right femur: There is a mildly displaced and impacted fracture of the right femoral neck. No significant angulation identified. Remaining portions of the femur appear intact. Total right knee arthroplasty present which appears intact. No knee joint effusion. Pelvis: No additional fracture identified. Left hip hemiarthroplasty present. CHEST: There are no airspace consolidations. No pleural fluid. No pneumothorax. The pulmonary vasculature appears within normal limits. The cardiac and mediastinal silhouette appear unremarkable. No acute osseous abnormality identified. Hiatal hernia present.     Impression: Mildly displaced and impacted fracture of the right femoral neck. No additional fracture identified within the pelvis. No acute cardiopulmonary process. Electronically Signed: Dina Basilio MD  2/9/2025 8:27 PM EST  Workstation ID: [id number]    XR Chest 1 View    Result Date: 2/9/2025  XR FEMUR 2 VW RIGHT, XR PELVIS 1 OR 2 VW, XR CHEST 1 VW Date of Exam: 2/9/2025 7:57 PM EST Indication: fall, R hip injury Comparison: None available. Findings: Right femur: There is a mildly displaced and impacted fracture of the right femoral neck. No significant angulation identified. Remaining portions of the femur appear intact. Total right knee arthroplasty present which appears intact. No knee joint effusion. Pelvis: No additional fracture identified. Left hip hemiarthroplasty present. CHEST: There are no airspace consolidations. No pleural fluid. No pneumothorax. The pulmonary vasculature appears within normal limits. The cardiac and mediastinal silhouette appear unremarkable. No acute osseous abnormality identified. Hiatal hernia present.     Impression: Mildly displaced and impacted fracture of the right femoral neck. No additional fracture identified within the pelvis. No acute cardiopulmonary process. Electronically  Signed: Dina Basilio MD  2/9/2025 8:27 PM EST  Workstation ID: [id number]    XR Pelvis 1 or 2 View    Result Date: 2/9/2025  XR FEMUR 2 VW RIGHT, XR PELVIS 1 OR 2 VW, XR CHEST 1 VW Date of Exam: 2/9/2025 7:57 PM EST Indication: fall, R hip injury Comparison: None available. Findings: Right femur: There is a mildly displaced and impacted fracture of the right femoral neck. No significant angulation identified. Remaining portions of the femur appear intact. Total right knee arthroplasty present which appears intact. No knee joint effusion. Pelvis: No additional fracture identified. Left hip hemiarthroplasty present. CHEST: There are no airspace consolidations. No pleural fluid. No pneumothorax. The pulmonary vasculature appears within normal limits. The cardiac and mediastinal silhouette appear unremarkable. No acute osseous abnormality identified. Hiatal hernia present.     Impression: Mildly displaced and impacted fracture of the right femoral neck. No additional fracture identified within the pelvis. No acute cardiopulmonary process. Electronically Signed: Dina Basilio MD  2/9/2025 8:27 PM EST  Workstation ID: [id number]             Results for orders placed during the hospital encounter of 02/09/25    Adult Transthoracic Echo Complete W/ Cont if Necessary Per Protocol    Interpretation Summary    Left ventricular systolic function is normal. Estimated left ventricular EF = 70%    Moderate tricuspid valve regurgitation is present.    Estimated  right ventricular systolic pressure from tricuspid regurgitation is 46 mmHg.    There is a trivial pericardial effusion.      Plan for Follow-up of Pending Labs/Results:     Discharge Details        Discharge Medications        New Medications        Instructions Start Date   acetaminophen 325 MG tablet  Commonly known as: TYLENOL   650 mg, Oral, Every 6 Hours PRN      ipratropium-albuterol 0.5-2.5 mg/3 ml nebulizer  Commonly known as: DUO-NEB   3 mL, Nebulization, Every 4 Hours  PRN      polyethylene glycol 17 g packet  Commonly known as: MIRALAX   17 g, Oral, Daily PRN      sennosides-docusate 8.6-50 MG per tablet  Commonly known as: PERICOLACE   2 tablets, Oral, 2 Times Daily             Changes to Medications        Instructions Start Date   metoprolol tartrate 25 MG tablet  Commonly known as: LOPRESSOR  What changed: how much to take   25 mg, Oral, 2 Times Daily             Continue These Medications        Instructions Start Date   apixaban 2.5 MG tablet tablet  Commonly known as: ELIQUIS   2.5 mg, 2 Times Daily      carbidopa-levodopa  MG per tablet  Commonly known as: SINEMET   1 tablet, 3 Times Daily      fluticasone 50 MCG/ACT nasal spray  Commonly known as: FLONASE   2 sprays, Daily PRN      traZODone 50 MG tablet  Commonly known as: DESYREL   100 mg, Nightly      vitamin D3 125 MCG (5000 UT) capsule capsule   5,000 Units, Daily               No Known Allergies      Discharge Disposition:  Skilled Nursing Facility (DC - External)    Diet:  Hospital:  Diet Order   Procedures    Diet: Regular/House; Feeding Assistance - Nursing; Texture: Regular (IDDSI 7); Fluid Consistency: Thin (IDDSI 0)            Activity:             CODE STATUS:    Code Status and Medical Interventions: No CPR (Do Not Attempt to Resuscitate); Limited Support; No intubation (DNI)   Ordered at: 02/12/25 2019     Medical Intervention Limits:    No intubation (DNI)     Level Of Support Discussed With:    Health Care Surrogate     Code Status (Patient has no pulse and is not breathing):    No CPR (Do Not Attempt to Resuscitate)     Medical Interventions (Patient has pulse or is breathing):    Limited Support       Future Appointments   Date Time Provider Department [place]   2/18/2025 12:30 PM MED 8  [facility]       Additional Instructions for the Follow-ups that You Need to Schedule       Discharge Follow-up with PCP   As directed       Currently Documented PCP:    Nemo Aponte PA    PCP Phone  Number:    [phone]     Follow Up Details: 1 week after discharge from rehab facility        Discharge Follow-up with Specialty: Dr. Patino (orthopedic surgery) 4 weeks post-op   As directed      Specialty: Dr. Patino (orthopedic surgery) 4 weeks post-op                      Everett Bridges MD  02/18/25      Time Spent on Discharge:  I spent  35  minutes on this discharge activity which included: face-to-face encounter with the patient, reviewing the data in the system, coordination of the care with the nursing staff as well as consultants, documentation, and entering orders.           Electronically signed by Everett Bridges MD at 02/18/25 0902

## 2025-02-18 NOTE — DISCHARGE INSTRUCTIONS
Per Dr. Patino (orthopedic surgery):  Bandage may be removed Monday 2/17/25  May shower but no immersion of incision in water  Follow up w/ Dr. Patino 4 weeks

## 2025-02-18 NOTE — PLAN OF CARE
Problem: Adult Inpatient Plan of Care  Goal: Plan of Care Review  Outcome: Progressing  Flowsheets  Taken 2/18/2025 0523 by Eleazar Cerda RN  Plan of Care Reviewed With: patient  Taken 2/17/2025 1517 by Ginger Adame PT  Progress: no change  Goal: Patient-Specific Goal (Individualized)  Outcome: Progressing  Goal: Absence of Hospital-Acquired Illness or Injury  Outcome: Progressing  Intervention: Identify and Manage Fall Risk  Recent Flowsheet Documentation  Taken 2/18/2025 0400 by Eleazar Cerda RN  Safety Promotion/Fall Prevention: safety round/check completed  Taken 2/18/2025 0200 by Eleazar Cerda RN  Safety Promotion/Fall Prevention: safety round/check completed  Taken 2/18/2025 0000 by Eleazar Cerda RN  Safety Promotion/Fall Prevention: safety round/check completed  Taken 2/17/2025 2200 by Eleazar Cerda RN  Safety Promotion/Fall Prevention: safety round/check completed  Taken 2/17/2025 2000 by Eleazar Cerda RN  Safety Promotion/Fall Prevention: safety round/check completed  Intervention: Prevent Skin Injury  Recent Flowsheet Documentation  Taken 2/18/2025 0300 by Eleazar Cerda RN  Body Position:   turned   right  Taken 2/17/2025 2000 by Eleazar Cerda RN  Body Position: legs elevated  Skin Protection: incontinence pads utilized  Goal: Optimal Comfort and Wellbeing  Outcome: Progressing  Goal: Readiness for Transition of Care  Outcome: Progressing     Problem: Skin Injury Risk Increased  Goal: Skin Health and Integrity  Outcome: Progressing  Intervention: Optimize Skin Protection  Recent Flowsheet Documentation  Taken 2/18/2025 0400 by Eleazar Cerda RN  Pressure Reduction Devices: pressure-redistributing mattress utilized  Taken 2/18/2025 0200 by Eleazar Cerda RN  Pressure Reduction Devices: pressure-redistributing mattress utilized  Taken 2/18/2025 0000 by Eleazar Cerda RN  Pressure Reduction Devices: pressure-redistributing mattress utilized  Taken 2/17/2025 2200 by Eleazar Cerda RN  Pressure Reduction Devices:  pressure-redistributing mattress utilized  Taken 2/17/2025 2000 by Eleazar Cerda RN  Activity Management: up in chair  Pressure Reduction Techniques: frequent weight shift encouraged  Pressure Reduction Devices: chair cushion utilized  Skin Protection: incontinence pads utilized     Problem: Fall Injury Risk  Goal: Absence of Fall and Fall-Related Injury  Outcome: Progressing  Intervention: Promote Injury-Free Environment  Recent Flowsheet Documentation  Taken 2/18/2025 0400 by Eleazar Cerda RN  Safety Promotion/Fall Prevention: safety round/check completed  Taken 2/18/2025 0200 by Eleazar Cerda RN  Safety Promotion/Fall Prevention: safety round/check completed  Taken 2/18/2025 0000 by Eleazar Cerda RN  Safety Promotion/Fall Prevention: safety round/check completed  Taken 2/17/2025 2200 by Eleazar Cerda RN  Safety Promotion/Fall Prevention: safety round/check completed  Taken 2/17/2025 2000 by Eleazar Cerda RN  Safety Promotion/Fall Prevention: safety round/check completed     Problem: Comorbidity Management  Goal: Maintenance of COPD Symptom Control  Outcome: Progressing  Goal: Maintenance of Heart Failure Symptom Control  Outcome: Progressing  Goal: Blood Pressure in Desired Range  Outcome: Progressing  Goal: Maintenance of Osteoarthritis Symptom Control  Outcome: Progressing  Intervention: Maintain Osteoarthritis Symptom Control  Recent Flowsheet Documentation  Taken 2/17/2025 2000 by Eleazar Cerda RN  Activity Management: up in chair   Goal Outcome Evaluation:  Plan of Care Reviewed With: patient        Progress: no change

## 2025-02-18 NOTE — PLAN OF CARE
Goal Outcome Evaluation:  Plan of Care Reviewed With: patient        Progress: improving  Outcome Evaluation: DC'd to [facility], report called to WILLIAM Lou

## 2025-02-20 ENCOUNTER — APPOINTMENT (OUTPATIENT)
Dept: GENERAL RADIOLOGY | Facility: HOSPITAL | Age: 85
End: 2025-02-20
Payer: MEDICARE

## 2025-02-20 ENCOUNTER — APPOINTMENT (OUTPATIENT)
Dept: CT IMAGING | Facility: HOSPITAL | Age: 85
End: 2025-02-20
Payer: MEDICARE

## 2025-02-20 ENCOUNTER — HOSPITAL ENCOUNTER (INPATIENT)
Facility: HOSPITAL | Age: 85
LOS: 6 days | Discharge: HOME | End: 2025-02-26
Attending: STUDENT IN AN ORGANIZED HEALTH CARE EDUCATION/TRAINING PROGRAM | Admitting: INTERNAL MEDICINE
Payer: MEDICARE

## 2025-02-20 DIAGNOSIS — Z79.01 CHRONIC ANTICOAGULATION: ICD-10-CM

## 2025-02-20 DIAGNOSIS — D62 ACUTE BLOOD LOSS ANEMIA: ICD-10-CM

## 2025-02-20 DIAGNOSIS — R57.8 HEMORRHAGIC SHOCK: Primary | ICD-10-CM

## 2025-02-20 DIAGNOSIS — G20.A1 PARKINSON'S DISEASE, UNSPECIFIED WHETHER DYSKINESIA PRESENT, UNSPECIFIED WHETHER MANIFESTATIONS FLUCTUATE: ICD-10-CM

## 2025-02-20 DIAGNOSIS — K92.2 GASTROINTESTINAL HEMORRHAGE, UNSPECIFIED GASTROINTESTINAL HEMORRHAGE TYPE: ICD-10-CM

## 2025-02-20 LAB
ABO GROUP BLD: NORMAL
ALBUMIN SERPL-MCNC: 2.9 G/DL (ref 3.5–5.2)
ALBUMIN/GLOB SERPL: 1.2 G/DL
ALP SERPL-CCNC: 76 U/L (ref 39–117)
ALT SERPL W P-5'-P-CCNC: <5 U/L (ref 1–33)
ANION GAP SERPL CALCULATED.3IONS-SCNC: 11 MMOL/L (ref 5–15)
APTT PPP: 36.6 SECONDS (ref 60–90)
AST SERPL-CCNC: 16 U/L (ref 1–32)
BACTERIA UR QL AUTO: ABNORMAL /HPF
BASOPHILS # BLD AUTO: 0.05 10*3/MM3 (ref 0–0.2)
BASOPHILS NFR BLD AUTO: 0.4 % (ref 0–1.5)
BILIRUB SERPL-MCNC: 0.3 MG/DL (ref 0–1.2)
BILIRUB UR QL STRIP: NEGATIVE
BLD GP AB SCN SERPL QL: NEGATIVE
BUN BLDA-MCNC: 33 MG/DL (ref 8–26)
BUN SERPL-MCNC: 32 MG/DL (ref 8–23)
BUN/CREAT SERPL: 56.1 (ref 7–25)
CA-I BLDA-SCNC: 1.13 MMOL/L (ref 1.2–1.32)
CALCIUM SPEC-SCNC: 8.8 MG/DL (ref 8.6–10.5)
CHLORIDE BLDA-SCNC: 102 MMOL/L (ref 98–109)
CHLORIDE SERPL-SCNC: 101 MMOL/L (ref 98–107)
CLARITY UR: ABNORMAL
CO2 BLDA-SCNC: 23 MMOL/L (ref 24–29)
CO2 SERPL-SCNC: 24 MMOL/L (ref 22–29)
COLOR UR: YELLOW
CREAT BLDA-MCNC: 0.7 MG/DL (ref 0.6–1.3)
CREAT SERPL-MCNC: 0.57 MG/DL (ref 0.57–1)
D-LACTATE SERPL-SCNC: 1.6 MMOL/L (ref 0.5–2)
DEPRECATED RDW RBC AUTO: 48.4 FL (ref 37–54)
DEVELOPER EXPIRATION DATE: ABNORMAL
DEVELOPER LOT NUMBER: ABNORMAL
EGFRCR SERPLBLD CKD-EPI 2021: 84.3 ML/MIN/1.73
EGFRCR SERPLBLD CKD-EPI 2021: 88.6 ML/MIN/1.73
EOSINOPHIL # BLD AUTO: 0.05 10*3/MM3 (ref 0–0.4)
EOSINOPHIL NFR BLD AUTO: 0.4 % (ref 0.3–6.2)
ERYTHROCYTE [DISTWIDTH] IN BLOOD BY AUTOMATED COUNT: 15.4 % (ref 12.3–15.4)
EXPIRATION DATE: ABNORMAL
FECAL OCCULT BLOOD SCREEN, POC: POSITIVE
GEN 5 1HR TROPONIN T REFLEX: 29 NG/L
GLOBULIN UR ELPH-MCNC: 2.4 GM/DL
GLUCOSE BLDC GLUCOMTR-MCNC: 115 MG/DL (ref 70–130)
GLUCOSE BLDC GLUCOMTR-MCNC: 128 MG/DL (ref 70–130)
GLUCOSE BLDC GLUCOMTR-MCNC: 133 MG/DL (ref 70–130)
GLUCOSE SERPL-MCNC: 131 MG/DL (ref 65–99)
GLUCOSE UR STRIP-MCNC: NEGATIVE MG/DL
HCT VFR BLD AUTO: 17.8 % (ref 34–46.6)
HCT VFR BLD AUTO: 31.6 % (ref 34–46.6)
HCT VFR BLDA CALC: 16 % (ref 38–51)
HGB BLD-MCNC: 10.2 G/DL (ref 12–15.9)
HGB BLD-MCNC: 5.5 G/DL (ref 12–15.9)
HGB BLDA-MCNC: 5.4 G/DL (ref 12–17)
HGB UR QL STRIP.AUTO: ABNORMAL
HYALINE CASTS UR QL AUTO: ABNORMAL /LPF
IMM GRANULOCYTES # BLD AUTO: 0.27 10*3/MM3 (ref 0–0.05)
IMM GRANULOCYTES NFR BLD AUTO: 2 % (ref 0–0.5)
INR PPP: 1.18 (ref 0.89–1.12)
KETONES UR QL STRIP: NEGATIVE
LEUKOCYTE ESTERASE UR QL STRIP.AUTO: ABNORMAL
LYMPHOCYTES # BLD AUTO: 4.26 10*3/MM3 (ref 0.7–3.1)
LYMPHOCYTES NFR BLD AUTO: 31.4 % (ref 19.6–45.3)
Lab: ABNORMAL
MAGNESIUM SERPL-MCNC: 1.9 MG/DL (ref 1.6–2.4)
MCH RBC QN AUTO: 28.1 PG (ref 26.6–33)
MCHC RBC AUTO-ENTMCNC: 30.9 G/DL (ref 31.5–35.7)
MCV RBC AUTO: 90.8 FL (ref 79–97)
MONOCYTES # BLD AUTO: 0.58 10*3/MM3 (ref 0.1–0.9)
MONOCYTES NFR BLD AUTO: 4.3 % (ref 5–12)
NEGATIVE CONTROL: NEGATIVE
NEUTROPHILS NFR BLD AUTO: 61.5 % (ref 42.7–76)
NEUTROPHILS NFR BLD AUTO: 8.36 10*3/MM3 (ref 1.7–7)
NITRITE UR QL STRIP: NEGATIVE
NRBC BLD AUTO-RTO: 0 /100 WBC (ref 0–0.2)
NT-PROBNP SERPL-MCNC: 336.2 PG/ML (ref 0–1800)
PH UR STRIP.AUTO: 6 [PH] (ref 5–8)
PHOSPHATE SERPL-MCNC: 3.4 MG/DL (ref 2.5–4.5)
PLATELET # BLD AUTO: 351 10*3/MM3 (ref 140–450)
PMV BLD AUTO: 9.6 FL (ref 6–12)
POSITIVE CONTROL: POSITIVE
POTASSIUM BLDA-SCNC: 4.5 MMOL/L (ref 3.5–4.9)
POTASSIUM SERPL-SCNC: 4.8 MMOL/L (ref 3.5–5.2)
PROCALCITONIN SERPL-MCNC: 0.19 NG/ML (ref 0–0.25)
PROT SERPL-MCNC: 5.3 G/DL (ref 6–8.5)
PROT UR QL STRIP: NEGATIVE
PROTHROMBIN TIME: 15.1 SECONDS (ref 12.2–14.5)
RBC # BLD AUTO: 1.96 10*6/MM3 (ref 3.77–5.28)
RBC # UR STRIP: ABNORMAL /HPF
REF LAB TEST METHOD: ABNORMAL
RH BLD: POSITIVE
SODIUM BLD-SCNC: 133 MMOL/L (ref 138–146)
SODIUM SERPL-SCNC: 136 MMOL/L (ref 136–145)
SP GR UR STRIP: 1.04 (ref 1–1.03)
SQUAMOUS #/AREA URNS HPF: ABNORMAL /HPF
T&S EXPIRATION DATE: NORMAL
TROPONIN T % DELTA: 12
TROPONIN T NUMERIC DELTA: 3 NG/L
TROPONIN T SERPL HS-MCNC: 26 NG/L
TSH SERPL DL<=0.05 MIU/L-ACNC: 2.91 UIU/ML (ref 0.27–4.2)
UROBILINOGEN UR QL STRIP: ABNORMAL
WBC # UR STRIP: ABNORMAL /HPF
WBC NRBC COR # BLD AUTO: 13.57 10*3/MM3 (ref 3.4–10.8)

## 2025-02-20 PROCEDURE — 86923 COMPATIBILITY TEST ELECTRIC: CPT

## 2025-02-20 PROCEDURE — 85018 HEMOGLOBIN: CPT | Performed by: INTERNAL MEDICINE

## 2025-02-20 PROCEDURE — 71045 X-RAY EXAM CHEST 1 VIEW: CPT

## 2025-02-20 PROCEDURE — 25510000001 IOPAMIDOL PER 1 ML: Performed by: STUDENT IN AN ORGANIZED HEALTH CARE EDUCATION/TRAINING PROGRAM

## 2025-02-20 PROCEDURE — 86900 BLOOD TYPING SEROLOGIC ABO: CPT | Performed by: STUDENT IN AN ORGANIZED HEALTH CARE EDUCATION/TRAINING PROGRAM

## 2025-02-20 PROCEDURE — 99284 EMERGENCY DEPT VISIT MOD MDM: CPT | Performed by: PHYSICIAN ASSISTANT

## 2025-02-20 PROCEDURE — 83605 ASSAY OF LACTIC ACID: CPT | Performed by: STUDENT IN AN ORGANIZED HEALTH CARE EDUCATION/TRAINING PROGRAM

## 2025-02-20 PROCEDURE — 86900 BLOOD TYPING SEROLOGIC ABO: CPT

## 2025-02-20 PROCEDURE — 25010000002 PROTHROMBIN COMPLEX CONC HUMAN 1000 UNITS KIT: Performed by: STUDENT IN AN ORGANIZED HEALTH CARE EDUCATION/TRAINING PROGRAM

## 2025-02-20 PROCEDURE — 84484 ASSAY OF TROPONIN QUANT: CPT | Performed by: STUDENT IN AN ORGANIZED HEALTH CARE EDUCATION/TRAINING PROGRAM

## 2025-02-20 PROCEDURE — 83880 ASSAY OF NATRIURETIC PEPTIDE: CPT | Performed by: STUDENT IN AN ORGANIZED HEALTH CARE EDUCATION/TRAINING PROGRAM

## 2025-02-20 PROCEDURE — 93005 ELECTROCARDIOGRAM TRACING: CPT | Performed by: STUDENT IN AN ORGANIZED HEALTH CARE EDUCATION/TRAINING PROGRAM

## 2025-02-20 PROCEDURE — 99285 EMERGENCY DEPT VISIT HI MDM: CPT

## 2025-02-20 PROCEDURE — 84443 ASSAY THYROID STIM HORMONE: CPT | Performed by: STUDENT IN AN ORGANIZED HEALTH CARE EDUCATION/TRAINING PROGRAM

## 2025-02-20 PROCEDURE — 85025 COMPLETE CBC W/AUTO DIFF WBC: CPT | Performed by: STUDENT IN AN ORGANIZED HEALTH CARE EDUCATION/TRAINING PROGRAM

## 2025-02-20 PROCEDURE — 83735 ASSAY OF MAGNESIUM: CPT | Performed by: STUDENT IN AN ORGANIZED HEALTH CARE EDUCATION/TRAINING PROGRAM

## 2025-02-20 PROCEDURE — 36415 COLL VENOUS BLD VENIPUNCTURE: CPT

## 2025-02-20 PROCEDURE — 25010000002 OCTREOTIDE PER 25 MCG: Performed by: STUDENT IN AN ORGANIZED HEALTH CARE EDUCATION/TRAINING PROGRAM

## 2025-02-20 PROCEDURE — 86901 BLOOD TYPING SEROLOGIC RH(D): CPT | Performed by: STUDENT IN AN ORGANIZED HEALTH CARE EDUCATION/TRAINING PROGRAM

## 2025-02-20 PROCEDURE — 82948 REAGENT STRIP/BLOOD GLUCOSE: CPT

## 2025-02-20 PROCEDURE — 84100 ASSAY OF PHOSPHORUS: CPT | Performed by: STUDENT IN AN ORGANIZED HEALTH CARE EDUCATION/TRAINING PROGRAM

## 2025-02-20 PROCEDURE — 86920 COMPATIBILITY TEST SPIN: CPT

## 2025-02-20 PROCEDURE — 85014 HEMATOCRIT: CPT

## 2025-02-20 PROCEDURE — 85730 THROMBOPLASTIN TIME PARTIAL: CPT | Performed by: STUDENT IN AN ORGANIZED HEALTH CARE EDUCATION/TRAINING PROGRAM

## 2025-02-20 PROCEDURE — 80047 BASIC METABLC PNL IONIZED CA: CPT

## 2025-02-20 PROCEDURE — P9016 RBC LEUKOCYTES REDUCED: HCPCS

## 2025-02-20 PROCEDURE — 80053 COMPREHEN METABOLIC PANEL: CPT | Performed by: STUDENT IN AN ORGANIZED HEALTH CARE EDUCATION/TRAINING PROGRAM

## 2025-02-20 PROCEDURE — 36430 TRANSFUSION BLD/BLD COMPNT: CPT

## 2025-02-20 PROCEDURE — 74178 CT ABD&PLV WO CNTR FLWD CNTR: CPT

## 2025-02-20 PROCEDURE — 87040 BLOOD CULTURE FOR BACTERIA: CPT | Performed by: STUDENT IN AN ORGANIZED HEALTH CARE EDUCATION/TRAINING PROGRAM

## 2025-02-20 PROCEDURE — 85610 PROTHROMBIN TIME: CPT | Performed by: STUDENT IN AN ORGANIZED HEALTH CARE EDUCATION/TRAINING PROGRAM

## 2025-02-20 PROCEDURE — 99291 CRITICAL CARE FIRST HOUR: CPT

## 2025-02-20 PROCEDURE — 84145 PROCALCITONIN (PCT): CPT

## 2025-02-20 PROCEDURE — 82270 OCCULT BLOOD FECES: CPT | Performed by: STUDENT IN AN ORGANIZED HEALTH CARE EDUCATION/TRAINING PROGRAM

## 2025-02-20 PROCEDURE — 86850 RBC ANTIBODY SCREEN: CPT | Performed by: STUDENT IN AN ORGANIZED HEALTH CARE EDUCATION/TRAINING PROGRAM

## 2025-02-20 PROCEDURE — 81001 URINALYSIS AUTO W/SCOPE: CPT | Performed by: STUDENT IN AN ORGANIZED HEALTH CARE EDUCATION/TRAINING PROGRAM

## 2025-02-20 PROCEDURE — 85014 HEMATOCRIT: CPT | Performed by: INTERNAL MEDICINE

## 2025-02-20 RX ORDER — BISACODYL 5 MG/1
5 TABLET, DELAYED RELEASE ORAL DAILY PRN
Status: DISCONTINUED | OUTPATIENT
Start: 2025-02-20 | End: 2025-02-26 | Stop reason: HOSPADM

## 2025-02-20 RX ORDER — CARBIDOPA AND LEVODOPA 25; 100 MG/1; MG/1
1 TABLET ORAL 3 TIMES DAILY
Status: DISCONTINUED | OUTPATIENT
Start: 2025-02-20 | End: 2025-02-26 | Stop reason: HOSPADM

## 2025-02-20 RX ORDER — BISACODYL 10 MG
10 SUPPOSITORY, RECTAL RECTAL DAILY PRN
Status: DISCONTINUED | OUTPATIENT
Start: 2025-02-20 | End: 2025-02-26 | Stop reason: HOSPADM

## 2025-02-20 RX ORDER — PANTOPRAZOLE SODIUM 40 MG/10ML
40 INJECTION, POWDER, LYOPHILIZED, FOR SOLUTION INTRAVENOUS ONCE
Status: COMPLETED | OUTPATIENT
Start: 2025-02-20 | End: 2025-02-20

## 2025-02-20 RX ORDER — IOPAMIDOL 755 MG/ML
85 INJECTION, SOLUTION INTRAVASCULAR
Status: COMPLETED | OUTPATIENT
Start: 2025-02-20 | End: 2025-02-20

## 2025-02-20 RX ORDER — ACETAMINOPHEN 650 MG/1
650 SUPPOSITORY RECTAL EVERY 4 HOURS PRN
Status: DISCONTINUED | OUTPATIENT
Start: 2025-02-20 | End: 2025-02-26 | Stop reason: HOSPADM

## 2025-02-20 RX ORDER — ONDANSETRON 2 MG/ML
4 INJECTION INTRAMUSCULAR; INTRAVENOUS EVERY 6 HOURS PRN
Status: DISCONTINUED | OUTPATIENT
Start: 2025-02-20 | End: 2025-02-26 | Stop reason: HOSPADM

## 2025-02-20 RX ORDER — OCTREOTIDE ACETATE 50 UG/ML
50 INJECTION, SOLUTION INTRAVENOUS; SUBCUTANEOUS ONCE
Status: COMPLETED | OUTPATIENT
Start: 2025-02-20 | End: 2025-02-20

## 2025-02-20 RX ORDER — DEXTROSE MONOHYDRATE AND SODIUM CHLORIDE 5; .45 G/100ML; G/100ML
100 INJECTION, SOLUTION INTRAVENOUS CONTINUOUS
Status: ACTIVE | OUTPATIENT
Start: 2025-02-20 | End: 2025-02-21

## 2025-02-20 RX ORDER — AMOXICILLIN 250 MG
2 CAPSULE ORAL 2 TIMES DAILY PRN
Status: DISCONTINUED | OUTPATIENT
Start: 2025-02-20 | End: 2025-02-26 | Stop reason: HOSPADM

## 2025-02-20 RX ORDER — ACETAMINOPHEN 325 MG/1
650 TABLET ORAL EVERY 4 HOURS PRN
Status: DISCONTINUED | OUTPATIENT
Start: 2025-02-20 | End: 2025-02-26 | Stop reason: HOSPADM

## 2025-02-20 RX ORDER — IPRATROPIUM BROMIDE AND ALBUTEROL SULFATE 2.5; .5 MG/3ML; MG/3ML
3 SOLUTION RESPIRATORY (INHALATION) EVERY 6 HOURS PRN
Status: DISCONTINUED | OUTPATIENT
Start: 2025-02-20 | End: 2025-02-26 | Stop reason: HOSPADM

## 2025-02-20 RX ORDER — POLYETHYLENE GLYCOL 3350 17 G/17G
17 POWDER, FOR SOLUTION ORAL DAILY PRN
Status: DISCONTINUED | OUTPATIENT
Start: 2025-02-20 | End: 2025-02-26 | Stop reason: HOSPADM

## 2025-02-20 RX ORDER — NITROGLYCERIN 0.4 MG/1
0.4 TABLET SUBLINGUAL
Status: DISCONTINUED | OUTPATIENT
Start: 2025-02-20 | End: 2025-02-26 | Stop reason: HOSPADM

## 2025-02-20 RX ORDER — TRAZODONE HYDROCHLORIDE 50 MG/1
100 TABLET ORAL NIGHTLY
Status: DISCONTINUED | OUTPATIENT
Start: 2025-02-20 | End: 2025-02-26 | Stop reason: HOSPADM

## 2025-02-20 RX ADMIN — MUPIROCIN 1 APPLICATION: 20 OINTMENT TOPICAL at 20:00

## 2025-02-20 RX ADMIN — DEXTROSE AND SODIUM CHLORIDE 100 ML/HR: 5; 450 INJECTION, SOLUTION INTRAVENOUS at 17:54

## 2025-02-20 RX ADMIN — IOPAMIDOL 85 ML: 755 INJECTION, SOLUTION INTRAVENOUS at 13:57

## 2025-02-20 RX ADMIN — PANTOPRAZOLE SODIUM 8 MG/HR: 40 INJECTION, POWDER, FOR SOLUTION INTRAVENOUS at 18:09

## 2025-02-20 RX ADMIN — PANTOPRAZOLE SODIUM 8 MG/HR: 40 INJECTION, POWDER, FOR SOLUTION INTRAVENOUS at 22:48

## 2025-02-20 RX ADMIN — OCTREOTIDE ACETATE 50 MCG: 50 INJECTION, SOLUTION INTRAVENOUS; SUBCUTANEOUS at 15:50

## 2025-02-20 RX ADMIN — Medication 2176 UNITS: at 14:36

## 2025-02-20 RX ADMIN — PANTOPRAZOLE SODIUM 40 MG: 40 INJECTION, POWDER, FOR SOLUTION INTRAVENOUS at 14:11

## 2025-02-20 NOTE — Clinical Note
Level of Care: Critical Care [6]   Admitting Physician: ELDON WATSON [[id number]]   Attending Physician: ELDON WATSON [[id number]]

## 2025-02-20 NOTE — LETTER
EMS Transport Request  For use at [facility], [place], [place], [place], and [place] only   Patient Name: Fanny Boyce : 1940   Weight:50.9 kg (112 lb 3.4 oz) Pick-up Location: [facility]:    Insurance: MEDICARE Auth End Date:    Pre-Cert #: D/C Summary complete:    Destination: [facility]    Contact Precautions:    Equipment (O2, Fluids, etc.):    Arrive By Date/Time: 25 Stretcher/WC: Stretcher   CM Requesting: Bambi Girard RN Ext: [phone]   Notes/Medical Necessity:      ______________________________________________________________________    *Only 2 patient bags OR 1 carry-on size bag are permitted.  Wheelchairs and walkers CANNOT transported with the patient. Acknowledge: Yes

## 2025-02-20 NOTE — ED PROVIDER NOTES
Note EMERGENCY DEPARTMENT ENCOUNTER    Pt Name: Fanny Boyce  MRN: [id number]  Pt :   1940  Room Number:  N234/1  Date of encounter:  2025  PCP: Nemo Aponte PA  ED Provider: Everett Swanson MD    Historian: EMS, patient      HPI:  Chief Complaint: Anemia, weakness        Context: Fanny Boyce is a 84-year-old woman sent from [facility] for anemia.  They have not noticed any source of bleeding she was on Eliquis until yesterday.  They reportedly checked her hemoglobin this morning it was 5.5 they rechecked it and it was 5.6.  EMS reports she has been hemodynamically stable en route upon arrival here she says she does not feel well complaining of generalized weakness but denies any other pain or complaints.       PAST MEDICAL HISTORY  Past Medical History:   Diagnosis Date    Atrial fibrillation     COPD (chronic obstructive pulmonary disease)     Dementia     History of lung cancer     Parkinson disease     Pulmonary embolism          PAST SURGICAL HISTORY  Past Surgical History:   Procedure Laterality Date    HIP HEMIARTHROPLASTY Right 2/10/2025    Procedure: HIP HEMIARTHROPLASTY RIGHT;  Surgeon: Frandy Patino MD;  Location: [facility];  Service: Orthopedics;  Laterality: Right;    JOINT REPLACEMENT      LUNG CANCER SURGERY           FAMILY HISTORY  Family History   Problem Relation Age of Onset    Heart disease Mother     Heart disease Father     Aneurysm Father          SOCIAL HISTORY  Social History     Socioeconomic History    Marital status: Legally    Tobacco Use    Smoking status: Former     Current packs/day: 1.00     Average packs/day: 1 pack/day for 40.0 years (40.0 ttl pk-yrs)     Types: Cigarettes     Passive exposure: Past    Smokeless tobacco: Never   Vaping Use    Vaping status: Never Used   Substance and Sexual Activity    Alcohol use: Never    Drug use: Never    Sexual activity: Not Currently         ALLERGIES  Morphine,  Latex, and Wound dressing adhesive        REVIEW OF SYSTEMS  Review of Systems       All systems reviewed and negative except for those discussed in HPI.       PHYSICAL EXAM    I have reviewed the triage vital signs and nursing notes.    ED Triage Vitals [02/20/25 1337]   Temp Heart Rate Resp BP SpO2   98.2 °F (36.8 °C) 73 18 (!) 99/36 93 %      Temp src Heart Rate Source Patient Position BP Location FiO2 (%)   Oral Monitor Lying Right arm --       Physical Exam  GENERAL:   Appears in no acute distress.   HENT: Nares patent.  EYES: No scleral icterus.  CV: Regular rhythm, regular rate.  RESPIRATORY: Normal effort.  No audible wheezes, rales or rhonchi.  ABDOMEN: Soft, nontender  MUSCULOSKELETAL: No deformities.  Rectal: No active bleeding but dark stool with positive Hemoccult  NEURO: Alert, moves all extremities, follows commands.  SKIN: Warm, dry, no rash visualized.      LAB RESULTS  Recent Results (from the past 24 hours)   Comprehensive Metabolic Panel    Collection Time: 02/20/25  1:35 PM    Specimen: Arm, Left; Blood   Result Value Ref Range    Glucose 131 (H) 65 - 99 mg/dL    BUN 32 (H) 8 - 23 mg/dL    Creatinine 0.57 0.57 - 1.00 mg/dL    Sodium 136 136 - 145 mmol/L    Potassium 4.8 3.5 - 5.2 mmol/L    Chloride 101 98 - 107 mmol/L    CO2 24.0 22.0 - 29.0 mmol/L    Calcium 8.8 8.6 - 10.5 mg/dL    Total Protein 5.3 (L) 6.0 - 8.5 g/dL    Albumin 2.9 (L) 3.5 - 5.2 g/dL    ALT (SGPT) <5 1 - 33 U/L    AST (SGOT) 16 1 - 32 U/L    Alkaline Phosphatase 76 39 - 117 U/L    Total Bilirubin 0.3 0.0 - 1.2 mg/dL    Globulin 2.4 gm/dL    A/G Ratio 1.2 g/dL    BUN/Creatinine Ratio 56.1 (H) 7.0 - 25.0    Anion Gap 11.0 5.0 - 15.0 mmol/L    eGFR 88.6 >60.0 mL/min/1.73   Protime-INR    Collection Time: 02/20/25  1:35 PM    Specimen: Arm, Left; Blood   Result Value Ref Range    Protime 15.1 (H) 12.2 - 14.5 Seconds    INR 1.18 (H) 0.89 - 1.12   aPTT    Collection Time: 02/20/25  1:35 PM    Specimen: Arm, Left; Blood   Result  Value Ref Range    PTT 36.6 (L) 60.0 - 90.0 seconds   High Sensitivity Troponin T    Collection Time: 02/20/25  1:35 PM    Specimen: Arm, Left; Blood   Result Value Ref Range    HS Troponin T 26 (H) <14 ng/L   BNP    Collection Time: 02/20/25  1:35 PM    Specimen: Arm, Left; Blood   Result Value Ref Range    proBNP 336.2 0.0 - 1,800.0 pg/mL   Lactic Acid, Plasma    Collection Time: 02/20/25  1:35 PM    Specimen: Arm, Left; Blood   Result Value Ref Range    Lactate 1.6 0.5 - 2.0 mmol/L   Magnesium    Collection Time: 02/20/25  1:35 PM    Specimen: Arm, Left; Blood   Result Value Ref Range    Magnesium 1.9 1.6 - 2.4 mg/dL   Phosphorus    Collection Time: 02/20/25  1:35 PM    Specimen: Arm, Left; Blood   Result Value Ref Range    Phosphorus 3.4 2.5 - 4.5 mg/dL   TSH Rfx On Abnormal To Free T4    Collection Time: 02/20/25  1:35 PM    Specimen: Arm, Left; Blood   Result Value Ref Range    TSH 2.910 0.270 - 4.200 uIU/mL   CBC Auto Differential    Collection Time: 02/20/25  1:35 PM    Specimen: Arm, Left; Blood   Result Value Ref Range    WBC 13.57 (H) 3.40 - 10.80 10*3/mm3    RBC 1.96 (L) 3.77 - 5.28 10*6/mm3    Hemoglobin 5.5 (C) 12.0 - 15.9 g/dL    Hematocrit 17.8 (C) 34.0 - 46.6 %    MCV 90.8 79.0 - 97.0 fL    MCH 28.1 26.6 - 33.0 pg    MCHC 30.9 (L) 31.5 - 35.7 g/dL    RDW 15.4 12.3 - 15.4 %    RDW-SD 48.4 37.0 - 54.0 fl    MPV 9.6 6.0 - 12.0 fL    Platelets 351 140 - 450 10*3/mm3    Neutrophil % 61.5 42.7 - 76.0 %    Lymphocyte % 31.4 19.6 - 45.3 %    Monocyte % 4.3 (L) 5.0 - 12.0 %    Eosinophil % 0.4 0.3 - 6.2 %    Basophil % 0.4 0.0 - 1.5 %    Immature Grans % 2.0 (H) 0.0 - 0.5 %    Neutrophils, Absolute 8.36 (H) 1.70 - 7.00 10*3/mm3    Lymphocytes, Absolute 4.26 (H) 0.70 - 3.10 10*3/mm3    Monocytes, Absolute 0.58 0.10 - 0.90 10*3/mm3    Eosinophils, Absolute 0.05 0.00 - 0.40 10*3/mm3    Basophils, Absolute 0.05 0.00 - 0.20 10*3/mm3    Immature Grans, Absolute 0.27 (H) 0.00 - 0.05 10*3/mm3    nRBC 0.0 0.0 - 0.2  /100 WBC   POC Occult Blood Stool    Collection Time: 02/20/25  1:39 PM    Specimen: Stool   Result Value Ref Range    Fecal Occult Blood Positive (A)     Lot Number [id number]     Expiration Date 4/27     DEVELOPER LOT NUMBER [id number]     DEVELOPER EXPIRATION DATE 12/27     Positive Control Positive     Negative Control Negative    ECG 12 Lead Electrolyte Imbalance    Collection Time: 02/20/25  1:40 PM   Result Value Ref Range    QT Interval 422 ms    QTC Interval 474 ms   Type & Screen    Collection Time: 02/20/25  1:47 PM    Specimen: Arm, Left; Blood   Result Value Ref Range    ABO Type O     RH type Positive     Antibody Screen Negative     T&S Expiration Date 2/23/2025 11:59:59 PM    High Sensitivity Troponin T 1Hr    Collection Time: 02/20/25  2:39 PM    Specimen: Blood   Result Value Ref Range    HS Troponin T 29 (H) <14 ng/L    Troponin T Numeric Delta 3 ng/L    Troponin T % Delta 12 Abnormal if >/= 20%   Procalcitonin    Collection Time: 02/20/25  2:39 PM    Specimen: Blood   Result Value Ref Range    Procalcitonin 0.19 0.00 - 0.25 ng/mL   Prepare RBC, 2 Units    Collection Time: 02/20/25  2:45 PM   Result Value Ref Range    Product Code P3354L88     Unit Number [id number]     UNIT  ABO O     UNIT  RH POS     Crossmatch Interpretation Compatible     Dispense Status XM     Blood Expiration Date 202503132359     Blood Type Barcode 5100     Product Code A3931S31     Unit Number [id number]     UNIT  ABO O     UNIT  RH POS     Crossmatch Interpretation Compatible     Dispense Status XM     Blood Expiration Date 202503132359     Blood Type Barcode 5100    Prepare RBC, 2 Units    Collection Time: 02/20/25  4:21 PM   Result Value Ref Range    Product Code I5546R21     Unit Number [id number]     UNIT  ABO O     UNIT  RH NEG     Crossmatch Interpretation Compatible     Dispense Status IS     Blood Expiration Date 202503182359     Blood Type Barcode 9500     Product Code A7412Z50     Unit Number  [id number]     UNIT  ABO O     UNIT  RH NEG     Crossmatch Interpretation Compatible     Dispense Status IS     Blood Expiration Date 488771556487     Blood Type Barcode 9500    POC Glucose Once    Collection Time: 02/20/25  4:25 PM    Specimen: Blood   Result Value Ref Range    Glucose 115 70 - 130 mg/dL   Hemoglobin & Hematocrit, Blood    Collection Time: 02/20/25  5:00 PM    Specimen: Blood   Result Value Ref Range    Hemoglobin 10.2 (L) 12.0 - 15.9 g/dL    Hematocrit 31.6 (L) 34.0 - 46.6 %   Urinalysis With Microscopic If Indicated (No Culture) - Urine, Clean Catch    Collection Time: 02/20/25  6:46 PM    Specimen: Urine, Clean Catch   Result Value Ref Range    Color, UA Yellow Yellow, Straw    Appearance, UA Cloudy (A) Clear    pH, UA 6.0 5.0 - 8.0    Specific Gravity, UA 1.040 (H) 1.001 - 1.030    Glucose, UA Negative Negative    Ketones, UA Negative Negative    Bilirubin, UA Negative Negative    Blood, UA Trace (A) Negative    Protein, UA Negative Negative    Leuk Esterase, UA Moderate (2+) (A) Negative    Nitrite, UA Negative Negative    Urobilinogen, UA 0.2 E.U./dL 0.2 - 1.0 E.U./dL   Urinalysis, Microscopic Only - Urine, Clean Catch    Collection Time: 02/20/25  6:46 PM    Specimen: Urine, Clean Catch   Result Value Ref Range    RBC, UA 3-5 (A) None Seen, 0-2 /HPF    WBC, UA Too Numerous to Count (A) None Seen, 0-2 /HPF    Bacteria, UA 4+ (A) None Seen, Trace /HPF    Squamous Epithelial Cells, UA None Seen None Seen, 0-2 /HPF    Hyaline Casts, UA None Seen 0 - 6 /LPF    Methodology Automated Microscopy        If labs were ordered, I independently reviewed the results and considered them in treating the patient.        RADIOLOGY  XR Chest 1 View    Result Date: 2/20/2025  XR CHEST 1 VW Date of Exam: 2/20/2025 4:39 PM EST Indication: Follow up Comparison: Chest radiograph 2/14/2015 Findings: Stable cardiomediastinal silhouette. Much less conspicuous pleural effusions, likely trace residual bilaterally.  Prominence of the central vasculature and interstitium also improving. Mild asymmetric retrocardiac opacities slightly improved from prior favoring atelectasis. Stable postsurgical changes on the right. No worsening consolidation, edema or pneumothorax. Degenerative related osseous change. Hiatal hernia stable from prior.     Impression: Improving sequelae of CHF exacerbation. Improving left lower lobe opacities which could reflect atelectasis. Electronically Signed: Bolivar Olivier MD  2/20/2025 5:15 PM EST  Workstation ID: [id number]    CT Abdomen Pelvis With & Without Contrast    Result Date: 2/20/2025  CT ABDOMEN PELVIS W WO CONTRAST Date of Exam: 2/20/2025 1:52 PM EST Indication: transfusion level anemia with positive hemoccult, stool is dark. Comparison: None available. Technique: Axial CT images were obtained of the abdomen and pelvis before and after the uneventful intravenous administration of 85 mL Isovue-370. Sagittal and coronal reconstructions were performed.  Automated exposure control and iterative reconstruction methods were used. Findings: Liver: The liver is unremarkable in morphology. Left paddock lobe cysts and additional subcentimeter liver hypodensities which are too small to characterize. No biliary dilation is seen. Gallbladder: Surgically absent Pancreas: Unremarkable. Spleen: Probable splenic cyst. Spleen is otherwise unremarkable. Adrenal glands: Unremarkable. Genitourinary tract: Small bilateral renal cysts and subcentimeter hypodensities which are too small to characterize. 5 mm nonobstructing calculus within the right kidney. No hydronephrosis is seen. The visualized portions of the ureters are unremarkable. Distal ureters and urinary bladder are partially obscured due to beam hardening artifact arising from bilateral hip arthroplasties. Gastrointestinal tract: Colonic diverticulosis is present. Large hiatal hernia with paraesophageal component. Moderate colonic stool noted. Hollow  viscera appear otherwise unremarkable. There is no evidence of bowel obstruction. No active contrast extravasation is seen within the GI tract lumen. Appendix: No findings to suggest acute appendicitis. Other findings: Atherosclerotic plaque is seen within the abdominal aorta and its branches. The abdominal aorta is normal in course and caliber. 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 There is moderate stenosis within the superior mesenteric artery (series 4, image 36). XI appears unremarkable. Bilateral renal arteries and visualized iliofemoral arteries are unremarkable. No free air or free fluid is identified. No pathologically enlarged lymph nodes are seen. The IVC is unremarkable. Bones and soft tissues: Bones are demineralized. There are severe compression fractures of T12 and L4. The T12 fracture is unchanged since 3/13/2024. The L4 fracture has significantly progressed since that prior study. There is a new sclerotic band at the inferior endplate of T10 which may represent an acute or subacute fracture. Bilateral hip arthroplasties are noted. Superficial soft tissues demonstrate no acute abnormality Lung bases: Mild left basilar atelectasis or scarring. Large hiatal hernia with paraesophageal component.     Impression: 1.No acute abnormality identified within the abdomen or pelvis. 2.No acute vascular abnormality is identified. No active contrast extravasation is seen within the GI tract lumen. 3.Colonic diverticulosis. 4.Large hiatal hernia with paraesophageal component. 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 6.Right nonobstructive nephrolithiasis 7.There are severe compression fractures of T12 and L4. The T12 fracture is unchanged since 3/13/2024. The L4 fracture has significantly progressed since that prior study. There is a new sclerotic band at the inferior endplate of T10 which may  represent an acute or subacute fracture. Please correlate clinically for pain/tenderness at these sites. 8.Additional findings as detailed above. Electronically Signed: Sp Palacio MD  2/20/2025 2:38 PM EST  Workstation ID: [id number]     I ordered and independently reviewed the above noted radiographic studies.      I viewed images of CT scan of the abdomen pelvis GI bleed protocol which showed no active extravasation or other acute pathology that I can appreciate.  Per my independent interpretation.    See radiologist's dictation for official interpretation.        PROCEDURES    Procedures    ECG 12 Lead Electrolyte Imbalance   Preliminary Result   Test Reason : Electrolyte Imbalance   Blood Pressure :   */*   mmHG   Vent. Rate :  76 BPM     Atrial Rate :  76 BPM      P-R Int : 172 ms          QRS Dur : 124 ms       QT Int : 422 ms       P-R-T Axes : -22 -20  42 degrees     QTcB Int : 474 ms      Normal sinus rhythm   Right bundle branch block   Abnormal ECG   When compared with ECG of 09-Feb-2025 20:10,   Questionable change in QRS axis   T wave inversion no longer evident in Inferior leads   T wave inversion less evident in Anterior leads   T wave amplitude has decreased in Lateral leads      Referred By: EDMD           Confirmed By:           MEDICATIONS GIVEN IN ER    Medications   pantoprazole (PROTONIX) 40 mg in sodium chloride 0.9 % 100 mL (0.4 mg/mL) MBP ( Intravenous Canceled Entry 2/20/25 1810)   nitroglycerin (NITROSTAT) SL tablet 0.4 mg (has no administration in time range)   mupirocin (BACTROBAN) 2 % nasal ointment 1 Application (has no administration in time range)   sennosides-docusate (PERICOLACE) 8.6-50 MG per tablet 2 tablet (has no administration in time range)     And   polyethylene glycol (MIRALAX) packet 17 g (has no administration in time range)     And   bisacodyl (DULCOLAX) EC tablet 5 mg (has no administration in time range)     And   bisacodyl (DULCOLAX) suppository 10 mg (has no  administration in time range)   acetaminophen (TYLENOL) tablet 650 mg (has no administration in time range)     Or   acetaminophen (TYLENOL) suppository 650 mg (has no administration in time range)   Potassium Replacement - Follow Nurse / BPA Driven Protocol (has no administration in time range)   Magnesium Standard Dose Replacement - Follow Nurse / BPA Driven Protocol (has no administration in time range)   Phosphorus Replacement - Follow Nurse / BPA Driven Protocol (has no administration in time range)   Calcium Replacement - Follow Nurse / BPA Driven Protocol (has no administration in time range)   dextrose 5 % and sodium chloride 0.45 % infusion (100 mL/hr Intravenous New Bag 2/20/25 4908)   ondansetron (ZOFRAN) injection 4 mg (has no administration in time range)   ipratropium-albuterol (DUO-NEB) nebulizer solution 3 mL (has no administration in time range)   traZODone (DESYREL) tablet 100 mg (has no administration in time range)   vitamin D3 capsule 5,000 Units (5,000 Units Oral Not Given 2/20/25 1801)   carbidopa-levodopa (SINEMET)  MG per tablet 1 tablet (has no administration in time range)   pantoprazole (PROTONIX) injection 40 mg (40 mg Intravenous Given 2/20/25 1411)   iopamidol (ISOVUE-370) 76 % injection 85 mL (85 mL Intravenous Given 2/20/25 1357)   prothrombin complex conc human (KCentra) IV solution 2,176 Units (2,176 Units Intravenous Given 2/20/25 1436)   octreotide (sandoSTATIN) injection 50 mcg (50 mcg Intravenous Given 2/20/25 1550)         MEDICAL DECISION MAKING, PROGRESS, and CONSULTS    All labs, if obtained, have been independently reviewed by me.  All radiology studies, if obtained, have been reviewed by me and the radiologist dictating the report.  All EKGs, if obtained, have been independently viewed and interpreted by me/my attending physician.      Discussion below represents my analysis of pertinent findings related to patient's condition, differential diagnosis, treatment plan  and final disposition.                                          Differential diagnosis:    Hemorrhagic shock, acute blood loss anemia, hematuria, GI bleeding, hemolysis, sepsis, anemia, electrolyte abnormality      Additional sources:    - Discussed/ obtained information from independent historians: EMS    - External (non-ED) record review:  Chart review recent hospitalization for hip fracture shows history of:   copd, afib, parkinsons, dementia     - Chronic or social conditions impacting care: Chronic anticoagulation, Parkinson's, dementia, atrial fibrillation        Orders placed during this visit:  Orders Placed This Encounter   Procedures    Blood Culture - Blood,    Blood Culture - Blood,    CT Abdomen Pelvis With & Without Contrast    XR Chest 1 View    Comprehensive Metabolic Panel    Protime-INR    aPTT    Urinalysis With Microscopic If Indicated (No Culture) - Urine, Clean Catch    High Sensitivity Troponin T    BNP    Lactic Acid, Plasma    Magnesium    Phosphorus    TSH Rfx On Abnormal To Free T4    CBC Auto Differential    High Sensitivity Troponin T 1Hr    Hemoglobin & Hematocrit, Blood    Magnesium    Phosphorus    Comprehensive Metabolic Panel    Hemoglobin    Basic Metabolic Panel    Protime-INR    aPTT    Lactic Acid, Plasma    Procalcitonin    Urinalysis, Microscopic Only - Urine, Clean Catch    NPO Diet NPO Type: Ice Chips    Verify Informed Consent for Blood Product Administration    Verify Informed Consent for Blood Product Administration    Vital Signs Every Hour and Per Hospital Policy Based on Patient Condition    Telemetry - Place Orders & Notify Provider of Results When Patient Experiences Acute Chest Pain, Dysrhythmia or Respiratory Distress    Height & Weight    Daily Weights    Intake & Output    Oral Care - Patient Not on NPPV & Not Intubated    Target Arousal Level RASS 0 to -2    Use Mobility Guidelines for Advancement of Activity    Saline Lock & Maintain IV Access    Daily CHG Bath  While in Critical Care    Place Sequential Compression Device    Maintain Sequential Compression Device    Vital Signs    Strict Intake & Output    Daily Weights    Notify Provider of Gross GI Bleeding    Verify Informed Consent for Blood Product Administration    Activity - Strict Bed Rest    Continuous Pulse Oximetry    She received 2 units of type-specific uncrossed blood in ED, get a stat H&H and if hemoglobin less than 8 give an aDDITIONAL unit, it is already prepared  Nursing Communication    Code Status and Medical Interventions: No CPR (Do Not Attempt to Resuscitate); Limited Support; No intubation (DNI)    Gastroenterology Consult    Oxygen Therapy- Nasal Cannula; Titrate 1-6 LPM Per SpO2; 90 - 95%    POC Chem 8    POC Occult Blood Stool    POC Glucose Once    ECG 12 Lead Electrolyte Imbalance    Prepare RBC, 2 Units    Type & Screen    Prepare RBC, 2 Units    Wound Ostomy Eval & Treat Pressure Injury    Insert Peripheral IV    Inpatient Admission    ICU / CCU Bed Request ([facility] / [facility] / [facility] ONLY)    CBC & Differential    CBC & Differential    CBC & Differential         Additional orders considered but not ordered:      ED Course:    Consultants:      ED Course as of 02/20/25 1955   Thu Feb 20, 2025   1319 Chart review recent hospitalization for hip fracture shows history of:   copd, afib, parkinsons, dementia  [CC]   1327 This is an 84-year-old woman sent from [facility] for anemia.  They have not noticed any source of bleeding she was on Eliquis until yesterday.  They reportedly checked her hemoglobin this morning it was 5.5 they rechecked it and it was 5.6.  EMS reports she has been hemodynamically stable en route upon arrival here she says she does not feel well complaining of generalized weakness but denies any other pain or complaints. [CC]   1331 She arrived awake and alert right now hemodynamically stable blood smells of melena, bedside Hemoccult positive.  Holding  anticoagulation reversal as long as she remains hemodynamically stable and treating with IV pantoprazole obtaining full laboratory workup occluding point-of-care Chem-8 will initiate transfusion as long as that has hemoglobin less than 7 obtaining CT scan of the abdomen pelvis GI bleed protocol. [CC]   1421 Unfortunately despite being normotensive for EMS initial blood pressure here was 99/36 and repeat was 97/39 I have contacted the blood bank and told them to send me at least 2 units of uncrossed blood we have sent around her to the blood bank.  I have contacted her daughter Lore Lewis and discussed was going on with her I have confirmed she is DNR and she says they definitely do not want her mother to receive chest compressions.  Our initial hemoglobin here is 5.5.  CT scan GI bleed protocol obtained was still do not have a formal read yet.  I have gone ahead and ordered PCC to reverse her anticoagulation at least right now she is not in atrial fibrillation. [CC]   1954 CT scan of the abdomen pelvis does not identify any active bleeding.  Even after 2 units of uncrossed blood at her maps are still in the low 60s I have spoken with Dr. Gamez the on-call gastroenterologist who is aware he has requested I add on octreotide to the PCC and the pantoprazole that I have done.  I think she needs continued aggressive resuscitation and would likely do better in the intensive care unit at this point I have spoken with Dr. Sosa in the ICU who is agreeable to admission. [CC]      ED Course User Index  [CC] Everett Swanson MD       90 minutes of critical care provided. This time excludes other billable procedures. Time does include preparation of documents, medical consultations, review of old records, and direct bedside care. Patient is at high risk for life-threatening deterioration due to acute blood loss anemia from GI bleeding requiring emergent transfusion of uncrossed match blood, reversal of  anticoagulation, urgent GI consultation and ICU admission.         Shared Decision Making:  After my consideration of clinical presentation and any laboratory/radiology studies obtained, I discussed the findings with the patient/patient representative who is in agreement with the treatment plan and the final disposition.   Risks and benefits of discharge and/or observation/admission were discussed.       AS OF 19:55 EST VITALS:    BP - 112/53  HR - 70  TEMP - 98.7 °F (37.1 °C) (Oral)  O2 SATS - (!) 85%                  DIAGNOSIS  Final diagnoses:   Hemorrhagic shock   Acute blood loss anemia   Gastrointestinal hemorrhage, unspecified gastrointestinal hemorrhage type   Parkinson's disease, unspecified whether dyskinesia present, unspecified whether manifestations fluctuate   Chronic anticoagulation         DISPOSITION  Intensive care unit      Please note that portions of this document were completed with voice recognition software.        Everett Swanson MD  02/20/25 1956

## 2025-02-20 NOTE — PLAN OF CARE
Goal Outcome Evaluation:  Plan of Care Reviewed With: patient      Patient calm and cooperative. Hgb increased to 10.2 and VSS. Pantoprazole drip and D5 1/2 NS started.   Progress: improving

## 2025-02-20 NOTE — CASE MANAGEMENT/SOCIAL WORK
Discharge Planning Assessment  [facility]     Patient Name: Fanny Boyce  MRN: [id number]  Today's Date: 2/20/2025    Admit Date: 2/20/2025    Plan: IDP   Discharge Needs Assessment       Row Name 02/20/25 1634       Living Environment    People in Home child(cari), adult    Name(s) of People in Home Lety Lewis    Current Living Arrangements home    Potentially Unsafe Housing Conditions unable to assess    In the past 12 months has the electric, gas, oil, or water company threatened to shut off services in your home? Yes    Primary Care Provided by self;child(cari)    Provides Primary Care For no one    Family Caregiver if Needed child(cari), adult    Family Caregiver Names [facility]    Quality of Family Relationships unable to assess    Able to Return to Prior Arrangements yes       Transportation Needs    In the past 12 months, has lack of transportation kept you from medical appointments or from getting medications? no    In the past 12 months, has lack of transportation kept you from meetings, work, or from getting things needed for daily living? No       Food Insecurity    Within the past 12 months, you worried that your food would run out before you got the money to buy more. Never true    Within the past 12 months, the food you bought just didn't last and you didn't have money to get more. Never true       Transition Planning    Patient/Family Anticipates Transition to inpatient rehabilitation facility    Transportation Anticipated other (see comments)  may need assistance with transportation       Discharge Needs Assessment    Readmission Within the Last 30 Days unable to assess    Equipment Currently Used at Home wheelchair;walker, rolling;hospital bed;commode;lift device    Do you want help finding or keeping work or a job? I do not need or want help    Do you want help with school or training? For example, starting or completing job training or getting a high school diploma, GED or  equivalent No                   Discharge Plan       Row Name 02/20/25 1636       Plan    Plan IDP    Plan Comments Pt. was recently discharged from [facility] on 2/18/25 to [facility]. Pt. lives with her daughter in [facility]. Pt.'s PCP is Nemo Aponte. Pt.'s insurance is Medicare and [employer]. Pt.'s pharmacy is [facility]. Pt. needs assistance at baseline. Pt. has a wheelchair, rolling walker, hospital bed, lift chair, and BSC. Pt. may need assistance with transportation when medically ready to d/c. Pt. has an advanced directive and ACP documentation on file. CM will continue to follow pt. throughout her stay.                  Continued Care and Services - Admitted Since 2/20/2025    No active coordination exists for this encounter.       Selected Continued Care - Prior Encounters Includes continued care and service providers with selected services from prior encounters from 11/22/2024 to 2/20/2025      Discharged on 2/18/2025 Admission date: 2/9/2025 - Discharge disposition: Skilled Nursing Facility (DC - External)      Destination       Service Provider Services Address Phone Fax Patient Preferred    [facility] [address] [address] [phone] --                             Demographic Summary       Row Name 02/20/25 1633       General Information    Arrived From other (see comments)  [facility]    Referral Source admission list;emergency department    Reason for Consult discharge planning    Preferred Language English                   Functional Status       Row Name 02/20/25 1633       Physical Activity    On average, how many days per week do you engage in moderate to strenuous exercise (like a brisk walk)? 0 days    On average, how many minutes do you engage in exercise at this level? 0 min    Number of minutes of exercise per week 0       Functional Status, IADL    Medications assistive equipment and person     Meal Preparation assistive equipment and person    Housekeeping assistive equipment and person    Laundry assistive equipment and person    Shopping assistive equipment and person    If for any reason you need help with day-to-day activities such as bathing, preparing meals, shopping, managing finances, etc., do you get the help you need? I get all the help I need       Employment/    Employment Status retired                   Psychosocial       Row Name 02/20/25 1634       Mental Health    Little interest or pleasure in doing things Several days    Feeling down, depressed, or hopeless Several days       Stress    Do you feel stress - tense, restless, nervous, or anxious, or unable to sleep at night because your mind is troubled all the time - these days? Not at all                   Abuse/Neglect    No documentation.                  Legal       Row Name 02/20/25 1638       Financial Resource Strain    How hard is it for you to pay for the very basics like food, housing, medical care, and heating? Not hard                   Substance Abuse    No documentation.                  Patient Forms    No documentation.                     JEROMY Sykes

## 2025-02-20 NOTE — CONSULTS
[facility]    Referring Provider: Everett Swanson MD     PCP: Nemo Aponte PA    Reason for Consultation: Gastrointestinal hemorrhage     Chief complaint: Hypotension     History of present illness:    Fanny Boyce is a 84 y.o. female who is admitted with severe acute blood loss anemia.   She was recently discharged from our facility two days ago, 2/18/25, after admission for right femoral neck fracture requiring right hip hemiarthroplasty on 2/10.  Her H&H on discharge was 9.4 and 30.   She was sent to [facility] for rehabilitation and labs today revealed a Hgb of 5.5.    She is hypotensive.   She is lethargic and disoriented.    She is unable to provide any history.   No family present at the bedside.   She had a rectal exam by the ER physician with findings of melena.   She is receiving 2 units of uncrossed blood.     She is DNR/DNI.       Allergies:  Morphine, Latex, and Wound dressing adhesive    Scheduled Meds:  octreotide, 50 mcg, Intravenous, Once         Infusions:       PRN Meds:      Home Meds:  (Not in a hospital admission)      ROS: Review of Systems   Unable to perform ROS: Mental status change     PAST MED HX:  Past Medical History:   Diagnosis Date    Atrial fibrillation     COPD (chronic obstructive pulmonary disease)     Dementia     History of lung cancer     Parkinson disease     Pulmonary embolism      PAST SURG HX:  Past Surgical History:   Procedure Laterality Date    HIP HEMIARTHROPLASTY Right 2/10/2025    Procedure: HIP HEMIARTHROPLASTY RIGHT;  Surgeon: Frandy Patino MD;  [facility];  Service: Orthopedics;  Laterality: Right;    JOINT REPLACEMENT      LUNG CANCER SURGERY       FAM HX:  Family History   Problem Relation Age of Onset    Heart disease Mother     Heart disease Father     Aneurysm Father      SOC HX:  Social History     Socioeconomic History    Marital status: Legally    Tobacco Use    Smoking status: Former     Current  "packs/day: 1.00     Average packs/day: 1 pack/day for 40.0 years (40.0 ttl pk-yrs)     Types: Cigarettes    Smokeless tobacco: Never   Vaping Use    Vaping status: Never Used   Substance and Sexual Activity    Alcohol use: Never    Drug use: Never    Sexual activity: Not Currently       PHYSICAL EXAM  BP (!) 99/39   Pulse 77   Temp 98.2 °F (36.8 °C) (Oral)   Resp 18   Ht 152.4 cm (60\")   Wt 54 kg (119 lb 0.8 oz)   SpO2 94%   BMI 23.25 kg/m²   Wt Readings from Last 3 Encounters:   02/20/25 54 kg (119 lb 0.8 oz)   02/18/25 54 kg (119 lb 1.6 oz)   06/01/24 53.1 kg (117 lb)   ,body mass index is 23.25 kg/m².  Physical Exam  Constitutional:       Appearance: She is ill-appearing.   Cardiovascular:      Rate and Rhythm: Normal rate and regular rhythm.   Pulmonary:      Effort: Pulmonary effort is normal. No respiratory distress.   Abdominal:      General: Bowel sounds are normal. There is no distension.      Palpations: Abdomen is soft.      Tenderness: There is no abdominal tenderness. There is no guarding.   Skin:     Coloration: Skin is pale.   Neurological:      Mental Status: She is lethargic and disoriented.   Psychiatric:         Cognition and Memory: Cognition is impaired.       Results Review:   I reviewed the patient's new clinical results.    Lab Results   Component Value Date    WBC 13.57 (H) 02/20/2025    HGB 5.5 (C) 02/20/2025    HGB 9.4 (L) 02/17/2025    HGB 10.2 (L) 02/15/2025    HCT 17.8 (C) 02/20/2025    MCV 90.8 02/20/2025     02/20/2025     Lab Results   Component Value Date    INR 1.18 (H) 02/20/2025    INR 0.97 02/10/2025    INR 1.2 (H) 02/25/2022     Lab Results   Component Value Date    GLUCOSE 131 (H) 02/20/2025    BUN 32 (H) 02/20/2025    CREATININE 0.57 02/20/2025    EGFRIFNONA >60 07/19/2022    EGFRIFAFRI >60 07/19/2022    BCR 56.1 (H) 02/20/2025     02/20/2025    K 4.8 02/20/2025    CO2 24.0 02/20/2025    CALCIUM 8.8 02/20/2025    ALBUMIN 2.9 (L) 02/20/2025    ALKPHOS 76 " 02/20/2025    BILITOT 0.3 02/20/2025    ALT <5 02/20/2025    AST 16 02/20/2025     CT Abdomen/Pelvis (as interpreted by radiologist):  Findings:   Liver: The liver is unremarkable in morphology. Left paddock lobe cysts and additional subcentimeter liver hypodensities which are too small to characterize. No biliary dilation is seen.   Gallbladder: Surgically absent   Pancreas: Unremarkable.   Spleen: Probable splenic cyst. Spleen is otherwise unremarkable.   Adrenal glands: Unremarkable.   Genitourinary tract: Small bilateral renal cysts and subcentimeter hypodensities which are too small to characterize. 5 mm nonobstructing calculus within the right kidney. No hydronephrosis is seen. The visualized portions of the ureters are unremarkable. Distal ureters and urinary bladder are partially obscured due to beam hardening artifact arising from bilateral hip arthroplasties.   Gastrointestinal tract: Colonic diverticulosis is present. Large hiatal hernia with paraesophageal component. Moderate colonic stool noted. Hollow viscera appear otherwise unremarkable. There is no evidence of bowel obstruction. No active contrast extravasation is seen within the GI tract lumen.   Appendix: No findings to suggest acute appendicitis.   Other findings: Atherosclerotic plaque is seen within the abdominal aorta and its branches. The abdominal aorta is normal in course and caliber. 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 There is moderate   stenosis within the superior mesenteric artery (series 4, image 36). XI appears unremarkable. Bilateral renal arteries and visualized iliofemoral arteries are unremarkable. No free air or free fluid is identified. No pathologically enlarged lymph nodes are seen. The IVC is unremarkable.   Bones and soft tissues: Bones are demineralized. There are severe compression fractures of T12 and L4. The T12 fracture is unchanged since 3/13/2024. The L4 fracture  has significantly progressed since that prior study. There is a new sclerotic band at the inferior endplate of T10 which may represent an acute or subacute fracture. Bilateral hip arthroplasties are noted. Superficial soft tissues demonstrate no acute abnormality.   Lung bases: Mild left basilar atelectasis or scarring. Large hiatal hernia with paraesophageal component.   IMPRESSION:  Impression:  1.No acute abnormality identified within the abdomen or pelvis.   2.No acute vascular abnormality is identified. No active contrast extravasation is seen within the GI tract lumen.  3.Colonic diverticulosis.  4.Large hiatal hernia with paraesophageal component.  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  6.Right nonobstructive nephrolithiasis  7.There are severe compression fractures of T12 and L4. The T12 fracture is unchanged since 3/13/2024. The L4 fracture has significantly progressed since that prior study. There is a new sclerotic band at the inferior endplate of T10 which may represent an acute or subacute fracture. Please correlate clinically for pain/tenderness at these sites.  8.Additional findings as detailed above.    ASSESSMENTS/PLANS    Gastrointestinal hemorrhage with melena   Acute blood loss anemia  Hemorrhagic shock     >> Patient is currently being transfused 2 units of pRBCs emergently in the ER.    She received Kcentra for reversal of her Eliquis.     >> Begin IV Pantoprazole drip 10 mg/hr  >> CT abdomen/pelvis bleeding protocol without active extravasation   >> NPO   >> Recommend EGD tomorrow, 2/21/25.    Patient needs further resuscitation today.   I did speak with her daughter however whom confirmed patient is DNR and DNI.       I discussed the patient's findings and my recommendations with patient and her daughter via telephone.       STEW Renee  02/20/25  15:17 EST

## 2025-02-20 NOTE — PROGRESS NOTES
ICU ADMISSION NOTE    Chief complaint     Upper GI bleed  History of atrial fibrillation on Eliquis    Subjective     Ms. Boyce is an 84 year old female with pmhx of atrial fibrillation, COPD, dementia, parkinson disease, former tobacco use, and history of lung cancer who presented to [facility] ED from [facility] on 2/20 with a hemoglobin of 5.5. Of note, she was admitted on 2/10-2/18 for a hip fracture. She underwent hemiarthroplasty on 2/10 and was restarted on Eliquis on 2/11. Her H&H at discharge was 9.4/30. On 2/18 she was discharged to [facility]. Today she was noted to have a hgb of 5.5 for which she was transported to [facility] via EMS.    Upon arrival here, her fecal occult is positive and rectal exam revealed melena. She was hypotensive, disoriented, and lethargic. She was given Kcentra and 2 units of uncrossed pRBCs. CT A/P without active extravasation. GI consulted and started IV pantoprazole. Plans are to proceed with EGD tomorrow.     Patient will be admitted to the ICU for higher level of care.     Time spent: 4 minutes. Electronically signed by LAVERNE Cam, 02/20/25, 4:25 PM EST.    She told me that she was brought to the hospital from home.  She does not know the day, year, hospital.  She denies abdominal pain, nausea, vomiting, hematemesis.  She denies chest pain or palpitations.    Review of Systems  Review of Systems   Unable to perform ROS: Dementia        Home Medications  Medications Prior to Admission   Medication Sig Dispense Refill Last Dose/Taking    acetaminophen (TYLENOL) 325 MG tablet Take 2 tablets by mouth Every 6 (Six) Hours As Needed for Mild Pain. (Patient taking differently: Take 2 tablets by mouth Every 6 (Six) Hours As Needed for Mild Pain, Headache or Fever. OTC)       apixaban (ELIQUIS) 2.5 MG tablet tablet Take 1 tablet by mouth 2 (Two) Times a Day.       carbidopa-levodopa (SINEMET)  MG per tablet Take 1 tablet by mouth 3 (Three) Times a Day.       fluticasone  (FLONASE) 50 MCG/ACT nasal spray Administer 2 sprays into the nostril(s) as directed by provider Daily As Needed for Rhinitis or Allergies.       ipratropium-albuterol (DUO-NEB) 0.5-2.5 mg/3 ml nebulizer Take 3 mL by nebulization Every 4 (Four) Hours As Needed for Wheezing or Shortness of Air.       metoprolol tartrate (LOPRESSOR) 25 MG tablet Take 1 tablet by mouth 2 (Two) Times a Day.       polyethylene glycol (MIRALAX) 17 g packet Take 17 g by mouth Daily As Needed (Use if senna-docusate is ineffective).       sennosides-docusate (PERICOLACE) 8.6-50 MG per tablet Take 2 tablets by mouth 2 (Two) Times a Day.       traZODone (DESYREL) 50 MG tablet Take 2 tablets by mouth Every Night.       vitamin D3 125 MCG (5000 UT) capsule capsule Take 1 capsule by mouth Daily.        Prior to Admission medications    Medication Sig Start Date End Date Taking? Authorizing Provider   acetaminophen (TYLENOL) 325 MG tablet Take 2 tablets by mouth Every 6 (Six) Hours As Needed for Mild Pain.  Patient taking differently: Take 2 tablets by mouth Every 6 (Six) Hours As Needed for Mild Pain, Headache or Fever. OTC 2/18/25   Everett Bridges MD   apixaban (ELIQUIS) 2.5 MG tablet tablet Take 1 tablet by mouth 2 (Two) Times a Day.    ProviderChristiane MD   carbidopa-levodopa (SINEMET)  MG per tablet Take 1 tablet by mouth 3 (Three) Times a Day.    ProviderChristiane MD   fluticasone (FLONASE) 50 MCG/ACT nasal spray Administer 2 sprays into the nostril(s) as directed by provider Daily As Needed for Rhinitis or Allergies.    ProviderChristiane MD   ipratropium-albuterol (DUO-NEB) 0.5-2.5 mg/3 ml nebulizer Take 3 mL by nebulization Every 4 (Four) Hours As Needed for Wheezing or Shortness of Air. 2/18/25   Everett Bridges MD   metoprolol tartrate (LOPRESSOR) 25 MG tablet Take 1 tablet by mouth 2 (Two) Times a Day. 2/18/25   Everett Bridges MD   polyethylene glycol (MIRALAX) 17 g packet Take 17 g by mouth Daily As  Needed (Use if senna-docusate is ineffective). 2/18/25   Everett Bridges MD   sennosides-docusate (PERICOLACE) 8.6-50 MG per tablet Take 2 tablets by mouth 2 (Two) Times a Day. 2/18/25   Everett Bridges MD   traZODone (DESYREL) 50 MG tablet Take 2 tablets by mouth Every Night.    Provider, MD Christiane   vitamin D3 125 MCG (5000 UT) capsule capsule Take 1 capsule by mouth Daily.    Provider, MD Christiane      History  Past Medical History:   Diagnosis Date    Atrial fibrillation     COPD (chronic obstructive pulmonary disease)     Dementia     History of lung cancer     Parkinson disease     Pulmonary embolism      Past Surgical History:   Procedure Laterality Date    HIP HEMIARTHROPLASTY Right 2/10/2025    Procedure: HIP HEMIARTHROPLASTY RIGHT;  Surgeon: Frandy Patino MD;  Location: [facility];  Service: Orthopedics;  Laterality: Right;    JOINT REPLACEMENT      LUNG CANCER SURGERY       Family History   Problem Relation Age of Onset    Heart disease Mother     Heart disease Father     Aneurysm Father      Social History     Tobacco Use    Smoking status: Former     Current packs/day: 1.00     Average packs/day: 1 pack/day for 40.0 years (40.0 ttl pk-yrs)     Types: Cigarettes    Smokeless tobacco: Never   Vaping Use    Vaping status: Never Used   Substance Use Topics    Alcohol use: Never    Drug use: Never     Medications Prior to Admission   Medication Sig Dispense Refill Last Dose/Taking    acetaminophen (TYLENOL) 325 MG tablet Take 2 tablets by mouth Every 6 (Six) Hours As Needed for Mild Pain. (Patient taking differently: Take 2 tablets by mouth Every 6 (Six) Hours As Needed for Mild Pain, Headache or Fever. OTC)       apixaban (ELIQUIS) 2.5 MG tablet tablet Take 1 tablet by mouth 2 (Two) Times a Day.       carbidopa-levodopa (SINEMET)  MG per tablet Take 1 tablet by mouth 3 (Three) Times a Day.       fluticasone (FLONASE) 50 MCG/ACT nasal spray Administer 2 sprays into the  "nostril(s) as directed by provider Daily As Needed for Rhinitis or Allergies.       ipratropium-albuterol (DUO-NEB) 0.5-2.5 mg/3 ml nebulizer Take 3 mL by nebulization Every 4 (Four) Hours As Needed for Wheezing or Shortness of Air.       metoprolol tartrate (LOPRESSOR) 25 MG tablet Take 1 tablet by mouth 2 (Two) Times a Day.       polyethylene glycol (MIRALAX) 17 g packet Take 17 g by mouth Daily As Needed (Use if senna-docusate is ineffective).       sennosides-docusate (PERICOLACE) 8.6-50 MG per tablet Take 2 tablets by mouth 2 (Two) Times a Day.       traZODone (DESYREL) 50 MG tablet Take 2 tablets by mouth Every Night.       vitamin D3 125 MCG (5000 UT) capsule capsule Take 1 capsule by mouth Daily.        Allergies:  Morphine, Latex, and Wound dressing adhesive      Objective     Vital Signs  Blood pressure 116/47, pulse 79, temperature 97.9 °F (36.6 °C), temperature source Oral, resp. rate 16, height 152.4 cm (60\"), weight 54 kg (119 lb 0.8 oz), SpO2 96%.    Physical Exam:  General Appearance:  Frail elderly woman in no respiratory distress   Head:  Temporal wasting   Eyes:          Conjunctiva pale   Ears:     Throat: Oral mucosa moist   Neck: Limited range of motion, trachea midline   Back:   Thoracic spine kyphosis   Lungs:   Symmetric chest expansion.  No wheeze or rhonchi    Heart:  Regular rhythm, S1, S2 auscultated, no murmur   Abdomen:   Bowel sounds are present, nondistended, soft   Rectal:     Deferred   Extremities:    Right hip dressing intact.  Healed right knee surgical scar.  No pitting edema   Pulses:      Skin: Cool and dry   Lymph nodes:    Neurologic: Oriented to name.  Some cogwheeling of upper extremities but no resting tremor.  Can dorsiflex her feet and pull up against my hand with her feet but cannot lift her legs off of the bed.  She can lift both arms off of the stretcher.       Results Review:   Lab Results (last 24 hours)       Procedure Component Value Units Date/Time    " "Hemoglobin & Hematocrit, Blood [[id number]] Collected: 02/20/25 1700    Specimen: Blood Updated: 02/20/25 1700    Procalcitonin [[id number]]  (Normal) Collected: 02/20/25 1439    Specimen: Blood Updated: 02/20/25 1645     Procalcitonin 0.19 ng/mL     Narrative:      As a Marker for Sepsis (Non-Neonates):    1. <0.5 ng/mL represents a low risk of severe sepsis and/or septic shock.  2. >2 ng/mL represents a high risk of severe sepsis and/or septic shock.    As a Marker for Lower Respiratory Tract Infections that require antibiotic therapy:    PCT on Admission    Antibiotic Therapy       6-12 Hrs later    >0.5                Strongly Recommended  >0.25 - <0.5        Recommended   0.1 - 0.25          Discouraged              Remeasure/reassess PCT  <0.1                Strongly Discouraged     Remeasure/reassess PCT    As 28 day mortality risk marker: \"Change in Procalcitonin Result\" (>80% or <=80%) if Day 0 (or Day 1) and Day 4 values are available. Refer to [email or web address]    Change in PCT <=80%  A decrease of PCT levels below or equal to 80% defines a positive change in PCT test result representing a higher risk for 28-day all-cause mortality of patients diagnosed with severe sepsis for septic shock.    Change in PCT >80%  A decrease of PCT levels of more than 80% defines a negative change in PCT result representing a lower risk for 28-day all-cause mortality of patients diagnosed with severe sepsis or septic shock.       POC Glucose Once [[id number]]  (Normal) Collected: 02/20/25 1625    Specimen: Blood Updated: 02/20/25 1627     Glucose 115 mg/dL     High Sensitivity Troponin T 1Hr [[id number]]  (Abnormal) Collected: 02/20/25 1439    Specimen: Blood Updated: 02/20/25 1505     HS Troponin T 29 ng/L      Troponin T Numeric Delta 3 ng/L      Troponin T % Delta 12    Narrative:      High Sensitive Troponin T Reference Range:  <14.0 ng/L- Negative Female for AMI  <22.0 ng/L- Negative Male for AMI  >=14 - " Abnormal Female indicating possible myocardial injury.  >=22 - Abnormal Male indicating possible myocardial injury.   Clinicians would have to utilize clinical acumen, EKG, Troponin, and serial changes to determine if it is an Acute Myocardial Infarction or myocardial injury due to an underlying chronic condition.         Comprehensive Metabolic Panel [[id number]]  (Abnormal) Collected: 02/20/25 1335    Specimen: Blood from Arm, Left Updated: 02/20/25 1426     Glucose 131 mg/dL      BUN 32 mg/dL      Creatinine 0.57 mg/dL      Sodium 136 mmol/L      Potassium 4.8 mmol/L      Chloride 101 mmol/L      CO2 24.0 mmol/L      Calcium 8.8 mg/dL      Total Protein 5.3 g/dL      Albumin 2.9 g/dL      ALT (SGPT) <5 U/L      AST (SGOT) 16 U/L      Alkaline Phosphatase 76 U/L      Total Bilirubin 0.3 mg/dL      Globulin 2.4 gm/dL      Comment: Calculated Result        A/G Ratio 1.2 g/dL      BUN/Creatinine Ratio 56.1     Anion Gap 11.0 mmol/L      eGFR 88.6 mL/min/1.73     Narrative:      GFR Categories in Chronic Kidney Disease (CKD)      GFR Category          GFR (mL/min/1.73)    Interpretation  G1                     90 or greater         Normal or high (1)  G2                      60-89                Mild decrease (1)  G3a                   45-59                Mild to moderate decrease  G3b                   30-44                Moderate to severe decrease  G4                    15-29                Severe decrease  G5                    14 or less           Kidney failure          (1)In the absence of evidence of kidney disease, neither GFR category G1 or G2 fulfill the criteria for CKD.    eGFR calculation 2021 CKD-EPI creatinine equation, which does not include race as a factor    Magnesium [[id number]]  (Normal) Collected: 02/20/25 1335    Specimen: Blood from Arm, Left Updated: 02/20/25 1426     Magnesium 1.9 mg/dL     High Sensitivity Troponin T [[id number]]  (Abnormal) Collected: 02/20/25 1335    Specimen: Blood from  Arm, Left Updated: 02/20/25 1418     HS Troponin T 26 ng/L     Narrative:      High Sensitive Troponin T Reference Range:  <14.0 ng/L- Negative Female for AMI  <22.0 ng/L- Negative Male for AMI  >=14 - Abnormal Female indicating possible myocardial injury.  >=22 - Abnormal Male indicating possible myocardial injury.   Clinicians would have to utilize clinical acumen, EKG, Troponin, and serial changes to determine if it is an Acute Myocardial Infarction or myocardial injury due to an underlying chronic condition.         BNP [[id number]]  (Normal) Collected: 02/20/25 1335    Specimen: Blood from Arm, Left Updated: 02/20/25 1418     proBNP 336.2 pg/mL     Narrative:      This assay is used as an aid in the diagnosis of individuals suspected of having heart failure. It can be used as an aid in the diagnosis of acute decompensated heart failure (ADHF) in patients presenting with signs and symptoms of ADHF to the emergency department (ED). In addition, NT-proBNP of <300 pg/mL indicates ADHF is not likely.    Age Range Result Interpretation  NT-proBNP Concentration (pg/mL:      <50             Positive            >450                   Gray                 300-450                    Negative             <300    50-75           Positive            >900                  Gray                300-900                  Negative            <300      >75             Positive            >1800                  Gray                300-1800                  Negative            <300    TSH Rfx On Abnormal To Free T4 [[id number]]  (Normal) Collected: 02/20/25 1335    Specimen: Blood from Arm, Left Updated: 02/20/25 1418     TSH 2.910 uIU/mL     Phosphorus [[id number]]  (Normal) Collected: 02/20/25 1335    Specimen: Blood from Arm, Left Updated: 02/20/25 1413     Phosphorus 3.4 mg/dL     Lactic Acid, Plasma [[id number]]  (Normal) Collected: 02/20/25 1335    Specimen: Blood from Arm, Left Updated: 02/20/25 1404     Lactate 1.6 mmol/L       Comment: Falsely depressed results may occur on samples drawn from patients receiving N-Acetylcysteine (NAC) or Metamizole.       Protime-INR [[id number]]  (Abnormal) Collected: 02/20/25 1335    Specimen: Blood from Arm, Left Updated: 02/20/25 1402     Protime 15.1 Seconds      INR 1.18    aPTT [[id number]]  (Abnormal) Collected: 02/20/25 1335    Specimen: Blood from Arm, Left Updated: 02/20/25 1402     PTT 36.6 seconds     Narrative:      PTT = The equivalent PTT values for the therapeutic range of heparin levels at 0.3 to 0.5 U/ml are 60 to 70 seconds.    CBC & Differential [[id number]]  (Abnormal) Collected: 02/20/25 1335    Specimen: Blood from Arm, Left Updated: 02/20/25 1357    Narrative:      The following orders were created for panel order CBC & Differential.  Procedure                               Abnormality         Status                     ---------                               -----------         ------                     CBC Auto Differential[[id number]]        Abnormal            Final result                 Please view results for these tests on the individual orders.    CBC Auto Differential [[id number]]  (Abnormal) Collected: 02/20/25 1335    Specimen: Blood from Arm, Left Updated: 02/20/25 1357     WBC 13.57 10*3/mm3      RBC 1.96 10*6/mm3      Hemoglobin 5.5 g/dL      Hematocrit 17.8 %      MCV 90.8 fL      MCH 28.1 pg      MCHC 30.9 g/dL      RDW 15.4 %      RDW-SD 48.4 fl      MPV 9.6 fL      Platelets 351 10*3/mm3      Neutrophil % 61.5 %      Lymphocyte % 31.4 %      Monocyte % 4.3 %      Eosinophil % 0.4 %      Basophil % 0.4 %      Immature Grans % 2.0 %      Neutrophils, Absolute 8.36 10*3/mm3      Lymphocytes, Absolute 4.26 10*3/mm3      Monocytes, Absolute 0.58 10*3/mm3      Eosinophils, Absolute 0.05 10*3/mm3      Basophils, Absolute 0.05 10*3/mm3      Immature Grans, Absolute 0.27 10*3/mm3      nRBC 0.0 /100 WBC     Blood Culture - Blood, Arm, Right [[id number]] Collected: 02/20/25 1335     Specimen: Blood from Arm, Right Updated: 02/20/25 1349    Blood Culture - Blood, Arm, Left [[id number]] Collected: 02/20/25 1335    Specimen: Blood from Arm, Left Updated: 02/20/25 1349    POC Occult Blood Stool [[id number]]  (Abnormal) Resulted: 02/20/25 1339    Specimen: Stool Updated: 02/20/25 1340     Fecal Occult Blood Positive     Lot Number [id number]     Expiration Date 4/27     DEVELOPER LOT NUMBER [id number]     DEVELOPER EXPIRATION DATE 12/27     Positive Control Positive     Negative Control Negative          Imaging Results (Last 24 Hours)       Procedure Component Value Units Date/Time    XR Chest 1 View [[id number]] Resulted: 02/20/25 1614     Updated: 02/20/25 1701    CT Abdomen Pelvis With & Without Contrast [[id number]] Collected: 02/20/25 1413     Updated: 02/20/25 1441    Narrative:      CT ABDOMEN PELVIS W WO CONTRAST    Date of Exam: 2/20/2025 1:52 PM EST    Indication: transfusion level anemia with positive hemoccult, stool is dark.    Comparison: None available.    Technique: Axial CT images were obtained of the abdomen and pelvis before and after the uneventful intravenous administration of 85 mL Isovue-370. Sagittal and coronal reconstructions were performed.  Automated exposure control and iterative   reconstruction methods were used.    Findings:    Liver: The liver is unremarkable in morphology. Left paddock lobe cysts and additional subcentimeter liver hypodensities which are too small to characterize. No biliary dilation is seen.    Gallbladder: Surgically absent    Pancreas: Unremarkable.    Spleen: Probable splenic cyst. Spleen is otherwise unremarkable.    Adrenal glands: Unremarkable.    Genitourinary tract: Small bilateral renal cysts and subcentimeter hypodensities which are too small to characterize. 5 mm nonobstructing calculus within the right kidney. No hydronephrosis is seen. The visualized portions of the ureters are   unremarkable. Distal ureters and urinary bladder are partially  obscured due to beam hardening artifact arising from bilateral hip arthroplasties.    Gastrointestinal tract: Colonic diverticulosis is present. Large hiatal hernia with paraesophageal component. Moderate colonic stool noted. Hollow viscera appear otherwise unremarkable. There is no evidence of bowel obstruction. No active contrast   extravasation is seen within the GI tract lumen.    Appendix: No findings to suggest acute appendicitis.    Other findings: Atherosclerotic plaque is seen within the abdominal aorta and its branches. The abdominal aorta is normal in course and caliber. 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 There is moderate   stenosis within the superior mesenteric artery (series 4, image 36). XI appears unremarkable. Bilateral renal arteries and visualized iliofemoral arteries are unremarkable. No free air or free fluid is identified. No pathologically enlarged lymph nodes   are seen. The IVC is unremarkable.    Bones and soft tissues: Bones are demineralized. There are severe compression fractures of T12 and L4. The T12 fracture is unchanged since 3/13/2024. The L4 fracture has significantly progressed since that prior study. There is a new sclerotic band at   the inferior endplate of T10 which may represent an acute or subacute fracture. Bilateral hip arthroplasties are noted. Superficial soft tissues demonstrate no acute abnormality    Lung bases: Mild left basilar atelectasis or scarring. Large hiatal hernia with paraesophageal component.      Impression:      Impression:  1.No acute abnormality identified within the abdomen or pelvis.   2.No acute vascular abnormality is identified. No active contrast extravasation is seen within the GI tract lumen.  3.Colonic diverticulosis.  4.Large hiatal hernia with paraesophageal component.  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  6.Right  nonobstructive nephrolithiasis  7.There are severe compression fractures of T12 and L4. The T12 fracture is unchanged since 3/13/2024. The L4 fracture has significantly progressed since that prior study. There is a new sclerotic band at the inferior endplate of T10 which may represent   an acute or subacute fracture. Please correlate clinically for pain/tenderness at these sites.  8.Additional findings as detailed above.      Electronically Signed: Sp Palacio MD    2/20/2025 2:38 PM EST    Workstation ID: [id number]             PROBLEM LIST    Upper GI bleeding  Anticoagulation for atrial fibrillation  Dementia  Recent right hip fracture with open reduction internal fixation  Osteoporosis with multiple compression fractures of the thoracic and lumbar spine  Large paraesophageal hiatal hernia    Assessment & Plan     Frail 84-year-old woman with Parkinson's disease, dementia, recent right femur fracture post surgery February 10 and discharged to [facility].  She returns with weakness, hypotension and a hemoglobin of 5.5.  She does take Eliquis for history of atrial fibrillation.  She is currently in sinus rhythm.  She received 2 units of uncrossed blood in the emergency room and her blood pressure improved to a systolic of 99.  She also received Kcentra.    She is followed at  for COPD.  Her FEV1 is 1.29 L.  She was on Spiriva once daily and albuterol inhaler as needed.  She has not required supplemental oxygen.  She no longer smokes but cannot tell me when she stopped.    -Recheck H&H and transfuse if hemoglobin less than 8  -Serial H&H  -GI consult  -N.p.o. after midnight for EGD  -Maintenance IV fluids  -Resume Sinemet once she can take p.o.  -Hold metoprolol  -Discontinue Eliquis  -Bronchodilators as needed  -Trazodone 50 mg 2 tablets at night  -Vitamin D 325 mcg for her osteoporosis  -Spiriva is not on formulary, will use a DuoNeb as needed for wheezing or shortness of air  -No CPR, no  intubation        Suzanna Sosa MD  02/20/25  17:09 EST    Time: Critical care 40 min    Please note that portions of this note were completed with a voice recognition program.

## 2025-02-21 ENCOUNTER — ANESTHESIA EVENT (OUTPATIENT)
Dept: GASTROENTEROLOGY | Facility: HOSPITAL | Age: 85
End: 2025-02-21
Payer: MEDICARE

## 2025-02-21 ENCOUNTER — ANESTHESIA (OUTPATIENT)
Dept: GASTROENTEROLOGY | Facility: HOSPITAL | Age: 85
End: 2025-02-21
Payer: MEDICARE

## 2025-02-21 LAB
ALBUMIN SERPL-MCNC: 2.6 G/DL (ref 3.5–5.2)
ALBUMIN/GLOB SERPL: 1.2 G/DL
ALP SERPL-CCNC: 77 U/L (ref 39–117)
ALT SERPL W P-5'-P-CCNC: 13 U/L (ref 1–33)
ANION GAP SERPL CALCULATED.3IONS-SCNC: 6 MMOL/L (ref 5–15)
APTT PPP: 32.8 SECONDS (ref 22–39)
AST SERPL-CCNC: 15 U/L (ref 1–32)
BASOPHILS # BLD AUTO: 0.07 10*3/MM3 (ref 0–0.2)
BASOPHILS NFR BLD AUTO: 0.5 % (ref 0–1.5)
BH BB BLOOD EXPIRATION DATE: NORMAL
BH BB BLOOD EXPIRATION DATE: NORMAL
BH BB BLOOD TYPE BARCODE: 9500
BH BB BLOOD TYPE BARCODE: 9500
BH BB DISPENSE STATUS: NORMAL
BH BB DISPENSE STATUS: NORMAL
BH BB PRODUCT CODE: NORMAL
BH BB PRODUCT CODE: NORMAL
BH BB UNIT NUMBER: NORMAL
BH BB UNIT NUMBER: NORMAL
BILIRUB SERPL-MCNC: 0.4 MG/DL (ref 0–1.2)
BUN SERPL-MCNC: 30 MG/DL (ref 8–23)
BUN/CREAT SERPL: 56.6 (ref 7–25)
CALCIUM SPEC-SCNC: 8 MG/DL (ref 8.6–10.5)
CHLORIDE SERPL-SCNC: 103 MMOL/L (ref 98–107)
CO2 SERPL-SCNC: 26 MMOL/L (ref 22–29)
CREAT SERPL-MCNC: 0.53 MG/DL (ref 0.57–1)
CROSSMATCH INTERPRETATION: NORMAL
CROSSMATCH INTERPRETATION: NORMAL
D-LACTATE SERPL-SCNC: 1.2 MMOL/L (ref 0.5–2)
DEPRECATED RDW RBC AUTO: 47.9 FL (ref 37–54)
EGFRCR SERPLBLD CKD-EPI 2021: 90.2 ML/MIN/1.73
EOSINOPHIL # BLD AUTO: 0.15 10*3/MM3 (ref 0–0.4)
EOSINOPHIL NFR BLD AUTO: 1.1 % (ref 0.3–6.2)
ERYTHROCYTE [DISTWIDTH] IN BLOOD BY AUTOMATED COUNT: 15.1 % (ref 12.3–15.4)
GLOBULIN UR ELPH-MCNC: 2.1 GM/DL
GLUCOSE BLDC GLUCOMTR-MCNC: 114 MG/DL (ref 70–130)
GLUCOSE BLDC GLUCOMTR-MCNC: 118 MG/DL (ref 70–130)
GLUCOSE BLDC GLUCOMTR-MCNC: 137 MG/DL (ref 70–130)
GLUCOSE BLDC GLUCOMTR-MCNC: 98 MG/DL (ref 70–130)
GLUCOSE SERPL-MCNC: 120 MG/DL (ref 65–99)
HCT VFR BLD AUTO: 22.4 % (ref 34–46.6)
HCT VFR BLD AUTO: 26 % (ref 34–46.6)
HGB BLD-MCNC: 7.3 G/DL (ref 12–15.9)
HGB BLD-MCNC: 8.4 G/DL (ref 12–15.9)
HGB BLD-MCNC: 9 G/DL (ref 12–15.9)
IMM GRANULOCYTES # BLD AUTO: 0.6 10*3/MM3 (ref 0–0.05)
IMM GRANULOCYTES NFR BLD AUTO: 4.5 % (ref 0–0.5)
INR PPP: 1.03 (ref 0.89–1.12)
LYMPHOCYTES # BLD AUTO: 3.01 10*3/MM3 (ref 0.7–3.1)
LYMPHOCYTES NFR BLD AUTO: 22.6 % (ref 19.6–45.3)
MAGNESIUM SERPL-MCNC: 1.9 MG/DL (ref 1.6–2.4)
MCH RBC QN AUTO: 29.1 PG (ref 26.6–33)
MCHC RBC AUTO-ENTMCNC: 32.6 G/DL (ref 31.5–35.7)
MCV RBC AUTO: 89.2 FL (ref 79–97)
MONOCYTES # BLD AUTO: 0.72 10*3/MM3 (ref 0.1–0.9)
MONOCYTES NFR BLD AUTO: 5.4 % (ref 5–12)
NEUTROPHILS NFR BLD AUTO: 65.9 % (ref 42.7–76)
NEUTROPHILS NFR BLD AUTO: 8.77 10*3/MM3 (ref 1.7–7)
NRBC BLD AUTO-RTO: 0.2 /100 WBC (ref 0–0.2)
PHOSPHATE SERPL-MCNC: 2.9 MG/DL (ref 2.5–4.5)
PLATELET # BLD AUTO: 312 10*3/MM3 (ref 140–450)
PMV BLD AUTO: 9.7 FL (ref 6–12)
POTASSIUM SERPL-SCNC: 4.3 MMOL/L (ref 3.5–5.2)
PROT SERPL-MCNC: 4.7 G/DL (ref 6–8.5)
PROTHROMBIN TIME: 13.6 SECONDS (ref 12.2–14.5)
RBC # BLD AUTO: 2.51 10*6/MM3 (ref 3.77–5.28)
SODIUM SERPL-SCNC: 135 MMOL/L (ref 136–145)
UNIT  ABO: NORMAL
UNIT  ABO: NORMAL
UNIT  RH: NORMAL
UNIT  RH: NORMAL
WBC NRBC COR # BLD AUTO: 13.32 10*3/MM3 (ref 3.4–10.8)

## 2025-02-21 PROCEDURE — 25810000003 LACTATED RINGERS PER 1000 ML: Performed by: NURSE ANESTHETIST, CERTIFIED REGISTERED

## 2025-02-21 PROCEDURE — 25010000002 LIDOCAINE PF 1% 1 % SOLUTION: Performed by: NURSE ANESTHETIST, CERTIFIED REGISTERED

## 2025-02-21 PROCEDURE — 82948 REAGENT STRIP/BLOOD GLUCOSE: CPT

## 2025-02-21 PROCEDURE — 80053 COMPREHEN METABOLIC PANEL: CPT

## 2025-02-21 PROCEDURE — 43255 EGD CONTROL BLEEDING ANY: CPT | Performed by: INTERNAL MEDICINE

## 2025-02-21 PROCEDURE — 25010000002 GLYCOPYRROLATE 1 MG/5ML SOLUTION: Performed by: NURSE ANESTHETIST, CERTIFIED REGISTERED

## 2025-02-21 PROCEDURE — P9016 RBC LEUKOCYTES REDUCED: HCPCS

## 2025-02-21 PROCEDURE — 85025 COMPLETE CBC W/AUTO DIFF WBC: CPT

## 2025-02-21 PROCEDURE — 85014 HEMATOCRIT: CPT | Performed by: INTERNAL MEDICINE

## 2025-02-21 PROCEDURE — 36430 TRANSFUSION BLD/BLD COMPNT: CPT

## 2025-02-21 PROCEDURE — 85018 HEMOGLOBIN: CPT | Performed by: INTERNAL MEDICINE

## 2025-02-21 PROCEDURE — 85730 THROMBOPLASTIN TIME PARTIAL: CPT | Performed by: INTERNAL MEDICINE

## 2025-02-21 PROCEDURE — 25010000002 PHENYLEPHRINE 10 MG/ML SOLUTION: Performed by: NURSE ANESTHETIST, CERTIFIED REGISTERED

## 2025-02-21 PROCEDURE — 25010000002 ONDANSETRON PER 1 MG: Performed by: INTERNAL MEDICINE

## 2025-02-21 PROCEDURE — 0DJ08ZZ INSPECTION OF UPPER INTESTINAL TRACT, VIA NATURAL OR ARTIFICIAL OPENING ENDOSCOPIC: ICD-10-PCS | Performed by: INTERNAL MEDICINE

## 2025-02-21 PROCEDURE — 0W3P8ZZ CONTROL BLEEDING IN GASTROINTESTINAL TRACT, VIA NATURAL OR ARTIFICIAL OPENING ENDOSCOPIC: ICD-10-PCS | Performed by: INTERNAL MEDICINE

## 2025-02-21 PROCEDURE — C1052 HEMOSTATIC AGENT, GI, TOPIC: HCPCS | Performed by: INTERNAL MEDICINE

## 2025-02-21 PROCEDURE — 83605 ASSAY OF LACTIC ACID: CPT | Performed by: INTERNAL MEDICINE

## 2025-02-21 PROCEDURE — 84100 ASSAY OF PHOSPHORUS: CPT

## 2025-02-21 PROCEDURE — 85610 PROTHROMBIN TIME: CPT | Performed by: INTERNAL MEDICINE

## 2025-02-21 PROCEDURE — 25010000002 PROPOFOL 10 MG/ML EMULSION: Performed by: NURSE ANESTHETIST, CERTIFIED REGISTERED

## 2025-02-21 PROCEDURE — 86900 BLOOD TYPING SEROLOGIC ABO: CPT

## 2025-02-21 PROCEDURE — 83735 ASSAY OF MAGNESIUM: CPT

## 2025-02-21 DEVICE — DEV CLIP GI INSTINCT/PLUS MRI/CONDTN 7F 16MM 230CM: Type: IMPLANTABLE DEVICE | Site: DUODENUM | Status: FUNCTIONAL

## 2025-02-21 DEVICE — SYS CLIP GI OTSC 11/6T 165CM: Type: IMPLANTABLE DEVICE | Site: DUODENUM | Status: FUNCTIONAL

## 2025-02-21 RX ORDER — LIDOCAINE HYDROCHLORIDE 10 MG/ML
INJECTION, SOLUTION EPIDURAL; INFILTRATION; INTRACAUDAL; PERINEURAL AS NEEDED
Status: DISCONTINUED | OUTPATIENT
Start: 2025-02-21 | End: 2025-02-21 | Stop reason: SURG

## 2025-02-21 RX ORDER — GLYCOPYRROLATE 0.2 MG/ML
INJECTION INTRAMUSCULAR; INTRAVENOUS AS NEEDED
Status: DISCONTINUED | OUTPATIENT
Start: 2025-02-21 | End: 2025-02-21 | Stop reason: SURG

## 2025-02-21 RX ORDER — SODIUM CHLORIDE, SODIUM LACTATE, POTASSIUM CHLORIDE, CALCIUM CHLORIDE 600; 310; 30; 20 MG/100ML; MG/100ML; MG/100ML; MG/100ML
INJECTION, SOLUTION INTRAVENOUS CONTINUOUS PRN
Status: DISCONTINUED | OUTPATIENT
Start: 2025-02-21 | End: 2025-02-21 | Stop reason: SURG

## 2025-02-21 RX ORDER — PHENYLEPHRINE HYDROCHLORIDE 10 MG/ML
INJECTION INTRAVENOUS AS NEEDED
Status: DISCONTINUED | OUTPATIENT
Start: 2025-02-21 | End: 2025-02-21 | Stop reason: SURG

## 2025-02-21 RX ORDER — PROPOFOL 10 MG/ML
VIAL (ML) INTRAVENOUS AS NEEDED
Status: DISCONTINUED | OUTPATIENT
Start: 2025-02-21 | End: 2025-02-21 | Stop reason: SURG

## 2025-02-21 RX ADMIN — TRAZODONE HYDROCHLORIDE 100 MG: 100 TABLET ORAL at 20:10

## 2025-02-21 RX ADMIN — PROPOFOL 20 MG: 10 INJECTION, EMULSION INTRAVENOUS at 15:00

## 2025-02-21 RX ADMIN — SODIUM CHLORIDE, POTASSIUM CHLORIDE, SODIUM LACTATE AND CALCIUM CHLORIDE: 600; 310; 30; 20 INJECTION, SOLUTION INTRAVENOUS at 14:32

## 2025-02-21 RX ADMIN — LIDOCAINE HYDROCHLORIDE 50 MG: 10 INJECTION, SOLUTION EPIDURAL; INFILTRATION; INTRACAUDAL; PERINEURAL at 14:43

## 2025-02-21 RX ADMIN — ONDANSETRON 4 MG: 2 INJECTION INTRAMUSCULAR; INTRAVENOUS at 16:13

## 2025-02-21 RX ADMIN — PANTOPRAZOLE SODIUM 8 MG/HR: 40 INJECTION, POWDER, FOR SOLUTION INTRAVENOUS at 21:24

## 2025-02-21 RX ADMIN — PROPOFOL 30 MG: 10 INJECTION, EMULSION INTRAVENOUS at 14:51

## 2025-02-21 RX ADMIN — PROPOFOL 30 MG: 10 INJECTION, EMULSION INTRAVENOUS at 14:43

## 2025-02-21 RX ADMIN — PROPOFOL 20 MG: 10 INJECTION, EMULSION INTRAVENOUS at 14:55

## 2025-02-21 RX ADMIN — PROPOFOL 20 MG: 10 INJECTION, EMULSION INTRAVENOUS at 14:53

## 2025-02-21 RX ADMIN — Medication 5000 UNITS: at 09:44

## 2025-02-21 RX ADMIN — PANTOPRAZOLE SODIUM 8 MG/HR: 40 INJECTION, POWDER, FOR SOLUTION INTRAVENOUS at 04:39

## 2025-02-21 RX ADMIN — CARBIDOPA AND LEVODOPA 1 TABLET: 25; 100 TABLET ORAL at 20:09

## 2025-02-21 RX ADMIN — PHENYLEPHRINE HYDROCHLORIDE 100 MCG: 10 INJECTION INTRAVENOUS at 14:56

## 2025-02-21 RX ADMIN — PHENYLEPHRINE HYDROCHLORIDE 200 MCG: 10 INJECTION INTRAVENOUS at 15:14

## 2025-02-21 RX ADMIN — PROPOFOL 30 MG: 10 INJECTION, EMULSION INTRAVENOUS at 15:19

## 2025-02-21 RX ADMIN — PANTOPRAZOLE SODIUM 8 MG/HR: 40 INJECTION, POWDER, FOR SOLUTION INTRAVENOUS at 16:10

## 2025-02-21 RX ADMIN — DEXTROSE AND SODIUM CHLORIDE 100 ML/HR: 5; 450 INJECTION, SOLUTION INTRAVENOUS at 03:26

## 2025-02-21 RX ADMIN — PROPOFOL 20 MG: 10 INJECTION, EMULSION INTRAVENOUS at 15:25

## 2025-02-21 RX ADMIN — PHENYLEPHRINE HYDROCHLORIDE 100 MCG: 10 INJECTION INTRAVENOUS at 14:51

## 2025-02-21 RX ADMIN — MUPIROCIN 1 APPLICATION: 20 OINTMENT TOPICAL at 09:44

## 2025-02-21 RX ADMIN — PHENYLEPHRINE HYDROCHLORIDE 200 MCG: 10 INJECTION INTRAVENOUS at 14:43

## 2025-02-21 RX ADMIN — PROPOFOL 20 MG: 10 INJECTION, EMULSION INTRAVENOUS at 15:04

## 2025-02-21 RX ADMIN — PROPOFOL 30 MG: 10 INJECTION, EMULSION INTRAVENOUS at 15:07

## 2025-02-21 RX ADMIN — PROPOFOL 20 MG: 10 INJECTION, EMULSION INTRAVENOUS at 15:22

## 2025-02-21 RX ADMIN — GLYCOPYRROLATE 0.4 MG: 1 INJECTION INTRAMUSCULAR; INTRAVENOUS at 15:01

## 2025-02-21 RX ADMIN — PROPOFOL 30 MG: 10 INJECTION, EMULSION INTRAVENOUS at 15:15

## 2025-02-21 RX ADMIN — ACETAMINOPHEN 650 MG: 325 TABLET ORAL at 00:56

## 2025-02-21 RX ADMIN — PHENYLEPHRINE HYDROCHLORIDE 100 MCG: 10 INJECTION INTRAVENOUS at 14:59

## 2025-02-21 RX ADMIN — PROPOFOL 40 MG: 10 INJECTION, EMULSION INTRAVENOUS at 14:47

## 2025-02-21 RX ADMIN — CARBIDOPA AND LEVODOPA 1 TABLET: 25; 100 TABLET ORAL at 16:10

## 2025-02-21 RX ADMIN — PANTOPRAZOLE SODIUM 8 MG/HR: 40 INJECTION, POWDER, FOR SOLUTION INTRAVENOUS at 11:19

## 2025-02-21 RX ADMIN — PROPOFOL 20 MG: 10 INJECTION, EMULSION INTRAVENOUS at 14:58

## 2025-02-21 RX ADMIN — PROPOFOL 20 MG: 10 INJECTION, EMULSION INTRAVENOUS at 15:09

## 2025-02-21 RX ADMIN — CARBIDOPA AND LEVODOPA 1 TABLET: 25; 100 TABLET ORAL at 09:44

## 2025-02-21 RX ADMIN — MUPIROCIN 1 APPLICATION: 20 OINTMENT TOPICAL at 20:10

## 2025-02-21 NOTE — PLAN OF CARE
Goal Outcome Evaluation:  Plan of Care Reviewed With: patient, family        Progress: improving  Outcome Evaluation: Pt alert to self and at times why she is here.  PRBC's given x2.  EGD procedure done in afternoon-see notes.  PW in place with adequate UOP.  NPO with diet to be introduced slowly due to pt stating that she is nauseated- Zofran given with cold wash cloth placed on forehead.  Daughter called in AM with update on pt and consent gotten for procedure.  Care explained with questions answered. VSS.  Resting between care.  WCTM      Problem: Adult Inpatient Plan of Care  Goal: Plan of Care Review  Outcome: Progressing  Flowsheets (Taken 2/21/2025 1701)  Progress: improving  Outcome Evaluation: Plan of Care Reviewed With:   patient   family  Goal: Patient-Specific Goal (Individualized)  Outcome: Progressing  Goal: Absence of Hospital-Acquired Illness or Injury  Outcome: Progressing  Intervention: Identify and Manage Fall Risk  Flowsheets  Taken 2/21/2025 1600  Safety Promotion/Fall Prevention:   activity supervised   assistive device/personal items within reach   clutter free environment maintained   fall prevention program maintained   safety round/check completed   lighting adjusted  Taken 2/21/2025 1400  Safety Promotion/Fall Prevention:   activity supervised   assistive device/personal items within reach   clutter free environment maintained   fall prevention program maintained   room organization consistent   safety round/check completed   lighting adjusted  Taken 2/21/2025 1200  Safety Promotion/Fall Prevention:   activity supervised   assistive device/personal items within reach   clutter free environment maintained   fall prevention program maintained   room organization consistent   safety round/check completed   lighting adjusted  Taken 2/21/2025 1000  Safety Promotion/Fall Prevention:   activity supervised   assistive device/personal items within reach   clutter free environment maintained   fall  prevention program maintained   room organization consistent   safety round/check completed   lighting adjusted  Taken 2/21/2025 0800  Safety Promotion/Fall Prevention:   activity supervised   assistive device/personal items within reach   clutter free environment maintained   fall prevention program maintained   room organization consistent   safety round/check completed   lighting adjusted  Intervention: Prevent Skin Injury  Flowsheets  Taken 2/21/2025 1600  Body Position:   neutral head position   neutral body alignment   lower extremity elevated   upper extremity elevated   weight shifting   turned   left  Skin Protection:   incontinence pads utilized   transparent dressing maintained   silicone foam dressing in place  Taken 2/21/2025 1400  Body Position:   neutral head position   neutral body alignment   lower extremity elevated   weight shifting   upper extremity elevated   turned   right  Skin Protection:   incontinence pads utilized   transparent dressing maintained  Taken 2/21/2025 1200  Body Position:   neutral head position   neutral body alignment   lower extremity elevated   upper extremity elevated   weight shifting   turned  Skin Protection:   incontinence pads utilized   transparent dressing maintained   protective footwear used  Taken 2/21/2025 1000  Body Position:   turned   left   lower extremity elevated   neutral body alignment   neutral head position   upper extremity elevated   weight shifting  Skin Protection:   incontinence pads utilized   protective footwear used   transparent dressing maintained  Taken 2/21/2025 0800  Body Position:   turned   right   neutral head position   neutral body alignment   lower extremity elevated   upper extremity elevated   weight shifting  Skin Protection:   incontinence pads utilized   protective footwear used   silicone foam dressing in place   transparent dressing maintained  Intervention: Prevent and Manage VTE (Venous Thromboembolism)  Risk  Flowsheets  Taken 2/21/2025 1600  VTE Prevention/Management:   bilateral   SCDs (sequential compression devices) on  Taken 2/21/2025 0800  VTE Prevention/Management:   bilateral   SCDs (sequential compression devices) on  Intervention: Prevent Infection  Flowsheets (Taken 2/21/2025 0800)  Infection Prevention:   environmental surveillance performed   equipment surfaces disinfected   hand hygiene promoted   single patient room provided  Goal: Optimal Comfort and Wellbeing  Outcome: Progressing  Intervention: Provide Person-Centered Care  Flowsheets  Taken 2/21/2025 1600  Trust Relationship/Rapport:   care explained   choices provided   questions answered   questions encouraged   reassurance provided  Taken 2/21/2025 1400  Trust Relationship/Rapport:   care explained   choices provided   reassurance provided  Taken 2/21/2025 1200  Trust Relationship/Rapport:   care explained   choices provided   reassurance provided  Taken 2/21/2025 1000  Trust Relationship/Rapport:   care explained   choices provided   reassurance provided   questions answered  Taken 2/21/2025 0800  Trust Relationship/Rapport:   care explained   choices provided   questions answered   questions encouraged   reassurance provided  Goal: Readiness for Transition of Care  Outcome: Progressing     Problem: Fall Injury Risk  Goal: Absence of Fall and Fall-Related Injury  Outcome: Progressing  Intervention: Identify and Manage Contributors  Flowsheets  Taken 2/21/2025 1600  Medication Review/Management: medications reviewed  Self-Care Promotion: independence encouraged  Taken 2/21/2025 1400  Medication Review/Management: medications reviewed  Self-Care Promotion: independence encouraged  Taken 2/21/2025 1200  Medication Review/Management: medications reviewed  Self-Care Promotion: independence encouraged  Taken 2/21/2025 1000  Medication Review/Management: medications reviewed  Self-Care Promotion: independence encouraged  Taken 2/21/2025  0800  Medication Review/Management: medications reviewed  Self-Care Promotion: independence encouraged  Intervention: Promote Injury-Free Environment  Flowsheets  Taken 2/21/2025 1600  Safety Promotion/Fall Prevention:   activity supervised   assistive device/personal items within reach   clutter free environment maintained   fall prevention program maintained   safety round/check completed   lighting adjusted  Taken 2/21/2025 1400  Safety Promotion/Fall Prevention:   activity supervised   assistive device/personal items within reach   clutter free environment maintained   fall prevention program maintained   room organization consistent   safety round/check completed   lighting adjusted  Taken 2/21/2025 1200  Safety Promotion/Fall Prevention:   activity supervised   assistive device/personal items within reach   clutter free environment maintained   fall prevention program maintained   room organization consistent   safety round/check completed   lighting adjusted  Taken 2/21/2025 1000  Safety Promotion/Fall Prevention:   activity supervised   assistive device/personal items within reach   clutter free environment maintained   fall prevention program maintained   room organization consistent   safety round/check completed   lighting adjusted  Taken 2/21/2025 0800  Safety Promotion/Fall Prevention:   activity supervised   assistive device/personal items within reach   clutter free environment maintained   fall prevention program maintained   room organization consistent   safety round/check completed   lighting adjusted     Problem: Skin Injury Risk Increased  Goal: Skin Health and Integrity  Outcome: Progressing  Intervention: Optimize Skin Protection  Flowsheets  Taken 2/21/2025 1600  Activity Management: bedrest  Pressure Reduction Techniques:   frequent weight shift encouraged   weight shift assistance provided   heels elevated off bed   pressure points protected  Head of Bed (HOB) Positioning: HOB at 30-45  degrees  Pressure Reduction Devices:   alternating pressure pump (DRAGAN)   elbow protectors utilized   pressure-redistributing mattress utilized   positioning supports utilized   heel offloading device utilized  Skin Protection:   incontinence pads utilized   transparent dressing maintained   silicone foam dressing in place  Taken 2/21/2025 1400  Activity Management: bedrest  Pressure Reduction Techniques:   frequent weight shift encouraged   weight shift assistance provided   heels elevated off bed   pressure points protected  Head of Bed (HOB) Positioning: HOB at 30 degrees  Pressure Reduction Devices:   alternating pressure pump (DRAGAN)   pressure-redistributing mattress utilized   positioning supports utilized   heel offloading device utilized   elbow protectors utilized  Skin Protection:   incontinence pads utilized   transparent dressing maintained  Taken 2/21/2025 1200  Activity Management: bedrest  Pressure Reduction Techniques:   frequent weight shift encouraged   weight shift assistance provided   heels elevated off bed   pressure points protected  Head of Bed (HOB) Positioning: HOB at 30-45 degrees  Pressure Reduction Devices:   alternating pressure pump (DRAGAN)   elbow protectors utilized   pressure-redistributing mattress utilized   positioning supports utilized   heel offloading device utilized  Skin Protection:   incontinence pads utilized   transparent dressing maintained   protective footwear used  Taken 2/21/2025 1000  Activity Management: bedrest  Pressure Reduction Techniques:   frequent weight shift encouraged   weight shift assistance provided   heels elevated off bed   pressure points protected  Head of Bed (HOB) Positioning: HOB at 30-45 degrees  Pressure Reduction Devices:   alternating pressure pump (DRAGAN)   elbow protectors utilized   pressure-redistributing mattress utilized   positioning supports utilized   heel offloading device utilized  Skin Protection:   incontinence pads utilized    protective footwear used   transparent dressing maintained  Taken 2/21/2025 0800  Activity Management: bedrest  Pressure Reduction Techniques:   frequent weight shift encouraged   weight shift assistance provided   heels elevated off bed   pressure points protected  Head of Bed (HOB) Positioning: HOB at 30-45 degrees  Pressure Reduction Devices:   alternating pressure pump (DRAGAN)   elbow protectors utilized   pressure-redistributing mattress utilized   positioning supports utilized   heel offloading device utilized  Skin Protection:   incontinence pads utilized   protective footwear used   silicone foam dressing in place   transparent dressing maintained     Problem: Restraint, Nonviolent  Goal: Absence of Harm or Injury  Outcome: Progressing  Intervention: Implement Least Restrictive Safety Strategies  Flowsheets  Taken 2/21/2025 1600  Medical Device Protection:   IV pole/bag removed from visual field   torso covered   tubing secured  Diversional Activities: television  Taken 2/21/2025 1400  Medical Device Protection:   IV pole/bag removed from visual field   torso covered   tubing secured  Diversional Activities: television  Taken 2/21/2025 1200  Medical Device Protection:   IV pole/bag removed from visual field   tubing secured   torso covered  Diversional Activities: television  Taken 2/21/2025 1000  Medical Device Protection:   IV pole/bag removed from visual field   torso covered   tubing secured  Diversional Activities: television  Taken 2/21/2025 0800  Medical Device Protection:   IV pole/bag removed from visual field   torso covered   tubing secured  Diversional Activities: television  Intervention: Protect Dignity, Rights and Personal Wellbeing  Flowsheets  Taken 2/21/2025 1600  Trust Relationship/Rapport:   care explained   choices provided   questions answered   questions encouraged   reassurance provided  Taken 2/21/2025 1400  Trust Relationship/Rapport:   care explained   choices provided   reassurance  provided  Taken 2/21/2025 1200  Trust Relationship/Rapport:   care explained   choices provided   reassurance provided  Taken 2/21/2025 1000  Trust Relationship/Rapport:   care explained   choices provided   reassurance provided   questions answered  Taken 2/21/2025 0800  Trust Relationship/Rapport:   care explained   choices provided   questions answered   questions encouraged   reassurance provided  Intervention: Protect Skin and Joint Integrity  Flowsheets  Taken 2/21/2025 1600  Body Position:   neutral head position   neutral body alignment   lower extremity elevated   upper extremity elevated   weight shifting   turned   left  Skin Protection:   incontinence pads utilized   transparent dressing maintained   silicone foam dressing in place  Taken 2/21/2025 1400  Body Position:   neutral head position   neutral body alignment   lower extremity elevated   weight shifting   upper extremity elevated   turned   right  Skin Protection:   incontinence pads utilized   transparent dressing maintained  Taken 2/21/2025 1200  Body Position:   neutral head position   neutral body alignment   lower extremity elevated   upper extremity elevated   weight shifting   turned  Skin Protection:   incontinence pads utilized   transparent dressing maintained   protective footwear used  Taken 2/21/2025 1000  Body Position:   turned   left   lower extremity elevated   neutral body alignment   neutral head position   upper extremity elevated   weight shifting  Skin Protection:   incontinence pads utilized   protective footwear used   transparent dressing maintained  Taken 2/21/2025 0800  Body Position:   turned   right   neutral head position   neutral body alignment   lower extremity elevated   upper extremity elevated   weight shifting  Skin Protection:   incontinence pads utilized   protective footwear used   silicone foam dressing in place   transparent dressing maintained

## 2025-02-21 NOTE — ANESTHESIA PREPROCEDURE EVALUATION
Anesthesia Evaluation     Patient summary reviewed and Nursing notes reviewed   NPO Solid Status: > 8 hours  NPO Liquid Status: > 2 hours           Airway   Mallampati: I  TM distance: >3 FB  Neck ROM: full  No difficulty expected  Dental      Pulmonary    (+) pulmonary embolism, a smoker cigarettes, lung cancer, COPD (MDI),  (-) shortness of breath  Cardiovascular     ECG reviewed    (+) dysrhythmias Atrial Fib  (-) hypertension, past MI, angina    ROS comment: ECG NSR RBBB   ECHO EF = 70%  Moderate TI   RVSP 46 mmHg.        Neuro/Psych  (+) Parkinson's disease (sinemet), psychiatric history, dementia  (-) seizures, CVA  GI/Hepatic/Renal/Endo    (+) hiatal hernia, GI bleeding   (-) no renal disease, diabetes, no thyroid disorder    Musculoskeletal     Abdominal    Substance History      OB/GYN          Other   blood dyscrasia anemia,     ROS/Med Hx Other: ABLA HCT 22---->26  PRBC   ICU direct to endo suite   Eliquis   Recent hip arthroplasty 2/10/25      Phys Exam Other: Mahajan 3 G1V                   Anesthesia Plan    ASA 3     general and MAC     (DNR lifting for procedure discussed c family )  intravenous induction     Anesthetic plan, risks, benefits, and alternatives have been provided, discussed and informed consent has been obtained with: patient.    Plan discussed with CRNA.        CODE STATUS:    Medical Intervention Limits: No intubation (DNI)  Code Status (Patient has no pulse and is not breathing): No CPR (Do Not Attempt to Resuscitate)  Medical Interventions (Patient has pulse or is breathing): Limited Support

## 2025-02-21 NOTE — PLAN OF CARE
Goal Outcome Evaluation:  Plan of Care Reviewed With: patient           Outcome Evaluation: vss. restless at times. placed in mitts for pulling IVs. 2Lnc,  no outward signs of bleeding.  HH stable this shift.  remains on protonix drip.  possible egd today.

## 2025-02-21 NOTE — PROGRESS NOTES
Intensive Care Follow-up     Hospital:  LOS: 1 day   Ms. Fanny Boyce, 84 y.o. female is followed for:   UGI bleed        Subjective     Ms. Boyce is an 84 year old female with pmhx of atrial fibrillation, COPD, dementia, parkinson disease, former tobacco use, and history of lung cancer who presented to [facility] ED from [facility] on 2/20 with a hemoglobin of 5.5. Of note, she was admitted on 2/10-2/18 for a hip fracture. She underwent hemiarthroplasty on 2/10 and was restarted on Eliquis on 2/11. Her H&H at discharge was 9.4/30. On 2/18 she was discharged to [facility]. Today she was noted to have a hgb of 5.5 for which she was transported to [facility] via EMS.     Upon arrival here, her fecal occult is positive and rectal exam revealed melena. She was hypotensive, disoriented, and lethargic. She was given Kcentra and 2 units of uncrossed pRBCs. CT A/P without active extravasation. GI consulted and started IV pantoprazole. Plans are to proceed with EGD tomorrow.      Patient will be admitted to the ICU for higher level of care.   Interval History:  The chart has been reviewed.  The patient has not required pressors overnight.  She is in the process of receiving 1 unit of packed red blood cells in addition to the 2 she received yesterday.  She appears to be relatively comfortable in bed.  She does follow minimally although she is not speaking to me currently.    The patient's past medical, surgical and social history were reviewed and updated in Epic as appropriate.        Objective     Infusions:  dextrose 5 % and sodium chloride 0.45 %, 100 mL/hr, Last Rate: 100 mL/hr (02/21/25 0326)  pantoprazole, 8 mg/hr, Last Rate: 8 mg/hr (02/21/25 9195)      Medications:  carbidopa-levodopa, 1 tablet, Oral, TID  mupirocin, 1 Application, Each Nare, BID  traZODone, 100 mg, Oral, Nightly  vitamin D3, 5,000 Units, Oral, Daily        Vital Sign Min/Max for last 24 hours  Temp  Min: 97.7 °F (36.5 °C)  Max: 98.9 °F (37.2 °C)   BP  Min: 90/45   "Max: 126/60   Pulse  Min: 61  Max: 86   Resp  Min: 14  Max: 20   SpO2  Min: 85 %  Max: 100 %   Flow (L/min) (Oxygen Therapy)  Min: 2  Max: 2       Input/Output for last 24 hour shift  02/20 0701 - 02/21 0700  In: 1353.1 [I.V.:1353.1]  Out: 600 [Urine:600]      Objective:  General Appearance:  Uncomfortable and ill-appearing (Cachexia).    Vital signs: (most recent): Blood pressure 103/43, pulse 63, temperature 97.7 °F (36.5 °C), resp. rate 16, height 152.4 cm (60\"), weight 51.3 kg (113 lb 1.5 oz), SpO2 99%.    HEENT: Normal HEENT exam.    Lungs:  Normal effort and normal respiratory rate.  There are decreased breath sounds.  No rales, wheezes or rhonchi.    Heart: Normal rate.  Irregular rhythm.  S1 normal and S2 normal.  No murmur.   Chest: Symmetric chest wall expansion.   Abdomen: Abdomen is soft and non-distended.  Bowel sounds are normal.     Neurological: Patient is alert.    Pupils:  Pupils are equal, round, and reactive to light.  Pupils are equal.   Skin:  Pale.  No rash.               Results from last 7 days   Lab Units 02/21/25  0533 02/21/25  0031 02/20/25  1700 02/20/25  1342 02/20/25  1335 02/17/25  1244   WBC 10*3/mm3 13.32*  --   --   --  13.57* 9.10   HEMOGLOBIN g/dL 7.3* 9.0* 10.2*  --  5.5* 9.4*   HEMOGLOBIN, POC   --   --   --    < >  --   --    PLATELETS 10*3/mm3 312  --   --   --  351 348    < > = values in this interval not displayed.     Results from last 7 days   Lab Units 02/21/25  0533 02/20/25  1342 02/20/25  1335 02/17/25  1244   SODIUM mmol/L 135*  --  136 138   POTASSIUM mmol/L 4.3  --  4.8 3.9   CO2 mmol/L 26.0  --  24.0 30.0*   BUN mg/dL 30*  --  32* 21   CREATININE mg/dL 0.53* 0.70 0.57 0.56*   MAGNESIUM mg/dL 1.9  --  1.9 1.8   PHOSPHORUS mg/dL 2.9  --  3.4  --    GLUCOSE mg/dL 120*  --  131* 151*     Estimated Creatinine Clearance: 61.7 mL/min (A) (by C-G formula based on SCr of 0.53 mg/dL (L)).            I reviewed the patient's results and images.     Assessment & Plan "   Impression        UGI bleed    Atrial fibrillation with prior anticoagulation with Eliquis    Dementia     Hemorrhagic shock    Acute blood loss anemia       Plan        Patient is currently receiving 1 unit of packed red blood cells.  We will continue to monitor blood counts closely.  Gastroenterology will continue to follow.  Family is not at bedside currently however it was mentioned to be from nursing and that the family is reluctant to agree to EGD.  This will need to be clarified.  Certainly given the patient's baseline dementia and second state, I think that it would be reasonable to consider simply keeping her off of anticoagulation and monitoring her if endoscopy is not desired.  She will remain in the intensive care unit under close monitoring today.  Follow-up labs and orders are placed in addition to the serial hemoglobin.  Restart medications as able.    Plan of care and goals reviewed with mulitdisciplinary/antibiotic stewardship team during rounds.   I discussed the patient's findings and my recommendations with patient and nursing staff       Navarro Zabala MD, [facility]  Pulmonology and Critical Care Medicine

## 2025-02-21 NOTE — CASE MANAGEMENT/SOCIAL WORK
Continued Stay Note  [facility]     Patient Name: Fanny Boyce  MRN: [id number]  Today's Date: 2/21/2025    Admit Date: 2/20/2025    Plan: Ongoing   Discharge Plan       Row Name 02/21/25 1403       Plan    Plan Ongoing    Plan Comments Received call from Lyudmila with [facility]; patient needs to return to [facility] by midnight on Saturday or will need a new referral.  CM will continue to follow.                   Discharge Codes    No documentation.                       Jackie Wolf RN

## 2025-02-21 NOTE — ANESTHESIA POSTPROCEDURE EVALUATION
Patient: Fanny Boyce    Procedure Summary       Date: 02/21/25 Room / Location: [facility] ENDOSCOPY 2 /  [facility]    Anesthesia Start: 1432 Anesthesia Stop: 1550    Procedure: ESOPHAGOGASTRODUODENOSCOPY Diagnosis:     Surgeons: Steve Gamez MD Provider: Armani Brito MD    Anesthesia Type: general, MAC ASA Status: 3            Anesthesia Type: general, MAC    Vitals  Vitals Value Taken Time   /55 02/21/25 1550   Temp 97.5 °F (36.4 °C) 02/21/25 1550   Pulse 81 02/21/25 1550   Resp 16 02/21/25 1550   SpO2 100 % 02/21/25 1550           Post Anesthesia Care and Evaluation    Patient location during evaluation: ICU  Patient participation: waiting for patient participation  Level of consciousness: sleepy but conscious    Airway patency: patent  Anesthetic complications: No anesthetic complications  PONV Status: none  Cardiovascular status: acceptable  Respiratory status: nasal cannula and spontaneous ventilation  Hydration status: acceptable    Comments: Report given to ICU RN at procedure room bedside before ICU staff  transport back with monitors and O2 via NC  No anesthesia care post op

## 2025-02-21 NOTE — H&P
ICU ADMISSION NOTE    Chief complaint     Upper GI bleed  History of atrial fibrillation on Eliquis    Subjective     Ms. Boyce is an 84 year old female with pmhx of atrial fibrillation, COPD, dementia, parkinson disease, former tobacco use, and history of lung cancer who presented to [facility] ED from [facility] on 2/20 with a hemoglobin of 5.5. Of note, she was admitted on 2/10-2/18 for a hip fracture. She underwent hemiarthroplasty on 2/10 and was restarted on Eliquis on 2/11. Her H&H at discharge was 9.4/30. On 2/18 she was discharged to [facility]. Today she was noted to have a hgb of 5.5 for which she was transported to [facility] via EMS.    Upon arrival here, her fecal occult is positive and rectal exam revealed melena. She was hypotensive, disoriented, and lethargic. She was given Kcentra and 2 units of uncrossed pRBCs. CT A/P without active extravasation. GI consulted and started IV pantoprazole. Plans are to proceed with EGD tomorrow.     Patient will be admitted to the ICU for higher level of care.     Time spent: 4 minutes. Electronically signed by LAVERNE Cam, 02/20/25, 4:25 PM EST.    She told me that she was brought to the hospital from home.  She does not know the day, year, hospital.  She denies abdominal pain, nausea, vomiting, hematemesis.  She denies chest pain or palpitations.    Review of Systems  Review of Systems   Unable to perform ROS: Dementia        Home Medications  Medications Prior to Admission   Medication Sig Dispense Refill Last Dose/Taking    acetaminophen (TYLENOL) 325 MG tablet Take 2 tablets by mouth Every 6 (Six) Hours As Needed for Mild Pain. (Patient taking differently: Take 2 tablets by mouth Every 6 (Six) Hours As Needed for Mild Pain, Headache or Fever. OTC)       apixaban (ELIQUIS) 2.5 MG tablet tablet Take 1 tablet by mouth 2 (Two) Times a Day.       carbidopa-levodopa (SINEMET)  MG per tablet Take 1 tablet by mouth 3 (Three) Times a Day.       fluticasone  (FLONASE) 50 MCG/ACT nasal spray Administer 2 sprays into the nostril(s) as directed by provider Daily As Needed for Rhinitis or Allergies.       ipratropium-albuterol (DUO-NEB) 0.5-2.5 mg/3 ml nebulizer Take 3 mL by nebulization Every 4 (Four) Hours As Needed for Wheezing or Shortness of Air.       metoprolol tartrate (LOPRESSOR) 25 MG tablet Take 1 tablet by mouth 2 (Two) Times a Day.       polyethylene glycol (MIRALAX) 17 g packet Take 17 g by mouth Daily As Needed (Use if senna-docusate is ineffective).       sennosides-docusate (PERICOLACE) 8.6-50 MG per tablet Take 2 tablets by mouth 2 (Two) Times a Day.       traZODone (DESYREL) 50 MG tablet Take 2 tablets by mouth Every Night.       vitamin D3 125 MCG (5000 UT) capsule capsule Take 1 capsule by mouth Daily.        Prior to Admission medications    Medication Sig Start Date End Date Taking? Authorizing Provider   acetaminophen (TYLENOL) 325 MG tablet Take 2 tablets by mouth Every 6 (Six) Hours As Needed for Mild Pain.  Patient taking differently: Take 2 tablets by mouth Every 6 (Six) Hours As Needed for Mild Pain, Headache or Fever. OTC 2/18/25   Everett Bridges MD   apixaban (ELIQUIS) 2.5 MG tablet tablet Take 1 tablet by mouth 2 (Two) Times a Day.    ProviderChristiane MD   carbidopa-levodopa (SINEMET)  MG per tablet Take 1 tablet by mouth 3 (Three) Times a Day.    ProviderChristiane MD   fluticasone (FLONASE) 50 MCG/ACT nasal spray Administer 2 sprays into the nostril(s) as directed by provider Daily As Needed for Rhinitis or Allergies.    ProviderChristiane MD   ipratropium-albuterol (DUO-NEB) 0.5-2.5 mg/3 ml nebulizer Take 3 mL by nebulization Every 4 (Four) Hours As Needed for Wheezing or Shortness of Air. 2/18/25   Everett Bridges MD   metoprolol tartrate (LOPRESSOR) 25 MG tablet Take 1 tablet by mouth 2 (Two) Times a Day. 2/18/25   Everett Bridges MD   polyethylene glycol (MIRALAX) 17 g packet Take 17 g by mouth Daily As  Needed (Use if senna-docusate is ineffective). 2/18/25   Everett Bridges MD   sennosides-docusate (PERICOLACE) 8.6-50 MG per tablet Take 2 tablets by mouth 2 (Two) Times a Day. 2/18/25   Everett Bridges MD   traZODone (DESYREL) 50 MG tablet Take 2 tablets by mouth Every Night.    Provider, MD Christiane   vitamin D3 125 MCG (5000 UT) capsule capsule Take 1 capsule by mouth Daily.    Provider, MD Christiane      History  Past Medical History:   Diagnosis Date    Atrial fibrillation     COPD (chronic obstructive pulmonary disease)     Dementia     History of lung cancer     Parkinson disease     Pulmonary embolism      Past Surgical History:   Procedure Laterality Date    HIP HEMIARTHROPLASTY Right 2/10/2025    Procedure: HIP HEMIARTHROPLASTY RIGHT;  Surgeon: Frandy Patino MD;  Location: [facility];  Service: Orthopedics;  Laterality: Right;    JOINT REPLACEMENT      LUNG CANCER SURGERY       Family History   Problem Relation Age of Onset    Heart disease Mother     Heart disease Father     Aneurysm Father      Social History     Tobacco Use    Smoking status: Former     Current packs/day: 1.00     Average packs/day: 1 pack/day for 40.0 years (40.0 ttl pk-yrs)     Types: Cigarettes    Smokeless tobacco: Never   Vaping Use    Vaping status: Never Used   Substance Use Topics    Alcohol use: Never    Drug use: Never     Medications Prior to Admission   Medication Sig Dispense Refill Last Dose/Taking    acetaminophen (TYLENOL) 325 MG tablet Take 2 tablets by mouth Every 6 (Six) Hours As Needed for Mild Pain. (Patient taking differently: Take 2 tablets by mouth Every 6 (Six) Hours As Needed for Mild Pain, Headache or Fever. OTC)       apixaban (ELIQUIS) 2.5 MG tablet tablet Take 1 tablet by mouth 2 (Two) Times a Day.       carbidopa-levodopa (SINEMET)  MG per tablet Take 1 tablet by mouth 3 (Three) Times a Day.       fluticasone (FLONASE) 50 MCG/ACT nasal spray Administer 2 sprays into the  "nostril(s) as directed by provider Daily As Needed for Rhinitis or Allergies.       ipratropium-albuterol (DUO-NEB) 0.5-2.5 mg/3 ml nebulizer Take 3 mL by nebulization Every 4 (Four) Hours As Needed for Wheezing or Shortness of Air.       metoprolol tartrate (LOPRESSOR) 25 MG tablet Take 1 tablet by mouth 2 (Two) Times a Day.       polyethylene glycol (MIRALAX) 17 g packet Take 17 g by mouth Daily As Needed (Use if senna-docusate is ineffective).       sennosides-docusate (PERICOLACE) 8.6-50 MG per tablet Take 2 tablets by mouth 2 (Two) Times a Day.       traZODone (DESYREL) 50 MG tablet Take 2 tablets by mouth Every Night.       vitamin D3 125 MCG (5000 UT) capsule capsule Take 1 capsule by mouth Daily.        Allergies:  Morphine, Latex, and Wound dressing adhesive      Objective     Vital Signs  Blood pressure 116/47, pulse 79, temperature 97.9 °F (36.6 °C), temperature source Oral, resp. rate 16, height 152.4 cm (60\"), weight 54 kg (119 lb 0.8 oz), SpO2 96%.    Physical Exam:  General Appearance:  Frail elderly woman in no respiratory distress   Head:  Temporal wasting   Eyes:          Conjunctiva pale   Ears:     Throat: Oral mucosa moist   Neck: Limited range of motion, trachea midline   Back:   Thoracic spine kyphosis   Lungs:   Symmetric chest expansion.  No wheeze or rhonchi    Heart:  Regular rhythm, S1, S2 auscultated, no murmur   Abdomen:   Bowel sounds are present, nondistended, soft   Rectal:     Deferred   Extremities:    Right hip dressing intact.  Healed right knee surgical scar.  No pitting edema   Pulses:      Skin: Cool and dry   Lymph nodes:    Neurologic: Oriented to name.  Some cogwheeling of upper extremities but no resting tremor.  Can dorsiflex her feet and pull up against my hand with her feet but cannot lift her legs off of the bed.  She can lift both arms off of the stretcher.       Results Review:   Lab Results (last 24 hours)       Procedure Component Value Units Date/Time    " "Hemoglobin & Hematocrit, Blood [[id number]] Collected: 02/20/25 1700    Specimen: Blood Updated: 02/20/25 1700    Procalcitonin [[id number]]  (Normal) Collected: 02/20/25 1439    Specimen: Blood Updated: 02/20/25 1645     Procalcitonin 0.19 ng/mL     Narrative:      As a Marker for Sepsis (Non-Neonates):    1. <0.5 ng/mL represents a low risk of severe sepsis and/or septic shock.  2. >2 ng/mL represents a high risk of severe sepsis and/or septic shock.    As a Marker for Lower Respiratory Tract Infections that require antibiotic therapy:    PCT on Admission    Antibiotic Therapy       6-12 Hrs later    >0.5                Strongly Recommended  >0.25 - <0.5        Recommended   0.1 - 0.25          Discouraged              Remeasure/reassess PCT  <0.1                Strongly Discouraged     Remeasure/reassess PCT    As 28 day mortality risk marker: \"Change in Procalcitonin Result\" (>80% or <=80%) if Day 0 (or Day 1) and Day 4 values are available. Refer to [email or web address]-[email or web address]    Change in PCT <=80%  A decrease of PCT levels below or equal to 80% defines a positive change in PCT test result representing a higher risk for 28-day all-cause mortality of patients diagnosed with severe sepsis for septic shock.    Change in PCT >80%  A decrease of PCT levels of more than 80% defines a negative change in PCT result representing a lower risk for 28-day all-cause mortality of patients diagnosed with severe sepsis or septic shock.       POC Glucose Once [[id number]]  (Normal) Collected: 02/20/25 1625    Specimen: Blood Updated: 02/20/25 1627     Glucose 115 mg/dL     High Sensitivity Troponin T 1Hr [[id number]]  (Abnormal) Collected: 02/20/25 1439    Specimen: Blood Updated: 02/20/25 1505     HS Troponin T 29 ng/L      Troponin T Numeric Delta 3 ng/L      Troponin T % Delta 12    Narrative:      High Sensitive Troponin T Reference Range:  <14.0 ng/L- Negative Female for AMI  <22.0 ng/L- Negative Male for AMI  >=14 - " Abnormal Female indicating possible myocardial injury.  >=22 - Abnormal Male indicating possible myocardial injury.   Clinicians would have to utilize clinical acumen, EKG, Troponin, and serial changes to determine if it is an Acute Myocardial Infarction or myocardial injury due to an underlying chronic condition.         Comprehensive Metabolic Panel [[id number]]  (Abnormal) Collected: 02/20/25 1335    Specimen: Blood from Arm, Left Updated: 02/20/25 1426     Glucose 131 mg/dL      BUN 32 mg/dL      Creatinine 0.57 mg/dL      Sodium 136 mmol/L      Potassium 4.8 mmol/L      Chloride 101 mmol/L      CO2 24.0 mmol/L      Calcium 8.8 mg/dL      Total Protein 5.3 g/dL      Albumin 2.9 g/dL      ALT (SGPT) <5 U/L      AST (SGOT) 16 U/L      Alkaline Phosphatase 76 U/L      Total Bilirubin 0.3 mg/dL      Globulin 2.4 gm/dL      Comment: Calculated Result        A/G Ratio 1.2 g/dL      BUN/Creatinine Ratio 56.1     Anion Gap 11.0 mmol/L      eGFR 88.6 mL/min/1.73     Narrative:      GFR Categories in Chronic Kidney Disease (CKD)      GFR Category          GFR (mL/min/1.73)    Interpretation  G1                     90 or greater         Normal or high (1)  G2                      60-89                Mild decrease (1)  G3a                   45-59                Mild to moderate decrease  G3b                   30-44                Moderate to severe decrease  G4                    15-29                Severe decrease  G5                    14 or less           Kidney failure          (1)In the absence of evidence of kidney disease, neither GFR category G1 or G2 fulfill the criteria for CKD.    eGFR calculation 2021 CKD-EPI creatinine equation, which does not include race as a factor    Magnesium [[id number]]  (Normal) Collected: 02/20/25 1335    Specimen: Blood from Arm, Left Updated: 02/20/25 1426     Magnesium 1.9 mg/dL     High Sensitivity Troponin T [[id number]]  (Abnormal) Collected: 02/20/25 1335    Specimen: Blood from  Arm, Left Updated: 02/20/25 1418     HS Troponin T 26 ng/L     Narrative:      High Sensitive Troponin T Reference Range:  <14.0 ng/L- Negative Female for AMI  <22.0 ng/L- Negative Male for AMI  >=14 - Abnormal Female indicating possible myocardial injury.  >=22 - Abnormal Male indicating possible myocardial injury.   Clinicians would have to utilize clinical acumen, EKG, Troponin, and serial changes to determine if it is an Acute Myocardial Infarction or myocardial injury due to an underlying chronic condition.         BNP [[id number]]  (Normal) Collected: 02/20/25 1335    Specimen: Blood from Arm, Left Updated: 02/20/25 1418     proBNP 336.2 pg/mL     Narrative:      This assay is used as an aid in the diagnosis of individuals suspected of having heart failure. It can be used as an aid in the diagnosis of acute decompensated heart failure (ADHF) in patients presenting with signs and symptoms of ADHF to the emergency department (ED). In addition, NT-proBNP of <300 pg/mL indicates ADHF is not likely.    Age Range Result Interpretation  NT-proBNP Concentration (pg/mL:      <50             Positive            >450                   Gray                 300-450                    Negative             <300    50-75           Positive            >900                  Gray                300-900                  Negative            <300      >75             Positive            >1800                  Gray                300-1800                  Negative            <300    TSH Rfx On Abnormal To Free T4 [[id number]]  (Normal) Collected: 02/20/25 1335    Specimen: Blood from Arm, Left Updated: 02/20/25 1418     TSH 2.910 uIU/mL     Phosphorus [[id number]]  (Normal) Collected: 02/20/25 1335    Specimen: Blood from Arm, Left Updated: 02/20/25 1413     Phosphorus 3.4 mg/dL     Lactic Acid, Plasma [[id number]]  (Normal) Collected: 02/20/25 1335    Specimen: Blood from Arm, Left Updated: 02/20/25 1404     Lactate 1.6 mmol/L       Comment: Falsely depressed results may occur on samples drawn from patients receiving N-Acetylcysteine (NAC) or Metamizole.       Protime-INR [[id number]]  (Abnormal) Collected: 02/20/25 1335    Specimen: Blood from Arm, Left Updated: 02/20/25 1402     Protime 15.1 Seconds      INR 1.18    aPTT [[id number]]  (Abnormal) Collected: 02/20/25 1335    Specimen: Blood from Arm, Left Updated: 02/20/25 1402     PTT 36.6 seconds     Narrative:      PTT = The equivalent PTT values for the therapeutic range of heparin levels at 0.3 to 0.5 U/ml are 60 to 70 seconds.    CBC & Differential [[id number]]  (Abnormal) Collected: 02/20/25 1335    Specimen: Blood from Arm, Left Updated: 02/20/25 1357    Narrative:      The following orders were created for panel order CBC & Differential.  Procedure                               Abnormality         Status                     ---------                               -----------         ------                     CBC Auto Differential[[id number]]        Abnormal            Final result                 Please view results for these tests on the individual orders.    CBC Auto Differential [[id number]]  (Abnormal) Collected: 02/20/25 1335    Specimen: Blood from Arm, Left Updated: 02/20/25 1357     WBC 13.57 10*3/mm3      RBC 1.96 10*6/mm3      Hemoglobin 5.5 g/dL      Hematocrit 17.8 %      MCV 90.8 fL      MCH 28.1 pg      MCHC 30.9 g/dL      RDW 15.4 %      RDW-SD 48.4 fl      MPV 9.6 fL      Platelets 351 10*3/mm3      Neutrophil % 61.5 %      Lymphocyte % 31.4 %      Monocyte % 4.3 %      Eosinophil % 0.4 %      Basophil % 0.4 %      Immature Grans % 2.0 %      Neutrophils, Absolute 8.36 10*3/mm3      Lymphocytes, Absolute 4.26 10*3/mm3      Monocytes, Absolute 0.58 10*3/mm3      Eosinophils, Absolute 0.05 10*3/mm3      Basophils, Absolute 0.05 10*3/mm3      Immature Grans, Absolute 0.27 10*3/mm3      nRBC 0.0 /100 WBC     Blood Culture - Blood, Arm, Right [[id number]] Collected: 02/20/25 1335     Specimen: Blood from Arm, Right Updated: 02/20/25 1349    Blood Culture - Blood, Arm, Left [[id number]] Collected: 02/20/25 1335    Specimen: Blood from Arm, Left Updated: 02/20/25 1349    POC Occult Blood Stool [[id number]]  (Abnormal) Resulted: 02/20/25 1339    Specimen: Stool Updated: 02/20/25 1340     Fecal Occult Blood Positive     Lot Number [id number]     Expiration Date 4/27     DEVELOPER LOT NUMBER [id number]     DEVELOPER EXPIRATION DATE 12/27     Positive Control Positive     Negative Control Negative          Imaging Results (Last 24 Hours)       Procedure Component Value Units Date/Time    XR Chest 1 View [[id number]] Resulted: 02/20/25 1614     Updated: 02/20/25 1701    CT Abdomen Pelvis With & Without Contrast [[id number]] Collected: 02/20/25 1413     Updated: 02/20/25 1441    Narrative:      CT ABDOMEN PELVIS W WO CONTRAST    Date of Exam: 2/20/2025 1:52 PM EST    Indication: transfusion level anemia with positive hemoccult, stool is dark.    Comparison: None available.    Technique: Axial CT images were obtained of the abdomen and pelvis before and after the uneventful intravenous administration of 85 mL Isovue-370. Sagittal and coronal reconstructions were performed.  Automated exposure control and iterative   reconstruction methods were used.    Findings:    Liver: The liver is unremarkable in morphology. Left paddock lobe cysts and additional subcentimeter liver hypodensities which are too small to characterize. No biliary dilation is seen.    Gallbladder: Surgically absent    Pancreas: Unremarkable.    Spleen: Probable splenic cyst. Spleen is otherwise unremarkable.    Adrenal glands: Unremarkable.    Genitourinary tract: Small bilateral renal cysts and subcentimeter hypodensities which are too small to characterize. 5 mm nonobstructing calculus within the right kidney. No hydronephrosis is seen. The visualized portions of the ureters are   unremarkable. Distal ureters and urinary bladder are partially  obscured due to beam hardening artifact arising from bilateral hip arthroplasties.    Gastrointestinal tract: Colonic diverticulosis is present. Large hiatal hernia with paraesophageal component. Moderate colonic stool noted. Hollow viscera appear otherwise unremarkable. There is no evidence of bowel obstruction. No active contrast   extravasation is seen within the GI tract lumen.    Appendix: No findings to suggest acute appendicitis.    Other findings: Atherosclerotic plaque is seen within the abdominal aorta and its branches. The abdominal aorta is normal in course and caliber. 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 There is moderate   stenosis within the superior mesenteric artery (series 4, image 36). XI appears unremarkable. Bilateral renal arteries and visualized iliofemoral arteries are unremarkable. No free air or free fluid is identified. No pathologically enlarged lymph nodes   are seen. The IVC is unremarkable.    Bones and soft tissues: Bones are demineralized. There are severe compression fractures of T12 and L4. The T12 fracture is unchanged since 3/13/2024. The L4 fracture has significantly progressed since that prior study. There is a new sclerotic band at   the inferior endplate of T10 which may represent an acute or subacute fracture. Bilateral hip arthroplasties are noted. Superficial soft tissues demonstrate no acute abnormality    Lung bases: Mild left basilar atelectasis or scarring. Large hiatal hernia with paraesophageal component.      Impression:      Impression:  1.No acute abnormality identified within the abdomen or pelvis.   2.No acute vascular abnormality is identified. No active contrast extravasation is seen within the GI tract lumen.  3.Colonic diverticulosis.  4.Large hiatal hernia with paraesophageal component.  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  6.Right  nonobstructive nephrolithiasis  7.There are severe compression fractures of T12 and L4. The T12 fracture is unchanged since 3/13/2024. The L4 fracture has significantly progressed since that prior study. There is a new sclerotic band at the inferior endplate of T10 which may represent   an acute or subacute fracture. Please correlate clinically for pain/tenderness at these sites.  8.Additional findings as detailed above.      Electronically Signed: Sp Palacio MD    2/20/2025 2:38 PM EST    Workstation ID: [id number]             PROBLEM LIST    Upper GI bleeding  Anticoagulation for atrial fibrillation  Dementia  Recent right hip fracture with open reduction internal fixation  Osteoporosis with multiple compression fractures of the thoracic and lumbar spine  Large paraesophageal hiatal hernia    Assessment & Plan     Frail 84-year-old woman with Parkinson's disease, dementia, recent right femur fracture post surgery February 10 and discharged to [facility].  She returns with weakness, hypotension and a hemoglobin of 5.5.  She does take Eliquis for history of atrial fibrillation.  She is currently in sinus rhythm.  She received 2 units of uncrossed blood in the emergency room and her blood pressure improved to a systolic of 99.  She also received Kcentra.    She is followed at  for COPD.  Her FEV1 is 1.29 L.  She was on Spiriva once daily and albuterol inhaler as needed.  She has not required supplemental oxygen.  She no longer smokes but cannot tell me when she stopped.    -Recheck H&H and transfuse if hemoglobin less than 8  -Serial H&H  -GI consult  -N.p.o. after midnight for EGD  -Maintenance IV fluids  -Resume Sinemet once she can take p.o.  -Hold metoprolol  -Discontinue Eliquis  -Bronchodilators as needed  -Trazodone 50 mg 2 tablets at night  -Vitamin D 325 mcg for her osteoporosis  -Spiriva is not on formulary, will use a DuoNeb as needed for wheezing or shortness of air  -No CPR, no  intubation        Suzanna Sosa MD  02/20/25  17:09 EST    Time: Critical care 40 min    Please note that portions of this note were completed with a voice recognition program.

## 2025-02-21 NOTE — NURSING NOTE
WOC consulted for pressure injury to medial sacral spine-nonblanchable redness maroon    Patient assessed in side-lying position with primary RN present.    Excoriation noted to sacral area.  Noted discoloration.  Does not appear to be pressure at this time.    Recommend utilizing Allevyn sacrococcygeal dressing over this area.  Apply barrier spray prior to application.  See wound care orders.    Barrier cream to lower gluteals-not under Allevyn dressing.    Turn patient every 2 hours.    Pressure injury prevention protocol.    WOC will sign off.    Ki Falk RN, BSN, CWOCN  Wound, Ostomy and Continence (WOC) Department  [facility]

## 2025-02-22 LAB
ANION GAP SERPL CALCULATED.3IONS-SCNC: 8 MMOL/L (ref 5–15)
BASOPHILS # BLD AUTO: 0.06 10*3/MM3 (ref 0–0.2)
BASOPHILS NFR BLD AUTO: 0.6 % (ref 0–1.5)
BH BB BLOOD EXPIRATION DATE: NORMAL
BH BB BLOOD EXPIRATION DATE: NORMAL
BH BB BLOOD TYPE BARCODE: 5100
BH BB BLOOD TYPE BARCODE: 5100
BH BB DISPENSE STATUS: NORMAL
BH BB DISPENSE STATUS: NORMAL
BH BB PRODUCT CODE: NORMAL
BH BB PRODUCT CODE: NORMAL
BH BB UNIT NUMBER: NORMAL
BH BB UNIT NUMBER: NORMAL
BUN SERPL-MCNC: 17 MG/DL (ref 8–23)
BUN/CREAT SERPL: 32.7 (ref 7–25)
CALCIUM SPEC-SCNC: 8.3 MG/DL (ref 8.6–10.5)
CHLORIDE SERPL-SCNC: 108 MMOL/L (ref 98–107)
CO2 SERPL-SCNC: 24 MMOL/L (ref 22–29)
CREAT SERPL-MCNC: 0.52 MG/DL (ref 0.57–1)
CROSSMATCH INTERPRETATION: NORMAL
CROSSMATCH INTERPRETATION: NORMAL
DEPRECATED RDW RBC AUTO: 49.1 FL (ref 37–54)
EGFRCR SERPLBLD CKD-EPI 2021: 90.6 ML/MIN/1.73
EOSINOPHIL # BLD AUTO: 0.23 10*3/MM3 (ref 0–0.4)
EOSINOPHIL NFR BLD AUTO: 2.3 % (ref 0.3–6.2)
ERYTHROCYTE [DISTWIDTH] IN BLOOD BY AUTOMATED COUNT: 15.2 % (ref 12.3–15.4)
GLUCOSE BLDC GLUCOMTR-MCNC: 104 MG/DL (ref 70–130)
GLUCOSE BLDC GLUCOMTR-MCNC: 83 MG/DL (ref 70–130)
GLUCOSE BLDC GLUCOMTR-MCNC: 88 MG/DL (ref 70–130)
GLUCOSE BLDC GLUCOMTR-MCNC: 89 MG/DL (ref 70–130)
GLUCOSE SERPL-MCNC: 86 MG/DL (ref 65–99)
HCT VFR BLD AUTO: 30.7 % (ref 34–46.6)
HGB BLD-MCNC: 9.9 G/DL (ref 12–15.9)
IMM GRANULOCYTES # BLD AUTO: 0.34 10*3/MM3 (ref 0–0.05)
IMM GRANULOCYTES NFR BLD AUTO: 3.4 % (ref 0–0.5)
LYMPHOCYTES # BLD AUTO: 2.07 10*3/MM3 (ref 0.7–3.1)
LYMPHOCYTES NFR BLD AUTO: 20.8 % (ref 19.6–45.3)
MAGNESIUM SERPL-MCNC: 1.9 MG/DL (ref 1.6–2.4)
MCH RBC QN AUTO: 29.5 PG (ref 26.6–33)
MCHC RBC AUTO-ENTMCNC: 32.2 G/DL (ref 31.5–35.7)
MCV RBC AUTO: 91.4 FL (ref 79–97)
MONOCYTES # BLD AUTO: 0.51 10*3/MM3 (ref 0.1–0.9)
MONOCYTES NFR BLD AUTO: 5.1 % (ref 5–12)
NEUTROPHILS NFR BLD AUTO: 6.76 10*3/MM3 (ref 1.7–7)
NEUTROPHILS NFR BLD AUTO: 67.8 % (ref 42.7–76)
NRBC BLD AUTO-RTO: 0 /100 WBC (ref 0–0.2)
PLATELET # BLD AUTO: 271 10*3/MM3 (ref 140–450)
PMV BLD AUTO: 9.5 FL (ref 6–12)
POTASSIUM SERPL-SCNC: 3.8 MMOL/L (ref 3.5–5.2)
RBC # BLD AUTO: 3.36 10*6/MM3 (ref 3.77–5.28)
SODIUM SERPL-SCNC: 140 MMOL/L (ref 136–145)
UNIT  ABO: NORMAL
UNIT  ABO: NORMAL
UNIT  RH: NORMAL
UNIT  RH: NORMAL
WBC NRBC COR # BLD AUTO: 9.97 10*3/MM3 (ref 3.4–10.8)

## 2025-02-22 PROCEDURE — 85025 COMPLETE CBC W/AUTO DIFF WBC: CPT | Performed by: INTERNAL MEDICINE

## 2025-02-22 PROCEDURE — 99232 SBSQ HOSP IP/OBS MODERATE 35: CPT | Performed by: INTERNAL MEDICINE

## 2025-02-22 PROCEDURE — 80048 BASIC METABOLIC PNL TOTAL CA: CPT | Performed by: INTERNAL MEDICINE

## 2025-02-22 PROCEDURE — 99232 SBSQ HOSP IP/OBS MODERATE 35: CPT | Performed by: NURSE PRACTITIONER

## 2025-02-22 PROCEDURE — 83735 ASSAY OF MAGNESIUM: CPT | Performed by: INTERNAL MEDICINE

## 2025-02-22 PROCEDURE — 82948 REAGENT STRIP/BLOOD GLUCOSE: CPT

## 2025-02-22 RX ADMIN — MUPIROCIN 1 APPLICATION: 20 OINTMENT TOPICAL at 20:50

## 2025-02-22 RX ADMIN — CARBIDOPA AND LEVODOPA 1 TABLET: 25; 100 TABLET ORAL at 09:00

## 2025-02-22 RX ADMIN — TRAZODONE HYDROCHLORIDE 100 MG: 100 TABLET ORAL at 20:50

## 2025-02-22 RX ADMIN — MUPIROCIN 1 APPLICATION: 20 OINTMENT TOPICAL at 09:00

## 2025-02-22 RX ADMIN — PANTOPRAZOLE SODIUM 8 MG/HR: 40 INJECTION, POWDER, FOR SOLUTION INTRAVENOUS at 06:23

## 2025-02-22 RX ADMIN — PANTOPRAZOLE SODIUM 8 MG/HR: 40 INJECTION, POWDER, FOR SOLUTION INTRAVENOUS at 02:22

## 2025-02-22 RX ADMIN — PANTOPRAZOLE SODIUM 8 MG/HR: 40 INJECTION, POWDER, FOR SOLUTION INTRAVENOUS at 21:34

## 2025-02-22 RX ADMIN — Medication 5000 UNITS: at 09:00

## 2025-02-22 RX ADMIN — CARBIDOPA AND LEVODOPA 1 TABLET: 25; 100 TABLET ORAL at 20:50

## 2025-02-22 RX ADMIN — CARBIDOPA AND LEVODOPA 1 TABLET: 25; 100 TABLET ORAL at 16:57

## 2025-02-22 RX ADMIN — PANTOPRAZOLE SODIUM 8 MG/HR: 40 INJECTION, POWDER, FOR SOLUTION INTRAVENOUS at 12:03

## 2025-02-22 RX ADMIN — PANTOPRAZOLE SODIUM 8 MG/HR: 40 INJECTION, POWDER, FOR SOLUTION INTRAVENOUS at 16:58

## 2025-02-22 NOTE — PLAN OF CARE
Goal Outcome Evaluation:  Plan of Care Reviewed With: patient           Outcome Evaluation: vss. no outward signs of bleeding. adequate urine pure wick.  restraints remain. awaiting am labs.

## 2025-02-22 NOTE — PROGRESS NOTES
Intensive Care Follow-up     Hospital:  LOS: 2 days   Ms. Fanny Boyce, 84 y.o. female is followed for:   UGI bleed            History of present illness:    84 year old female with atrial fibrillation, COPD, dementia, parkinson disease, former tobacco use, and history of lung cancer who presented to [facility] ED from [facility] on 2/20 with a hemoglobin of 5.5. Of note, she was admitted on 2/10-2/18 for a hip fracture. She underwent hemiarthroplasty on 2/10 and was restarted on Eliquis on 2/11. Her H&H at discharge was 9.4/30. On 2/18 she was discharged to [facility]. Upon arrival here, her fecal occult is positive and rectal exam revealed melena. She was hypotensive, disoriented, and lethargic. She was given Kcentra and 2 units of uncrossed pRBCs. CT A/P without active extravasation. GI consulted and started IV pantoprazole.   Patient underwent upper GI endoscopy by Dr. Gamez on February 21.  Patient was found to have 2 cratered duodenal ulcer in the duodenal bulb.  There was active extravasation noted during the case.  Clips were placed and needed coagulation with bipolar cautery to control of bleeding with hemostatic spray.     Subjective   Interval History:  No further bleeding noted overnight.  Patient remains on PPI infusion.  GI team following.  Plan was to keep patient on ice chips only for now.  Patient without any complaints currently.                 The patient's past medical, surgical and social history were reviewed and updated in Epic as appropriate.       Objective     Infusions:  pantoprazole, 8 mg/hr, Last Rate: 8 mg/hr (02/22/25 1203)      Medications:  carbidopa-levodopa, 1 tablet, Oral, TID  mupirocin, 1 Application, Each Nare, BID  traZODone, 100 mg, Oral, Nightly  vitamin D3, 5,000 Units, Oral, Daily        Vital Sign Min/Max for last 24 hours  Temp  Min: 97.5 °F (36.4 °C)  Max: 98 °F (36.7 °C)   BP  Min: 102/50  Max: 131/50   Pulse  Min: 55  Max: 81   Resp  Min: 14  Max: 20   SpO2  Min: 90 %  Max:  "100 %   Flow (L/min) (Oxygen Therapy)  Min: 1  Max: 2       Input/Output for last 24 hour shift  02/21 0701 - 02/22 0700  In: 2746.1 [I.V.:1666.1]  Out: 2975 [Urine:2975]   S RR:  [10] 10  Objective:  Vital signs: (most recent): Blood pressure 104/41, pulse 62, temperature 97.7 °F (36.5 °C), temperature source Oral, resp. rate 18, height 152.4 cm (60\"), weight 52.1 kg (114 lb 13.8 oz), SpO2 100%.            General Appearance: Awake, confused, apparently baseline, in no acute distress  Lungs:   B/L Breath sounds present with decreased breath sounds on bases, no wheezing heard, no crackles.   Heart: S1 and S2 present, no murmur  Abdomen: Soft, nontender, no guarding or rigidity, bowel sounds positive.  Extremities:  no edema, warm to touch.  Neurologic:  Moving all four extremities.      Results from last 7 days   Lab Units 02/22/25  0817 02/21/25  1349 02/21/25  0533 02/20/25  1342 02/20/25  1335   WBC 10*3/mm3 9.97  --  13.32*  --  13.57*   HEMOGLOBIN g/dL 9.9* 8.4* 7.3*   < > 5.5*   HEMOGLOBIN, POC   --   --   --    < >  --    PLATELETS 10*3/mm3 271  --  312  --  351    < > = values in this interval not displayed.     Results from last 7 days   Lab Units 02/22/25  0817 02/21/25  0533 02/20/25  1342 02/20/25  1335   SODIUM mmol/L 140 135*  --  136   POTASSIUM mmol/L 3.8 4.3  --  4.8   CO2 mmol/L 24.0 26.0  --  24.0   BUN mg/dL 17 30*  --  32*   CREATININE mg/dL 0.52* 0.53* 0.70 0.57   MAGNESIUM mg/dL 1.9 1.9  --  1.9   PHOSPHORUS mg/dL  --  2.9  --  3.4   GLUCOSE mg/dL 86 120*  --  131*     Estimated Creatinine Clearance: 63.9 mL/min (A) (by C-G formula based on SCr of 0.52 mg/dL (L)).          Images:   None new    I reviewed the patient's results and images.     Assessment & Plan   Impression        UGI bleed    Atrial fibrillation    COPD (chronic obstructive pulmonary disease)    Dementia    Parkinson's disease    Fracture of left humerus with routine healing    Hemorrhagic shock    Acute blood loss " anemia       Plan        1.  Patient presented with acute upper GI bleed with severe symptomatic anemia.  Patient is admitted in intensive care unit for closer monitoring.  Received multiple blood products.  GI team performed endoscopy with clipping of bleeding duodenal ulcer with control of bleeding.  Hemoglobin remained stable overnight.  No further bleeding noted at this point.  Will continue to monitor closely.  Hemodynamically stable.  2.  Per GI notes plan was to continue IV Protonix for 72 hours.  Will continue the same for now.  On liquid diet and will advance as per GI recommendations.  3.  Atrial fibrillation was on anticoagulation.  Currently off anticoagulation and high risk for resumption of anticoagulation this early in the course.  Will hold for now.  4.  Mild leukocytosis, no definite evidence of infection.  Leukocytosis has normalized.  5.  Continue Parkinson medications.  Continue trazodone nightly.    Continue close monitoring in ICU.  High risk of decline with recurrence of bleed.  May need IR intervention if that happens.    Plan of care and goals reviewed with multidisciplinary/antibiotic stewardship team during rounds.   I discussed the patient's findings and my recommendations with patient and nursing staff       Brian Maynard MD, [facility]  Pulmonary, Critical care and Sleep Medicine

## 2025-02-22 NOTE — PROGRESS NOTES
"  GI Daily Progress Note  Subjective:    Chief Complaint:  f/u GIB    Patient resting in bed, no acute distress. Hemoglobin stable.     Objective:    BP 93/44   Pulse 57   Temp 97.7 °F (36.5 °C) (Oral)   Resp 18   Ht 152.4 cm (60\")   Wt 52.1 kg (114 lb 13.8 oz)   SpO2 96%   BMI 22.43 kg/m²     Physical Exam  Vitals and nursing note reviewed.   Constitutional:       General: She is sleeping. She is not in acute distress.     Appearance: She is not ill-appearing or toxic-appearing.   Cardiovascular:      Rate and Rhythm: Normal rate and regular rhythm.      Pulses: Normal pulses.      Heart sounds: Normal heart sounds.   Pulmonary:      Effort: Pulmonary effort is normal. No respiratory distress.      Breath sounds: Normal breath sounds.   Abdominal:      General: Abdomen is flat. Bowel sounds are normal. There is no distension.      Palpations: Abdomen is soft. There is no mass.      Tenderness: There is no abdominal tenderness. There is no guarding or rebound.      Hernia: No hernia is present.         Lab  I have personally reviewed most recent cardiac tracings, lab results, and radiology images and interpretations and agree with findings.    Lab Results   Component Value Date    WBC 9.97 02/22/2025    HGB 9.9 (L) 02/22/2025    HGB 8.4 (L) 02/21/2025    HGB 7.3 (L) 02/21/2025    MCV 91.4 02/22/2025     02/22/2025    INR 1.03 02/21/2025    INR 1.18 (H) 02/20/2025    INR 0.97 02/10/2025    INR 1.2 (H) 02/25/2022    INR 1.1 02/21/2022       Lab Results   Component Value Date    GLUCOSE 86 02/22/2025    BUN 17 02/22/2025    CREATININE 0.52 (L) 02/22/2025    EGFRIFNONA >60 07/19/2022    EGFRIFAFRI >60 07/19/2022    BCR 32.7 (H) 02/22/2025     02/22/2025    K 3.8 02/22/2025    CO2 24.0 02/22/2025    CALCIUM 8.3 (L) 02/22/2025    ALBUMIN 2.6 (L) 02/21/2025    ALKPHOS 77 02/21/2025    BILITOT 0.4 02/21/2025    ALT 13 02/21/2025    AST 15 02/21/2025     Upper GI Endoscopy (02/21/2025 14:15)   EGD per  " Brown  EGD found normal esophagus, large paraesophageal hernia, blood noted in the duodenum with noted duodenal ulcers; Ovesco clip deployed and cautery utilized for hemostasis. Hemostatic spray applied at conclusion of procedure.     Assessment:      UGI bleed    Atrial fibrillation    COPD (chronic obstructive pulmonary disease)    Dementia    Parkinson's disease    Fracture of left humerus with routine healing    Hemorrhagic shock    Acute blood loss anemia    Gastrointestinal hemorrhage with melena, s/p EGD with duodenal ulcers with bleed, treated with bipolar cautery, OVESCO clip and hemospray.    Acute blood loss anemia  Hemorrhagic shock     Plan:  Patient doing well today.  Hemoglobin stable.  No further reports of bleeding.    >>> Continue IV pantoprazole drip to completion; 72 hours total.  >>> Monitor H&H and transfuse per protocol  >>> If recurrent bleeding, recommend IR intervention with targeted embolization to the GDA with special attention to be paid to the region of the hemostatic clip.    Obdulio Reece, LAVERNE  02/22/25  16:05 EST

## 2025-02-22 NOTE — PLAN OF CARE
Goal Outcome Evaluation:    H&H Stabilized. No signs of active bleeding.     Diet started and advanced as tolerated.     Protonix drip infusing. Convert to PO after 72 hours.      Daughter updated via phone. Will be by this evening.

## 2025-02-23 LAB
ANION GAP SERPL CALCULATED.3IONS-SCNC: 1 MMOL/L (ref 5–15)
BASOPHILS # BLD AUTO: 0.05 10*3/MM3 (ref 0–0.2)
BASOPHILS NFR BLD AUTO: 0.5 % (ref 0–1.5)
BUN SERPL-MCNC: 19 MG/DL (ref 8–23)
BUN/CREAT SERPL: 38 (ref 7–25)
CALCIUM SPEC-SCNC: 8.1 MG/DL (ref 8.6–10.5)
CHLORIDE SERPL-SCNC: 108 MMOL/L (ref 98–107)
CO2 SERPL-SCNC: 25 MMOL/L (ref 22–29)
CREAT SERPL-MCNC: 0.5 MG/DL (ref 0.57–1)
DEPRECATED RDW RBC AUTO: 50 FL (ref 37–54)
EGFRCR SERPLBLD CKD-EPI 2021: 91.5 ML/MIN/1.73
EOSINOPHIL # BLD AUTO: 0.25 10*3/MM3 (ref 0–0.4)
EOSINOPHIL NFR BLD AUTO: 2.7 % (ref 0.3–6.2)
ERYTHROCYTE [DISTWIDTH] IN BLOOD BY AUTOMATED COUNT: 15.3 % (ref 12.3–15.4)
GLUCOSE SERPL-MCNC: 86 MG/DL (ref 65–99)
HCT VFR BLD AUTO: 28.6 % (ref 34–46.6)
HGB BLD-MCNC: 9 G/DL (ref 12–15.9)
IMM GRANULOCYTES # BLD AUTO: 0.22 10*3/MM3 (ref 0–0.05)
IMM GRANULOCYTES NFR BLD AUTO: 2.4 % (ref 0–0.5)
LYMPHOCYTES # BLD AUTO: 2.1 10*3/MM3 (ref 0.7–3.1)
LYMPHOCYTES NFR BLD AUTO: 22.7 % (ref 19.6–45.3)
MAGNESIUM SERPL-MCNC: 1.7 MG/DL (ref 1.6–2.4)
MCH RBC QN AUTO: 29.5 PG (ref 26.6–33)
MCHC RBC AUTO-ENTMCNC: 31.5 G/DL (ref 31.5–35.7)
MCV RBC AUTO: 93.8 FL (ref 79–97)
MONOCYTES # BLD AUTO: 0.51 10*3/MM3 (ref 0.1–0.9)
MONOCYTES NFR BLD AUTO: 5.5 % (ref 5–12)
NEUTROPHILS NFR BLD AUTO: 6.12 10*3/MM3 (ref 1.7–7)
NEUTROPHILS NFR BLD AUTO: 66.2 % (ref 42.7–76)
NRBC BLD AUTO-RTO: 0 /100 WBC (ref 0–0.2)
PLATELET # BLD AUTO: 266 10*3/MM3 (ref 140–450)
PMV BLD AUTO: 9.1 FL (ref 6–12)
POTASSIUM SERPL-SCNC: 3.4 MMOL/L (ref 3.5–5.2)
POTASSIUM SERPL-SCNC: 4.9 MMOL/L (ref 3.5–5.2)
RBC # BLD AUTO: 3.05 10*6/MM3 (ref 3.77–5.28)
SODIUM SERPL-SCNC: 134 MMOL/L (ref 136–145)
WBC NRBC COR # BLD AUTO: 9.25 10*3/MM3 (ref 3.4–10.8)

## 2025-02-23 PROCEDURE — 99232 SBSQ HOSP IP/OBS MODERATE 35: CPT | Performed by: INTERNAL MEDICINE

## 2025-02-23 PROCEDURE — 84132 ASSAY OF SERUM POTASSIUM: CPT | Performed by: INTERNAL MEDICINE

## 2025-02-23 PROCEDURE — 80048 BASIC METABOLIC PNL TOTAL CA: CPT | Performed by: INTERNAL MEDICINE

## 2025-02-23 PROCEDURE — 83735 ASSAY OF MAGNESIUM: CPT | Performed by: INTERNAL MEDICINE

## 2025-02-23 PROCEDURE — 85025 COMPLETE CBC W/AUTO DIFF WBC: CPT | Performed by: INTERNAL MEDICINE

## 2025-02-23 PROCEDURE — 99232 SBSQ HOSP IP/OBS MODERATE 35: CPT | Performed by: NURSE PRACTITIONER

## 2025-02-23 PROCEDURE — 97162 PT EVAL MOD COMPLEX 30 MIN: CPT

## 2025-02-23 PROCEDURE — 97166 OT EVAL MOD COMPLEX 45 MIN: CPT

## 2025-02-23 RX ORDER — PANTOPRAZOLE SODIUM 40 MG/1
40 TABLET, DELAYED RELEASE ORAL EVERY 12 HOURS SCHEDULED
Status: DISCONTINUED | OUTPATIENT
Start: 2025-02-23 | End: 2025-02-26 | Stop reason: HOSPADM

## 2025-02-23 RX ORDER — POTASSIUM CHLORIDE 1500 MG/1
40 TABLET, EXTENDED RELEASE ORAL EVERY 4 HOURS
Status: COMPLETED | OUTPATIENT
Start: 2025-02-23 | End: 2025-02-23

## 2025-02-23 RX ORDER — PANTOPRAZOLE SODIUM 40 MG/10ML
40 INJECTION, POWDER, LYOPHILIZED, FOR SOLUTION INTRAVENOUS EVERY 12 HOURS SCHEDULED
Status: DISCONTINUED | OUTPATIENT
Start: 2025-02-23 | End: 2025-02-25

## 2025-02-23 RX ADMIN — CARBIDOPA AND LEVODOPA 1 TABLET: 25; 100 TABLET ORAL at 16:11

## 2025-02-23 RX ADMIN — Medication 5000 UNITS: at 09:14

## 2025-02-23 RX ADMIN — POTASSIUM CHLORIDE 40 MEQ: 1500 TABLET, EXTENDED RELEASE ORAL at 06:25

## 2025-02-23 RX ADMIN — CARBIDOPA AND LEVODOPA 1 TABLET: 25; 100 TABLET ORAL at 19:53

## 2025-02-23 RX ADMIN — POTASSIUM CHLORIDE 40 MEQ: 1500 TABLET, EXTENDED RELEASE ORAL at 10:45

## 2025-02-23 RX ADMIN — PANTOPRAZOLE SODIUM 40 MG: 40 TABLET, DELAYED RELEASE ORAL at 19:53

## 2025-02-23 RX ADMIN — MUPIROCIN 1 APPLICATION: 20 OINTMENT TOPICAL at 09:14

## 2025-02-23 RX ADMIN — PANTOPRAZOLE SODIUM 8 MG/HR: 40 INJECTION, POWDER, FOR SOLUTION INTRAVENOUS at 06:07

## 2025-02-23 RX ADMIN — TRAZODONE HYDROCHLORIDE 100 MG: 100 TABLET ORAL at 19:52

## 2025-02-23 RX ADMIN — PANTOPRAZOLE SODIUM 8 MG/HR: 40 INJECTION, POWDER, FOR SOLUTION INTRAVENOUS at 02:25

## 2025-02-23 RX ADMIN — CARBIDOPA AND LEVODOPA 1 TABLET: 25; 100 TABLET ORAL at 09:14

## 2025-02-23 RX ADMIN — MUPIROCIN 1 APPLICATION: 20 OINTMENT TOPICAL at 21:34

## 2025-02-23 NOTE — THERAPY EVALUATION
Patient Name: Fanny Boyce  : 1940    MRN: [id number]                              Today's Date: 2025       Admit Date: 2025    Visit Dx:     ICD-10-CM ICD-9-CM   1. Hemorrhagic shock  R57.8 785.59   2. Acute blood loss anemia  D62 285.1   3. Gastrointestinal hemorrhage, unspecified gastrointestinal hemorrhage type  K92.2 578.9   4. Parkinson's disease, unspecified whether dyskinesia present, unspecified whether manifestations fluctuate  G20.A1 332.0   5. Chronic anticoagulation  Z79.01 V58.61     Patient Active Problem List   Diagnosis    Atrial fibrillation    COPD (chronic obstructive pulmonary disease)    Dementia    Parkinson's disease    Fracture of left humerus with routine healing    Fracture of femoral neck, right    Severe protein-calorie malnutrition    Hiatal hernia    UGI bleed    Hemorrhagic shock    Acute blood loss anemia     Past Medical History:   Diagnosis Date    Atrial fibrillation     COPD (chronic obstructive pulmonary disease)     Dementia     History of lung cancer     Parkinson disease     Pulmonary embolism      Past Surgical History:   Procedure Laterality Date    HIP HEMIARTHROPLASTY Right 2/10/2025    Procedure: HIP HEMIARTHROPLASTY RIGHT;  Surgeon: Frandy Patino MD;  Location: [facility];  Service: Orthopedics;  Laterality: Right;    JOINT REPLACEMENT      LUNG CANCER SURGERY        General Information       Row Name 25 1020          Physical Therapy Time and Intention    Document Type evaluation  -ES     Mode of Treatment physical therapy  -ES       Row Name 25 1020          General Information    Patient Profile Reviewed yes  -ES     Prior Level of Function --  Pt admitted from [facility]  R hip [facility] rehab, Lives with Dtr at baseline where she uses wheelchair, rolling walker, hospital bed, lift chair, and BSC. Specifics assist levels at baseline require further inquiry  -ES     Existing Precautions/Restrictions fall;right;hip;other (see comments)   s/p R hip [facility]ente (2/10)  -ES     Barriers to Rehab medically complex;previous functional deficit;cognitive status  -ES       Row Name 02/23/25 1020          Living Environment    People in Home child(cari), adult  -ES       Row Name 02/23/25 1020          Cognition    Orientation Status (Cognition) oriented to;person;place;disoriented to;time  -ES       Row Name 02/23/25 1020          Safety Issues/Impairments Affecting Functional Mobility    Safety Issues Affecting Function (Mobility) awareness of need for assistance;insight into deficits/self-awareness;safety precaution awareness;safety precautions follow-through/compliance;sequencing abilities  -ES     Impairments Affecting Function (Mobility) balance;cognition;endurance/activity tolerance;range of motion (ROM);postural/trunk control;strength  -ES     Cognitive Impairments, Mobility Safety/Performance awareness, need for assistance;insight into deficits/self-awareness;safety precaution awareness;safety precaution follow-through  -ES               User Key  (r) = Recorded By, (t) = Taken By, (c) = Cosigned By      Initials Name Provider Type    Faith Winters PT Physical Therapist                   Mobility       Row Name 02/23/25 1020          Bed Mobility    Comment, (Bed Mobility) UIC  -ES       Row Name 02/23/25 1020          Sit-Stand Transfer    Sit-Stand [place] (Transfers) moderate assist (50% patient effort);2 person assist;verbal cues  -ES     Assistive Device (Sit-Stand Transfers) walker, front-wheeled  -ES     Comment, (Sit-Stand Transfer) STS from chair w/ modA x2. Required v/c for sequencing with FWW. Able to perform 10x B weight shifting without knee buckling. Further mob limited by weakness/fatigue.  -ES               User Key  (r) = Recorded By, (t) = Taken By, (c) = Cosigned By      Initials Name Provider Type    Faith Winters PT Physical Therapist                   Obj/Interventions       Row Name 02/23/25 1021          Range of  Motion Comprehensive    General Range of Motion bilateral lower extremity ROM WFL  -ES       Row Name 02/23/25 1021          Strength Comprehensive (MMT)    General Manual Muscle Testing (MMT) Assessment lower extremity strength deficits identified  -ES     Comment, General Manual Muscle Testing (MMT) Assessment LLE 4/5, RLE 3/5  -ES       Row Name 02/23/25 1021          Balance    Balance Assessment sitting static balance;sitting dynamic balance;standing static balance;standing dynamic balance  -ES     Static Sitting Balance contact guard  -ES     Dynamic Sitting Balance minimal assist  -ES     Position, Sitting Balance supported;sitting in chair  -ES     Static Standing Balance moderate assist;2-person assist  -ES     Dynamic Standing Balance maximum assist;2-person assist;verbal cues  -ES     Position/Device Used, Standing Balance supported;walker, front-wheeled  -ES     Balance Interventions sitting;standing;sit to stand;supported;static;dynamic;occupation based/functional task  -ES       Row Name 02/23/25 1021          Sensory Assessment (Somatosensory)    Sensory Assessment (Somatosensory) LE sensation intact  -ES               User Key  (r) = Recorded By, (t) = Taken By, (c) = Cosigned By      Initials Name Provider Type    ES Faith Pizarro, PT Physical Therapist                   Goals/Plan       Row Name 02/23/25 1023          Bed Mobility Goal 1 (PT)    Activity/Assistive Device (Bed Mobility Goal 1, PT) sit to supine/supine to sit  -ES     [place] Level/Cues Needed (Bed Mobility Goal 1, PT) minimum assist (75% or more patient effort)  -ES     Time Frame (Bed Mobility Goal 1, PT) short term goal (STG);5 days  -ES       Row Name 02/23/25 1023          Transfer Goal 1 (PT)    Activity/Assistive Device (Transfer Goal 1, PT) sit-to-stand/stand-to-sit;bed-to-chair/chair-to-bed;walker, rolling  -ES     [place] Level/Cues Needed (Transfer Goal 1, PT) minimum assist (75% or more patient effort)  -ES      Time Frame (Transfer Goal 1, PT) long term goal (LTG);10 days  -ES       Row Name 02/23/25 1023          Gait Training Goal 1 (PT)    Activity/Assistive Device (Gait Training Goal 1, PT) gait (walking locomotion);assistive device use;decrease fall risk;increase endurance/gait distance;walker, rolling  -ES     [place] Level (Gait Training Goal 1, PT) minimum assist (75% or more patient effort)  -ES     Distance (Gait Training Goal 1, PT) 25  -ES     Time Frame (Gait Training Goal 1, PT) long term goal (LTG);10 days  -ES       Row Name 02/23/25 1023          Therapy Assessment/Plan (PT)    Planned Therapy Interventions (PT) balance training;home exercise program;gait training;bed mobility training;neuromuscular re-education;patient/family education;strengthening;stair training;postural re-education;transfer training  -ES               User Key  (r) = Recorded By, (t) = Taken By, (c) = Cosigned By      Initials Name Provider Type    ES Faith Pizarro, PT Physical Therapist                   Clinical Impression       Row Name 02/23/25 1022          Pain    Pretreatment Pain Rating 0/10 - no pain  -ES     Posttreatment Pain Rating 0/10 - no pain  -ES     Pre/Posttreatment Pain Comment tolerated  -ES       Row Name 02/23/25 1022          Plan of Care Review    Plan of Care Reviewed With patient  -ES     Progress no change  -ES     Outcome Evaluation PT eval complete. Pt presents with recent R hip hemiarthroplasty (2/10), generalized weakness, balance deficits, and decreased activity tolerance warranting IPPT services. Pt required mod-maxA x2 for mobility. PT rec return to IRF at d/c.  -ES       Row Name 02/23/25 1022          Therapy Assessment/Plan (PT)    Rehab Potential (PT) good  -ES     Criteria for Skilled Interventions Met (PT) yes;meets criteria;skilled treatment is necessary  -ES     Therapy Frequency (PT) daily  -ES     Predicted Duration of Therapy Intervention (PT) 10 days  -ES       Row Name  02/23/25 1022          Vital Signs    Pre Systolic BP Rehab 108  -ES     Pre Treatment Diastolic BP 45  -ES     Post Systolic BP Rehab 110  -ES     Post Treatment Diastolic BP 47  -ES     Pretreatment Heart Rate (beats/min) 78  -ES     Posttreatment Heart Rate (beats/min) 74  -ES     Pre SpO2 (%) 94  -ES     O2 Delivery Pre Treatment room air  -ES     O2 Delivery Intra Treatment room air  -ES     Post SpO2 (%) 96  -ES     O2 Delivery Post Treatment room air  -ES     Pre Patient Position Sitting  -ES     Intra Patient Position Standing  -ES     Post Patient Position Sitting  -ES       Row Name 02/23/25 1022          Positioning and Restraints    Pre-Treatment Position sitting in chair/recliner  -ES     Post Treatment Position chair  -ES     In Chair notified nsg;reclined;sitting;call light within reach;encouraged to call for assist;exit alarm on;waffle cushion;on mechanical lift sling;legs elevated  -ES               User Key  (r) = Recorded By, (t) = Taken By, (c) = Cosigned By      Initials Name Provider Type    ES Faith Pizarro, PT Physical Therapist                   Outcome Measures       Row Name 02/23/25 1024          How much help from another person do you currently need...    Turning from your back to your side while in flat bed without using bedrails? 2  -ES     Moving from lying on back to sitting on the side of a flat bed without bedrails? 2  -ES     Moving to and from a bed to a chair (including a wheelchair)? 2  -ES     Standing up from a chair using your arms (e.g., wheelchair, bedside chair)? 2  -ES     Climbing 3-5 steps with a railing? 1  -ES     To walk in hospital room? 1  -ES     AM-PAC 6 Clicks Score (PT) 10  -ES     Highest Level of Mobility Goal 4 --> Transfer to chair/commode  -ES       Row Name 02/23/25 1024 02/23/25 1015       Functional Assessment    Outcome Measure Options AM-PAC 6 Clicks Basic Mobility (PT)  -ES AM-PAC 6 Clicks Daily Activity (OT)  -CS              User Key  (r) =  Recorded By, (t) = Taken By, (c) = Cosigned By      Initials Name Provider Type    CS Jose M Hopkins OT Occupational Therapist    Faith Winters PT Physical Therapist                                 Physical Therapy Education       Title: PT OT SLP Therapies (In Progress)       Topic: Physical Therapy (In Progress)       Point: Mobility training (Done)       Learning Progress Summary            Patient Acceptance, E,TB, VU by ES at 2/23/2025 1024                      Point: Home exercise program (Not Started)       Learner Progress:  Not documented in this visit.              Point: Body mechanics (Done)       Learning Progress Summary            Patient Acceptance, E,TB, VU by ELSIE at 2/23/2025 1024                      Point: Precautions (Done)       Learning Progress Summary            Patient Acceptance, E,TB, VU by ELSIE at 2/23/2025 1024                                      User Key       Initials Effective Dates Name Provider Type Discipline    ELSIE 08/11/22 -  Faith Pizarro PT Physical Therapist PT                  PT Recommendation and Plan  Planned Therapy Interventions (PT): balance training, home exercise program, gait training, bed mobility training, neuromuscular re-education, patient/family education, strengthening, stair training, postural re-education, transfer training  Progress: no change  Outcome Evaluation: PT eval complete. Pt presents with recent R hip hemiarthroplasty (2/10), generalized weakness, balance deficits, and decreased activity tolerance warranting IPPT services. Pt required mod-maxA x2 for mobility. PT rec return to IRF at d/c.     Time Calculation:   PT Evaluation Complexity  History, PT Evaluation Complexity: 1-2 personal factors and/or comorbidities  Examination of Body Systems (PT Eval Complexity): total of 3 or more elements  Clinical Presentation (PT Evaluation Complexity): evolving  Clinical Decision Making (PT Evaluation Complexity): moderate complexity  Overall  Complexity (PT Evaluation Complexity): moderate complexity     PT Charges       Row Name 02/23/25 1025             Time Calculation    Start Time 0842  -ES      PT Received On 02/23/25  -ES      PT Goal Re-Cert Due Date 03/05/25  -ES         Untimed Charges    PT Eval/Re-eval Minutes 46  -ES         Total Minutes    Untimed Charges Total Minutes 46  -ES       Total Minutes 46  -ES                User Key  (r) = Recorded By, (t) = Taken By, (c) = Cosigned By      Initials Name Provider Type    ES Faith Pizarro PT Physical Therapist                  Therapy Charges for Today       Code Description Service Date Service Provider Modifiers Qty    49541307712 HC PT EVAL MOD COMPLEXITY 4 2/23/2025 Faith Pizarro, PT GP 1            PT G-Codes  Outcome Measure Options: AM-PAC 6 Clicks Basic Mobility (PT)  AM-PAC 6 Clicks Score (PT): 10  AM-PAC 6 Clicks Score (OT): 14  PT Discharge Summary  Anticipated Discharge Disposition (PT): inpatient rehabilitation facility    Faith Pizarro PT  2/23/2025

## 2025-02-23 NOTE — PLAN OF CARE
Goal Outcome Evaluation:  Plan of Care Reviewed With: patient           Outcome Evaluation: bp borderline but vss. no outward signs of bleeding.  room air. tolerating diet.  protonix drip remains.  AM labs pending.

## 2025-02-23 NOTE — PROGRESS NOTES
Intensive Care Follow-up     Hospital:  LOS: 3 days   Ms. Fanny Boyce, 84 y.o. female is followed for:   UGI bleed            History of present illness:    84 year old female with atrial fibrillation, COPD, dementia, parkinson disease, former tobacco use, and history of lung cancer who presented to [facility] ED from [facility] on 2/20 with a hemoglobin of 5.5. Of note, she was admitted on 2/10-2/18 for a hip fracture. She underwent hemiarthroplasty on 2/10 and was restarted on Eliquis on 2/11. Her H&H at discharge was 9.4/30. On 2/18 she was discharged to [facility]. Upon arrival here, her fecal occult is positive and rectal exam revealed melena. She was hypotensive, disoriented, and lethargic. She was given Kcentra and 2 units of uncrossed pRBCs. CT A/P without active extravasation. GI consulted and started IV pantoprazole.   Patient underwent upper GI endoscopy by Dr. Gamez on February 21.  Patient was found to have 2 cratered duodenal ulcer in the duodenal bulb.  There was active extravasation noted during the case.  Clips were placed and needed coagulation with bipolar cautery to control of bleeding with hemostatic spray.     Subjective   Interval History:  Overnight no acute events.  Patient denies any further episodes of GI bleed.  Denies any other discomfort.  Currently on room air.  Patient is  seen sitting out in chair.  Diet is advanced and she is tolerating well.               The patient's past medical, surgical and social history were reviewed and updated in Epic as appropriate.       Objective     Infusions:  pantoprazole, 8 mg/hr, Last Rate: 8 mg/hr (02/23/25 0607)      Medications:  carbidopa-levodopa, 1 tablet, Oral, TID  mupirocin, 1 Application, Each Nare, BID  traZODone, 100 mg, Oral, Nightly  vitamin D3, 5,000 Units, Oral, Daily        Vital Sign Min/Max for last 24 hours  Temp  Min: 97.7 °F (36.5 °C)  Max: 99 °F (37.2 °C)   BP  Min: 76/57  Max: 111/42   Pulse  Min: 57  Max: 84   Resp  Min: 16  Max:  "20   SpO2  Min: 84 %  Max: 100 %   No data recorded       Input/Output for last 24 hour shift  02/22 0701 - 02/23 0700  In: 717.2 [P.O.:250; I.V.:467.2]  Out: 950 [Urine:950]      Objective:  Vital signs: (most recent): Blood pressure 111/42, pulse 75, temperature 98.4 °F (36.9 °C), temperature source Oral, resp. rate 18, height 152.4 cm (60\"), weight 51.9 kg (114 lb 6.7 oz), SpO2 97%.            General Appearance: Awake, confused (apparently baseline), in no acute distress  Lungs:   B/L Breath sounds present with decreased breath sounds on bases, no wheezing heard, no crackles.   Heart: S1 and S2 present, no murmur  Abdomen: Soft, nontender, no guarding or rigidity, bowel sounds positive.  Extremities:  no edema, warm to touch.  Neurologic:  Moving all four extremities.      Results from last 7 days   Lab Units 02/23/25  0416 02/22/25  0817 02/21/25  1349 02/21/25  0533   WBC 10*3/mm3 9.25 9.97  --  13.32*   HEMOGLOBIN g/dL 9.0* 9.9* 8.4* 7.3*   PLATELETS 10*3/mm3 266 271  --  312     Results from last 7 days   Lab Units 02/23/25  0416 02/22/25  0817 02/21/25  0533 02/20/25  1342 02/20/25  1335   SODIUM mmol/L 134* 140 135*  --  136   POTASSIUM mmol/L 3.4* 3.8 4.3  --  4.8   CO2 mmol/L 25.0 24.0 26.0  --  24.0   BUN mg/dL 19 17 30*  --  32*   CREATININE mg/dL 0.50* 0.52* 0.53*   < > 0.57   MAGNESIUM mg/dL 1.7 1.9 1.9  --  1.9   PHOSPHORUS mg/dL  --   --  2.9  --  3.4   GLUCOSE mg/dL 86 86 120*  --  131*    < > = values in this interval not displayed.     Estimated Creatinine Clearance: 66.2 mL/min (A) (by C-G formula based on SCr of 0.5 mg/dL (L)).          Images:   None new    I reviewed the patient's results and images.     Assessment & Plan   Impression        UGI bleed    Atrial fibrillation    COPD (chronic obstructive pulmonary disease)    Dementia    Parkinson's disease    Fracture of left humerus with routine healing    Hemorrhagic shock    Acute blood loss anemia       Plan        1.  Patient presented " with acute upper GI bleed with severe symptomatic anemia.  Patient is admitted in intensive care unit for closer monitoring.  Received multiple blood products.  GI team performed endoscopy with clipping of bleeding duodenal ulcer with control of bleeding.  Hemoglobin remained stable overnight.  No further bleeding noted at this point.  If has recurrence of bleed then will need IR intervention per GI team.  So far stable.  2.  Continue PPI drip for now and plan was to continue it for 72 hours which will be finishing tomorrow then can be switched to twice daily PPI.  .  3.  Atrial fibrillation was on anticoagulation.  Currently off anticoagulation and high risk for resumption of anticoagulation this early in the course.  Will hold for now.  Further discussions will need to be held with patient and family regarding resumption of anticoagulation which will be high risk in this patient.  4.  Mild leukocytosis, no definite evidence of infection.  WBC count normal.  No new fevers.  5.  Continue Parkinson medications.  Continue trazodone nightly.    Patient can be transferred out of ICU to telemetry floor.  Will sign out to hospitalist team.    Plan of care and goals reviewed with multidisciplinary/antibiotic stewardship team during rounds.   I discussed the patient's findings and my recommendations with patient and nursing staff   Above documentation reviewed and reflect accurate information as of 2/23/2025 including copied elements of the note.       Brian Maynard MD, [facility]  Pulmonary, Critical care and Sleep Medicine

## 2025-02-23 NOTE — PLAN OF CARE
Goal Outcome Evaluation:  Plan of Care Reviewed With: patient        Progress: no change  Outcome Evaluation: PT eval complete. Pt presents with recent R hip hemiarthroplasty (2/10), generalized weakness, balance deficits, and decreased activity tolerance warranting IPPT services. Pt required mod-maxA x2 for mobility. PT rec return to IRF at d/c.    Anticipated Discharge Disposition (PT): inpatient rehabilitation facility

## 2025-02-23 NOTE — PROGRESS NOTES
"  GI Daily Progress Note  Subjective:    Chief Complaint: Follow-up GIB    Patient resting up to chair no acute distress.  Was downgraded to telemetry earlier today.  Hemoglobin stable.  No further reports of GIB.  Patient feels okay and is tolerating diet at time of exam.    Objective:    BP (!) 138/113 (BP Location: Right arm, Patient Position: Sitting)   Pulse 86   Temp 98.2 °F (36.8 °C) (Oral)   Resp 17   Ht 152.4 cm (60\")   Wt 51.9 kg (114 lb 6.7 oz)   SpO2 97%   BMI 22.35 kg/m²     Physical Exam  Vitals and nursing note reviewed.   Constitutional:       General: She is not in acute distress.     Appearance: Normal appearance. She is normal weight. She is not ill-appearing or toxic-appearing.   Cardiovascular:      Rate and Rhythm: Normal rate and regular rhythm.      Pulses: Normal pulses.      Heart sounds: Normal heart sounds.   Pulmonary:      Effort: Pulmonary effort is normal. No respiratory distress.      Breath sounds: Normal breath sounds.   Abdominal:      General: Abdomen is flat. Bowel sounds are normal. There is no distension.      Palpations: Abdomen is soft. There is no mass.      Tenderness: There is no abdominal tenderness. There is no guarding or rebound.      Hernia: No hernia is present.   Neurological:      Mental Status: She is alert. Mental status is at baseline.     Lab  I have personally reviewed most recent cardiac tracings, lab results, and radiology images and interpretations and agree with findings.    Lab Results   Component Value Date    WBC 9.25 02/23/2025    HGB 9.0 (L) 02/23/2025    HGB 9.9 (L) 02/22/2025    HGB 8.4 (L) 02/21/2025    MCV 93.8 02/23/2025     02/23/2025    INR 1.03 02/21/2025    INR 1.18 (H) 02/20/2025    INR 0.97 02/10/2025    INR 1.2 (H) 02/25/2022    INR 1.1 02/21/2022       Lab Results   Component Value Date    GLUCOSE 86 02/23/2025    BUN 19 02/23/2025    CREATININE 0.50 (L) 02/23/2025    EGFRIFNONA >60 07/19/2022    EGFRIFAFRI >60 07/19/2022 "    BCR 38.0 (H) 02/23/2025     (L) 02/23/2025    K 3.4 (L) 02/23/2025    CO2 25.0 02/23/2025    CALCIUM 8.1 (L) 02/23/2025    ALBUMIN 2.6 (L) 02/21/2025    ALKPHOS 77 02/21/2025    BILITOT 0.4 02/21/2025    ALT 13 02/21/2025    AST 15 02/21/2025     Assessment:  Gastrointestinal hemorrhage with melena, s/p EGD with duodenal ulcers with bleed, treated with bipolar cautery, OVESCO clip and hemospray.    Acute blood loss anemia  Hemorrhagic shock     Plan:  Patient doing well today.  Hemoglobin remained stable.    >> Discussed with ICU RN this morning; okay to discontinue IV PPI drip and begin twice daily PPI therapy.  >> Monitor H&H transfuse per protocol  >> As noted prior, if recurrent bleeding, recommend IR intervention with targeted embolization to the GDA with special attention to be paid to presence of a hemostatic clip.    Will follow peripherally.  Please call with any questions.    Obdulio Reece, APRMARIYA  02/23/25  17:55 EST

## 2025-02-23 NOTE — PLAN OF CARE
Goal Outcome Evaluation:  Plan of Care Reviewed With: patient        Progress: no change  Outcome Evaluation: Pt presents with generalized strength, balance, and functional endurance deficits warranting IP OT services to promote return to PLOF. No pain reported with R hip during ADL related mobility, tolerated transfer to recliner and assisted/seated self-care. Rec return to IRF to complete rehab.    Anticipated Discharge Disposition (OT): inpatient rehabilitation facility

## 2025-02-23 NOTE — THERAPY EVALUATION
Patient Name: Fanny Boyce  : 1940    MRN: [id number]                              Today's Date: 2025       Admit Date: 2025    Visit Dx:     ICD-10-CM ICD-9-CM   1. Hemorrhagic shock  R57.8 785.59   2. Acute blood loss anemia  D62 285.1   3. Gastrointestinal hemorrhage, unspecified gastrointestinal hemorrhage type  K92.2 578.9   4. Parkinson's disease, unspecified whether dyskinesia present, unspecified whether manifestations fluctuate  G20.A1 332.0   5. Chronic anticoagulation  Z79.01 V58.61     Patient Active Problem List   Diagnosis    Atrial fibrillation    COPD (chronic obstructive pulmonary disease)    Dementia    Parkinson's disease    Fracture of left humerus with routine healing    Fracture of femoral neck, right    Severe protein-calorie malnutrition    Hiatal hernia    UGI bleed    Hemorrhagic shock    Acute blood loss anemia     Past Medical History:   Diagnosis Date    Atrial fibrillation     COPD (chronic obstructive pulmonary disease)     Dementia     History of lung cancer     Parkinson disease     Pulmonary embolism      Past Surgical History:   Procedure Laterality Date    HIP HEMIARTHROPLASTY Right 2/10/2025    Procedure: HIP HEMIARTHROPLASTY RIGHT;  Surgeon: Frandy Patino MD;  Location: [facility];  Service: Orthopedics;  Laterality: Right;    JOINT REPLACEMENT      LUNG CANCER SURGERY        General Information       Row Name 25 1005          OT Time and Intention    Document Type evaluation  -CS     Mode of Treatment occupational therapy  -CS     Patient Effort good  -CS       Row Name 25 1005          General Information    Patient Profile Reviewed yes  -CS     Prior Level of Function --   Pt admitted from [facility]  R hip [facility] rehab, Lives with Dtr at baseline where she uses wheelchair, rolling walker, hospital bed, lift chair, and BSC. Specifics assist levels at baseline require further inquiry  -CS     Existing Precautions/Restrictions  fall;right;hip;hip, posterior;other (see comments)  s/p R hip vicente (2/10)  -CS     Barriers to Rehab medically complex;previous functional deficit  -CS       Row Name 02/23/25 1005          Occupational Profile    Reason for Services/Referral (Occupational Profile) below baseline  -CS       Row Name 02/23/25 1005          Living Environment    People in Home child(cari), adult  -CS       Row Name 02/23/25 1005          Cognition    Orientation Status (Cognition) oriented to;person;place;disoriented to;time  -CS       Row Name 02/23/25 1005          Safety Issues/Impairments Affecting Functional Mobility    Safety Issues Affecting Function (Mobility) insight into deficits/self-awareness;awareness of need for assistance;judgment;problem-solving;safety precaution awareness;safety precautions follow-through/compliance  -CS     Impairments Affecting Function (Mobility) balance;cognition;endurance/activity tolerance;range of motion (ROM);postural/trunk control;strength  -CS     Cognitive Impairments, Mobility Safety/Performance awareness, need for assistance;judgment;insight into deficits/self-awareness;problem-solving/reasoning;sequencing abilities;safety precaution follow-through;safety precaution awareness  -CS     Comment, Safety Issues/Impairments (Mobility) kyphotic posture  -CS               User Key  (r) = Recorded By, (t) = Taken By, (c) = Cosigned By      Initials Name Provider Type    CS Jose M Hopkins OT Occupational Therapist                     Mobility/ADL's       Row Name 02/23/25 1008          Bed Mobility    Bed Mobility supine-sit;scooting/bridging  -CS     Scooting/Bridging [place] (Bed Mobility) moderate assist (50% patient effort);2 person assist;verbal cues;nonverbal cues (demo/gesture)  -CS     Supine-Sit [place] (Bed Mobility) 2 person assist;moderate assist (50% patient effort);verbal cues;nonverbal cues (demo/gesture)  -CS     Assistive Device (Bed Mobility) bed rails;head of bed  elevated;repositioning sheet  -CS     Comment, (Bed Mobility) cues for sequencing BLEs to EOB, increased assist to upright trunk, kyphotic but fair balance upon sitting, no dizziness reported  -CS       Row Name 02/23/25 1008          Transfers    Transfers bed-chair transfer  -CS     Comment, (Transfers) SPT to recliner w/ BUE support and RLE blocking  -CS       Row Name 02/23/25 1008          Bed-Chair Transfer    Bed-Chair [place] (Transfers) moderate assist (50% patient effort);2 person assist;verbal cues;nonverbal cues (demo/gesture)  -CS     Assistive Device (Bed-Chair Transfers) other (see comments)  -CS       Row Name 02/23/25 1008          Functional Mobility    Functional Mobility- Comment defer to PT for assessment/progress  -CS       Row Name 02/23/25 1008          Activities of Daily Living    BADL Assessment/Intervention lower body dressing;grooming;feeding  -CS       Row Name 02/23/25 1008          Lower Body Dressing Assessment/Training    [place] Level (Lower Body Dressing) don;socks;dependent (less than 25% patient effort)  -CS     Position (Lower Body Dressing) edge of bed sitting  -CS       Row Name 02/23/25 1008          Grooming Assessment/Training    [place] Level (Grooming) wash face, hands;moderate assist (50% patient effort);hair care, combing/brushing;maximum assist (25% patient effort)  -CS     Position (Grooming) supported sitting  -CS       Row Name 02/23/25 1008          Self-Feeding Assessment/Training    [place] Level (Feeding) prepare tray/open items;moderate assist (50% patient effort);liquids to mouth;scoop food and bring to mouth;independent  -CS     Position (Feeding) sitting up in bed  -CS               User Key  (r) = Recorded By, (t) = Taken By, (c) = Cosigned By      Initials Name Provider Type    CS Jose M Hpokins, OT Occupational Therapist                   Obj/Interventions       Row Name 02/23/25 1010          Sensory Assessment (Somatosensory)     Sensory Assessment (Somatosensory) UE sensation intact  -CS       Row Name 02/23/25 1010          Vision Assessment/Intervention    Visual Impairment/Limitations WFL;corrective lenses for reading  -CS       Row Name 02/23/25 1010          Range of Motion Comprehensive    General Range of Motion upper extremity range of motion deficits identified  -CS     Comment, General Range of Motion L shoulder ROM limited to approx 100 degrees, otherwise BUE grossly WFL  -CS       Row Name 02/23/25 1010          Strength Comprehensive (MMT)    General Manual Muscle Testing (MMT) Assessment upper extremity strength deficits identified  -CS     Comment, General Manual Muscle Testing (MMT) Assessment BUE grossly 4-/5  -CS       Row Name 02/23/25 1010          Motor Skills    Motor Skills coordination;functional endurance  -     Coordination bimanual skills;WFL  -     Functional Endurance impaired, easily fatigued  -CS       Row Name 02/23/25 1010          Balance    Balance Assessment sitting static balance;sitting dynamic balance;standing static balance;standing dynamic balance  -     Static Sitting Balance contact guard  -     Dynamic Sitting Balance minimal assist  -     Position, Sitting Balance unsupported;sitting edge of bed  -     Static Standing Balance moderate assist;2-person assist;verbal cues;non-verbal cues (demo/gesture)  -CS     Dynamic Standing Balance maximum assist;2-person assist;verbal cues;non-verbal cues (demo/gesture)  -CS     Balance Interventions sitting;standing;sit to stand;occupation based/functional task  -               User Key  (r) = Recorded By, (t) = Taken By, (c) = Cosigned By      Initials Name Provider Type    CS Jose M Hopkins, OT Occupational Therapist                   Goals/Plan       Row Name 02/23/25 1014          Bed Mobility Goal 1 (OT)    Activity/Assistive Device (Bed Mobility Goal 1, OT) sit to supine;supine to sit;scooting  -     [place] Level/Cues Needed (Bed  Mobility Goal 1, OT) minimum assist (75% or more patient effort)  -CS     Time Frame (Bed Mobility Goal 1, OT) short term goal (STG);5 days  -CS     Strategies/Barriers (Bed Mobility Goal 1, OT) hip precautions  -CS     Progress/Outcomes (Bed Mobility Goal 1, OT) new goal  -CS       Row Name 02/23/25 1014          Dressing Goal 1 (OT)    Activity/Device (Dressing Goal 1, OT) upper body dressing  -CS     [place]/Cues Needed (Dressing Goal 1, OT) minimum assist (75% or more patient effort)  -CS     Time Frame (Dressing Goal 1, OT) long term goal (LTG);1 week  -CS     Strategies/Barriers (Dressing Goal 1, OT) vicente strategy to mitigate L shoulder ROM/pain  -CS     Progress/Outcome (Dressing Goal 1, OT) new goal  -CS       Row Name 02/23/25 1014          Grooming Goal 1 (OT)    Activity/Device (Grooming Goal 1, OT) hair care;oral care;wash face, hands  -CS     [place] (Grooming Goal 1, OT) standby assist;verbal cues required  -CS     Time Frame (Grooming Goal 1, OT) short term goal (STG);5 days  -CS     Strategies/Barriers (Grooming Goal 1, OT) seated setup  -CS     Progress/Outcome (Grooming Goal 1, OT) new goal  -CS       Row Name 02/23/25 1014          Therapy Assessment/Plan (OT)    Planned Therapy Interventions (OT) activity tolerance training;functional balance retraining;occupation/activity based interventions;ROM/therapeutic exercise;strengthening exercise;transfer/mobility retraining;patient/caregiver education/training;neuromuscular control/coordination retraining;cognitive/visual perception retraining;passive ROM/stretching;BADL retraining;adaptive equipment training  -CS               User Key  (r) = Recorded By, (t) = Taken By, (c) = Cosigned By      Initials Name Provider Type    CS Jose M Hopkins, OT Occupational Therapist                   Clinical Impression       Row Name 02/23/25 1011          Pain Assessment    Additional Documentation Pain Scale: FACES Pre/Post-Treatment (Group)  -CS        Row Name 02/23/25 1011          Pain Scale: FACES Pre/Post-Treatment    Pain: FACES Scale, Pretreatment 0-->no hurt  -CS     Posttreatment Pain Rating 0-->no hurt  -CS       Row Name 02/23/25 1011          Plan of Care Review    Plan of Care Reviewed With patient  -CS     Progress no change  -CS     Outcome Evaluation Pt presents with generalized strength, balance, and functional endurance deficits warranting IP OT services to promote return to PLOF. No pain reported with R hip during ADL related mobility, tolerated transfer to recliner and assisted/seated self-care. Rec return to IRF to complete rehab.  -CS       Row Name 02/23/25 1011          Therapy Assessment/Plan (OT)    Patient/Family Therapy Goal Statement (OT) return home  -CS     Rehab Potential (OT) fair  -CS     Criteria for Skilled Therapeutic Interventions Met (OT) meets criteria;yes;skilled treatment is necessary  -CS     Therapy Frequency (OT) daily  -CS       Row Name 02/23/25 1011          Therapy Plan Review/Discharge Plan (OT)    Anticipated Discharge Disposition (OT) inpatient rehabilitation facility  -CS       Row Name 02/23/25 1011          Vital Signs    Pre Systolic BP Rehab 108  -CS     Pre Treatment Diastolic BP 45  -CS     Post Systolic BP Rehab 110  -CS     Post Treatment Diastolic BP 47  -CS     O2 Delivery Pre Treatment room air  -CS     O2 Delivery Intra Treatment room air  -CS     O2 Delivery Post Treatment room air  -CS     Pre Patient Position Supine  -CS     Intra Patient Position Standing  -CS     Post Patient Position Sitting  -CS       Row Name 02/23/25 1011          Positioning and Restraints    Pre-Treatment Position in bed  -CS     Post Treatment Position chair  -CS     In Chair sitting;with PT  -CS               User Key  (r) = Recorded By, (t) = Taken By, (c) = Cosigned By      Initials Name Provider Type    CS Jose M Hopkins, OT Occupational Therapist                   Outcome Measures       Row Name 02/23/25 1015           How much help from another is currently needed...    Putting on and taking off regular lower body clothing? 1  -CS     Bathing (including washing, rinsing, and drying) 2  -CS     Toileting (which includes using toilet bed pan or urinal) 2  -CS     Putting on and taking off regular upper body clothing 3  -CS     Taking care of personal grooming (such as brushing teeth) 3  -CS     Eating meals 3  -CS     AM-PAC 6 Clicks Score (OT) 14  -CS       Row Name 02/23/25 1015          Functional Assessment    Outcome Measure Options AM-PAC 6 Clicks Daily Activity (OT)  -CS               User Key  (r) = Recorded By, (t) = Taken By, (c) = Cosigned By      Initials Name Provider Type    CS Jose M Hopkins OT Occupational Therapist                    Occupational Therapy Education       Title: PT OT SLP Therapies (In Progress)       Topic: Occupational Therapy (In Progress)       Point: ADL training (Done)       Description:   Instruct learner(s) on proper safety adaptation and remediation techniques during self care or transfers.   Instruct in proper use of assistive devices.                  Learning Progress Summary            Patient Acceptance, E,D, VU,NR by  at 2/23/2025 1015                      Point: Home exercise program (Not Started)       Description:   Instruct learner(s) on appropriate technique for monitoring, assisting and/or progressing therapeutic exercises/activities.                  Learner Progress:  Not documented in this visit.              Point: Precautions (Done)       Description:   Instruct learner(s) on prescribed precautions during self-care and functional transfers.                  Learning Progress Summary            Patient Acceptance, E,D, VU,NR by  at 2/23/2025 1015                      Point: Body mechanics (Done)       Description:   Instruct learner(s) on proper positioning and spine alignment during self-care, functional mobility activities and/or exercises.                   Learning Progress Summary            Patient Acceptance, E,D, VU,NR by  at 2/23/2025 1015                                      User Key       Initials Effective Dates Name Provider Type Discipline     06/16/21 -  Jose M Hopkins OT Occupational Therapist OT                  OT Recommendation and Plan  Planned Therapy Interventions (OT): activity tolerance training, functional balance retraining, occupation/activity based interventions, ROM/therapeutic exercise, strengthening exercise, transfer/mobility retraining, patient/caregiver education/training, neuromuscular control/coordination retraining, cognitive/visual perception retraining, passive ROM/stretching, BADL retraining, adaptive equipment training  Therapy Frequency (OT): daily  Plan of Care Review  Plan of Care Reviewed With: patient  Progress: no change  Outcome Evaluation: Pt presents with generalized strength, balance, and functional endurance deficits warranting IP OT services to promote return to PLOF. No pain reported with R hip during ADL related mobility, tolerated transfer to recliner and assisted/seated self-care. Rec return to IRF to complete rehab.     Time Calculation:   Evaluation Complexity (OT)  Review Occupational Profile/Medical/Therapy History Complexity: expanded/moderate complexity  Assessment, Occupational Performance/Identification of Deficit Complexity: 3-5 performance deficits  Clinical Decision Making Complexity (OT): detailed assessment/moderate complexity  Overall Complexity of Evaluation (OT): moderate complexity     Time Calculation- OT       Row Name 02/23/25 1016             Time Calculation- OT    OT Start Time 0845  -CS      OT Received On 02/23/25  -CS      OT Goal Re-Cert Due Date 03/05/25  -CS         Untimed Charges    OT Eval/Re-eval Minutes 47  -CS         Total Minutes    Untimed Charges Total Minutes 47  -CS       Total Minutes 47  -CS                User Key  (r) = Recorded By, (t) = Taken By, (c) = Cosigned  By      Initials Name Provider Type    CS Jose M Hopkins, OT Occupational Therapist                  Therapy Charges for Today       Code Description Service Date Service Provider Modifiers Qty    [id number] HC OT EVAL MOD COMPLEXITY 4 2/23/2025 Jose M Hopkins OT GO 1                 Jose M Hopkins OT  2/23/2025

## 2025-02-24 LAB
ANION GAP SERPL CALCULATED.3IONS-SCNC: 9 MMOL/L (ref 5–15)
BASOPHILS # BLD AUTO: 0.03 10*3/MM3 (ref 0–0.2)
BASOPHILS NFR BLD AUTO: 0.3 % (ref 0–1.5)
BUN SERPL-MCNC: 16 MG/DL (ref 8–23)
BUN/CREAT SERPL: 34.8 (ref 7–25)
CALCIUM SPEC-SCNC: 8.6 MG/DL (ref 8.6–10.5)
CHLORIDE SERPL-SCNC: 104 MMOL/L (ref 98–107)
CO2 SERPL-SCNC: 25 MMOL/L (ref 22–29)
CREAT SERPL-MCNC: 0.46 MG/DL (ref 0.57–1)
DEPRECATED RDW RBC AUTO: 49.3 FL (ref 37–54)
EGFRCR SERPLBLD CKD-EPI 2021: 93.3 ML/MIN/1.73
EOSINOPHIL # BLD AUTO: 0.21 10*3/MM3 (ref 0–0.4)
EOSINOPHIL NFR BLD AUTO: 2.3 % (ref 0.3–6.2)
ERYTHROCYTE [DISTWIDTH] IN BLOOD BY AUTOMATED COUNT: 15.6 % (ref 12.3–15.4)
GLUCOSE SERPL-MCNC: 95 MG/DL (ref 65–99)
HCT VFR BLD AUTO: 31.1 % (ref 34–46.6)
HGB BLD-MCNC: 10.3 G/DL (ref 12–15.9)
IMM GRANULOCYTES # BLD AUTO: 0.15 10*3/MM3 (ref 0–0.05)
IMM GRANULOCYTES NFR BLD AUTO: 1.6 % (ref 0–0.5)
LYMPHOCYTES # BLD AUTO: 1.83 10*3/MM3 (ref 0.7–3.1)
LYMPHOCYTES NFR BLD AUTO: 19.7 % (ref 19.6–45.3)
MAGNESIUM SERPL-MCNC: 2 MG/DL (ref 1.6–2.4)
MCH RBC QN AUTO: 30.2 PG (ref 26.6–33)
MCHC RBC AUTO-ENTMCNC: 33.1 G/DL (ref 31.5–35.7)
MCV RBC AUTO: 91.2 FL (ref 79–97)
MONOCYTES # BLD AUTO: 0.41 10*3/MM3 (ref 0.1–0.9)
MONOCYTES NFR BLD AUTO: 4.4 % (ref 5–12)
NEUTROPHILS NFR BLD AUTO: 6.66 10*3/MM3 (ref 1.7–7)
NEUTROPHILS NFR BLD AUTO: 71.7 % (ref 42.7–76)
NRBC BLD AUTO-RTO: 0 /100 WBC (ref 0–0.2)
PLATELET # BLD AUTO: 315 10*3/MM3 (ref 140–450)
PMV BLD AUTO: 9.1 FL (ref 6–12)
POTASSIUM SERPL-SCNC: 4.5 MMOL/L (ref 3.5–5.2)
QT INTERVAL: 422 MS
QTC INTERVAL: 474 MS
RBC # BLD AUTO: 3.41 10*6/MM3 (ref 3.77–5.28)
SODIUM SERPL-SCNC: 138 MMOL/L (ref 136–145)
WBC NRBC COR # BLD AUTO: 9.29 10*3/MM3 (ref 3.4–10.8)

## 2025-02-24 PROCEDURE — 99231 SBSQ HOSP IP/OBS SF/LOW 25: CPT | Performed by: NURSE PRACTITIONER

## 2025-02-24 PROCEDURE — 97530 THERAPEUTIC ACTIVITIES: CPT

## 2025-02-24 PROCEDURE — 99232 SBSQ HOSP IP/OBS MODERATE 35: CPT | Performed by: INTERNAL MEDICINE

## 2025-02-24 PROCEDURE — 97110 THERAPEUTIC EXERCISES: CPT

## 2025-02-24 PROCEDURE — 83735 ASSAY OF MAGNESIUM: CPT | Performed by: INTERNAL MEDICINE

## 2025-02-24 PROCEDURE — 80048 BASIC METABOLIC PNL TOTAL CA: CPT | Performed by: INTERNAL MEDICINE

## 2025-02-24 PROCEDURE — 25010000002 ONDANSETRON PER 1 MG: Performed by: INTERNAL MEDICINE

## 2025-02-24 PROCEDURE — 85025 COMPLETE CBC W/AUTO DIFF WBC: CPT | Performed by: INTERNAL MEDICINE

## 2025-02-24 RX ADMIN — ONDANSETRON 4 MG: 2 INJECTION INTRAMUSCULAR; INTRAVENOUS at 03:01

## 2025-02-24 RX ADMIN — CARBIDOPA AND LEVODOPA 1 TABLET: 25; 100 TABLET ORAL at 20:21

## 2025-02-24 RX ADMIN — PANTOPRAZOLE SODIUM 40 MG: 40 TABLET, DELAYED RELEASE ORAL at 20:21

## 2025-02-24 RX ADMIN — Medication 5000 UNITS: at 09:11

## 2025-02-24 RX ADMIN — TRAZODONE HYDROCHLORIDE 100 MG: 100 TABLET ORAL at 20:21

## 2025-02-24 RX ADMIN — MUPIROCIN 1 APPLICATION: 20 OINTMENT TOPICAL at 09:11

## 2025-02-24 RX ADMIN — CARBIDOPA AND LEVODOPA 1 TABLET: 25; 100 TABLET ORAL at 09:11

## 2025-02-24 RX ADMIN — MUPIROCIN 1 APPLICATION: 20 OINTMENT TOPICAL at 20:21

## 2025-02-24 RX ADMIN — CARBIDOPA AND LEVODOPA 1 TABLET: 25; 100 TABLET ORAL at 15:57

## 2025-02-24 RX ADMIN — PANTOPRAZOLE SODIUM 40 MG: 40 TABLET, DELAYED RELEASE ORAL at 09:12

## 2025-02-24 NOTE — PROGRESS NOTES
[facility] Medicine Services  PROGRESS NOTE    Patient Name: Fanny Boyce  : 1940  MRN: [id number]    Date of Admission: 2025  Primary Care Physician: Nemo Aponte PA    Subjective   Subjective     CC: f/u GIB    HPI: UP in bed. Very weak, very tired. Not eating. Doesn't want to get OOB.      Objective   Objective     Vital Signs:   Temp:  [97.6 °F (36.4 °C)-98.2 °F (36.8 °C)] 97.6 °F (36.4 °C)  Heart Rate:  [69-86] 83  Resp:  [16-18] 16  BP: (101-138)/() 101/59     Physical Exam:  Constitutional: No acute distress, awake, alert, chronically ill appearing  HENT: NCAT, mucous membranes moist  Respiratory: Clear to auscultation bilaterally, respiratory effort normal   Cardiovascular: RRR, no murmurs, rubs, or gallops  Gastrointestinal: Positive bowel sounds, soft, nontender, nondistended  Musculoskeletal: No bilateral ankle edema  Psychiatric: Appropriate affect, cooperative  Neurologic: Oriented x 3, strength symmetric in all extremities, Cranial Nerves grossly intact to confrontation, speech clear  Skin: No rashes     Results Reviewed:  LAB RESULTS:      Lab 25  0425 25  0416 25  0817 25  1349 25  0533 25  1700 25  1439 25  1342 25  1335   WBC 9.29 9.25 9.97  --  13.32*  --   --   --  13.57*   HEMOGLOBIN 10.3* 9.0* 9.9* 8.4* 7.3*   < >  --   --  5.5*   HEMOGLOBIN, POC  --   --   --   --   --   --   --    < >  --    HEMATOCRIT 31.1* 28.6* 30.7* 26.0* 22.4*   < >  --   --  17.8*   HEMATOCRIT POC  --   --   --   --   --   --   --    < >  --    PLATELETS 315 266 271  --  312  --   --   --  351   NEUTROS ABS 6.66 6.12 6.76  --  8.77*  --   --   --  8.36*   IMMATURE GRANS (ABS) 0.15* 0.22* 0.34*  --  0.60*  --   --   --  0.27*   LYMPHS ABS 1.83 2.10 2.07  --  3.01  --   --   --  4.26*   MONOS ABS 0.41 0.51 0.51  --  0.72  --   --   --  0.58   EOS ABS 0.21 0.25 0.23  --  0.15  --   --   --  0.05   MCV 91.2 93.8 91.4   --  89.2  --   --   --  90.8   PROCALCITONIN  --   --   --   --   --   --  0.19  --   --    LACTATE  --   --   --   --  1.2  --   --   --  1.6   PROTIME  --   --   --   --  13.6  --   --   --  15.1*   APTT  --   --   --   --  32.8  --   --   --  36.6*   HSTROP T  --   --   --   --   --   --  29*  --  26*    < > = values in this interval not displayed.         Lab 02/24/25  0425 02/23/25 2001 02/23/25  0416 02/22/25  0817 02/21/25  0533 02/20/25  1342 02/20/25  1335   SODIUM 138  --  134* 140 135*  --  136   POTASSIUM 4.5 4.9 3.4* 3.8 4.3  --  4.8   CHLORIDE 104  --  108* 108* 103  --  101   CO2 25.0  --  25.0 24.0 26.0  --  24.0   ANION GAP 9.0  --  1.0* 8.0 6.0  --  11.0   BUN 16  --  19 17 30*  --  32*   CREATININE 0.46*  --  0.50* 0.52* 0.53* 0.70 0.57   EGFR 93.3  --  91.5 90.6 90.2 84.3 88.6   GLUCOSE 95  --  86 86 120*  --  131*   CALCIUM 8.6  --  8.1* 8.3* 8.0*  --  8.8   MAGNESIUM 2.0  --  1.7 1.9 1.9  --  1.9   PHOSPHORUS  --   --   --   --  2.9  --  3.4   TSH  --   --   --   --   --   --  2.910         Lab 02/21/25  0533 02/20/25  1335   TOTAL PROTEIN 4.7* 5.3*   ALBUMIN 2.6* 2.9*   GLOBULIN 2.1 2.4   ALT (SGPT) 13 <5   AST (SGOT) 15 16   BILIRUBIN 0.4 0.3   ALK PHOS 77 76         Lab 02/21/25 0533 02/20/25  1439 02/20/25  1335   PROBNP  --   --  336.2   HSTROP T  --  29* 26*   PROTIME 13.6  --  15.1*   INR 1.03  --  1.18*             Lab 02/20/25  1347   ABO TYPING O   RH TYPING Positive   ANTIBODY SCREEN Negative         Brief Urine Lab Results  (Last result in the past 365 days)        Color   Clarity   Blood   Leuk Est   Nitrite   Protein   CREAT   Urine HCG        02/20/25 1846 Yellow   Cloudy   Trace   Moderate (2+)   Negative   Negative                   Microbiology Results Abnormal       None            No radiology results from the last 24 hrs    Results for orders placed during the hospital encounter of 02/09/25    Adult Transthoracic Echo Complete W/ Cont if Necessary Per  Protocol    Interpretation Summary    Left ventricular systolic function is normal. Estimated left ventricular EF = 70%    Moderate tricuspid valve regurgitation is present.    Estimated  right ventricular systolic pressure from tricuspid regurgitation is 46 mmHg.    There is a trivial pericardial effusion.      Current medications:  Scheduled Meds:carbidopa-levodopa, 1 tablet, Oral, TID  mupirocin, 1 Application, Each Nare, BID  pantoprazole, 40 mg, Oral, Q12H   Or  pantoprazole, 40 mg, Intravenous, Q12H  traZODone, 100 mg, Oral, Nightly  vitamin D3, 5,000 Units, Oral, Daily      Continuous Infusions:   PRN Meds:.  acetaminophen **OR** acetaminophen    senna-docusate sodium **AND** polyethylene glycol **AND** bisacodyl **AND** bisacodyl    Calcium Replacement - Follow Nurse / BPA Driven Protocol    ipratropium-albuterol    Magnesium Standard Dose Replacement - Follow Nurse / BPA Driven Protocol    nitroglycerin    ondansetron    Phosphorus Replacement - Follow Nurse / BPA Driven Protocol    Potassium Replacement - Follow Nurse / BPA Driven Protocol    Assessment & Plan   Assessment & Plan     Active Hospital Problems    Diagnosis  POA    **UGI bleed [K92.2]  Yes    Hemorrhagic shock [R57.8]  Yes    Acute blood loss anemia [D62]  Yes    Fracture of left humerus with routine healing [S42.302D]  Not Applicable    Atrial fibrillation [I48.91]  Yes    COPD (chronic obstructive pulmonary disease) [J44.9]  Yes    Dementia [F03.90]  Yes    Parkinson's disease [G20.A1]  Yes      Resolved Hospital Problems   No resolved problems to display.        Brief Hospital Course to date:  Fanny Boyce is a  84 year old female with atrial fibrillation, COPD, dementia, parkinson disease, former tobacco use, and history of lung cancer [facility] with a hemoglobin of 5.5. Of note, she was admitted on 2/10-2/18 for a hip fracture. Upon arrival here, her fecal occult is positive and rectal exam revealed  melena. She was hypotensive, disoriented, and lethargic. She was given Kcentra and 2 units of uncrossed pRBCs. CT A/P without active extravasation. GI consulted and started IV pantoprazole.   Patient underwent upper GI endoscopy by Dr. Gamez on February 21.  Patient was found to have 2 cratered duodenal ulcer in the duodenal bulb.  There was active extravasation noted during the case.  Clips were placed and needed coagulation with bipolar cautery to control of bleeding with hemostatic spray. Transferred to floor 2/24.    ABL  Duodenal ulcer s/p clipping/cautery  -GI following - for rebleed rec'd emergent IR embolization.  -Continue IV PPI  -Continue to hold AC.  -H/H in am.    Recent right femoral neck fracture  --S/P right hip hemiarthroplasty 2/10/25 by Dr. Patino  --PT/OT follows - rec'd return to [facility] at d/c.     Atrial fibrillation with [id number] = 3  - Continue to hold AC - will refer to cardiology at d/c to discuss Watchman given her life threatening bleed.      COPD  - Not currently in exacerbation.  - Continue as needed DuoNebs.     Parkinson's disease  Dementia  - Continue carbidopa/levodopa  - Changed trazodone nightly to PRN at lower dose due to drowsiness     I discussed with her daughter this am. Updated on POC.    Expected Discharge Location and Transportation:   Expected Discharge   Expected discharge date/ time has not been documented.     VTE Prophylaxis:  Mechanical VTE prophylaxis orders are present.         AM-PAC 6 Clicks Score (PT): 10 (02/23/25 2000)    CODE STATUS:   Code Status and Medical Interventions: No CPR (Do Not Attempt to Resuscitate); Limited Support; No intubation (DNI)   Ordered at: 02/20/25 1612     Medical Intervention Limits:    No intubation (DNI)     Code Status (Patient has no pulse and is not breathing):    No CPR (Do Not Attempt to Resuscitate)     Medical Interventions (Patient has pulse or is breathing):    Limited Support       Hilary Caldera II, DO  02/24/25

## 2025-02-24 NOTE — THERAPY TREATMENT NOTE
Patient Name: Fanny Boyce  : 1940    MRN: [id number]                              Today's Date: 2025       Admit Date: 2025    Visit Dx:     ICD-10-CM ICD-9-CM   1. Hemorrhagic shock  R57.8 785.59   2. Acute blood loss anemia  D62 285.1   3. Gastrointestinal hemorrhage, unspecified gastrointestinal hemorrhage type  K92.2 578.9   4. Parkinson's disease, unspecified whether dyskinesia present, unspecified whether manifestations fluctuate  G20.A1 332.0   5. Chronic anticoagulation  Z79.01 V58.61     Patient Active Problem List   Diagnosis    Atrial fibrillation    COPD (chronic obstructive pulmonary disease)    Dementia    Parkinson's disease    Fracture of left humerus with routine healing    Fracture of femoral neck, right    Severe protein-calorie malnutrition    Hiatal hernia    UGI bleed    Hemorrhagic shock    Acute blood loss anemia     Past Medical History:   Diagnosis Date    Atrial fibrillation     COPD (chronic obstructive pulmonary disease)     Dementia     History of lung cancer     Parkinson disease     Pulmonary embolism      Past Surgical History:   Procedure Laterality Date    ENDOSCOPY N/A 2025    Procedure: ESOPHAGOGASTRODUODENOSCOPY;  Surgeon: Steve Gamez MD;  Location:  [facility];  Service: Gastroenterology;  Laterality: N/A;    HIP HEMIARTHROPLASTY Right 2/10/2025    Procedure: HIP HEMIARTHROPLASTY RIGHT;  Surgeon: Frandy Patino MD;  Location:  [facility];  Service: Orthopedics;  Laterality: Right;    JOINT REPLACEMENT      LUNG CANCER SURGERY        General Information       Row Name 25 1133          Physical Therapy Time and Intention    Document Type therapy note (daily note)  -AE     Mode of Treatment physical therapy  -AE       Row Name 25 1133          General Information    Patient Profile Reviewed yes  -AE     Existing Precautions/Restrictions fall;right;hip;other (see comments)  s/p R hip vicente (2/10)  -AE     Barriers to Rehab medically  complex;previous functional deficit;cognitive status  -AE       Row Name 02/24/25 1133          Cognition    Orientation Status (Cognition) oriented to;person;verbal cues/prompts needed for orientation;place;disoriented to;time  -AE       Row Name 02/24/25 1133          Safety Issues/Impairments Affecting Functional Mobility    Safety Issues Affecting Function (Mobility) awareness of need for assistance;insight into deficits/self-awareness;safety precaution awareness;safety precautions follow-through/compliance;sequencing abilities  -AE     Impairments Affecting Function (Mobility) balance;endurance/activity tolerance;range of motion (ROM);postural/trunk control;strength;cognition  -AE     Cognitive Impairments, Mobility Safety/Performance awareness, need for assistance;insight into deficits/self-awareness;safety precaution follow-through;sequencing abilities;judgment;problem-solving/reasoning  -AE               User Key  (r) = Recorded By, (t) = Taken By, (c) = Cosigned By      Initials Name Provider Type    AE Jesús Estes, PT Physical Therapist                   Mobility       Row Name 02/24/25 1140          Bed Mobility    Bed Mobility supine-sit  -AE     Supine-Sit [place] (Bed Mobility) maximum assist (25% patient effort);1 person assist;verbal cues  -AE     Assistive Device (Bed Mobility) bed rails;head of bed elevated;repositioning sheet  -AE     Comment, (Bed Mobility) VCs for hand placement and sequencing. Pt required increased cues to improve participation with moving BLE to EOB. Pt lethargic near beginning of session.  -AE       Row Name 02/24/25 1140          Transfers    Comment, (Transfers) VCs for hand placement and sequencing. Pt required increased cues to improve upright standing posture.  -AE       Row Name 02/24/25 1140          Bed-Chair Transfer    Bed-Chair [place] (Transfers) maximum assist (25% patient effort);1 person assist;verbal cues  -AE     Assistive Device (Bed-Chair  Transfers) other (see comments)  BUE support  -AE       Row Name 02/24/25 1140          Sit-Stand Transfer    Sit-Stand [place] [employer] (Transfers) maximum assist (25% patient effort);1 person assist;verbal cues  -AE       Row Name 02/24/25 1140          Gait/Stairs (Locomotion)    Comment, (Gait/Stairs) Increased difficulty sequencing BLE during SPT to chair. Not safe to attempt at this time.  -AE               User Key  (r) = Recorded By, (t) = Taken By, (c) = Cosigned By      Initials Name Provider Type    AE Jesús Estes PT Physical Therapist                   Obj/Interventions       Row Name 02/24/25 1143          Motor Skills    Therapeutic Exercise hip;knee;ankle  -AE       Row Name 02/24/25 1143          Hip (Therapeutic Exercise)    Hip (Therapeutic Exercise) AAROM (active assistive range of motion)  -AE     Hip AAROM (Therapeutic Exercise) bilateral;flexion;extension;aBduction;aDduction;10 repetitions;sitting  -AE       Row Name 02/24/25 1143          Knee (Therapeutic Exercise)    Knee (Therapeutic Exercise) strengthening exercise  -AE     Knee Strengthening (Therapeutic Exercise) bilateral;LAQ (long arc quad);10 repetitions;sitting  AAROM SLR  -AE       Row Name 02/24/25 1143          Ankle (Therapeutic Exercise)    Ankle (Therapeutic Exercise) AROM (active range of motion)  -AE     Ankle AROM (Therapeutic Exercise) bilateral;dorsiflexion;plantarflexion;10 repetitions;sitting  -AE       Row Name 02/24/25 1143          Balance    Balance Assessment sitting static balance;sitting dynamic balance;standing static balance;standing dynamic balance  -AE     Static Sitting Balance contact guard  -AE     Dynamic Sitting Balance contact guard  -AE     Position, Sitting Balance unsupported;sitting edge of bed  -AE     Static Standing Balance maximum assist;1-person assist  -AE     Dynamic Standing Balance maximum assist;1-person assist;verbal cues  -AE     Position/Device Used, Standing Balance supported  -AE                User Key  (r) = Recorded By, (t) = Taken By, (c) = Cosigned By      Initials Name Provider Type    AE Jesús Estes, PT Physical Therapist                   Goals/Plan    No documentation.                  Clinical Impression       Row Name 02/24/25 1145          Pain    Pretreatment Pain Rating 0/10 - no pain  -AE     Posttreatment Pain Rating 0/10 - no pain  -AE       Row Name 02/24/25 1145          Plan of Care Review    Plan of Care Reviewed With patient  -AE     Progress no change  -AE     Outcome Evaluation Pt continues to present with decreased functional mobility and generalized weakness limiting independence. Pt completed STS and SPT to chair with max A x1 and BUE support. Continue to progress per pt tolerance.  -AE       Row Name 02/24/25 1145          Vital Signs    Pre Systolic BP Rehab 101  -AE     Pre Treatment Diastolic BP 59  -AE     Posttreatment Heart Rate (beats/min) 77  -AE     Pre SpO2 (%) 95  -AE     O2 Delivery Pre Treatment room air  -AE     O2 Delivery Intra Treatment room air  -AE     Post SpO2 (%) 95  -AE     O2 Delivery Post Treatment room air  -AE     Pre Patient Position Sitting  -AE     Intra Patient Position Standing  -AE     Post Patient Position Sitting  -AE       Row Name 02/24/25 1145          Positioning and Restraints    Pre-Treatment Position in bed  -AE     Post Treatment Position chair  -AE     In Chair notified nsg;reclined;call light within reach;encouraged to call for assist;exit alarm on;waffle cushion;legs elevated;heels elevated  -AE               User Key  (r) = Recorded By, (t) = Taken By, (c) = Cosigned By      Initials Name Provider Type    AE Jesús Estes, PT Physical Therapist                   Outcome Measures       Row Name 02/24/25 1148 02/24/25 0900       How much help from another person do you currently need...    Turning from your back to your side while in flat bed without using bedrails? 2  -AE 2  -AB    Moving from lying on back to  sitting on the side of a flat bed without bedrails? 2  -AE 2  -AB    Moving to and from a bed to a chair (including a wheelchair)? 2  -AE 2  -AB    Standing up from a chair using your arms (e.g., wheelchair, bedside chair)? 2  -AE 2  -AB    Climbing 3-5 steps with a railing? 1  -AE 1  -AB    To walk in hospital room? 1  -AE 1  -AB    AM-PAC 6 Clicks Score (PT) 10  -AE 10  -AB    Highest Level of Mobility Goal 4 --> Transfer to chair/commode  -AE 4 --> Transfer to chair/commode  -AB      Row Name 02/24/25 1148          Functional Assessment    Outcome Measure Options AM-PAC 6 Clicks Basic Mobility (PT)  -AE               User Key  (r) = Recorded By, (t) = Taken By, (c) = Cosigned By      Initials Name Provider Type    AE Jesús Estes, PT Physical Therapist    Tracy Bustillo, RN Registered Nurse                                 Physical Therapy Education       Title: PT OT SLP Therapies (In Progress)       Topic: Physical Therapy (In Progress)       Point: Mobility training (In Progress)       Learning Progress Summary            Patient Acceptance, E, NR by AE at 2/24/2025 1050    Acceptance, E,TB, VU by ES at 2/23/2025 1024                      Point: Home exercise program (Not Started)       Learner Progress:  Not documented in this visit.              Point: Body mechanics (In Progress)       Learning Progress Summary            Patient Acceptance, E, NR by AE at 2/24/2025 1050    Acceptance, E,TB, VU by ES at 2/23/2025 1024                      Point: Precautions (In Progress)       Learning Progress Summary            Patient Acceptance, E, NR by AE at 2/24/2025 1050    Acceptance, E,TB, VU by ES at 2/23/2025 1024                                      User Key       Initials Effective Dates Name Provider Type Discipline    AE 09/21/21 -  Jesús Estes, PT Physical Therapist PT    ES 08/11/22 -  Faith Pizarro PT Physical Therapist PT                  PT Recommendation and Plan     Progress: no  change  Outcome Evaluation: Pt continues to present with decreased functional mobility and generalized weakness limiting independence. Pt completed STS and SPT to chair with max A x1 and BUE support. Continue to progress per pt tolerance.     Time Calculation:         PT Charges       Row Name 02/24/25 1149             Time Calculation    Start Time 1050  -AE      PT Received On 02/24/25  -AE      PT Goal Re-Cert Due Date 03/05/25  -AE         Timed Charges    21335 - PT Therapeutic Exercise Minutes 8  -AE      94217 - PT Therapeutic Activity Minutes 15  -AE         Total Minutes    Timed Charges Total Minutes 23  -AE       Total Minutes 23  -AE                User Key  (r) = Recorded By, (t) = Taken By, (c) = Cosigned By      Initials Name Provider Type    AE Jesús Estes PT Physical Therapist                  Therapy Charges for Today       Code Description Service Date Service Provider Modifiers Qty    68560086447 HC PT THER PROC EA 15 MIN 2/24/2025 Jesús Estes, PT GP 1    74667338311 HC PT THERAPEUTIC ACT EA 15 MIN 2/24/2025 Jesús Estes, PT GP 1            PT G-Codes  Outcome Measure Options: AM-PAC 6 Clicks Basic Mobility (PT)  AM-PAC 6 Clicks Score (PT): 10  AM-PAC 6 Clicks Score (OT): 14  PT Discharge Summary  Anticipated Discharge Disposition (PT): skilled nursing facility    Jesús Estes PT  2/24/2025

## 2025-02-24 NOTE — PLAN OF CARE
Goal Outcome Evaluation:  Plan of Care Reviewed With: patient        Progress: no change  Outcome Evaluation: Pt continues to present with decreased functional mobility and generalized weakness limiting independence. Pt completed STS and SPT to chair with max A x1 and BUE support. Continue to progress per pt tolerance.    Anticipated Discharge Disposition (PT): skilled nursing facility

## 2025-02-24 NOTE — CASE MANAGEMENT/SOCIAL WORK
Continued Stay Note   Jeremias     Patient Name: Fanny Boyce  MRN: [id number]  Today's Date: 2/24/2025    Admit Date: 2/20/2025    Plan: Rehab   Discharge Plan       Row Name 02/24/25 1447       Plan    Plan Rehab    Patient/Family in Agreement with Plan yes    Plan Comments Met with patient at the bedside to discuss discharge plan. Patient's plan is rehab. Referral given to [facility] with [facility]. CM will continue to follow.    Final Discharge Disposition Code 62 - inpatient rehab facility                   Discharge Codes    No documentation.                       Bambi Girard RN

## 2025-02-24 NOTE — PROGRESS NOTES
"  GI Daily Progress Note  Subjective:    Chief Complaint: Follow-up GI bleed    Patient resting up to chair in no acute distress.  No new or worsening GI complaints.  Hemoglobin stable at 10.3.    Objective:    /48 (BP Location: Right arm, Patient Position: Sitting)   Pulse 96   Temp 97.3 °F (36.3 °C) (Axillary)   Resp 16   Ht 152.4 cm (60\")   Wt 52 kg (114 lb 10.2 oz)   SpO2 93%   BMI 22.39 kg/m²     Physical Exam  Vitals and nursing note reviewed.   Constitutional:       General: She is not in acute distress.     Appearance: Normal appearance. She is normal weight. She is not ill-appearing or toxic-appearing.   Cardiovascular:      Rate and Rhythm: Normal rate and regular rhythm.      Pulses: Normal pulses.      Heart sounds: Normal heart sounds.   Pulmonary:      Effort: Pulmonary effort is normal. No respiratory distress.      Breath sounds: Normal breath sounds.   Abdominal:      General: Abdomen is flat. Bowel sounds are normal. There is no distension.      Palpations: Abdomen is soft. There is no mass.      Tenderness: There is no abdominal tenderness. There is no guarding or rebound.      Hernia: No hernia is present.   Neurological:      Mental Status: She is alert. Mental status is at baseline. She is disoriented.   Psychiatric:         Judgment: Judgment normal.         Lab  I have personally reviewed most recent cardiac tracings, lab results, and radiology images and interpretations and agree with findings.    Lab Results   Component Value Date    WBC 9.29 02/24/2025    HGB 10.3 (L) 02/24/2025    HGB 9.0 (L) 02/23/2025    HGB 9.9 (L) 02/22/2025    MCV 91.2 02/24/2025     02/24/2025    INR 1.03 02/21/2025    INR 1.18 (H) 02/20/2025    INR 0.97 02/10/2025    INR 1.2 (H) 02/25/2022    INR 1.1 02/21/2022       Lab Results   Component Value Date    GLUCOSE 95 02/24/2025    BUN 16 02/24/2025    CREATININE 0.46 (L) 02/24/2025    EGFRIFNONA >60 07/19/2022    EGFRIFAFRI >60 07/19/2022    BCR " 34.8 (H) 02/24/2025     02/24/2025    K 4.5 02/24/2025    CO2 25.0 02/24/2025    CALCIUM 8.6 02/24/2025    ALBUMIN 2.6 (L) 02/21/2025    ALKPHOS 77 02/21/2025    BILITOT 0.4 02/21/2025    ALT 13 02/21/2025    AST 15 02/21/2025       Assessment:  Gastrointestinal hemorrhage with melena, s/p EGD with duodenal ulcers with bleed, treated with bipolar cautery, OVESCO clip and hemospray.    Acute blood loss anemia  Hemorrhagic shock     Plan:  Patient doing well today.  Hemoglobin remained stable.  No new or worsening GI complaints.  >>> Continue twice daily PPI for 30 days; daily thereafter  >>> Continue to abstain from any use of NSAIDs  >>> If recurrent bleed, will need IR intervention.    Will sign off.  Please call with any further questions or concerns.    Obdulio Reece, APRN  02/24/25  14:59 EST

## 2025-02-25 LAB
BACTERIA SPEC AEROBE CULT: NORMAL
BACTERIA SPEC AEROBE CULT: NORMAL
HCT VFR BLD AUTO: 30 % (ref 34–46.6)
HGB BLD-MCNC: 9.5 G/DL (ref 12–15.9)

## 2025-02-25 PROCEDURE — 85018 HEMOGLOBIN: CPT | Performed by: INTERNAL MEDICINE

## 2025-02-25 PROCEDURE — 97530 THERAPEUTIC ACTIVITIES: CPT

## 2025-02-25 PROCEDURE — 85014 HEMATOCRIT: CPT | Performed by: INTERNAL MEDICINE

## 2025-02-25 PROCEDURE — 99232 SBSQ HOSP IP/OBS MODERATE 35: CPT | Performed by: INTERNAL MEDICINE

## 2025-02-25 PROCEDURE — 97110 THERAPEUTIC EXERCISES: CPT

## 2025-02-25 RX ADMIN — PANTOPRAZOLE SODIUM 40 MG: 40 TABLET, DELAYED RELEASE ORAL at 20:44

## 2025-02-25 RX ADMIN — MUPIROCIN 1 APPLICATION: 20 OINTMENT TOPICAL at 08:48

## 2025-02-25 RX ADMIN — Medication 5000 UNITS: at 08:48

## 2025-02-25 RX ADMIN — CARBIDOPA AND LEVODOPA 1 TABLET: 25; 100 TABLET ORAL at 20:43

## 2025-02-25 RX ADMIN — CARBIDOPA AND LEVODOPA 1 TABLET: 25; 100 TABLET ORAL at 15:59

## 2025-02-25 RX ADMIN — PANTOPRAZOLE SODIUM 40 MG: 40 TABLET, DELAYED RELEASE ORAL at 08:48

## 2025-02-25 RX ADMIN — CARBIDOPA AND LEVODOPA 1 TABLET: 25; 100 TABLET ORAL at 08:47

## 2025-02-25 NOTE — PLAN OF CARE
Goal Outcome Evaluation:  Plan of Care Reviewed With: patient, child        Progress: no change  Outcome Evaluation: Pt presents with decreased functional mobility and generalized weakness limiting functional independence. Pt ambulated 6ft + 6ft with min A x1 +1 chair follow this session. Continue to progress per pt tolerance.    Anticipated Discharge Disposition (PT): skilled nursing facility

## 2025-02-25 NOTE — THERAPY TREATMENT NOTE
Patient Name: Fanny Boyce  : 1940    MRN: [id number]                              Today's Date: 2025       Admit Date: 2025    Visit Dx:     ICD-10-CM ICD-9-CM   1. Hemorrhagic shock  R57.8 785.59   2. Acute blood loss anemia  D62 285.1   3. Gastrointestinal hemorrhage, unspecified gastrointestinal hemorrhage type  K92.2 578.9   4. Parkinson's disease, unspecified whether dyskinesia present, unspecified whether manifestations fluctuate  G20.A1 332.0   5. Chronic anticoagulation  Z79.01 V58.61     Patient Active Problem List   Diagnosis    Atrial fibrillation    COPD (chronic obstructive pulmonary disease)    Dementia    Parkinson's disease    Fracture of left humerus with routine healing    Fracture of femoral neck, right    Severe protein-calorie malnutrition    Hiatal hernia    UGI bleed    Hemorrhagic shock    Acute blood loss anemia     Past Medical History:   Diagnosis Date    Atrial fibrillation     COPD (chronic obstructive pulmonary disease)     Dementia     History of lung cancer     Parkinson disease     Pulmonary embolism      Past Surgical History:   Procedure Laterality Date    ENDOSCOPY N/A 2025    Procedure: ESOPHAGOGASTRODUODENOSCOPY;  Surgeon: Steve Gamez MD;  Location:  [facility];  Service: Gastroenterology;  Laterality: N/A;    HIP HEMIARTHROPLASTY Right 2/10/2025    Procedure: HIP HEMIARTHROPLASTY RIGHT;  Surgeon: Frandy Patino MD;  Location:  [facility];  Service: Orthopedics;  Laterality: Right;    JOINT REPLACEMENT      LUNG CANCER SURGERY        General Information       Row Name 25 1418          Physical Therapy Time and Intention    Document Type therapy note (daily note)  -AE     Mode of Treatment physical therapy  -AE       Row Name 25 1418          General Information    Patient Profile Reviewed yes  -AE     Existing Precautions/Restrictions fall;right;hip;other (see comments)  s/p R hip vicente (2/10)  -AE     Barriers to Rehab medically  complex;previous functional deficit;cognitive status  -AE       Row Name 02/25/25 1418          Cognition    Orientation Status (Cognition) oriented to;person;verbal cues/prompts needed for orientation;place;disoriented to;time  -AE       Row Name 02/25/25 1418          Safety Issues/Impairments Affecting Functional Mobility    Safety Issues Affecting Function (Mobility) awareness of need for assistance;insight into deficits/self-awareness;safety precaution awareness;safety precautions follow-through/compliance;sequencing abilities  -AE     Impairments Affecting Function (Mobility) balance;endurance/activity tolerance;range of motion (ROM);postural/trunk control;strength;cognition  -AE     Cognitive Impairments, Mobility Safety/Performance insight into deficits/self-awareness;safety precaution awareness;safety precaution follow-through;sequencing abilities;problem-solving/reasoning  -AE               User Key  (r) = Recorded By, (t) = Taken By, (c) = Cosigned By      Initials Name Provider Type    AE Jesús Estes, PT Physical Therapist                   Mobility       Row Name 02/25/25 1419          Bed Mobility    Bed Mobility supine-sit  -AE     Supine-Sit [place] (Bed Mobility) moderate assist (50% patient effort);2 person assist;verbal cues  -AE     Assistive Device (Bed Mobility) bed rails;head of bed elevated;repositioning sheet  -AE     Comment, (Bed Mobility) VCs for hand placement and sequencing. Pt requrired increased cues to improve sequencing of BLE. Improved from last session.  -AE       Row Name 02/25/25 1419          Transfers    Comment, (Transfers) VCs for hand placement and sequencing. Mod A x2 initially but able to improve to min A x1 with subsequent attempts.  -AE       Row Name 02/25/25 1419          Bed-Chair Transfer    Bed-Chair [place] (Transfers) moderate assist (50% patient effort);2 person assist;verbal cues  -AE     Assistive Device (Bed-Chair Transfers) other (see  comments)  BUE support  -AE     Comment, (Bed-Chair Transfer) able to sequence BLE, taking steps to chair  -AE       Row Name 02/25/25 1419          Sit-Stand Transfer    Sit-Stand [place] (Transfers) moderate assist (50% patient effort);2 person assist;minimum assist (75% patient effort)  -AE     Assistive Device (Sit-Stand Transfers) walker, front-wheeled  -AE     Comment, (Sit-Stand Transfer) 3x STS, mod A x2 for initial STS but able to improve to min A x1 and RW for support on 3rd attempt.  -AE       Row Name 02/25/25 1419          Gait/Stairs (Locomotion)    [place] Level (Gait) minimum assist (75% patient effort);1 person assist;1 person to manage equipment;verbal cues  -AE     Assistive Device (Gait) walker, front-wheeled  -AE     Distance in Feet (Gait) 6  +6ft  -AE     Deviations/Abnormal Patterns (Gait) bilateral deviations;kayce[place] decreased;gait speed decreased;stride length decreased;base of support, narrow  -AE     Bilateral Gait Deviations forward flexed posture;heel strike decreased  -AE     Comment, (Gait/Stairs) Pt demo step to gait pattern with slowed kaycee, decreased gait speed, and forward flexed posture. Pt required increased cues for improved step length and to maintain upright posture. Increased difficulty with increased fatigue. Pt required seated rest break between gait trials. Further distance limited by fatigue.  -AE               User Key  (r) = Recorded By, (t) = Taken By, (c) = Cosigned By      Initials Name Provider Type    AE Jesús Estes, JOAN Physical Therapist                   Obj/Interventions       Row Name 02/25/25 1427          Motor Skills    Therapeutic Exercise hip;ankle;knee  -AE       Row Name 02/25/25 1427          Hip (Therapeutic Exercise)    Hip (Therapeutic Exercise) isometric exercises  -AE     Hip Isometrics (Therapeutic Exercise) bilateral;gluteal sets;5 repetitions;sitting  -AE       Row Name 02/25/25 1427          Knee (Therapeutic Exercise)     Knee (Therapeutic Exercise) strengthening exercise;isometric exercises  -AE     Knee Isometrics (Therapeutic Exercise) bilateral;quad sets;sitting;10 repetitions;3 second hold  -AE     Knee Strengthening (Therapeutic Exercise) bilateral;LAQ (long arc quad);10 repetitions;sitting  -AE       Row Name 02/25/25 1427          Ankle (Therapeutic Exercise)    Ankle (Therapeutic Exercise) AROM (active range of motion)  -AE     Ankle AROM (Therapeutic Exercise) bilateral;dorsiflexion;plantarflexion;10 repetitions;sitting  -AE       Row Name 02/25/25 1427          Balance    Balance Assessment sitting static balance;sitting dynamic balance;standing static balance;standing dynamic balance  -AE     Static Sitting Balance contact guard  -AE     Dynamic Sitting Balance contact guard  -AE     Position, Sitting Balance unsupported;sitting edge of bed  -AE     Static Standing Balance verbal cues;minimal assist;1-person assist  -AE     Dynamic Standing Balance minimal assist;1-person assist;1 person to manage equipment;verbal cues  -AE     Position/Device Used, Standing Balance supported  -AE               User Key  (r) = Recorded By, (t) = Taken By, (c) = Cosigned By      Initials Name Provider Type    AE Jesús Estes, PT Physical Therapist                   Goals/Plan    No documentation.                  Clinical Impression       Row Name 02/25/25 1430          Pain    Pretreatment Pain Rating 0/10 - no pain  -AE     Posttreatment Pain Rating 0/10 - no pain  -AE       Row Name 02/25/25 1430          Plan of Care Review    Plan of Care Reviewed With patient;child  -AE     Progress no change  -AE     Outcome Evaluation Pt presents with decreased functional mobility and generalized weakness limiting functional independence. Pt ambulated 6ft + 6ft with min A x1 +1 chair follow this session. Continue to progress per pt tolerance.  -AE       Row Name 02/25/25 1430          Vital Signs    O2 Delivery Pre Treatment room air  -AE      O2 Delivery Intra Treatment room air  -AE     O2 Delivery Post Treatment room air  -AE     Pre Patient Position Supine  -AE     Intra Patient Position Standing  -AE     Post Patient Position Sitting  -AE       Row Name 02/25/25 1430          Positioning and Restraints    Pre-Treatment Position in bed  -AE     Post Treatment Position chair  -AE     In Chair notified nsg;reclined;call light within reach;encouraged to call for assist;exit alarm on;with family/caregiver;waffle cushion;legs elevated  -AE               User Key  (r) = Recorded By, (t) = Taken By, (c) = Cosigned By      Initials Name Provider Type    AE Jesús Estes PT Physical Therapist                   Outcome Measures       Row Name 02/25/25 1432          How much help from another person do you currently need...    Turning from your back to your side while in flat bed without using bedrails? 2  -AE     Moving from lying on back to sitting on the side of a flat bed without bedrails? 2  -AE     Moving to and from a bed to a chair (including a wheelchair)? 3  -AE     Standing up from a chair using your arms (e.g., wheelchair, bedside chair)? 3  -AE     Climbing 3-5 steps with a railing? 2  -AE     To walk in hospital room? 3  -AE     AM-PAC 6 Clicks Score (PT) 15  -AE     Highest Level of Mobility Goal 4 --> Transfer to chair/commode  -AE       Row Name 02/25/25 1432          Functional Assessment    Outcome Measure Options AM-PAC 6 Clicks Basic Mobility (PT)  -AE               User Key  (r) = Recorded By, (t) = Taken By, (c) = Cosigned By      Initials Name Provider Type    AE Jesús Estes PT Physical Therapist                                 Physical Therapy Education       Title: PT OT SLP Therapies (In Progress)       Topic: Physical Therapy (In Progress)       Point: Mobility training (In Progress)       Learning Progress Summary            Patient Acceptance, E, NR by AE at 2/25/2025 1313    Acceptance, E, NR by AE at 2/24/2025 1050     Acceptance, E,TB, VU by ES at 2/23/2025 1024                      Point: Home exercise program (Not Started)       Learner Progress:  Not documented in this visit.              Point: Body mechanics (In Progress)       Learning Progress Summary            Patient Acceptance, E, NR by AE at 2/25/2025 1313    Acceptance, E, NR by AE at 2/24/2025 1050    Acceptance, E,TB, VU by ES at 2/23/2025 1024                      Point: Precautions (In Progress)       Learning Progress Summary            Patient Acceptance, E, NR by AE at 2/25/2025 1313    Acceptance, E, NR by AE at 2/24/2025 1050    Acceptance, E,TB, VU by ES at 2/23/2025 1024                                      User Key       Initials Effective Dates Name Provider Type Discipline    AE 09/21/21 -  Jesús Estes, PT Physical Therapist PT    ES 08/11/22 -  Faith Pizarro PT Physical Therapist PT                  PT Recommendation and Plan     Progress: no change  Outcome Evaluation: Pt presents with decreased functional mobility and generalized weakness limiting functional independence. Pt ambulated 6ft + 6ft with min A x1 +1 chair follow this session. Continue to progress per pt tolerance.     Time Calculation:         PT Charges       Row Name 02/25/25 1434             Time Calculation    Start Time 1313  -AE      PT Received On 02/25/25  -AE      PT Goal Re-Cert Due Date 03/05/25  -AE         Timed Charges    18089 - PT Therapeutic Exercise Minutes 10  -AE      76994 - PT Therapeutic Activity Minutes 13  -AE         Total Minutes    Timed Charges Total Minutes 23  -AE       Total Minutes 23  -AE                User Key  (r) = Recorded By, (t) = Taken By, (c) = Cosigned By      Initials Name Provider Type    AE Jesús Estes PT Physical Therapist                  Therapy Charges for Today       Code Description Service Date Service Provider Modifiers Qty    31671826545 HC PT THER PROC EA 15 MIN 2/24/2025 Jesús Estes, PT GP 1    54061323192 HC PT  THERAPEUTIC ACT EA 15 MIN 2/24/2025 Jesús Estes, PT GP 1    45710626587 HC PT THER PROC EA 15 MIN 2/25/2025 Jesús Estes, PT GP 1    60363297362 HC PT THERAPEUTIC ACT EA 15 MIN 2/25/2025 Jesús Estse, PT GP 1    76454415019 HC PT THER SUPP EA 15 MIN 2/25/2025 Jesús Estes, PT GP 2            PT G-Codes  Outcome Measure Options: AM-PAC 6 Clicks Basic Mobility (PT)  AM-PAC 6 Clicks Score (PT): 15  AM-PAC 6 Clicks Score (OT): 14  PT Discharge Summary  Anticipated Discharge Disposition (PT): skilled nursing facility    Jesús Estes, JOAN  2/25/2025

## 2025-02-25 NOTE — CASE MANAGEMENT/SOCIAL WORK
Continued Stay Note  [facility]     Patient Name: Fanny Boyce  MRN: [id number]  Today's Date: 2/25/2025    Admit Date: 2/20/2025    Plan: Rehab   Discharge Plan       Row Name 02/25/25 2874       Plan    Plan Comments Patient's plan is rehab at discharge. Keaton with [facility] is following and has requested updated PT note. PT will work with patient today. CM will continue to follow.    Final Discharge Disposition Code 62 - inpatient rehab facility                   Discharge Codes    No documentation.                       Bambi Girard RN

## 2025-02-25 NOTE — PROGRESS NOTES
[facility] Medicine Services  PROGRESS NOTE    Patient Name: Fanny Boyce  : 1940  MRN: [id number]    Date of Admission: 2025  Primary Care Physician: Nemo Aponte PA    Subjective   Subjective     CC: f/u gib    HPI: Lying in bed comfortably. No issues overnight. No evidence of bleeding.      Objective   Objective     Vital Signs:   Temp:  [97.3 °F (36.3 °C)-97.6 °F (36.4 °C)] 97.6 °F (36.4 °C)  Heart Rate:  [60-96] 70  Resp:  [16-18] 16  BP: (100-129)/(46-72) 117/57     Physical Exam:  Constitutional: No acute distress, awake, alert, elderly, frail  HENT: NCAT, mucous membranes moist  Respiratory: Clear to auscultation bilaterally, respiratory effort normal   Cardiovascular: RRR, no murmurs, rubs, or gallops  Gastrointestinal: Positive bowel sounds, soft, nontender, nondistended  Musculoskeletal: No bilateral ankle edema  Psychiatric: Appropriate affect, cooperative  Neurologic: Oriented x 3, strength symmetric in all extremities, Cranial Nerves grossly intact to confrontation, speech clear  Skin: No rashes     Results Reviewed:  LAB RESULTS:      Lab 25  0500 25  0425 25  0416 25  0817 25  1349 25  0533 25  1700 25  1439 25  1342 25  1335   WBC  --  9.29 9.25 9.97  --  13.32*  --   --   --  13.57*   HEMOGLOBIN 9.5* 10.3* 9.0* 9.9* 8.4* 7.3*   < >  --   --  5.5*   HEMOGLOBIN, POC  --   --   --   --   --   --   --   --    < >  --    HEMATOCRIT 30.0* 31.1* 28.6* 30.7* 26.0* 22.4*   < >  --   --  17.8*   HEMATOCRIT POC  --   --   --   --   --   --   --   --    < >  --    PLATELETS  --  315 266 271  --  312  --   --   --  351   NEUTROS ABS  --  6.66 6.12 6.76  --  8.77*  --   --   --  8.36*   IMMATURE GRANS (ABS)  --  0.15* 0.22* 0.34*  --  0.60*  --   --   --  0.27*   LYMPHS ABS  --  1.83 2.10 2.07  --  3.01  --   --   --  4.26*   MONOS ABS  --  0.41 0.51 0.51  --  0.72  --   --   --  0.58   EOS ABS  --  0.21  ----- Message from Stephenie Fan sent at 5/9/2022 10:12 AM CDT -----  Regarding: Medication Refill  Type:  RX Refill Request    Who Called: Patient  Refill or New Rx:refill  RX Name and Strength: Pravastain 40mg  How is the patient currently taking it? (ex. 1XDay): QD  Is this a 30 day or 90 day RX: 90  Preferred Pharmacy with phone number: [facility]  Local or Mail Order:mail order  Ordering Provider:Dr Argueta  Would the patient rather a call back or a response via [facility]? call  Best Call Back Number:[phone]  Additional Information:         0.25 0.23  --  0.15  --   --   --  0.05   MCV  --  91.2 93.8 91.4  --  89.2  --   --   --  90.8   PROCALCITONIN  --   --   --   --   --   --   --  0.19  --   --    LACTATE  --   --   --   --   --  1.2  --   --   --  1.6   PROTIME  --   --   --   --   --  13.6  --   --   --  15.1*   APTT  --   --   --   --   --  32.8  --   --   --  36.6*   HSTROP T  --   --   --   --   --   --   --  29*  --  26*    < > = values in this interval not displayed.         Lab 02/24/25 0425 02/23/25 2001 02/23/25  0416 02/22/25  0817 02/21/25  0533 02/20/25  1342 02/20/25  1335   SODIUM 138  --  134* 140 135*  --  136   POTASSIUM 4.5 4.9 3.4* 3.8 4.3  --  4.8   CHLORIDE 104  --  108* 108* 103  --  101   CO2 25.0  --  25.0 24.0 26.0  --  24.0   ANION GAP 9.0  --  1.0* 8.0 6.0  --  11.0   BUN 16  --  19 17 30*  --  32*   CREATININE 0.46*  --  0.50* 0.52* 0.53* 0.70 0.57   EGFR 93.3  --  91.5 90.6 90.2 84.3 88.6   GLUCOSE 95  --  86 86 120*  --  131*   CALCIUM 8.6  --  8.1* 8.3* 8.0*  --  8.8   MAGNESIUM 2.0  --  1.7 1.9 1.9  --  1.9   PHOSPHORUS  --   --   --   --  2.9  --  3.4   TSH  --   --   --   --   --   --  2.910         Lab 02/21/25  0533 02/20/25  1335   TOTAL PROTEIN 4.7* 5.3*   ALBUMIN 2.6* 2.9*   GLOBULIN 2.1 2.4   ALT (SGPT) 13 <5   AST (SGOT) 15 16   BILIRUBIN 0.4 0.3   ALK PHOS 77 76         Lab 02/21/25  0533 02/20/25  1439 02/20/25  1335   PROBNP  --   --  336.2   HSTROP T  --  29* 26*   PROTIME 13.6  --  15.1*   INR 1.03  --  1.18*             Lab 02/20/25  1347   ABO TYPING O   RH TYPING Positive   ANTIBODY SCREEN Negative         Brief Urine Lab Results  (Last result in the past 365 days)        Color   Clarity   Blood   Leuk Est   Nitrite   Protein   CREAT   Urine HCG        02/20/25 1846 Yellow   Cloudy   Trace   Moderate (2+)   Negative   Negative                   Microbiology Results Abnormal       None            No radiology results from the last 24 hrs    Results for orders placed during the hospital encounter of  02/09/25    Adult Transthoracic Echo Complete W/ Cont if Necessary Per Protocol    Interpretation Summary    Left ventricular systolic function is normal. Estimated left ventricular EF = 70%    Moderate tricuspid valve regurgitation is present.    Estimated  right ventricular systolic pressure from tricuspid regurgitation is 46 mmHg.    There is a trivial pericardial effusion.      Current medications:  Scheduled Meds:carbidopa-levodopa, 1 tablet, Oral, TID  pantoprazole, 40 mg, Oral, Q12H   Or  pantoprazole, 40 mg, Intravenous, Q12H  traZODone, 100 mg, Oral, Nightly  vitamin D3, 5,000 Units, Oral, Daily      Continuous Infusions:   PRN Meds:.  acetaminophen **OR** acetaminophen    senna-docusate sodium **AND** polyethylene glycol **AND** bisacodyl **AND** bisacodyl    Calcium Replacement - Follow Nurse / BPA Driven Protocol    ipratropium-albuterol    Magnesium Standard Dose Replacement - Follow Nurse / BPA Driven Protocol    nitroglycerin    ondansetron    Phosphorus Replacement - Follow Nurse / BPA Driven Protocol    Potassium Replacement - Follow Nurse / BPA Driven Protocol    Assessment & Plan   Assessment & Plan     Active Hospital Problems    Diagnosis  POA    **UGI bleed [K92.2]  Yes    Hemorrhagic shock [R57.8]  Yes    Acute blood loss anemia [D62]  Yes    Fracture of left humerus with routine healing [S42.302D]  Not Applicable    Atrial fibrillation [I48.91]  Yes    COPD (chronic obstructive pulmonary disease) [J44.9]  Yes    Dementia [F03.90]  Yes    Parkinson's disease [G20.A1]  Yes      Resolved Hospital Problems   No resolved problems to display.        Brief Hospital Course to date:  Fanny Boyce is a  84 year old female with atrial fibrillation, COPD, dementia, parkinson disease, former tobacco use, and history of lung cancer who presented to [facility] ED from [facility] on 2/20 with a hemoglobin of 5.5. Of note, she was admitted on 2/10-2/18 for a hip fracture. Upon arrival here, her fecal occult is positive  and rectal exam revealed melena. She was hypotensive, disoriented, and lethargic. She was given Kcentra and 2 units of uncrossed pRBCs. CT A/P without active extravasation. GI consulted and started IV pantoprazole.   Patient underwent upper GI endoscopy by Dr. Gamez on February 21.  Patient was found to have 2 cratered duodenal ulcer in the duodenal bulb.  There was active extravasation noted during the case.  Clips were placed and needed coagulation with bipolar cautery to control of bleeding with hemostatic spray. Transferred to floor 2/24.     ABL  Duodenal ulcer s/p clipping/cautery  -GI following d/w him - for rebleed rec'd emergent IR embolization.  -Transition to PO PPI BID x 30 days.  -Continue to hold AC.  -H/H in am.     Recent right femoral neck fracture  --S/P right hip hemiarthroplasty 2/10/25 by Dr. Patino  --PT/OT follows - rec'd return to [facility] at d/c.     Atrial fibrillation with [id number] = 3  - Continue to hold AC - will refer to cardiology at d/c to discuss Watchman given her life threatening bleed.      COPD  - Not currently in exacerbation.  - Continue as needed DuoNebs.     Parkinson's disease  Dementia  - Continue carbidopa/levodopa  - Changed trazodone nightly to PRN at lower dose due to drowsiness      I discussed with her daughter. Updated on POC and return to [facility] in 1-2 days.    Expected Discharge Location and Transportation:   Expected Discharge   Expected discharge date/ time has not been documented.     VTE Prophylaxis:  Mechanical VTE prophylaxis orders are present.         AM-PAC 6 Clicks Score (PT): 10 (02/24/25 2000)    CODE STATUS:   Code Status and Medical Interventions: No CPR (Do Not Attempt to Resuscitate); Limited Support; No intubation (DNI)   Ordered at: 02/20/25 1612     Medical Intervention Limits:    No intubation (DNI)     Code Status (Patient has no pulse and is not breathing):    No CPR (Do Not Attempt to Resuscitate)     Medical Interventions (Patient has pulse or is  breathing):    Limited Support       Hilary Caldera, II, DO  02/25/25

## 2025-02-25 NOTE — PLAN OF CARE
Problem: Adult Inpatient Plan of Care  Goal: Absence of Hospital-Acquired Illness or Injury  Intervention: Identify and Manage Fall Risk  Recent Flowsheet Documentation  Taken 2/25/2025 0000 by Ila Linn RN  Safety Promotion/Fall Prevention:   activity supervised   clutter free environment maintained   nonskid shoes/slippers when out of bed   safety round/check completed  Taken 2/24/2025 2000 by Ila Linn RN  Safety Promotion/Fall Prevention:   safety round/check completed   activity supervised  Intervention: Prevent Skin Injury  Recent Flowsheet Documentation  Taken 2/24/2025 2000 by Ila Linn RN  Skin Protection: incontinence pads utilized  Intervention: Prevent and Manage VTE (Venous Thromboembolism) Risk  Recent Flowsheet Documentation  Taken 2/24/2025 2000 by Ila Linn RN  VTE Prevention/Management: SCDs (sequential compression devices) off  Intervention: Prevent Infection  Recent Flowsheet Documentation  Taken 2/25/2025 0000 by Ila Linn RN  Infection Prevention: rest/sleep promoted  Taken 2/24/2025 2000 by Ila Linn RN  Infection Prevention:   environmental surveillance performed   hand hygiene promoted   rest/sleep promoted  Goal: Optimal Comfort and Wellbeing  Intervention: Provide Person-Centered Care  Recent Flowsheet Documentation  Taken 2/25/2025 0000 by Ila Linn RN  Trust Relationship/Rapport:   care explained   choices provided   questions answered   questions encouraged  Taken 2/24/2025 2000 by Ila Linn RN  Trust Relationship/Rapport:   care explained   choices provided   questions encouraged   questions answered     Problem: Fall Injury Risk  Goal: Absence of Fall and Fall-Related Injury  Intervention: Identify and Manage Contributors  Recent Flowsheet Documentation  Taken 2/25/2025 0000 by Ila Linn RN  Medication Review/Management: medications reviewed  Taken 2/24/2025 2000 by Ila Linn RN  Medication Review/Management:  medications reviewed  Intervention: Promote Injury-Free Environment  Recent Flowsheet Documentation  Taken 2/25/2025 0000 by Ila Linn RN  Safety Promotion/Fall Prevention:   activity supervised   clutter free environment maintained   nonskid shoes/slippers when out of bed   safety round/check completed  Taken 2/24/2025 2000 by Ila Linn RN  Safety Promotion/Fall Prevention:   safety round/check completed   activity supervised     Problem: Skin Injury Risk Increased  Goal: Skin Health and Integrity  Intervention: Optimize Skin Protection  Recent Flowsheet Documentation  Taken 2/24/2025 2000 by Ila Linn RN  Activity Management: activity encouraged  Pressure Reduction Techniques: frequent weight shift encouraged  Head of Bed (HOB) Positioning: HOB at 20-30 degrees  Pressure Reduction Devices:   pressure-redistributing mattress utilized   foam padding utilized  Skin Protection: incontinence pads utilized     Problem: Restraint, Nonviolent  Goal: Absence of Harm or Injury  Intervention: Implement Least Restrictive Safety Strategies  Recent Flowsheet Documentation  Taken 2/25/2025 0000 by Ila Linn RN  Diversional Activities: television  Taken 2/24/2025 2000 by Ila Linn RN  Diversional Activities: television  Intervention: Protect Dignity, Rights and Personal Wellbeing  Recent Flowsheet Documentation  Taken 2/25/2025 0000 by Ila Linn RN  Trust Relationship/Rapport:   care explained   choices provided   questions answered   questions encouraged  Taken 2/24/2025 2000 by Ila Linn RN  Trust Relationship/Rapport:   care explained   choices provided   questions encouraged   questions answered  Intervention: Protect Skin and Joint Integrity  Recent Flowsheet Documentation  Taken 2/24/2025 2000 by Ila Linn RN  Skin Protection: incontinence pads utilized   Goal Outcome Evaluation:

## 2025-02-26 ENCOUNTER — READMISSION MANAGEMENT (OUTPATIENT)
Dept: CALL CENTER | Facility: HOSPITAL | Age: 85
End: 2025-02-26
Payer: MEDICARE

## 2025-02-26 VITALS
RESPIRATION RATE: 16 BRPM | HEART RATE: 83 BPM | SYSTOLIC BLOOD PRESSURE: 107 MMHG | HEIGHT: 60 IN | BODY MASS INDEX: 22.03 KG/M2 | WEIGHT: 112.21 LBS | TEMPERATURE: 97.7 F | DIASTOLIC BLOOD PRESSURE: 53 MMHG | OXYGEN SATURATION: 98 %

## 2025-02-26 PROBLEM — R57.8 HEMORRHAGIC SHOCK: Status: RESOLVED | Noted: 2025-02-20 | Resolved: 2025-02-26

## 2025-02-26 PROBLEM — K92.2 UGI BLEED: Status: RESOLVED | Noted: 2025-02-20 | Resolved: 2025-02-26

## 2025-02-26 PROBLEM — D62 ACUTE BLOOD LOSS ANEMIA: Status: RESOLVED | Noted: 2025-02-20 | Resolved: 2025-02-26

## 2025-02-26 PROCEDURE — 99239 HOSP IP/OBS DSCHRG MGMT >30: CPT | Performed by: INTERNAL MEDICINE

## 2025-02-26 RX ORDER — ACETAMINOPHEN 325 MG/1
650 TABLET ORAL EVERY 6 HOURS PRN
Start: 2025-02-26

## 2025-02-26 RX ORDER — PANTOPRAZOLE SODIUM 40 MG/1
TABLET, DELAYED RELEASE ORAL
Start: 2025-02-26 | End: 2025-06-26

## 2025-02-26 RX ADMIN — PANTOPRAZOLE SODIUM 40 MG: 40 TABLET, DELAYED RELEASE ORAL at 08:51

## 2025-02-26 RX ADMIN — Medication 5000 UNITS: at 08:51

## 2025-02-26 RX ADMIN — CARBIDOPA AND LEVODOPA 1 TABLET: 25; 100 TABLET ORAL at 08:50

## 2025-02-26 NOTE — DISCHARGE SUMMARY
[facility] Medicine Services  DISCHARGE SUMMARY    Patient Name: Fanny Boyce  : 1940  MRN: [id number]    Date of Admission: 2025  1:18 PM  Date of Discharge:  2025  Primary Care Physician: Nemo Aponte PA    Consults       Date and Time Order Name Status Description    2025  4:12 PM Gastroenterology Consult Completed             Hospital Course     Presenting Problem: GIB    Active Hospital Problems    Diagnosis  POA    Fracture of left humerus with routine healing [S42.302D]  Not Applicable    Atrial fibrillation [I48.91]  Yes    COPD (chronic obstructive pulmonary disease) [J44.9]  Yes    Dementia [F03.90]  Yes    Parkinson's disease [G20.A1]  Yes      Resolved Hospital Problems    Diagnosis Date Resolved POA    **UGI bleed [K92.2] 2025 Yes    Hemorrhagic shock [R57.8] 2025 Yes    Acute blood loss anemia [D62] 2025 Yes          Hospital Course:  Fanny Boyce is a  84 year old female with atrial fibrillation, COPD, dementia, parkinson disease, former tobacco use, and history of lung cancer who presented to [facility] ED from [facility] on  with a hemoglobin of 5.5. Of note, she was admitted on 2/10- for a hip fracture. Upon arrival here, her fecal occult is positive and rectal exam revealed melena. She was hypotensive, disoriented, and lethargic. She was given Kcentra and 2 units of uncrossed pRBCs. CT A/P without active extravasation. GI consulted and started IV pantoprazole.   Patient underwent upper GI endoscopy by Dr. Gamez on .  Patient was found to have 2 cratered duodenal ulcer in the duodenal bulb.  There was active extravasation noted during the case.  Clips were placed and needed coagulation with bipolar cautery to control of bleeding with hemostatic spray. Transferred to floor .     ABL  Duodenal ulcer s/p clipping/cautery  -GI followed throughout stay. Taken urgencly for EGD  s/p clipping/cautery. Rec'd for  rebleed rec'd emergent IR embolization. H/H remained stable after procedure.  -Continue PO PPI BID x 30 days followed by PPI daily for life.  -Continue to hold AC - d/w daughter. Will have patient follow up EP for evaluation for watchman.     Recent right femoral neck fracture  --S/P right hip hemiarthroplasty 2/10/25 by Dr. Patino  --PT/OT follows - rec'd return to [facility] at d/c.     Atrial fibrillation with [id number] = 3  - Continue to hold AC - will refer to cardiology at d/c to discuss Watchman given her life threatening bleed.    Discharge Follow Up Recommendations for outpatient labs/diagnostics:   EP Cardiology 2-4 weeks    Day of Discharge     HPI: No issues overnight. Doing very well this am. Ate breakfast.     Review of Systems  Gen- No fevers, chills  CV- No chest pain, palpitations  Resp- No cough, dyspnea  GI- No N/V/D, abd pain      Vital Signs:   Temp:  [97.7 °F (36.5 °C)-98.6 °F (37 °C)] 97.7 °F (36.5 °C)  Heart Rate:  [] 83  Resp:  [16-18] 16  BP: (107-113)/(47-62) 107/53      Physical Exam:  Constitutional: No acute distress, awake, alert  HENT: NCAT, mucous membranes moist  Respiratory: Clear to auscultation bilaterally, respiratory effort normal   Cardiovascular: RRR, no murmurs, rubs, or gallops  Gastrointestinal: Positive bowel sounds, soft, nontender, nondistended  Musculoskeletal: No bilateral ankle edema  Psychiatric: Appropriate affect, cooperative  Neurologic: Oriented x 3, strength symmetric in all extremities, Cranial Nerves grossly intact to confrontation, speech clear  Skin: No rashes     Pertinent  and/or Most Recent Results     LAB RESULTS:      Lab 02/25/25  0500 02/24/25  0425 02/23/25  0416 02/22/25  0817 02/21/25  1349 02/21/25  0533 02/20/25  1700 02/20/25  1439 02/20/25  1342 02/20/25  1335   WBC  --  9.29 9.25 9.97  --  13.32*  --   --   --  13.57*   HEMOGLOBIN 9.5* 10.3* 9.0* 9.9* 8.4* 7.3*   < >  --   --  5.5*   HEMOGLOBIN, POC  --   --   --   --   --   --   --   --    <  >  --    HEMATOCRIT 30.0* 31.1* 28.6* 30.7* 26.0* 22.4*   < >  --   --  17.8*   HEMATOCRIT POC  --   --   --   --   --   --   --   --    < >  --    PLATELETS  --  315 266 271  --  312  --   --   --  351   NEUTROS ABS  --  6.66 6.12 6.76  --  8.77*  --   --   --  8.36*   IMMATURE GRANS (ABS)  --  0.15* 0.22* 0.34*  --  0.60*  --   --   --  0.27*   LYMPHS ABS  --  1.83 2.10 2.07  --  3.01  --   --   --  4.26*   MONOS ABS  --  0.41 0.51 0.51  --  0.72  --   --   --  0.58   EOS ABS  --  0.21 0.25 0.23  --  0.15  --   --   --  0.05   MCV  --  91.2 93.8 91.4  --  89.2  --   --   --  90.8   PROCALCITONIN  --   --   --   --   --   --   --  0.19  --   --    LACTATE  --   --   --   --   --  1.2  --   --   --  1.6   PROTIME  --   --   --   --   --  13.6  --   --   --  15.1*   APTT  --   --   --   --   --  32.8  --   --   --  36.6*    < > = values in this interval not displayed.         Lab 02/24/25  0425 02/23/25 2001 02/23/25  0416 02/22/25  0817 02/21/25  0533 02/20/25  1342 02/20/25  1335   SODIUM 138  --  134* 140 135*  --  136   POTASSIUM 4.5 4.9 3.4* 3.8 4.3  --  4.8   CHLORIDE 104  --  108* 108* 103  --  101   CO2 25.0  --  25.0 24.0 26.0  --  24.0   ANION GAP 9.0  --  1.0* 8.0 6.0  --  11.0   BUN 16  --  19 17 30*  --  32*   CREATININE 0.46*  --  0.50* 0.52* 0.53* 0.70 0.57   EGFR 93.3  --  91.5 90.6 90.2 84.3 88.6   GLUCOSE 95  --  86 86 120*  --  131*   CALCIUM 8.6  --  8.1* 8.3* 8.0*  --  8.8   MAGNESIUM 2.0  --  1.7 1.9 1.9  --  1.9   PHOSPHORUS  --   --   --   --  2.9  --  3.4   TSH  --   --   --   --   --   --  2.910         Lab 02/21/25  0533 02/20/25  1335   TOTAL PROTEIN 4.7* 5.3*   ALBUMIN 2.6* 2.9*   GLOBULIN 2.1 2.4   ALT (SGPT) 13 <5   AST (SGOT) 15 16   BILIRUBIN 0.4 0.3   ALK PHOS 77 76         Lab 02/21/25  0533 02/20/25  1439 02/20/25  1335   PROBNP  --   --  336.2   HSTROP T  --  29* 26*   PROTIME 13.6  --  15.1*   INR 1.03  --  1.18*             Lab 02/20/25  1347   ABO TYPING O   RH TYPING Positive    ANTIBODY SCREEN Negative         Brief Urine Lab Results  (Last result in the past 365 days)        Color   Clarity   Blood   Leuk Est   Nitrite   Protein   CREAT   Urine HCG        02/20/25 1846 Yellow   Cloudy   Trace   Moderate (2+)   Negative   Negative                 Microbiology Results (last 10 days)       Procedure Component Value - Date/Time    Blood Culture - Blood, Arm, Left [[id number]]  (Normal) Collected: 02/20/25 1335    Lab Status: Final result Specimen: Blood from Arm, Left Updated: 02/25/25 1400     Blood Culture No growth at 5 days    Blood Culture - Blood, Arm, Right [[id number]]  (Normal) Collected: 02/20/25 1335    Lab Status: Final result Specimen: Blood from Arm, Right Updated: 02/25/25 1400     Blood Culture No growth at 5 days            XR Chest 1 View    Result Date: 2/20/2025  XR CHEST 1 VW Date of Exam: 2/20/2025 4:39 PM EST Indication: Follow up Comparison: Chest radiograph 2/14/2015 Findings: Stable cardiomediastinal silhouette. Much less conspicuous pleural effusions, likely trace residual bilaterally. Prominence of the central vasculature and interstitium also improving. Mild asymmetric retrocardiac opacities slightly improved from prior favoring atelectasis. Stable postsurgical changes on the right. No worsening consolidation, edema or pneumothorax. Degenerative related osseous change. Hiatal hernia stable from prior.     Impression: Improving sequelae of CHF exacerbation. Improving left lower lobe opacities which could reflect atelectasis. Electronically Signed: Bolivar Olivier MD  2/20/2025 5:15 PM EST  Workstation ID: [id number]    CT Abdomen Pelvis With & Without Contrast    Result Date: 2/20/2025  CT ABDOMEN PELVIS W WO CONTRAST Date of Exam: 2/20/2025 1:52 PM EST Indication: transfusion level anemia with positive hemoccult, stool is dark. Comparison: None available. Technique: Axial CT images were obtained of the abdomen and pelvis before and after the uneventful intravenous  administration of 85 mL Isovue-370. Sagittal and coronal reconstructions were performed.  Automated exposure control and iterative reconstruction methods were used. Findings: Liver: The liver is unremarkable in morphology. Left paddock lobe cysts and additional subcentimeter liver hypodensities which are too small to characterize. No biliary dilation is seen. Gallbladder: Surgically absent Pancreas: Unremarkable. Spleen: Probable splenic cyst. Spleen is otherwise unremarkable. Adrenal glands: Unremarkable. Genitourinary tract: Small bilateral renal cysts and subcentimeter hypodensities which are too small to characterize. 5 mm nonobstructing calculus within the right kidney. No hydronephrosis is seen. The visualized portions of the ureters are unremarkable. Distal ureters and urinary bladder are partially obscured due to beam hardening artifact arising from bilateral hip arthroplasties. Gastrointestinal tract: Colonic diverticulosis is present. Large hiatal hernia with paraesophageal component. Moderate colonic stool noted. Hollow viscera appear otherwise unremarkable. There is no evidence of bowel obstruction. No active contrast extravasation is seen within the GI tract lumen. Appendix: No findings to suggest acute appendicitis. Other findings: Atherosclerotic plaque is seen within the abdominal aorta and its branches. The abdominal aorta is normal in course and caliber. 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 There is moderate stenosis within the superior mesenteric artery (series 4, image 36). XI appears unremarkable. Bilateral renal arteries and visualized iliofemoral arteries are unremarkable. No free air or free fluid is identified. No pathologically enlarged lymph nodes are seen. The IVC is unremarkable. Bones and soft tissues: Bones are demineralized. There are severe compression fractures of T12 and L4. The T12 fracture is unchanged since 3/13/2024. The L4  fracture has significantly progressed since that prior study. There is a new sclerotic band at the inferior endplate of T10 which may represent an acute or subacute fracture. Bilateral hip arthroplasties are noted. Superficial soft tissues demonstrate no acute abnormality Lung bases: Mild left basilar atelectasis or scarring. Large hiatal hernia with paraesophageal component.     Impression: 1.No acute abnormality identified within the abdomen or pelvis. 2.No acute vascular abnormality is identified. No active contrast extravasation is seen within the GI tract lumen. 3.Colonic diverticulosis. 4.Large hiatal hernia with paraesophageal component. 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 6.Right nonobstructive nephrolithiasis 7.There are severe compression fractures of T12 and L4. The T12 fracture is unchanged since 3/13/2024. The L4 fracture has significantly progressed since that prior study. There is a new sclerotic band at the inferior endplate of T10 which may represent an acute or subacute fracture. Please correlate clinically for pain/tenderness at these sites. 8.Additional findings as detailed above. Electronically Signed: Sp Palacio MD  2/20/2025 2:38 PM EST  Workstation ID: [id number]             Results for orders placed during the hospital encounter of 02/09/25    Adult Transthoracic Echo Complete W/ Cont if Necessary Per Protocol    Interpretation Summary    Left ventricular systolic function is normal. Estimated left ventricular EF = 70%    Moderate tricuspid valve regurgitation is present.    Estimated  right ventricular systolic pressure from tricuspid regurgitation is 46 mmHg.    There is a trivial pericardial effusion.      Plan for Follow-up of Pending Labs/Results:     Discharge Details        Discharge Medications        New Medications        Instructions Start Date   pantoprazole 40 MG EC tablet  Commonly known as: PROTONIX   Take  1 tablet by mouth Every 12 (Twelve) Hours for 30 days, THEN 1 tablet Daily for 90 days.   Start Date: February 26, 2025            Continue These Medications        Instructions Start Date   acetaminophen 325 MG tablet  Commonly known as: TYLENOL   650 mg, Oral, Every 6 Hours PRN, OTC      carbidopa-levodopa  MG per tablet  Commonly known as: SINEMET   1 tablet, 3 Times Daily      fluticasone 50 MCG/ACT nasal spray  Commonly known as: FLONASE   2 sprays, Daily PRN      ipratropium-albuterol 0.5-2.5 mg/3 ml nebulizer  Commonly known as: DUO-NEB   3 mL, Nebulization, Every 4 Hours PRN      polyethylene glycol 17 g packet  Commonly known as: MIRALAX   17 g, Oral, Daily PRN      sennosides-docusate 8.6-50 MG per tablet  Commonly known as: PERICOLACE   2 tablets, Oral, 2 Times Daily      traZODone 50 MG tablet  Commonly known as: DESYREL   100 mg, Nightly      vitamin D3 125 MCG (5000 UT) capsule capsule   5,000 Units, Daily             Stop These Medications      apixaban 2.5 MG tablet tablet  Commonly known as: ELIQUIS     metoprolol tartrate 25 MG tablet  Commonly known as: LOPRESSOR              Allergies   Allergen Reactions    Morphine Nausea And Vomiting    Latex Unknown (See Comments)     Other reaction(s): Itching, Rash   2itching and Severe rash    Wound Dressing Adhesive Other (See Comments) and Rash         Discharge Disposition:  Home or Self Care    Diet:  Hospital:  Diet Order   Procedures    Diet: Regular/House; Fluid Consistency: Thin (IDDSI 0)            Activity:      Restrictions or Other Recommendations:         CODE STATUS:    Code Status and Medical Interventions: No CPR (Do Not Attempt to Resuscitate); Limited Support; No intubation (DNI)   Ordered at: 02/20/25 1612     Medical Intervention Limits:    No intubation (DNI)     Code Status (Patient has no pulse and is not breathing):    No CPR (Do Not Attempt to Resuscitate)     Medical Interventions (Patient has pulse or is breathing):     Limited Support       Future Appointments   Date Time Provider Department Center   2/26/2025 11:00 AM MED 8 [facility] S CABRERA Caldera II, DO  02/26/25      Time Spent on Discharge:  I spent  33  minutes on this discharge activity which included: face-to-face encounter with the patient, reviewing the data in the system, coordination of the care with the nursing staff as well as consultants, documentation, and entering orders.

## 2025-02-26 NOTE — OUTREACH NOTE
Prep Survey      Flowsheet Row Responses   [facility] facility patient discharged from? [place]   Is LACE score < 7 ? No   Eligibility Not Eligible   What are the reasons patient is not eligible? [facility]   Does the patient have one of the following disease processes/diagnoses(primary or secondary)? Other   Prep survey completed? Yes            Magdalena NICOLAS - Registered Nurse

## 2025-03-19 PROBLEM — R29.6 FALLS: Status: ACTIVE | Noted: 2025-03-19

## 2025-03-19 NOTE — PROGRESS NOTES
Electrophysiology Clinic Consult     Fanny Boyce  [id number]  1940    Referring Provider: No ref. provider found   PCP: Nemo Aponte PA  [address] / [facility] [id number]    Date of Service: 03/20/25    Chief Complaint   Patient presents with    Atrial Fibrillation     NP     Problem List  Atrial fibrillation  [id number] 5 (age, female, PE)  Echo, 2/2025: EF 70%, moderate TR, RVSP 46 mmHg, trivial pericardial effusion  GI bleed with hemoglobin 5.5  Transfused, 2/26/2025  Taken emergently for EGD 2/21  COPD  PE  Dementia  Parkinson's  History of lung cancer  Hip fracture, 2/2025    History of Present Illness  Fanny Boyce is a 84 y.o. female who presents to my electrophysiology clinic for evaluation of AF. Hx of paroxysmal AF- overall asymptomatic per patient and her daughter. Hx of recent GIB requiring transfusion. Referred to my office for the consideration of LAAO. Currently not on AC. Of note, previously has not been on AC for about 5 years due to non-compliance (her  was in charge of medication and wasn't providing it).     Review of Systems   Constitutional:  Negative for activity change, fatigue and fever.   Respiratory:  Negative for chest tightness and shortness of breath.    Cardiovascular:  Negative for chest pain, palpitations and leg swelling.   Gastrointestinal:  Negative for constipation and diarrhea.   Genitourinary:  Negative for decreased urine volume and difficulty urinating.   Skin:  Negative for wound.   Neurological:  Negative for dizziness, syncope, weakness and light-headedness.   Hematological:  Bruises/bleeds easily.   Psychiatric/Behavioral:  Negative for suicidal ideas.        Outpatient Medications Marked as Taking for the 3/20/25 encounter (Office Visit) with Steve Jurado MD   Medication Sig Dispense Refill    acetaminophen (TYLENOL) 325 MG tablet Take 2 tablets by mouth Every 6 (Six) Hours As Needed for Mild Pain, Headache or Fever. OTC       "carbidopa-levodopa (SINEMET)  MG per tablet Take 1 tablet by mouth 3 (Three) Times a Day.      fluticasone (FLONASE) 50 MCG/ACT nasal spray Administer 2 sprays into the nostril(s) as directed by provider Daily As Needed for Rhinitis or Allergies.      ipratropium-albuterol (DUO-NEB) 0.5-2.5 mg/3 ml nebulizer Take 3 mL by nebulization Every 4 (Four) Hours As Needed for Wheezing or Shortness of Air.      meclizine (ANTIVERT) 25 MG tablet Take 1 tablet by mouth. (Patient taking differently: Take 1 tablet by mouth As Needed.)      Metoprolol Tartrate (LOPRESSOR PO) Take 12.5 mg by mouth 2 (Two) Times a Day.      pantoprazole (PROTONIX) 40 MG EC tablet Take 1 tablet by mouth Every 12 (Twelve) Hours for 30 days, THEN 1 tablet Daily for 90 days.      polyethylene glycol (MIRALAX) 17 g packet Take 17 g by mouth Daily As Needed (Use if senna-docusate is ineffective).      traZODone (DESYREL) 100 MG tablet       vitamin D3 125 MCG (5000 UT) capsule capsule Take 1 capsule by mouth Daily.         Physical Exam  Vitals:    03/20/25 1210   BP: 110/60   BP Location: Left arm   Patient Position: Sitting   Cuff Size: Adult   Pulse: 59   SpO2: 99%   Weight: 52.2 kg (115 lb)   Height: 152.4 cm (60\")     Body mass index is 22.46 kg/m².    Vitals and nursing note reviewed.   Constitutional:       Appearance: Healthy appearance.   HENT:      Head: Normocephalic and atraumatic.      Nose: Nose normal.   Neck:      Vascular: No JVD.   Pulmonary:      Effort: Pulmonary effort is normal.      Breath sounds: Normal breath sounds.   Cardiovascular:      PMI at left midclavicular line. Normal rate. Regular rhythm. Normal S1. Normal S2.       Murmurs: There is no murmur.      No gallop.    Edema:     Peripheral edema absent.   Skin:     General: Skin is warm and dry.   Neurological:      Mental Status: Oriented to person, place and time.   Psychiatric:         Behavior: Behavior normal.          Diagnostic Data    ECG 12 Lead    Date/Time: " 3/20/2025 12:58 PM  Performed by: Steve Jurado MD    Authorized by: Steve Jurado MD  Comparison: compared with previous ECG from 2/20/2025  Similar to previous ECG  Rhythm: sinus rhythm  Rate: normal  Conduction: right bundle branch block  QRS axis: normal  Other findings: non-specific ST-T wave changes    Clinical impression: abnormal EKG          Lab Results   Component Value Date    GLUCOSE 95 02/24/2025    CALCIUM 8.6 02/24/2025     02/24/2025    K 4.5 02/24/2025    CO2 25.0 02/24/2025     02/24/2025    BUN 16 02/24/2025    CREATININE 0.46 (L) 02/24/2025    EGFRIFAFRI >60 07/19/2022    EGFRIFNONA >60 07/19/2022    BCR 34.8 (H) 02/24/2025    ANIONGAP 9.0 02/24/2025     Lab Results   Component Value Date    WBC 9.29 02/24/2025    HGB 9.5 (L) 02/25/2025    HCT 30.0 (L) 02/25/2025    MCV 91.2 02/24/2025     02/24/2025     Lab Results   Component Value Date    INR 1.03 02/21/2025    INR 1.18 (H) 02/20/2025    INR 0.97 02/10/2025    PROTIME 13.6 02/21/2025    PROTIME 15.1 (H) 02/20/2025    PROTIME 13.0 02/10/2025     Lab Results   Component Value Date    TSH 2.910 02/20/2025         I personally viewed and interpreted the patient's EKG/Telemetry/lab data    Fanny BUCIO Carli  reports that she has quit smoking. Her smoking use included cigarettes. She has a 40 pack-year smoking history. She has been exposed to tobacco smoke. She has never used smokeless tobacco. I have educated her on the risk of diseases from using tobacco products such as cancer, COPD, and heart disease.       I spent 3  minutes counseling the patient.           ACP discussion was declined by the patient. Patient has an advance directive in EMR which is still valid.     Assessment and Plan   Diagnoses and all orders for this visit:    1. Longstanding persistent atrial fibrillation (Primary)    2. Gastrointestinal hemorrhage, unspecified gastrointestinal hemorrhage type    3. Falls    4. Dementia due to Parkinson's disease, unspecified  dementia severity, unspecified whether behavioral, psychotic, or mood disturbance or anxiety    5. Parkinson's disease, unspecified whether dyskinesia present, unspecified whether manifestations fluctuate        Atrial fibrillation  Recent fall with hip fracture  Dementia / Parkinson's  -[id number] 5 (age, female, PE)  -Echo, 2/2025: EF 70%, moderate TR, RVSP 46 mmHg, trivial pericardial effusion  -After extensive conversation regarding risks, benefits, or alternatives to the therapy (patient voiced understanding of risks, benefits, and alternative), we mutually agreed on not pursing anything invasive at this time   - long discussion with the patient and her daughter   - they are deferring LAAO at this time and deferring AC at this time as well, which I think is reasonable given her advanced age and frailty    - follow up with me PRN    Recent GI bleed with hemoglobin 5.5  -Transfused, 2/26/2025  -Taken emergently for EGD 2/21        Follow Up  No follow-ups on file.    Thank you for allowing me to participate in the care of your patient. Please to not hesitate to contact me with additional questions or concerns.

## 2025-03-20 ENCOUNTER — OFFICE VISIT (OUTPATIENT)
Dept: CARDIOLOGY | Facility: CLINIC | Age: 85
End: 2025-03-20
Payer: MEDICARE

## 2025-03-20 VITALS
DIASTOLIC BLOOD PRESSURE: 60 MMHG | HEIGHT: 60 IN | BODY MASS INDEX: 22.58 KG/M2 | WEIGHT: 115 LBS | OXYGEN SATURATION: 99 % | HEART RATE: 59 BPM | SYSTOLIC BLOOD PRESSURE: 110 MMHG

## 2025-03-20 DIAGNOSIS — G20.A1 DEMENTIA DUE TO PARKINSON'S DISEASE, UNSPECIFIED DEMENTIA SEVERITY, UNSPECIFIED WHETHER BEHAVIORAL, PSYCHOTIC, OR MOOD DISTURBANCE OR ANXIETY: ICD-10-CM

## 2025-03-20 DIAGNOSIS — G20.A1 PARKINSON'S DISEASE, UNSPECIFIED WHETHER DYSKINESIA PRESENT, UNSPECIFIED WHETHER MANIFESTATIONS FLUCTUATE: Chronic | ICD-10-CM

## 2025-03-20 DIAGNOSIS — I48.11 LONGSTANDING PERSISTENT ATRIAL FIBRILLATION: Primary | Chronic | ICD-10-CM

## 2025-03-20 DIAGNOSIS — R29.6 FALLS: Chronic | ICD-10-CM

## 2025-03-20 DIAGNOSIS — F02.80 DEMENTIA DUE TO PARKINSON'S DISEASE, UNSPECIFIED DEMENTIA SEVERITY, UNSPECIFIED WHETHER BEHAVIORAL, PSYCHOTIC, OR MOOD DISTURBANCE OR ANXIETY: ICD-10-CM

## 2025-03-20 DIAGNOSIS — K92.2 GASTROINTESTINAL HEMORRHAGE, UNSPECIFIED GASTROINTESTINAL HEMORRHAGE TYPE: Chronic | ICD-10-CM

## 2025-03-20 PROCEDURE — 99204 OFFICE O/P NEW MOD 45 MIN: CPT | Performed by: INTERNAL MEDICINE

## 2025-03-20 PROCEDURE — 93000 ELECTROCARDIOGRAM COMPLETE: CPT | Performed by: INTERNAL MEDICINE

## 2025-03-20 RX ORDER — TRAZODONE HYDROCHLORIDE 100 MG/1
TABLET ORAL
COMMUNITY
Start: 2025-01-09

## 2025-03-20 RX ORDER — MECLIZINE HYDROCHLORIDE 25 MG/1
25 TABLET ORAL
COMMUNITY

## 2025-03-26 ENCOUNTER — APPOINTMENT (OUTPATIENT)
Dept: CT IMAGING | Facility: HOSPITAL | Age: 85
End: 2025-03-26
Payer: MEDICARE

## 2025-03-26 ENCOUNTER — HOSPITAL ENCOUNTER (INPATIENT)
Facility: HOSPITAL | Age: 85
LOS: 6 days | Discharge: NURSING, REHAB OR LONG-TERM CARE | End: 2025-04-02
Attending: EMERGENCY MEDICINE | Admitting: INTERNAL MEDICINE
Payer: MEDICARE

## 2025-03-26 ENCOUNTER — APPOINTMENT (OUTPATIENT)
Dept: GENERAL RADIOLOGY | Facility: HOSPITAL | Age: 85
End: 2025-03-26
Payer: MEDICARE

## 2025-03-26 DIAGNOSIS — R06.02 SOB (SHORTNESS OF BREATH): ICD-10-CM

## 2025-03-26 DIAGNOSIS — I26.99 BILATERAL PULMONARY EMBOLISM: Primary | ICD-10-CM

## 2025-03-26 DIAGNOSIS — Z86.59 HISTORY OF DEMENTIA: ICD-10-CM

## 2025-03-26 DIAGNOSIS — Z86.79 HISTORY OF ATRIAL FIBRILLATION: ICD-10-CM

## 2025-03-26 DIAGNOSIS — R79.89 ELEVATED TROPONIN: ICD-10-CM

## 2025-03-26 LAB
ALBUMIN SERPL-MCNC: 3.7 G/DL (ref 3.5–5.2)
ALBUMIN/GLOB SERPL: 1.2 G/DL
ALP SERPL-CCNC: 119 U/L (ref 39–117)
ALT SERPL W P-5'-P-CCNC: <5 U/L (ref 1–33)
ANION GAP SERPL CALCULATED.3IONS-SCNC: 12 MMOL/L (ref 5–15)
APTT PPP: 29.6 SECONDS (ref 60–90)
AST SERPL-CCNC: 17 U/L (ref 1–32)
BASOPHILS # BLD AUTO: 0.07 10*3/MM3 (ref 0–0.2)
BASOPHILS NFR BLD AUTO: 1 % (ref 0–1.5)
BILIRUB SERPL-MCNC: 0.3 MG/DL (ref 0–1.2)
BUN SERPL-MCNC: 18 MG/DL (ref 8–23)
BUN/CREAT SERPL: 28.1 (ref 7–25)
CALCIUM SPEC-SCNC: 9.2 MG/DL (ref 8.6–10.5)
CHLORIDE SERPL-SCNC: 104 MMOL/L (ref 98–107)
CO2 SERPL-SCNC: 25 MMOL/L (ref 22–29)
CREAT BLDA-MCNC: 0.7 MG/DL (ref 0.6–1.3)
CREAT SERPL-MCNC: 0.64 MG/DL (ref 0.57–1)
DEPRECATED RDW RBC AUTO: 48.9 FL (ref 37–54)
EGFRCR SERPLBLD CKD-EPI 2021: 86.2 ML/MIN/1.73
EOSINOPHIL # BLD AUTO: 0.16 10*3/MM3 (ref 0–0.4)
EOSINOPHIL NFR BLD AUTO: 2.3 % (ref 0.3–6.2)
ERYTHROCYTE [DISTWIDTH] IN BLOOD BY AUTOMATED COUNT: 15.3 % (ref 12.3–15.4)
FLUAV SUBTYP SPEC NAA+PROBE: NOT DETECTED
FLUBV RNA ISLT QL NAA+PROBE: NOT DETECTED
GEN 5 1HR TROPONIN T REFLEX: 156 NG/L
GLOBULIN UR ELPH-MCNC: 3.1 GM/DL
GLUCOSE SERPL-MCNC: 103 MG/DL (ref 65–99)
HCT VFR BLD AUTO: 38.6 % (ref 34–46.6)
HGB BLD-MCNC: 11.5 G/DL (ref 12–15.9)
HOLD SPECIMEN: NORMAL
IMM GRANULOCYTES # BLD AUTO: 0.02 10*3/MM3 (ref 0–0.05)
IMM GRANULOCYTES NFR BLD AUTO: 0.3 % (ref 0–0.5)
INR PPP: 0.98 (ref 0.89–1.12)
LYMPHOCYTES # BLD AUTO: 1.49 10*3/MM3 (ref 0.7–3.1)
LYMPHOCYTES NFR BLD AUTO: 21.3 % (ref 19.6–45.3)
MCH RBC QN AUTO: 25.8 PG (ref 26.6–33)
MCHC RBC AUTO-ENTMCNC: 29.8 G/DL (ref 31.5–35.7)
MCV RBC AUTO: 86.7 FL (ref 79–97)
MONOCYTES # BLD AUTO: 0.53 10*3/MM3 (ref 0.1–0.9)
MONOCYTES NFR BLD AUTO: 7.6 % (ref 5–12)
NEUTROPHILS NFR BLD AUTO: 4.73 10*3/MM3 (ref 1.7–7)
NEUTROPHILS NFR BLD AUTO: 67.5 % (ref 42.7–76)
NRBC BLD AUTO-RTO: 0 /100 WBC (ref 0–0.2)
NT-PROBNP SERPL-MCNC: 6249 PG/ML (ref 0–1800)
PLATELET # BLD AUTO: 246 10*3/MM3 (ref 140–450)
PMV BLD AUTO: 10.9 FL (ref 6–12)
POTASSIUM SERPL-SCNC: 4.3 MMOL/L (ref 3.5–5.2)
PROT SERPL-MCNC: 6.8 G/DL (ref 6–8.5)
PROTHROMBIN TIME: 13.1 SECONDS (ref 12.2–14.5)
RBC # BLD AUTO: 4.45 10*6/MM3 (ref 3.77–5.28)
SARS-COV-2 RNA RESP QL NAA+PROBE: NOT DETECTED
SODIUM SERPL-SCNC: 141 MMOL/L (ref 136–145)
TROPONIN T % DELTA: -4
TROPONIN T NUMERIC DELTA: -6 NG/L
TROPONIN T SERPL HS-MCNC: 162 NG/L
UFH PPP CHRO-ACNC: 0.1 IU/ML (ref 0.3–0.7)
WBC NRBC COR # BLD AUTO: 7 10*3/MM3 (ref 3.4–10.8)
WHOLE BLOOD HOLD COAG: NORMAL
WHOLE BLOOD HOLD SPECIMEN: NORMAL

## 2025-03-26 PROCEDURE — G0378 HOSPITAL OBSERVATION PER HR: HCPCS

## 2025-03-26 PROCEDURE — 36415 COLL VENOUS BLD VENIPUNCTURE: CPT

## 2025-03-26 PROCEDURE — 84484 ASSAY OF TROPONIN QUANT: CPT | Performed by: PHYSICIAN ASSISTANT

## 2025-03-26 PROCEDURE — 85610 PROTHROMBIN TIME: CPT | Performed by: PHYSICIAN ASSISTANT

## 2025-03-26 PROCEDURE — 94640 AIRWAY INHALATION TREATMENT: CPT

## 2025-03-26 PROCEDURE — 25510000001 IOPAMIDOL 61 % SOLUTION: Performed by: EMERGENCY MEDICINE

## 2025-03-26 PROCEDURE — 99222 1ST HOSP IP/OBS MODERATE 55: CPT | Performed by: NURSE PRACTITIONER

## 2025-03-26 PROCEDURE — 80053 COMPREHEN METABOLIC PANEL: CPT | Performed by: PHYSICIAN ASSISTANT

## 2025-03-26 PROCEDURE — 99291 CRITICAL CARE FIRST HOUR: CPT

## 2025-03-26 PROCEDURE — 83880 ASSAY OF NATRIURETIC PEPTIDE: CPT | Performed by: PHYSICIAN ASSISTANT

## 2025-03-26 PROCEDURE — 85520 HEPARIN ASSAY: CPT | Performed by: PHYSICIAN ASSISTANT

## 2025-03-26 PROCEDURE — 85025 COMPLETE CBC W/AUTO DIFF WBC: CPT | Performed by: PHYSICIAN ASSISTANT

## 2025-03-26 PROCEDURE — 82565 ASSAY OF CREATININE: CPT

## 2025-03-26 PROCEDURE — 71275 CT ANGIOGRAPHY CHEST: CPT

## 2025-03-26 PROCEDURE — 71045 X-RAY EXAM CHEST 1 VIEW: CPT

## 2025-03-26 PROCEDURE — 93005 ELECTROCARDIOGRAM TRACING: CPT | Performed by: PHYSICIAN ASSISTANT

## 2025-03-26 PROCEDURE — 85730 THROMBOPLASTIN TIME PARTIAL: CPT | Performed by: PHYSICIAN ASSISTANT

## 2025-03-26 PROCEDURE — 25010000002 HEPARIN (PORCINE) 25000-0.45 UT/250ML-% SOLUTION: Performed by: PHYSICIAN ASSISTANT

## 2025-03-26 PROCEDURE — 87636 SARSCOV2 & INF A&B AMP PRB: CPT | Performed by: PHYSICIAN ASSISTANT

## 2025-03-26 RX ORDER — HEPARIN SODIUM 10000 [USP'U]/100ML
15 INJECTION, SOLUTION INTRAVENOUS
Status: DISCONTINUED | OUTPATIENT
Start: 2025-03-26 | End: 2025-03-27

## 2025-03-26 RX ORDER — IPRATROPIUM BROMIDE AND ALBUTEROL SULFATE 2.5; .5 MG/3ML; MG/3ML
3 SOLUTION RESPIRATORY (INHALATION) ONCE
Status: COMPLETED | OUTPATIENT
Start: 2025-03-26 | End: 2025-03-26

## 2025-03-26 RX ORDER — SODIUM CHLORIDE 0.9 % (FLUSH) 0.9 %
10 SYRINGE (ML) INJECTION AS NEEDED
Status: DISCONTINUED | OUTPATIENT
Start: 2025-03-26 | End: 2025-04-02 | Stop reason: HOSPADM

## 2025-03-26 RX ORDER — HEPARIN SODIUM 10000 [USP'U]/100ML
18 INJECTION, SOLUTION INTRAVENOUS
Status: DISCONTINUED | OUTPATIENT
Start: 2025-03-26 | End: 2025-03-26

## 2025-03-26 RX ORDER — HEPARIN SODIUM 1000 [USP'U]/ML
50 INJECTION, SOLUTION INTRAVENOUS; SUBCUTANEOUS AS NEEDED
Status: DISCONTINUED | OUTPATIENT
Start: 2025-03-26 | End: 2025-03-26

## 2025-03-26 RX ORDER — HEPARIN SODIUM 1000 [USP'U]/ML
25 INJECTION, SOLUTION INTRAVENOUS; SUBCUTANEOUS AS NEEDED
Status: DISCONTINUED | OUTPATIENT
Start: 2025-03-26 | End: 2025-03-26

## 2025-03-26 RX ORDER — HEPARIN SODIUM 1000 [USP'U]/ML
80 INJECTION, SOLUTION INTRAVENOUS; SUBCUTANEOUS ONCE
Status: DISCONTINUED | OUTPATIENT
Start: 2025-03-26 | End: 2025-03-26

## 2025-03-26 RX ORDER — IOPAMIDOL 612 MG/ML
78 INJECTION, SOLUTION INTRAVASCULAR
Status: COMPLETED | OUTPATIENT
Start: 2025-03-26 | End: 2025-03-26

## 2025-03-26 RX ADMIN — IOPAMIDOL 78 ML: 612 INJECTION, SOLUTION INTRAVENOUS at 19:17

## 2025-03-26 RX ADMIN — IPRATROPIUM BROMIDE AND ALBUTEROL SULFATE 3 ML: 2.5; .5 SOLUTION RESPIRATORY (INHALATION) at 17:35

## 2025-03-26 RX ADMIN — HEPARIN SODIUM 12 UNITS/KG/HR: 10000 INJECTION, SOLUTION INTRAVENOUS at 20:20

## 2025-03-26 NOTE — ED PROVIDER NOTES
Subjective   History of Present Illness  Pt is a 83 yo female presenting to ED by EMS from home with reports of SOB. PMHx significant for Afib, COPD, Parkinson's, Dementia, GIB, PE and Lung cancer. Per report patient has had SOB for the past 2-3 days. SOB worsens with exertion. Pt reported cough to nursing staff but denies cough, fever or CP on initial evaluation. She denies abdominal pain, vomiting or diarrhea. Pt does not wear O2 at baseline. Per records hx of admission 2/10 to 2/18 for right hip fracture s/p surgery 2/10/25 by Dr. Patino. Pt then admitted 2/20 to 2/26 for GI bleed with Hgb 5.5 and EGD 2/21 s/p clipping/cautery for duodenal ulcers. Pt has been off Eliquis since GI bleed. Per records patient is a DNR / DNI.     History provided by:  Patient, medical records, EMS personnel and relative      Review of Systems   Constitutional:  Negative for chills and fever.   HENT:  Negative for congestion.    Eyes:  Negative for visual disturbance.   Respiratory:  Positive for cough and shortness of breath.    Cardiovascular:  Negative for chest pain and leg swelling.   Gastrointestinal:  Negative for abdominal pain, blood in stool, diarrhea, nausea and vomiting.   Genitourinary:  Negative for difficulty urinating, dysuria and flank pain.   Musculoskeletal:  Positive for arthralgias (mild right, improving since recent fx / surgery). Negative for back pain.   Skin:  Negative for rash and wound.   Neurological:  Negative for dizziness, syncope, speech difficulty, weakness, numbness and headaches.   Psychiatric/Behavioral:  Negative for confusion (no reports new from baseline).    All other systems reviewed and are negative.      Past Medical History:   Diagnosis Date    Atrial fibrillation     COPD (chronic obstructive pulmonary disease)     Dementia     History of lung cancer     Parkinson disease     Pulmonary embolism        Allergies   Allergen Reactions    Nitrofurantoin Unknown (See Comments)    Latex Unknown  (See Comments)     Other reaction(s): Itching, Rash   2itching and Severe rash    Morphine Nausea And Vomiting, Nausea Only, Unknown - Low Severity, Other (See Comments), Rash and Unknown (See Comments)     Nausea    morphine sulfate    Wound Dressing Adhesive Other (See Comments) and Rash       Past Surgical History:   Procedure Laterality Date    ENDOSCOPY N/A 2/21/2025    Procedure: ESOPHAGOGASTRODUODENOSCOPY;  Surgeon: Steve Gamez MD;  Location: [facility] ENDOSCOPY;  Service: Gastroenterology;  Laterality: N/A;    HIP HEMIARTHROPLASTY Right 2/10/2025    Procedure: HIP HEMIARTHROPLASTY RIGHT;  Surgeon: Frandy Patino MD;  Location:  [facility];  Service: Orthopedics;  Laterality: Right;    JOINT REPLACEMENT      LUNG CANCER SURGERY         Family History   Problem Relation Age of Onset    Heart disease Mother     Heart disease Father     Aneurysm Father        Social History     Socioeconomic History    Marital status:    Tobacco Use    Smoking status: Former     Current packs/day: 1.00     Average packs/day: 1 pack/day for 40.0 years (40.0 ttl pk-yrs)     Types: Cigarettes     Passive exposure: Past    Smokeless tobacco: Never   Vaping Use    Vaping status: Never Used   Substance and Sexual Activity    Alcohol use: Never    Drug use: Never    Sexual activity: Not Currently           Objective   Physical Exam  Vitals and nursing note reviewed.   Constitutional:       General: She is not in acute distress.  HENT:      Head: Atraumatic.   Eyes:      Extraocular Movements: Extraocular movements intact.      Pupils: Pupils are equal, round, and reactive to light.   Cardiovascular:      Rate and Rhythm: Normal rate.   Pulmonary:      Effort: Pulmonary effort is normal.      Breath sounds: Normal breath sounds. No decreased breath sounds or wheezing.   Chest:      Chest wall: No tenderness.   Musculoskeletal:         General: Normal range of motion.      Cervical back: Normal range of motion.   Skin:      General: Skin is warm.   Neurological:      Mental Status: She is alert. Mental status is at baseline.      Sensory: Sensation is intact.      Motor: No weakness.   Psychiatric:         Mood and Affect: Mood normal.         Critical Care    Performed by: Paulina Mora PA  Authorized by: Royer Verde MD    Critical care provider statement:     Critical care time (minutes):  45    Critical care was necessary to treat or prevent imminent or life-threatening deterioration of the following conditions:  Cardiac failure and circulatory failure    Critical care was time spent personally by me on the following activities:  Development of treatment plan with patient or surrogate, discussions with consultants, evaluation of patient's response to treatment, examination of patient, interpretation of cardiac output measurements, obtaining history from patient or surrogate, ordering and performing treatments and interventions, ordering and review of laboratory studies, ordering and review of radiographic studies, pulse oximetry, re-evaluation of patient's condition and review of old charts             ED Course  ED Course as of 03/26/25 2018   Wed Mar 26, 2025   1730  I personally reviewed and interpreted labs results and went over with patient. [RT]   1745 SpO2: 93 %  RA [RT]   1745 BP: 122/60 [RT]   1745 Temp: 97.6 °F (36.4 °C)  Normal temp   [RT]   1810 I personally and independently reviewed CXR and agree with the radiology interpretation specifically no acute findings.  [RT]   1815 HS Troponin T(!!): 162  Noted elevation. Prior Trop 29 about a month ago [RT]   1915 HS Troponin T(!!): 156  Noted drop in Trop [RT]   1928 Radiology contacted ED regarding concerning CTA with findings of bilateral PE but no obvious heart strain.  [RT]   1929 Ordered Heparin.     Took into consideration recent GI bleed. H and H stable in ED today and no complaints of active GI bleed. Patient has been off her Eliquis since last admission  2/20-2/26 [RT]   1930 Discussed patient with Dr. Verde who is agreeable with ED course and tx plan.  [RT]   1940 Discussed admission with hospitalist Dr. Martin.  [RT]   2012 Reviewed old records. Echo, 2/2025: EF 70%, moderate TR, RVSP 46 mmHg, trivial pericardial effusion [RT]   2012 Discussed heparin with ED pharmacist. Will hold on bolus.  [RT]   2014 Discussed patient with Dr. Blackmon. He is agreeable with heparin plan. Cards can consult on patient as needed inpatient.  [RT]      ED Course User Index  [RT] Paulina Mora PA      Recent Results (from the past 24 hours)   ECG 12 Lead Dyspnea    Collection Time: 03/26/25  5:22 PM   Result Value Ref Range    QT Interval 398 ms    QTC Interval 456 ms   Comprehensive Metabolic Panel    Collection Time: 03/26/25  5:25 PM    Specimen: Blood   Result Value Ref Range    Glucose 103 (H) 65 - 99 mg/dL    BUN 18 8 - 23 mg/dL    Creatinine 0.64 0.57 - 1.00 mg/dL    Sodium 141 136 - 145 mmol/L    Potassium 4.3 3.5 - 5.2 mmol/L    Chloride 104 98 - 107 mmol/L    CO2 25.0 22.0 - 29.0 mmol/L    Calcium 9.2 8.6 - 10.5 mg/dL    Total Protein 6.8 6.0 - 8.5 g/dL    Albumin 3.7 3.5 - 5.2 g/dL    ALT (SGPT) <5 1 - 33 U/L    AST (SGOT) 17 1 - 32 U/L    Alkaline Phosphatase 119 (H) 39 - 117 U/L    Total Bilirubin 0.3 0.0 - 1.2 mg/dL    Globulin 3.1 gm/dL    A/G Ratio 1.2 g/dL    BUN/Creatinine Ratio 28.1 (H) 7.0 - 25.0    Anion Gap 12.0 5.0 - 15.0 mmol/L    eGFR 86.2 >60.0 mL/min/1.73   BNP    Collection Time: 03/26/25  5:25 PM    Specimen: Blood   Result Value Ref Range    proBNP 6,249.0 (H) 0.0 - 1,800.0 pg/mL   High Sensitivity Troponin T    Collection Time: 03/26/25  5:25 PM    Specimen: Blood   Result Value Ref Range    HS Troponin T 162 (C) <14 ng/L   Green Top (Gel)    Collection Time: 03/26/25  5:25 PM   Result Value Ref Range    Extra Tube Hold for add-ons.    Lavender Top    Collection Time: 03/26/25  5:25 PM   Result Value Ref Range    Extra Tube hold for add-on    Gold  Top - SST    Collection Time: 03/26/25  5:25 PM   Result Value Ref Range    Extra Tube Hold for add-ons.    Gray Top    Collection Time: 03/26/25  5:25 PM   Result Value Ref Range    Extra Tube Hold for add-ons.    Light Blue Top    Collection Time: 03/26/25  5:25 PM   Result Value Ref Range    Extra Tube Hold for add-ons.    CBC Auto Differential    Collection Time: 03/26/25  5:25 PM    Specimen: Blood   Result Value Ref Range    WBC 7.00 3.40 - 10.80 10*3/mm3    RBC 4.45 3.77 - 5.28 10*6/mm3    Hemoglobin 11.5 (L) 12.0 - 15.9 g/dL    Hematocrit 38.6 34.0 - 46.6 %    MCV 86.7 79.0 - 97.0 fL    MCH 25.8 (L) 26.6 - 33.0 pg    MCHC 29.8 (L) 31.5 - 35.7 g/dL    RDW 15.3 12.3 - 15.4 %    RDW-SD 48.9 37.0 - 54.0 fl    MPV 10.9 6.0 - 12.0 fL    Platelets 246 140 - 450 10*3/mm3    Neutrophil % 67.5 42.7 - 76.0 %    Lymphocyte % 21.3 19.6 - 45.3 %    Monocyte % 7.6 5.0 - 12.0 %    Eosinophil % 2.3 0.3 - 6.2 %    Basophil % 1.0 0.0 - 1.5 %    Immature Grans % 0.3 0.0 - 0.5 %    Neutrophils, Absolute 4.73 1.70 - 7.00 10*3/mm3    Lymphocytes, Absolute 1.49 0.70 - 3.10 10*3/mm3    Monocytes, Absolute 0.53 0.10 - 0.90 10*3/mm3    Eosinophils, Absolute 0.16 0.00 - 0.40 10*3/mm3    Basophils, Absolute 0.07 0.00 - 0.20 10*3/mm3    Immature Grans, Absolute 0.02 0.00 - 0.05 10*3/mm3    nRBC 0.0 0.0 - 0.2 /100 WBC   Protime-INR    Collection Time: 03/26/25  5:25 PM    Specimen: Blood   Result Value Ref Range    Protime 13.1 12.2 - 14.5 Seconds    INR 0.98 0.89 - 1.12   aPTT    Collection Time: 03/26/25  5:25 PM    Specimen: Blood   Result Value Ref Range    PTT 29.6 (L) 60.0 - 90.0 seconds   POC Creatinine    Collection Time: 03/26/25  5:28 PM    Specimen: Blood   Result Value Ref Range    Creatinine 0.70 0.60 - 1.30 mg/dL   COVID-19 and FLU A/B PCR, 1 HR TAT - Swab, Nasopharynx    Collection Time: 03/26/25  5:56 PM    Specimen: Nasopharynx; Swab   Result Value Ref Range    COVID19 Not Detected Not Detected - Ref. Range    Influenza A  PCR Not Detected Not Detected    Influenza B PCR Not Detected Not Detected   High Sensitivity Troponin T 1Hr    Collection Time: 03/26/25  6:38 PM    Specimen: Arm, Right; Blood   Result Value Ref Range    HS Troponin T 156 (C) <14 ng/L    Troponin T Numeric Delta -6 ng/L    Troponin T % Delta -4 Abnormal if >/= 20%   ECG 12 Lead Dyspnea    Collection Time: 03/26/25  6:39 PM   Result Value Ref Range    QT Interval 404 ms    QTC Interval 483 ms     Note: In addition to lab results from this visit, the labs listed above may include labs taken at another facility or during a different encounter within the last 24 hours. Please correlate lab times with ED admission and discharge times for further clarification of the services performed during this visit.    CT Angiogram Chest   Final Result   Addendum (preliminary) 1 of 1   ADDENDUM #1   Addition to impression:      There is evidence of emphysema. Correlate with patient history and risk    factors and please assess if the patient meets criteria for routine low    dose CT lung cancer screening.      Electronically Signed: Royer Quintero MD     3/26/2025 7:37 PM EDT     Workstation ID: [id number]   ORIGINAL REPORT:   CT ANGIOGRAM CHEST      Date of Exam: 3/26/2025 7:14 PM EDT      Indication: SOB, recent hip fx, rule out PE.      Comparison: CTA chest 2/12/2025      Technique: CTA of the chest was performed after the uneventful intravenous    administration of 73 mL Isovue-370. Reconstructed coronal and sagittal    images were also obtained. In addition, a 3-D volume rendered image was    created for interpretation.    Automated exposure control and iterative reconstruction methods were used.         Findings:   There are bilateral pulmonary emboli including pulmonary emboli in the    distal right main pulmonary artery which extends into the lobar and    segmental branches of the right lower lobe and into the segmental branches    of the right middle lobe (series 4 image     44-55). There is a pulmonary embolism in the lobar pulmonary artery of    the left upper lobe (series 4 image 35). Grossly normal and unchanged    caliber of the main pulmonary artery. No definite right heart strain.    Unremarkable appearance of the thoracic    aorta. No thoracic adenopathy. There is a large sliding hiatal hernia.    Unremarkable appearance of the airways. The lungs are clear without focal    consolidation. No pulmonary infarct. There is centrilobular emphysema. No    pleural effusion or pneumothorax.    There is accentuated thoracic kyphosis with chronic severe compression    deformity of the T12 vertebral body. No acute osseous findings.    Unremarkable appearance of the upper abdomen. There is nonobstructing    right nephrolithiasis.      Impression:   Bilateral pulmonary emboli including pulmonary emboli in the distal right    main pulmonary artery extending into the lobar and segmental branches of    the right lower lobe and into the segmental branches of the right middle    lobe. There is a pulmonary    embolism in the lobar pulmonary artery of the left upper lobe. No definite    CT evidence of right heart however correlation with troponin and EKG    recommended given the embolic burden.      Findings discussed with ordering provider Paulina Mora by Dr. Royer Quintero via secure epic chat at 7:25 p.m. 3/26/2025.            Electronically Signed: Royer Quintero MD     3/26/2025 7:27 PM EDT     Workstation ID: [id number]      Final   Impression:   Bilateral pulmonary emboli including pulmonary emboli in the distal right main pulmonary artery extending into the lobar and segmental branches of the right lower lobe and into the segmental branches of the right middle lobe. There is a pulmonary    embolism in the lobar pulmonary artery of the left upper lobe. No definite CT evidence of right heart however correlation with troponin and EKG recommended given the embolic burden.      Findings  "discussed with ordering provider Paulina Mora by Dr. Royer Quintero via secure epic chat at 7:25 p.m. 3/26/2025.            Electronically Signed: Royer Quintero MD     3/26/2025 7:27 PM EDT     Workstation ID: [id number]      XR Chest 1 View   Final Result   Impression:   No new chest disease.         Electronically Signed: Kevin Kaminski MD     3/26/2025 6:02 PM EDT     Workstation ID: [id number]        Vitals:    03/26/25 1715 03/26/25 1727 03/26/25 1736 03/26/25 1857   BP:  122/60  132/62   BP Location:  Right arm     Patient Position:  Sitting     Pulse: 86   84   Resp: 18  18    Temp: 97.6 °F (36.4 °C)      TempSrc: Oral      SpO2: 93%   92%   Weight: 52.2 kg (115 lb 1.3 oz)      Height: 152.4 cm (60\")        Medications   sodium chloride 0.9 % flush 10 mL (has no administration in time range)   heparin 41754 units/250 mL (100 units/mL) in 0.45 % NaCl infusion (has no administration in time range)   Pharmacy to Dose Heparin (has no administration in time range)   ipratropium-albuterol (DUO-NEB) nebulizer solution 3 mL (3 mL Nebulization Given 3/26/25 1735)   iopamidol (ISOVUE-300) 61 % injection 78 mL (78 mL Intravenous Given 3/26/25 1917)     ECG/EMG Results (last 24 hours)       Procedure Component Value Units Date/Time    ECG 12 Lead Dyspnea [[id number]] Collected: 03/26/25 1722     Updated: 03/26/25 1723     QT Interval 398 ms      QTC Interval 456 ms     Narrative:      Test Reason : Dyspnea  Blood Pressure :   */*   mmHG  Vent. Rate :  79 BPM     Atrial Rate :  79 BPM     P-R Int : 172 ms          QRS Dur : 126 ms      QT Int : 398 ms       P-R-T Axes :  94 122 -41 degrees    QTcB Int : 456 ms    ** Suspect arm lead reversal, interpretation assumes no reversal  Sinus rhythm with premature atrial complexes  Right bundle branch block  T wave abnormality, consider inferior ischemia  Abnormal ECG  When compared with ECG of 20-Feb-2025 13:40,  premature atrial complexes are now present  QRS axis shifted right  T wave " inversion now evident in Inferior leads  T wave inversion more evident in Anterior leads    Referred By: ED MD           Confirmed By:           ECG 12 Lead Dyspnea   Preliminary Result   Test Reason : ELEV TROP   Blood Pressure :   */*   mmHG   Vent. Rate :  86 BPM     Atrial Rate :  86 BPM      P-R Int : 130 ms          QRS Dur : 120 ms       QT Int : 404 ms       P-R-T Axes :  99 104 -36 degrees     QTcB Int : 483 ms      ** Suspect arm lead reversal, interpretation assumes no reversal   Sinus rhythm with premature atrial complexes   Right bundle branch block   T wave abnormality, consider inferior ischemia   Abnormal ECG   When compared with ECG of 26-Mar-2025 17:23, (Unconfirmed)   premature atrial complexes are now present      Referred By: EDMD           Confirmed By:       ECG 12 Lead Dyspnea   Preliminary Result   Test Reason : Dyspnea   Blood Pressure :   */*   mmHG   Vent. Rate :  79 BPM     Atrial Rate :  79 BPM      P-R Int : 172 ms          QRS Dur : 126 ms       QT Int : 398 ms       P-R-T Axes :  94 122 -41 degrees     QTcB Int : 456 ms      ** Suspect arm lead reversal, interpretation assumes no reversal   Sinus rhythm with premature atrial complexes   Right bundle branch block   T wave abnormality, consider inferior ischemia   Abnormal ECG   When compared with ECG of 20-Feb-2025 13:40,   premature atrial complexes are now present   QRS axis shifted right   T wave inversion now evident in Inferior leads   T wave inversion more evident in Anterior leads      Referred By: ED MD           Confirmed By:                                                            Medical Decision Making  Pt is a 83 yo female presenting to ED with complaints of SOB. I had a discussion with the patient / family regarding ED course, diagnosis, diagnostic results and treatment plan including medications and admission    DDx  Pneumonia, CHF, PE, Anemia, GI bleed, sepsis    Problems Addressed:  Bilateral pulmonary embolism:  complicated acute illness or injury  Elevated troponin: complicated acute illness or injury  History of atrial fibrillation: complicated acute illness or injury  History of dementia: complicated acute illness or injury  SOB (shortness of breath): complicated acute illness or injury    Amount and/or Complexity of Data Reviewed  External Data Reviewed: notes.     Details: Reviewed previous non ED visits including prior labs, imaging, available notes, medications, allergies and surgical hx.   Recent admissions as noted above  Labs: ordered. Decision-making details documented in ED Course.  Radiology: ordered and independent interpretation performed. Decision-making details documented in ED Course.  ECG/medicine tests: ordered and independent interpretation performed. Decision-making details documented in ED Course.    Risk  OTC drugs.  Prescription drug management.  Decision regarding hospitalization.        Final diagnoses:   Bilateral pulmonary embolism   Elevated troponin   SOB (shortness of breath)   History of atrial fibrillation   History of dementia       ED Disposition  ED Disposition       ED Disposition   Decision to Admit    Condition   --    Comment   --               No follow-up provider specified.       Medication List      No changes were made to your prescriptions during this visit.            Paulina Mora PA  03/26/25 2018

## 2025-03-26 NOTE — LETTER
EMS Transport Request  For use at [facility], [place], [place], and [place] only   Patient Name: Fanny Boyce : 1940   Weight:52.2 kg (115 lb 1.3 oz) Pick-up Location: [facility] BLS/ALS: BLS/ALS: BLS   Insurance: MEDICARE Auth End Date:    Pre-Cert #: D/C Summary complete:    Destination: Other [facility]   [facility], [place] [address]   Contact Precautions: None   Equipment (O2, Fluids, etc.): O2, settings 2L   Arrive By Date/Time:  2 Stretcher/WC: Stretcher   CM Requesting: JEROMY Sanabria (Kay) Ext: [phone]   Notes/Medical Necessity: fall risk, generalized weakness and poor sitting balance     ______________________________________________________________________    *Only 2 patient bags OR 1 carry-on size bag are permitted.  Wheelchairs and walkers CANNOT transported with the patient. Acknowledge: Yes

## 2025-03-27 LAB
ANION GAP SERPL CALCULATED.3IONS-SCNC: 11 MMOL/L (ref 5–15)
BASOPHILS # BLD AUTO: 0.06 10*3/MM3 (ref 0–0.2)
BASOPHILS # BLD AUTO: 0.1 10*3/MM3 (ref 0–0.2)
BASOPHILS NFR BLD AUTO: 1.1 % (ref 0–1.5)
BASOPHILS NFR BLD AUTO: 1.4 % (ref 0–1.5)
BUN SERPL-MCNC: 15 MG/DL (ref 8–23)
BUN/CREAT SERPL: 25.9 (ref 7–25)
CALCIUM SPEC-SCNC: 8.6 MG/DL (ref 8.6–10.5)
CHLORIDE SERPL-SCNC: 106 MMOL/L (ref 98–107)
CO2 SERPL-SCNC: 23 MMOL/L (ref 22–29)
CREAT SERPL-MCNC: 0.58 MG/DL (ref 0.57–1)
DEPRECATED RDW RBC AUTO: 48.4 FL (ref 37–54)
DEPRECATED RDW RBC AUTO: 48.6 FL (ref 37–54)
EGFRCR SERPLBLD CKD-EPI 2021: 88.3 ML/MIN/1.73
EOSINOPHIL # BLD AUTO: 0.21 10*3/MM3 (ref 0–0.4)
EOSINOPHIL # BLD AUTO: 0.21 10*3/MM3 (ref 0–0.4)
EOSINOPHIL NFR BLD AUTO: 3 % (ref 0.3–6.2)
EOSINOPHIL NFR BLD AUTO: 3.9 % (ref 0.3–6.2)
ERYTHROCYTE [DISTWIDTH] IN BLOOD BY AUTOMATED COUNT: 15.2 % (ref 12.3–15.4)
ERYTHROCYTE [DISTWIDTH] IN BLOOD BY AUTOMATED COUNT: 15.3 % (ref 12.3–15.4)
GLUCOSE SERPL-MCNC: 88 MG/DL (ref 65–99)
HCT VFR BLD AUTO: 32.8 % (ref 34–46.6)
HCT VFR BLD AUTO: 35.7 % (ref 34–46.6)
HGB BLD-MCNC: 10.5 G/DL (ref 12–15.9)
HGB BLD-MCNC: 9.6 G/DL (ref 12–15.9)
IMM GRANULOCYTES # BLD AUTO: 0.02 10*3/MM3 (ref 0–0.05)
IMM GRANULOCYTES # BLD AUTO: 0.02 10*3/MM3 (ref 0–0.05)
IMM GRANULOCYTES NFR BLD AUTO: 0.3 % (ref 0–0.5)
IMM GRANULOCYTES NFR BLD AUTO: 0.4 % (ref 0–0.5)
LYMPHOCYTES # BLD AUTO: 1.45 10*3/MM3 (ref 0.7–3.1)
LYMPHOCYTES # BLD AUTO: 1.51 10*3/MM3 (ref 0.7–3.1)
LYMPHOCYTES NFR BLD AUTO: 20.5 % (ref 19.6–45.3)
LYMPHOCYTES NFR BLD AUTO: 27.8 % (ref 19.6–45.3)
MAGNESIUM SERPL-MCNC: 2.2 MG/DL (ref 1.6–2.4)
MCH RBC QN AUTO: 25.4 PG (ref 26.6–33)
MCH RBC QN AUTO: 25.6 PG (ref 26.6–33)
MCHC RBC AUTO-ENTMCNC: 29.3 G/DL (ref 31.5–35.7)
MCHC RBC AUTO-ENTMCNC: 29.4 G/DL (ref 31.5–35.7)
MCV RBC AUTO: 86.8 FL (ref 79–97)
MCV RBC AUTO: 87.1 FL (ref 79–97)
MONOCYTES # BLD AUTO: 0.43 10*3/MM3 (ref 0.1–0.9)
MONOCYTES # BLD AUTO: 0.47 10*3/MM3 (ref 0.1–0.9)
MONOCYTES NFR BLD AUTO: 6.6 % (ref 5–12)
MONOCYTES NFR BLD AUTO: 7.9 % (ref 5–12)
NEUTROPHILS NFR BLD AUTO: 3.2 10*3/MM3 (ref 1.7–7)
NEUTROPHILS NFR BLD AUTO: 4.82 10*3/MM3 (ref 1.7–7)
NEUTROPHILS NFR BLD AUTO: 58.9 % (ref 42.7–76)
NEUTROPHILS NFR BLD AUTO: 68.2 % (ref 42.7–76)
NRBC BLD AUTO-RTO: 0 /100 WBC (ref 0–0.2)
NRBC BLD AUTO-RTO: 0 /100 WBC (ref 0–0.2)
PLATELET # BLD AUTO: 199 10*3/MM3 (ref 140–450)
PLATELET # BLD AUTO: 225 10*3/MM3 (ref 140–450)
PMV BLD AUTO: 10.3 FL (ref 6–12)
PMV BLD AUTO: 10.8 FL (ref 6–12)
POTASSIUM SERPL-SCNC: 3.8 MMOL/L (ref 3.5–5.2)
QT INTERVAL: 342 MS
QTC INTERVAL: 506 MS
RBC # BLD AUTO: 3.78 10*6/MM3 (ref 3.77–5.28)
RBC # BLD AUTO: 4.1 10*6/MM3 (ref 3.77–5.28)
SODIUM SERPL-SCNC: 140 MMOL/L (ref 136–145)
UFH PPP CHRO-ACNC: 0.14 IU/ML (ref 0.3–0.7)
UFH PPP CHRO-ACNC: 0.2 IU/ML (ref 0.3–0.7)
WBC NRBC COR # BLD AUTO: 5.43 10*3/MM3 (ref 3.4–10.8)
WBC NRBC COR # BLD AUTO: 7.07 10*3/MM3 (ref 3.4–10.8)

## 2025-03-27 PROCEDURE — 83735 ASSAY OF MAGNESIUM: CPT | Performed by: NURSE PRACTITIONER

## 2025-03-27 PROCEDURE — 80048 BASIC METABOLIC PNL TOTAL CA: CPT | Performed by: NURSE PRACTITIONER

## 2025-03-27 PROCEDURE — 85520 HEPARIN ASSAY: CPT

## 2025-03-27 PROCEDURE — 99233 SBSQ HOSP IP/OBS HIGH 50: CPT

## 2025-03-27 PROCEDURE — 93010 ELECTROCARDIOGRAM REPORT: CPT | Performed by: INTERNAL MEDICINE

## 2025-03-27 PROCEDURE — 99222 1ST HOSP IP/OBS MODERATE 55: CPT | Performed by: INTERNAL MEDICINE

## 2025-03-27 PROCEDURE — 85025 COMPLETE CBC W/AUTO DIFF WBC: CPT | Performed by: PHYSICIAN ASSISTANT

## 2025-03-27 PROCEDURE — 93005 ELECTROCARDIOGRAM TRACING: CPT

## 2025-03-27 PROCEDURE — 85025 COMPLETE CBC W/AUTO DIFF WBC: CPT | Performed by: NURSE PRACTITIONER

## 2025-03-27 RX ORDER — GLYCOPYRROLATE 0.2 MG/ML
0.2 INJECTION INTRAMUSCULAR; INTRAVENOUS
Status: DISCONTINUED | OUTPATIENT
Start: 2025-03-27 | End: 2025-04-02 | Stop reason: HOSPADM

## 2025-03-27 RX ORDER — POLYETHYLENE GLYCOL 3350 17 G/17G
17 POWDER, FOR SOLUTION ORAL DAILY PRN
Status: DISCONTINUED | OUTPATIENT
Start: 2025-03-27 | End: 2025-04-02 | Stop reason: HOSPADM

## 2025-03-27 RX ORDER — LORAZEPAM 1 MG/1
2 TABLET ORAL
Status: ACTIVE | OUTPATIENT
Start: 2025-03-27 | End: 2025-04-01

## 2025-03-27 RX ORDER — ACETAMINOPHEN 160 MG/5ML
650 SOLUTION ORAL EVERY 4 HOURS PRN
Status: DISCONTINUED | OUTPATIENT
Start: 2025-03-27 | End: 2025-04-02 | Stop reason: HOSPADM

## 2025-03-27 RX ORDER — LORAZEPAM 2 MG/ML
1 CONCENTRATE ORAL
Status: DISCONTINUED | OUTPATIENT
Start: 2025-03-27 | End: 2025-03-27

## 2025-03-27 RX ORDER — PANTOPRAZOLE SODIUM 40 MG/1
40 TABLET, DELAYED RELEASE ORAL DAILY
Status: DISCONTINUED | OUTPATIENT
Start: 2025-03-28 | End: 2025-04-02 | Stop reason: HOSPADM

## 2025-03-27 RX ORDER — HYDROMORPHONE HYDROCHLORIDE 1 MG/ML
0.5 INJECTION, SOLUTION INTRAMUSCULAR; INTRAVENOUS; SUBCUTANEOUS
Status: ACTIVE | OUTPATIENT
Start: 2025-03-27 | End: 2025-04-01

## 2025-03-27 RX ORDER — LORAZEPAM 1 MG/1
1 TABLET ORAL
Status: ACTIVE | OUTPATIENT
Start: 2025-03-27 | End: 2025-04-01

## 2025-03-27 RX ORDER — CARBIDOPA AND LEVODOPA 25; 100 MG/1; MG/1
1 TABLET ORAL 3 TIMES DAILY
Status: DISCONTINUED | OUTPATIENT
Start: 2025-03-27 | End: 2025-04-02 | Stop reason: HOSPADM

## 2025-03-27 RX ORDER — ACETAMINOPHEN 650 MG/1
650 SUPPOSITORY RECTAL EVERY 4 HOURS PRN
Status: DISCONTINUED | OUTPATIENT
Start: 2025-03-27 | End: 2025-04-02 | Stop reason: HOSPADM

## 2025-03-27 RX ORDER — GLYCOPYRROLATE 0.2 MG/ML
0.4 INJECTION INTRAMUSCULAR; INTRAVENOUS
Status: DISCONTINUED | OUTPATIENT
Start: 2025-03-27 | End: 2025-04-02 | Stop reason: HOSPADM

## 2025-03-27 RX ORDER — HYDROMORPHONE HYDROCHLORIDE 2 MG/ML
2 INJECTION, SOLUTION INTRAMUSCULAR; INTRAVENOUS; SUBCUTANEOUS
Refills: 0 | Status: DISCONTINUED | OUTPATIENT
Start: 2025-03-27 | End: 2025-03-27 | Stop reason: ALTCHOICE

## 2025-03-27 RX ORDER — LORAZEPAM 0.5 MG/1
0.5 TABLET ORAL
Status: ACTIVE | OUTPATIENT
Start: 2025-03-27 | End: 2025-04-01

## 2025-03-27 RX ORDER — ACETAMINOPHEN 325 MG/1
650 TABLET ORAL EVERY 4 HOURS PRN
Status: DISCONTINUED | OUTPATIENT
Start: 2025-03-27 | End: 2025-03-27

## 2025-03-27 RX ORDER — SODIUM CHLORIDE 0.9 % (FLUSH) 0.9 %
10 SYRINGE (ML) INJECTION EVERY 12 HOURS SCHEDULED
Status: DISCONTINUED | OUTPATIENT
Start: 2025-03-27 | End: 2025-04-02 | Stop reason: HOSPADM

## 2025-03-27 RX ORDER — LORAZEPAM 2 MG/ML
0.5 CONCENTRATE ORAL
Status: DISCONTINUED | OUTPATIENT
Start: 2025-03-27 | End: 2025-03-27

## 2025-03-27 RX ORDER — SODIUM CHLORIDE 0.9 % (FLUSH) 0.9 %
10 SYRINGE (ML) INJECTION AS NEEDED
Status: DISCONTINUED | OUTPATIENT
Start: 2025-03-27 | End: 2025-04-02 | Stop reason: HOSPADM

## 2025-03-27 RX ORDER — BISACODYL 5 MG/1
5 TABLET, DELAYED RELEASE ORAL DAILY PRN
Status: DISCONTINUED | OUTPATIENT
Start: 2025-03-27 | End: 2025-04-02 | Stop reason: HOSPADM

## 2025-03-27 RX ORDER — AMOXICILLIN 250 MG
2 CAPSULE ORAL EVERY 12 HOURS SCHEDULED
Status: DISCONTINUED | OUTPATIENT
Start: 2025-03-27 | End: 2025-04-02 | Stop reason: HOSPADM

## 2025-03-27 RX ORDER — LORAZEPAM 1 MG/1
2 TABLET ORAL EVERY 6 HOURS PRN
Status: ACTIVE | OUTPATIENT
Start: 2025-03-27 | End: 2025-04-01

## 2025-03-27 RX ORDER — LORAZEPAM 1 MG/1
1 TABLET ORAL EVERY 6 HOURS PRN
Status: ACTIVE | OUTPATIENT
Start: 2025-03-27 | End: 2025-04-01

## 2025-03-27 RX ORDER — FLUTICASONE PROPIONATE 50 MCG
2 SPRAY, SUSPENSION (ML) NASAL DAILY
Status: DISCONTINUED | OUTPATIENT
Start: 2025-03-27 | End: 2025-04-02 | Stop reason: HOSPADM

## 2025-03-27 RX ORDER — IPRATROPIUM BROMIDE AND ALBUTEROL SULFATE 2.5; .5 MG/3ML; MG/3ML
3 SOLUTION RESPIRATORY (INHALATION) EVERY 4 HOURS PRN
Status: DISCONTINUED | OUTPATIENT
Start: 2025-03-27 | End: 2025-04-02 | Stop reason: HOSPADM

## 2025-03-27 RX ORDER — HYDROMORPHONE HYDROCHLORIDE 2 MG/ML
2 INJECTION, SOLUTION INTRAMUSCULAR; INTRAVENOUS; SUBCUTANEOUS
Status: DISCONTINUED | OUTPATIENT
Start: 2025-03-27 | End: 2025-03-27 | Stop reason: ALTCHOICE

## 2025-03-27 RX ORDER — BISACODYL 10 MG
10 SUPPOSITORY, RECTAL RECTAL DAILY PRN
Status: DISCONTINUED | OUTPATIENT
Start: 2025-03-27 | End: 2025-04-02 | Stop reason: HOSPADM

## 2025-03-27 RX ORDER — SODIUM CHLORIDE 9 MG/ML
40 INJECTION, SOLUTION INTRAVENOUS AS NEEDED
Status: DISCONTINUED | OUTPATIENT
Start: 2025-03-27 | End: 2025-04-02 | Stop reason: HOSPADM

## 2025-03-27 RX ORDER — METOPROLOL TARTRATE 1 MG/ML
5 INJECTION, SOLUTION INTRAVENOUS EVERY 6 HOURS PRN
Status: DISCONTINUED | OUTPATIENT
Start: 2025-03-27 | End: 2025-04-02 | Stop reason: HOSPADM

## 2025-03-27 RX ORDER — PANTOPRAZOLE SODIUM 40 MG/1
40 TABLET, DELAYED RELEASE ORAL EVERY 12 HOURS SCHEDULED
Status: COMPLETED | OUTPATIENT
Start: 2025-03-27 | End: 2025-03-28

## 2025-03-27 RX ORDER — METOPROLOL TARTRATE 25 MG/1
25 TABLET, FILM COATED ORAL EVERY 12 HOURS SCHEDULED
Status: DISCONTINUED | OUTPATIENT
Start: 2025-03-27 | End: 2025-04-02 | Stop reason: HOSPADM

## 2025-03-27 RX ORDER — ACETAMINOPHEN 325 MG/1
650 TABLET ORAL EVERY 4 HOURS PRN
Status: DISCONTINUED | OUTPATIENT
Start: 2025-03-27 | End: 2025-04-02 | Stop reason: HOSPADM

## 2025-03-27 RX ADMIN — METOPROLOL TARTRATE 5 MG: 5 INJECTION INTRAVENOUS at 13:06

## 2025-03-27 RX ADMIN — CARBIDOPA AND LEVODOPA 1 TABLET: 25; 100 TABLET ORAL at 11:50

## 2025-03-27 RX ADMIN — Medication 12.5 MG: at 06:45

## 2025-03-27 RX ADMIN — PANTOPRAZOLE SODIUM 40 MG: 40 TABLET, DELAYED RELEASE ORAL at 06:45

## 2025-03-27 RX ADMIN — SENNOSIDES AND DOCUSATE SODIUM 2 TABLET: 50; 8.6 TABLET ORAL at 06:45

## 2025-03-27 RX ADMIN — FLUTICASONE PROPIONATE 2 SPRAY: 50 SPRAY, METERED NASAL at 09:01

## 2025-03-27 RX ADMIN — CARBIDOPA AND LEVODOPA 1 TABLET: 25; 100 TABLET ORAL at 09:05

## 2025-03-27 RX ADMIN — PANTOPRAZOLE SODIUM 40 MG: 40 TABLET, DELAYED RELEASE ORAL at 20:50

## 2025-03-27 RX ADMIN — METOPROLOL TARTRATE 25 MG: 25 TABLET, FILM COATED ORAL at 20:50

## 2025-03-27 RX ADMIN — Medication 10 ML: at 20:50

## 2025-03-27 RX ADMIN — Medication 10 ML: at 09:06

## 2025-03-27 RX ADMIN — CARBIDOPA AND LEVODOPA 1 TABLET: 25; 100 TABLET ORAL at 17:42

## 2025-03-27 RX ADMIN — SENNOSIDES AND DOCUSATE SODIUM 2 TABLET: 50; 8.6 TABLET ORAL at 20:49

## 2025-03-27 NOTE — CONSULTS
[facility] Cardiology Consult Note    Fanny Boyce  1940  [id number]  25    Requesting Physician: Ankit Sutherland MD    Chief Complaint: elevated troponin    History of Present Illness:   Fanny oByce is a 84 y.o. female with paroxysmal atrial fibrillation, history of PEs and recent GI bleed who was admitted to the hospital for shortness of breath.  Patient has baseline dementia with Parkinson's and is a very poor historian.  History was obtained from review of her medical record.  Patient was apparently acutely more short of breath.  She was actually just discharged from the hospital in late February after being admitted for acute GI bleed after right hip hemiarthroplasty earlier that same month.  Patient has been in and out of the hospital over the past 5 weeks.  In the course of her workup, patient was found to have elevated troponins and cardiology was consulted for further recommendations.  Of note, CT PE angiogram was performed and she was found to have bilateral pulmonary emboli.  She apparently has had history of this in the past.  Upon my evaluation, patient does not even know where she is.  She thinks that she is in [facility].  She denies any symptoms of chest pain or dyspnea.  She is on supplemental oxygen per nasal cannula and appears to be in no acute distress.  She is aware of her history of atrial fibrillation but does not recall seeing Dr. Jurado last week.    I called her daughter Suzanne Lewis after my evaluation and had an extensive conversation over the phone.  She corroborates the above story.  Reports that she and her daughter are considering making the patient comfort care considering that she has been in the hospital for 5 weeks and we are now battling the same issues of blood thinners and anemia.  She is aware of her advanced age and significant comorbid conditions.    Prior Cardiac History and Testin.  Paroxysmal atrial fibrillation  --  [id number] is 5 (age, female, PE)  -- TTE 2/25: EF 70%, moderate TR, RVSP 46, trivial pericardial effusion  2.  History of GI bleed with duodenal ulcers  -- EGD 2/25 with duodenal ulcer spurting hemorrhage, clipped  --Transfused 2/26/2025  3.  History of PEs  -- Acute PE 3/25  4.  History of Parkinson's and dementia  5.  COPD  6.  History of lung cancer    Review of Systems   Constitutional: Negative.    Respiratory: Negative.     Cardiovascular: Negative.    Gastrointestinal: Negative.    Musculoskeletal: Negative.    Neurological: Negative.        Past Medical History:   Diagnosis Date    Atrial fibrillation     COPD (chronic obstructive pulmonary disease)     Dementia     History of lung cancer     History of transfusion     Parkinson disease     Pulmonary embolism        Past Surgical History:   Procedure Laterality Date    ENDOSCOPY N/A 2/21/2025    Procedure: ESOPHAGOGASTRODUODENOSCOPY;  Surgeon: Steve Gamez MD;  Location:  [facility] ENDOSCOPY;  Service: Gastroenterology;  Laterality: N/A;    HIP HEMIARTHROPLASTY Right 2/10/2025    Procedure: HIP HEMIARTHROPLASTY RIGHT;  Surgeon: Frandy Patino MD;  Location:  [facility] OR;  Service: Orthopedics;  Laterality: Right;    JOINT REPLACEMENT      LUNG CANCER SURGERY         Family History   Problem Relation Age of Onset    Heart disease Mother     Heart disease Father     Aneurysm Father        Social History     Socioeconomic History    Marital status:    Tobacco Use    Smoking status: Former     Current packs/day: 1.00     Average packs/day: 1 pack/day for 40.0 years (40.0 ttl pk-yrs)     Types: Cigarettes     Passive exposure: Past    Smokeless tobacco: Never   Vaping Use    Vaping status: Never Used   Substance and Sexual Activity    Alcohol use: Never    Drug use: Never    Sexual activity: Not Currently         Current Facility-Administered Medications:     acetaminophen (TYLENOL) tablet 650 mg, 650 mg, Oral, Q4H PRN, Cristina Wolf, APRN     sennosides-docusate (PERICOLACE) 8.6-50 MG per tablet 2 tablet, 2 tablet, Oral, Q12H, 2 tablet at 03/27/25 0645 **AND** polyethylene glycol (MIRALAX) packet 17 g, 17 g, Oral, Daily PRN **AND** bisacodyl (DULCOLAX) EC tablet 5 mg, 5 mg, Oral, Daily PRN **AND** bisacodyl (DULCOLAX) suppository 10 mg, 10 mg, Rectal, Daily PRN, Cristina Wolf APRN    carbidopa-levodopa (SINEMET)  MG per tablet 1 tablet, 1 tablet, Oral, TID, Cristina Wolf APRN, 1 tablet at 03/27/25 0905    fluticasone (FLONASE) 50 MCG/ACT nasal spray 2 spray, 2 spray, Each Nare, Daily, Cristina Wolf APRN, 2 spray at 03/27/25 0901    heparin 01363 units/250 mL (100 units/mL) in 0.45 % NaCl infusion, 14 Units/kg/hr, Intravenous, Titrated, Adi Guadarrama, [facility], Last Rate: 7.3 mL/hr at 03/27/25 0906, 14 Units/kg/hr at 03/27/25 0906    ipratropium-albuterol (DUO-NEB) nebulizer solution 3 mL, 3 mL, Nebulization, Q4H PRN, Cristina Wolf APRN    metoprolol tartrate (LOPRESSOR) half tablet 12.5 mg, 12.5 mg, Oral, Q12H, Cristina Wolf APRN, 12.5 mg at 03/27/25 0645    pantoprazole (PROTONIX) EC tablet 40 mg, 40 mg, Oral, Q12H, 40 mg at 03/27/25 0645 **FOLLOWED BY** [START ON 3/28/2025] pantoprazole (PROTONIX) EC tablet 40 mg, 40 mg, Oral, Daily, Cristina Wolf APRN    Pharmacy to Dose Heparin, , Not Applicable, Continuous PRN, Paulina Mora PA    sodium chloride 0.9 % flush 10 mL, 10 mL, Intravenous, PRN, Paulina Mora PA    sodium chloride 0.9 % flush 10 mL, 10 mL, Intravenous, Q12H, Cristina Wolf, APRN, 10 mL at 03/27/25 0906    sodium chloride 0.9 % flush 10 mL, 10 mL, Intravenous, PRN, Cristina Wolf, APRN    sodium chloride 0.9 % infusion 40 mL, 40 mL, Intravenous, PRN, Cristina Wolf, APRN    Allergies   Allergen Reactions    Nitrofurantoin Unknown (See Comments)    Latex Unknown (See Comments)     Other reaction(s): Itching, Rash   2itching and Severe rash    Morphine Nausea And Vomiting, Nausea Only, Unknown - Low  Severity, Other (See Comments), Rash and Unknown (See Comments)     Nausea    morphine sulfate    Wound Dressing Adhesive Other (See Comments) and Rash       Objective:  Vitals:    03/26/25 1736 03/26/25 1857 03/27/25 0405 03/27/25 0901   BP:  132/62 122/67 122/66   BP Location:   Right arm Right arm   Patient Position:   Lying Lying   Pulse:  84 74 95   Resp: 18  14 28   Temp:   98.6 °F (37 °C) 97.7 °F (36.5 °C)   TempSrc:   Oral Oral   SpO2:  92% 91% 96%   Weight:       Height:           Vitals reviewed.   Constitutional:       Appearance: Healthy appearance. Not in distress.   Neck:      Vascular: No JVD.   Pulmonary:      Effort: Pulmonary effort is normal.      Breath sounds: Normal breath sounds.   Cardiovascular:      Normal rate. Irregular rhythm. Normal S1. Normal S2.       Murmurs: There is no murmur.      No gallop.  No click. No rub.   Pulses:     Intact distal pulses.   Edema:     Peripheral edema absent.   Abdominal:      General: There is no distension.      Palpations: Abdomen is soft.      Tenderness: There is no abdominal tenderness.   Skin:     General: Skin is warm and dry.         Results Review:   Results from last 7 days   Lab Units 03/27/25  0355 03/26/25  1728 03/26/25  1725   SODIUM mmol/L 140  --  141   POTASSIUM mmol/L 3.8  --  4.3   CHLORIDE mmol/L 106  --  104   CO2 mmol/L 23.0  --  25.0   BUN mg/dL 15  --  18   CREATININE mg/dL 0.58   < > 0.64   CALCIUM mg/dL 8.6  --  9.2   BILIRUBIN mg/dL  --   --  0.3   ALK PHOS U/L  --   --  119*   ALT (SGPT) U/L  --   --  <5   AST (SGOT) U/L  --   --  17   GLUCOSE mg/dL 88  --  103*    < > = values in this interval not displayed.     Results from last 7 days   Lab Units 03/26/25  1838 03/26/25  1725   HSTROP T ng/L 156* 162*     Results from last 7 days   Lab Units 03/27/25  0355 03/26/25  1725   WBC 10*3/mm3 5.43 7.00   HEMOGLOBIN g/dL 9.6* 11.5*   HEMATOCRIT % 32.8* 38.6   PLATELETS 10*3/mm3 199 246     Results from last 7 days   Lab Units  03/26/25  1725   INR  0.98   APTT seconds 29.6*     Results from last 7 days   Lab Units 03/27/25  0355   MAGNESIUM mg/dL 2.2           I personally viewed and interpreted the patient's EKG/Telemetry data     Assessment / Plan:     84-year-old female with paroxysmal atrial fibrillation, history of PE, anemia due to acute GI bleed with duodenal ulcers clipped in February 2025 presents with acute shortness of breath and found to have new PEs.    Acute bilateral pulmonary emboli   Acute on chronic hypoxic respiratory failure  Elevated troponin  Elevated BNP  COPD/emphysema  New PEs on CT scan with no evidence of right heart strain.  Troponin and BNP are elevated, likely due to acute PE.  Patient is in no acute distress and is hemodynamically stable, satting well on minimal oxygen requirement per nasal cannula.  Currently on heparin drip.  We discussed anticoagulation strategies with her daughter over the phone.  Difficult to anticoagulate her due to her recent GI bleed and severe anemia requiring transfusions.  Risk of morbidity and mortality with untreated PEs is very high and her daughter is aware of this.  She has already been considering making patient comfort care.  She will discuss this with the hospitalist later today  Will continue IV heparin for now.  If they decide against comfort care, I would recommend a trial of Eliquis 2.5 twice daily  At that point, would also repeat limited echo to investigate RV function with new PE.  Otherwise if comfort care, no indication for repeat echo as this will not .    Paroxysmal atrial fibrillation  [id number].  She is currently in sinus rhythm with PACs  Discussed with Dr. Jurado regarding [facility].  Decision was to forego any type of procedure at this time considering her age and comorbidities.  Strategy for anticoagulation as above    Coronary artery calcification  Appears to have some mild calcifications in her LAD seen on her CT scan this visit.  I  personally and independently reviewed the CT for this reason.  Elevated troponin due to the above.  No indication for further workup from this standpoint  If no anticoagulation as above, would at least consider aspirin 81 mg daily and statin but this is probably low yield considering her age    Chronic anemia  History of recent GI bleed status post clipping of duodenal ulcer  Hemoglobin has dropped overnight.  No obvious signs of bleed  Treatment per hospitalist  Complicates anticoagulation as above    Thank you for allowing me to participate in the care of your patient. Please do not hesitate to contact me with additional questions or concerns.     Electronically signed by Adam Bar MD, 03/27/25, 11:24 AM EDT.

## 2025-03-27 NOTE — PROGRESS NOTES
Inpatient hospice consult received. Chart reviewed. Hospice RNs made bedside visit. Patient lying awake in bed watching tv. She denies pain, dyspnea, anxiety. Asked if RN could get anything for patient and she said no. Discussed patient with nurseLynnette who relays that patient has been comfortable today, taken her pills whole, not required any prns for symptoms. Called hospice medical director, Dr. Suzi Moffett to discuss referral. She does not approve patient for inpatient hospice admission as patient does not have any unmanaged symptoms. She would like the outpatient hospice liaisons to take over tomorrow to discuss outpatient hospice options. This RN called daughterLore to discuss hospice referral. Discussed inpatient vs outpatient hospice. Discussed inpatient hospice criteria with daughter and that patient did not qualify for inpatient hospice today. She is agreeable with the outpatient hospice liaisons following up with her tomorrow. Updated Dr. Sutherland, RODRIGO Oropeza, nurse, Lynnette, and hospice liaison, Raquel.

## 2025-03-27 NOTE — PROGRESS NOTES
[facility] Medicine Services  PROGRESS NOTE    Patient Name: Fanny Boyce  : 1940  MRN: [id number]    Date of Admission: 3/26/2025  Primary Care Physician: Nemo Aponte PA    Subjective   Subjective     CC:  Pulmonary embolism    HPI:  No significant overnight events, patient remains on heparin drip at this time, found to have pulmonary embolisms, no active complaints otherwise satting well.  Not endorsing any pain at this time.      Objective   Objective     Vital Signs:   Temp:  [97.6 °F (36.4 °C)-98.6 °F (37 °C)] 97.9 °F (36.6 °C)  Heart Rate:  [73-95] 73  Resp:  [14-28] 18  BP: (122-132)/(60-74) 122/74  Flow (L/min) (Oxygen Therapy):  [2] 2     Physical Exam:  Constitutional: No acute distress, awake, alert  HENT: NCAT, mucous membranes moist  Respiratory: Clear to auscultation bilaterally, respiratory effort normal   Cardiovascular: RRR, no murmurs, rubs, or gallops  Gastrointestinal: Positive bowel sounds, soft, nontender, nondistended  Musculoskeletal: No bilateral ankle edema  Psychiatric: Appropriate affect, cooperative  Neurologic: Oriented x 3, strength symmetric in all extremities, Cranial Nerves grossly intact to confrontation, speech clear  Skin: No rashes      Results Reviewed:  LAB RESULTS:      Lab 25  1320 25  0355 25  1838 25  1725   WBC 7.07 5.43  --   --  7.00   HEMOGLOBIN 10.5* 9.6*  --   --  11.5*   HEMATOCRIT 35.7 32.8*  --   --  38.6   PLATELETS 225 199  --   --  246   NEUTROS ABS 4.82 3.20  --   --  4.73   IMMATURE GRANS (ABS) 0.02 0.02  --   --  0.02   LYMPHS ABS 1.45 1.51  --   --  1.49   MONOS ABS 0.47 0.43  --   --  0.53   EOS ABS 0.21 0.21  --   --  0.16   MCV 87.1 86.8  --   --  86.7   PROTIME  --   --   --   --  13.1   APTT  --   --   --   --  29.6*   HEPARIN ANTI-XA 0.20* 0.14* 0.10*  --   --    HSTROP T  --   --   --  156* 162*         Lab 25  0355 25  1728 25  1725   SODIUM 140  --   141   POTASSIUM 3.8  --  4.3   CHLORIDE 106  --  104   CO2 23.0  --  25.0   ANION GAP 11.0  --  12.0   BUN 15  --  18   CREATININE 0.58 0.70 0.64   EGFR 88.3  --  86.2   GLUCOSE 88  --  103*   CALCIUM 8.6  --  9.2   MAGNESIUM 2.2  --   --          Lab 03/26/25  1725   TOTAL PROTEIN 6.8   ALBUMIN 3.7   GLOBULIN 3.1   ALT (SGPT) <5   AST (SGOT) 17   BILIRUBIN 0.3   ALK PHOS 119*         Lab 03/26/25  1838 03/26/25  1725   PROBNP  --  6,249.0*   HSTROP T 156* 162*   PROTIME  --  13.1   INR  --  0.98                 Brief Urine Lab Results  (Last result in the past 365 days)        Color   Clarity   Blood   Leuk Est   Nitrite   Protein   CREAT   Urine HCG        02/20/25 1846 Yellow   Cloudy   Trace   Moderate (2+)   Negative   Negative                   Microbiology Results Abnormal       None            CT Angiogram Chest  Addendum Date: 3/26/2025  ADDENDUM #1 Addition to impression: There is evidence of emphysema. Correlate with patient history and risk factors and please assess if the patient meets criteria for routine low dose CT lung cancer screening. Electronically Signed: Royer Quintero MD  3/26/2025 7:37 PM EDT  Workstation ID: [id number] ORIGINAL REPORT: CT ANGIOGRAM CHEST Date of Exam: 3/26/2025 7:14 PM EDT Indication: SOB, recent hip fx, rule out PE. Comparison: CTA chest 2/12/2025 Technique: CTA of the chest was performed after the uneventful intravenous administration of 73 mL Isovue-370. Reconstructed coronal and sagittal images were also obtained. In addition, a 3-D volume rendered image was created for interpretation. Automated exposure control and iterative reconstruction methods were used. Findings: There are bilateral pulmonary emboli including pulmonary emboli in the distal right main pulmonary artery which extends into the lobar and segmental branches of the right lower lobe and into the segmental branches of the right middle lobe (series 4 image  44-55). There is a pulmonary embolism in the lobar  pulmonary artery of the left upper lobe (series 4 image 35). Grossly normal and unchanged caliber of the main pulmonary artery. No definite right heart strain. Unremarkable appearance of the thoracic aorta. No thoracic adenopathy. There is a large sliding hiatal hernia. Unremarkable appearance of the airways. The lungs are clear without focal consolidation. No pulmonary infarct. There is centrilobular emphysema. No pleural effusion or pneumothorax. There is accentuated thoracic kyphosis with chronic severe compression deformity of the T12 vertebral body. No acute osseous findings. Unremarkable appearance of the upper abdomen. There is nonobstructing right nephrolithiasis. Impression: Bilateral pulmonary emboli including pulmonary emboli in the distal right main pulmonary artery extending into the lobar and segmental branches of the right lower lobe and into the segmental branches of the right middle lobe. There is a pulmonary embolism in the lobar pulmonary artery of the left upper lobe. No definite CT evidence of right heart however correlation with troponin and EKG recommended given the embolic burden. Findings discussed with ordering provider Paulina Mora by Dr. Royer Quintero via secure epic chat at 7:25 p.m. 3/26/2025. Electronically Signed: Royer Quintero MD  3/26/2025 7:27 PM EDT  Workstation ID: [id number]    Result Date: 3/26/2025  CT ANGIOGRAM CHEST Date of Exam: 3/26/2025 7:14 PM EDT Indication: SOB, recent hip fx, rule out PE. Comparison: CTA chest 2/12/2025 Technique: CTA of the chest was performed after the uneventful intravenous administration of 73 mL Isovue-370. Reconstructed coronal and sagittal images were also obtained. In addition, a 3-D volume rendered image was created for interpretation. Automated exposure control and iterative reconstruction methods were used. Findings: There are bilateral pulmonary emboli including pulmonary emboli in the distal right main pulmonary artery which extends into  the lobar and segmental branches of the right lower lobe and into the segmental branches of the right middle lobe (series 4 image  44-55). There is a pulmonary embolism in the lobar pulmonary artery of the left upper lobe (series 4 image 35). Grossly normal and unchanged caliber of the main pulmonary artery. No definite right heart strain. Unremarkable appearance of the thoracic aorta. No thoracic adenopathy. There is a large sliding hiatal hernia. Unremarkable appearance of the airways. The lungs are clear without focal consolidation. No pulmonary infarct. There is centrilobular emphysema. No pleural effusion or pneumothorax. There is accentuated thoracic kyphosis with chronic severe compression deformity of the T12 vertebral body. No acute osseous findings. Unremarkable appearance of the upper abdomen. There is nonobstructing right nephrolithiasis.     Impression: Impression: Bilateral pulmonary emboli including pulmonary emboli in the distal right main pulmonary artery extending into the lobar and segmental branches of the right lower lobe and into the segmental branches of the right middle lobe. There is a pulmonary embolism in the lobar pulmonary artery of the left upper lobe. No definite CT evidence of right heart however correlation with troponin and EKG recommended given the embolic burden. Findings discussed with ordering provider Paulina Mora by Dr. Royer Quintero via secure epic chat at 7:25 p.m. 3/26/2025. Electronically Signed: Royer Quintero MD  3/26/2025 7:27 PM EDT  Workstation ID: [id number]    XR Chest 1 View  Result Date: 3/26/2025  XR CHEST 1 VW Date of Exam: 3/26/2025 5:18 PM EDT Indication: CHF/COPD protocol Comparison: 2/20/2025 Findings: Heart shadow is mildly enlarged with pulmonary vasculature appears normal. Prominent pulmonary hilar shadows are unchanged. Lungs are clear except for thin linear scarring left lung base similar to prior study. No clearly new pulmonary parenchymal disease,  evidence of effusion or pneumothorax is seen. Old healed left proximal humerus fracture is again noted.     Impression: Impression: No new chest disease. Electronically Signed: Kevin Kaminski MD  3/26/2025 6:02 PM EDT  Workstation ID: [id number]      Results for orders placed during the hospital encounter of 02/09/25    Adult Transthoracic Echo Complete W/ Cont if Necessary Per Protocol    Interpretation Summary    Left ventricular systolic function is normal. Estimated left ventricular EF = 70%    Moderate tricuspid valve regurgitation is present.    Estimated  right ventricular systolic pressure from tricuspid regurgitation is 46 mmHg.    There is a trivial pericardial effusion.      Current medications:  Scheduled Meds:carbidopa-levodopa, 1 tablet, Oral, TID  fluticasone, 2 spray, Each Nare, Daily  metoprolol tartrate, 25 mg, Oral, Q12H  pantoprazole, 40 mg, Oral, Q12H   Followed by  [START ON 3/28/2025] pantoprazole, 40 mg, Oral, Daily  senna-docusate sodium, 2 tablet, Oral, Q12H  sodium chloride, 10 mL, Intravenous, Q12H      Continuous Infusions:heparin, 15 Units/kg/hr, Last Rate: 14 Units/kg/hr (03/27/25 1150)  Pharmacy to Dose Heparin,       PRN Meds:.  acetaminophen    senna-docusate sodium **AND** polyethylene glycol **AND** bisacodyl **AND** bisacodyl    ipratropium-albuterol    metoprolol tartrate    Pharmacy to Dose Heparin    sodium chloride    sodium chloride    sodium chloride    Assessment & Plan   Assessment & Plan     Active Hospital Problems    Diagnosis  POA    **Pulmonary embolism [I26.99]  Yes    Atrial fibrillation [I48.91]  Yes    COPD (chronic obstructive pulmonary disease) [J44.9]  Yes    Dementia [F03.90]  Yes    Parkinson's disease [G20.A1]  Yes      Resolved Hospital Problems   No resolved problems to display.        Brief Hospital Course to date:  84 y.o. female with PMH significant for A-fib (currently not anticoagulated secondary to recent GI bleed), COPD, dementia, lung cancer, Parkinson  disease, who presents to the ED with complaint of shortness of breath who is found to have concern for bilateral PEs.     Bilateral pulmonary emboli  Acute on chronic hypoxic respiratory failure  Elevated proBNP  COPD/emphysema  Known history of A-fib without recent anticoagulation secondary to GI bleed in February 2025  CTA imaging showing new pulmonary embolisms with large burden however no evidence of right heart strain  Minimal oxygen requirements at this time  Heparin drip started in ED without bolus dosing on admission  Cardiology consulted, discussed with daughter over the phone stating difficult to anticoagulate due to recent GI bleed and severe anemia requiring transfusions, morbidity and mortality was discussed with untreated PEs being very high, possibility of comfort care  Long discussion with daughter, patient is now comfort care, discussed the risks of bleeding while on anticoagulation given history of recurrent GI bleeds.  The patient is not oriented to person, time and place at this time, daughter has made decision to proceed with comfort care measures  Repeat echo TTE to assess for RV strain    Chronic anemia  Evidence of duodenal ulcer on most recent EGD, recurrent history of GI bleeds  Hemoglobin is stable at this time, not requiring transfusion, no dip in hemoglobin or evidence of GI bleed while on heparin drip  Anticoagulation will be a complex decision given PE    Elevated troponin  Likely secondary to above  No complaints of chest pain  Trending down     A-fib  CAD  [id number] of 5  Currently rate controlled  Continue metoprolol with hold parameters     Peptic ulcer disease  Recent GI bleed  Continue Protonix twice daily     Dementia  Parkinson's disease  Continue carbidopa levodopa     COPD  Not in acute exacerbation  Baseline room air  DuoNebs as needed  Continue Flonase    Expected Discharge Location and Transportation: TBD  Expected Discharge TBD  Expected discharge date/ time has not  been documented.     VTE Prophylaxis:  Pharmacologic & mechanical VTE prophylaxis orders are present.     Total time spent: Time Spent: Time Spent: 60 minutes  Time spent includes time reviewing chart, face-to-face time, counseling patient/family/caregiver, ordering medications/tests/procedures, communicating with other health care professionals, documenting clinical information in the electronic health record, and coordination of care.      AM-PAC 6 Clicks Score (PT): 16 (03/26/25 8924)    CODE STATUS:   Code Status and Medical Interventions: No CPR (Do Not Attempt to Resuscitate); Limited Support; No intubation (DNI)   Ordered at: 03/26/25 2205     Code Status (Patient has no pulse and is not breathing):    No CPR (Do Not Attempt to Resuscitate)     Medical Interventions (Patient has pulse or is breathing):    Limited Support     Medical Intervention Limits:    No intubation (DNI)       Ankit Sutherland MD  03/27/25

## 2025-03-27 NOTE — PROGRESS NOTES
Malnutrition Severity Assessment    Patient Name:  Fanny Boyce  YOB: 1940  MRN: [id number]  Admit Date:  3/26/2025    Patient meets criteria for : (P) Severe Malnutrition    Comments:  Pt meets criteria for severe chronic malnutrition: Severe muscle wasting & subcutaneous fat loss.    Malnutrition Severity Assessment  Malnutrition Type: (P) Chronic Disease - Related Malnutrition  Malnutrition Type (Last 8 Hours)       Malnutrition Severity Assessment       Row Name 03/27/25 1515       Malnutrition Severity Assessment    Malnutrition Type Chronic Disease - Related Malnutrition (P)       Row Name 03/27/25 1515       Muscle Loss    Loss of Muscle Mass Findings Severe (P)     Adventism Region Severe - deep hollowing/scooping, lack of muscle to touch, facial bones well defined (P)     Clavicle Bone Region Severe - protruding prominent bone (P)     Acromion Bone Region Severe - squared shoulders, bones, and acromion process protrusion prominent (P)     Dorsal Hand Region Severe - prominent depression (P)     Patellar Region Severe - prominent bone, square looking, very little muscle definition (P)     Anterior Thigh Region Severe - line/depression along thigh, obviously thin (P)     Posterior Calf Region Moderate - some roundness, slight firmness (P)       Row Name 03/27/25 1515       Fat Loss    Subcutaneous Fat Loss Findings Severe (P)     Orbital Region  Severe - pronounced hollowness/depression, dark circles, loose saggy skin (P)     Upper Arm Region Severe - mostly skin, very little space between folds, fingers touch (P)       Row Name 03/27/25 1515       Criteria Met (Must meet criteria for severity in at least 2 of these categories: M Wasting, Fat Loss, Fluid, Secondary Signs, Wt. Status, Intake)    Patient meets criteria for  Severe Malnutrition (P)                     Electronically signed by:  July Mancia  03/27/25 15:16 EDT

## 2025-03-27 NOTE — PROGRESS NOTES
Pharmacy to Dose Heparin Infusion Note    Fanny Boyce is a 84 y.o. female receiving heparin infusion.     Therapy for (VTE/Cardiac): Cardiac - pt has PE but targeting 0.3-0.5 Anti-Xa  Patient Weight: 52.2 kg  Initial Bolus (Y/N): No  Any Bolus (Y/N): No     Signs or Symptoms of Bleeding: No, does have hx of GIB (admitted here 2/25 for significant bleed)    Cardiac or Other (Not VTE)   Initial Bolus: 60 units/kg (Max 4,000 units)  Initial rate: 12 units/kg/hr (Max 1,000 units/hr)   Anti-Xa Bolus   Dose Infusion Hold   Time Infusion Rate Change (units/kg/hr) Repeat  Anti-Xa    < 0.11 50 units/kg  (4000 units Max) None Increase by  3 units/kg/hr 6 hours   0.11- 0.19 25 units/kg  (2000 units Max) None Increase by  2 units/kg/hr 6 hours   0.2 - 0.29 0 None Increase by  1 units/kg/hr 6 hours   0.3 - 0.5 0 None No Change 6 hours (after 2 consecutive levels in range check qAM)   0.51 - 0.6 0 None Decrease by  1 units/kg/hr 6 hours   0.61 - 0.8 0 30 minutes Decrease by  2 units/kg/hr 6 hours   0.81 - 1 0 60 minutes Decrease by  3 units/kg/hr 6 hours   >1 0 Hold  After Anti-Xa less than 0.5 decrease previous rate by  4 units/kg/hr  Every 2 hours until Anti-Xa  less than 0.5 then when infusion restarts in 6 hours     Results from last 7 days   Lab Units 03/27/25  1320 03/27/25  0355 03/26/25  1725   INR   --   --  0.98   HEMOGLOBIN g/dL 10.5* 9.6* 11.5*   HEMATOCRIT % 35.7 32.8* 38.6   PLATELETS 10*3/mm3 225 199 246       Date   Time   Anti-Xa Current Rate (units/kg/hr) Bolus   (units) Rate Change   (units/kg/hr) New Rate (units/kg/hr) Repeat  Anti-Xa Comments /  Pump Check    3/26 1946 0.1 -- No bolus ever +12 12 0200 Off Eliquis since last admission for GIB 2/20. Anticoagulating conservatively given above. Weight/rate verified. AKIRA THOMAS   3/27 0355 0.14 12 -- +2 14 1200 Discussed w/ nurse   3/27 1320 0.20 14 -- +1 15 2000 Akira Church RN       --           --           --           --           --           --           --                                                                                                                                   Rolanda Schuster, PharmD  3/27/2025  14:01 EDT

## 2025-03-27 NOTE — PROGRESS NOTES
"          Clinical Nutrition Assessment     Patient Name: Fanny Boyce  YOB: 1940  MRN: [id number]  Date of Encounter: 03/27/25 14:30 EDT  Admission date: 3/26/2025  Reason for Visit: Nonhealing wound or pressure ulcer    Assessment   Nutrition Assessment   Admission Diagnosis:  Pulmonary embolism [I26.99]    Problem List:    Pulmonary embolism    Atrial fibrillation    COPD (chronic obstructive pulmonary disease)    Dementia    Parkinson's disease      PMH:   She  has a past medical history of Atrial fibrillation, COPD (chronic obstructive pulmonary disease), Dementia, History of lung cancer, History of transfusion, Parkinson disease, and Pulmonary embolism.    PSH:  She  has a past surgical history that includes Joint replacement; Lung cancer surgery; Hip hemiArthroplasty (Right, 2/10/2025); and Esophagogastroduodenoscopy (N/A, 2/21/2025).    Applicable Nutrition History:   2/14/25) Severe chronic malnutrition    Anthropometrics     Height: Height: 152.4 cm (60\")  Last Filed Weight: Weight: 52.2 kg (115 lb 1.3 oz) (03/26/25 1715)  Method: Weight Method: Bed scale  BMI: BMI (Calculated): 22.5    UBW:  115-119 per EMR  Weight change: No significant changes  Wt Readings from Last 30 Encounters:   03/26/25 1715 52.2 kg (115 lb 1.3 oz)   03/20/25 1210 52.2 kg (115 lb)   02/25/25 0542 50.9 kg (112 lb 3.4 oz)   02/24/25 0503 52 kg (114 lb 10.2 oz)   02/23/25 0600 51.9 kg (114 lb 6.7 oz)   02/22/25 0600 52.1 kg (114 lb 13.8 oz)   02/21/25 0600 51.3 kg (113 lb 1.5 oz)   02/20/25 1337 54 kg (119 lb 0.8 oz)   02/18/25 0600 54 kg (119 lb 1.6 oz)   02/17/25 0511 54.3 kg (119 lb 9.6 oz)   02/09/25 2225 52.1 kg (114 lb 12.8 oz)   02/09/25 1936 53.1 kg (117 lb)   06/01/24 1125 53.1 kg (117 lb)   03/13/24 2136 53.1 kg (117 lb)   01/31/24 0831 53.1 kg (117 lb)        Nutrition Focused Physical Exam    Date:  03/27/25        Patient meets criteria for malnutrition diagnosis, see MSA note.     Subjective "   Reported/Observed/Food/Nutrition Related History:     Pt was quiet and answered most questions with one word. Pt reports she has a normal appetite, no change now or PTA. States she ate most of breakfast & lunch, but no documentation of this. Pt has not noticed any wt loss. Says she has been getting boost BID here and likes them. Declined chewing.swallowing issues. NKFA. Noted no WOC consult for wound & no staging documentation.     Current Nutrition Prescription   PO: Diet: Cardiac; Healthy Heart (2-3 Na+); Fluid Consistency: Thin (IDDSI 0)  Oral Nutrition Supplement: N/A  Intake: Insufficient data    Assessment & Plan   Nutrition Diagnosis   Date:  03/27/25  Updated:  Problem Malnutrition, chronic severe   Etiology Inability to consume adequate energy 2/2 multiple chronic conditions   Signs/Symptoms severe muscle wasting and severe subcutaneous fat loss   Status: New     Goal:   Nutrition to support treatment and Establish PO      Nutrition Intervention      Follow treatment progress, Care plan reviewed, Advised available snacks, Interview for preferences, Encourage intake, Supplement provided    Provide Boost BID   Encourage intake of meals & available snacks.    Monitoring/Evaluation:   Per protocol, PO intake, Supplement intake, Weight, POC/GOC    July Mancia  Time Spent: 35 min

## 2025-03-27 NOTE — ED NOTES
Fanny Boyce    Nursing Report ED to Floor:  Mental status: A&O x2 (baseline) disoriented to situation and time   Ambulatory status: Non Ambulatory in ED  Oxygen Therapy:  2L NC   Cardiac Rhythm: Afib   Admitted from: ED/Home   Safety Concerns:  Fall Risk   Precautions: NA  Social Issues: NA  ED Room #:  15    ED Nurse Phone Extension - [phone] or may call [phone].      HPI:   Chief Complaint   Patient presents with    Shortness of Breath       Past Medical History:  Past Medical History:   Diagnosis Date    Atrial fibrillation     COPD (chronic obstructive pulmonary disease)     Dementia     History of lung cancer     Parkinson disease     Pulmonary embolism         Past Surgical History:  Past Surgical History:   Procedure Laterality Date    ENDOSCOPY N/A 2/21/2025    Procedure: ESOPHAGOGASTRODUODENOSCOPY;  Surgeon: Steve Gamez MD;  [facility];  Service: Gastroenterology;  Laterality: N/A;    HIP HEMIARTHROPLASTY Right 2/10/2025    Procedure: HIP HEMIARTHROPLASTY RIGHT;  Surgeon: Frandy Patino MD;  [facility];  Service: Orthopedics;  Laterality: Right;    JOINT REPLACEMENT      LUNG CANCER SURGERY          Admitting Doctor:   No admitting provider for patient encounter.    Consulting Provider(s):  Consults       Date and Time Order Name Status Description    2/20/2025  4:12 PM Gastroenterology Consult Completed              Admitting Diagnosis:   The primary encounter diagnosis was Bilateral pulmonary embolism. Diagnoses of Elevated troponin, SOB (shortness of breath), History of atrial fibrillation, and History of dementia were also pertinent to this visit.    Most Recent Vitals:   Vitals:    03/26/25 1715 03/26/25 1727 03/26/25 1736 03/26/25 1857   BP:  122/60  132/62   BP Location:  Right arm     Patient Position:  Sitting     Pulse: 86   84   Resp: 18  18    Temp: 97.6 °F (36.4 °C)      TempSrc: Oral      SpO2: 93%   92%   Weight: 52.2 kg (115 lb 1.3 oz)      Height: 152.4 cm  "(60\")          Active LDAs/IV Access:   Lines, Drains & Airways       Active LDAs       Name Placement date Placement time Site Days    Peripheral IV 03/26/25 1750 Left Antecubital 03/26/25  1750  Antecubital  less than 1                    Labs (abnormal labs have a star):   Labs Reviewed   COMPREHENSIVE METABOLIC PANEL - Abnormal; Notable for the following components:       Result Value    Glucose 103 (*)     Alkaline Phosphatase 119 (*)     BUN/Creatinine Ratio 28.1 (*)     All other components within normal limits    Narrative:     GFR Categories in Chronic Kidney Disease (CKD)      GFR Category          GFR (mL/min/1.73)    Interpretation  G1                     90 or greater         Normal or high (1)  G2                      60-89                Mild decrease (1)  G3a                   45-59                Mild to moderate decrease  G3b                   30-44                Moderate to severe decrease  G4                    15-29                Severe decrease  G5                    14 or less           Kidney failure          (1)In the absence of evidence of kidney disease, neither GFR category G1 or G2 fulfill the criteria for CKD.    eGFR calculation 2021 CKD-EPI creatinine equation, which does not include race as a factor   BNP (IN-HOUSE) - Abnormal; Notable for the following components:    proBNP 6,249.0 (*)     All other components within normal limits    Narrative:     This assay is used as an aid in the diagnosis of individuals suspected of having heart failure. It can be used as an aid in the diagnosis of acute decompensated heart failure (ADHF) in patients presenting with signs and symptoms of ADHF to the emergency department (ED). In addition, NT-proBNP of <300 pg/mL indicates ADHF is not likely.    Age Range Result Interpretation  NT-proBNP Concentration (pg/mL:      <50             Positive            >450                   Gray                 300-450                    Negative             " <300    50-75           Positive            >900                  Gray                300-900                  Negative            <300      >75             Positive            >1800                  Gray                300-1800                  Negative            <300   TROPONIN - Abnormal; Notable for the following components:    HS Troponin T 162 (*)     All other components within normal limits    Narrative:     High Sensitive Troponin T Reference Range:  <14.0 ng/L- Negative Female for AMI  <22.0 ng/L- Negative Male for AMI  >=14 - Abnormal Female indicating possible myocardial injury.  >=22 - Abnormal Male indicating possible myocardial injury.   Clinicians would have to utilize clinical acumen, EKG, Troponin, and serial changes to determine if it is an Acute Myocardial Infarction or myocardial injury due to an underlying chronic condition.        CBC WITH AUTO DIFFERENTIAL - Abnormal; Notable for the following components:    Hemoglobin 11.5 (*)     MCH 25.8 (*)     MCHC 29.8 (*)     All other components within normal limits   HIGH SENSITIVITIY TROPONIN T 1HR - Abnormal; Notable for the following components:    HS Troponin T 156 (*)     All other components within normal limits    Narrative:     High Sensitive Troponin T Reference Range:  <14.0 ng/L- Negative Female for AMI  <22.0 ng/L- Negative Male for AMI  >=14 - Abnormal Female indicating possible myocardial injury.  >=22 - Abnormal Male indicating possible myocardial injury.   Clinicians would have to utilize clinical acumen, EKG, Troponin, and serial changes to determine if it is an Acute Myocardial Infarction or myocardial injury due to an underlying chronic condition.        APTT - Abnormal; Notable for the following components:    PTT 29.6 (*)     All other components within normal limits    Narrative:     PTT = The equivalent PTT values for the therapeutic range of heparin levels at 0.3 to 0.5 U/ml are 60 to 70 seconds.   COVID-19 AND FLU A/B, NP  SWAB IN TRANSPORT MEDIA 1 HR TAT - Normal    Narrative:     Fact sheet for providers: https://www.fda.gov/media/801806/download    Fact sheet for patients: https://www.fda.gov/media/176149/download    Test performed by PCR.   PROTIME-INR - Normal   POCT CREATININE - Normal   COVID PRE-OP / PRE-PROCEDURE SCREENING ORDER (NO ISOLATION)    Narrative:     The following orders were created for panel order COVID PRE-OP / PRE-PROCEDURE SCREENING ORDER (NO ISOLATION) - Swab, Nasopharynx.  Procedure                               Abnormality         Status                     ---------                               -----------         ------                     COVID-19 and FLU A/B PCR...[[id number]]  Normal              Final result                 Please view results for these tests on the individual orders.   RAINBOW DRAW    Narrative:     The following orders were created for panel order Emily Draw.  Procedure                               Abnormality         Status                     ---------                               -----------         ------                     Green Top (Gel)[[id number]]                                  Final result               Lavender Top[[id number]]                                     Final result               Gold Top - SST[[id number]]                                   Final result               Aponte Top[[id number]]                                         Final result               Light Blue Top[[id number]]                                   Final result                 Please view results for these tests on the individual orders.   HEPARIN ANTI XA   POCT CREATININE   CBC AND DIFFERENTIAL    Narrative:     The following orders were created for panel order CBC & Differential.  Procedure                               Abnormality         Status                     ---------                               -----------         ------                     CBC Auto Differential[[id number]]         Abnormal            Final result                 Please view results for these tests on the individual orders.   GREEN TOP   LAVENDER TOP   GOLD TOP - SST   GRAY TOP   LIGHT BLUE TOP       Meds Given in ED:   Medications   sodium chloride 0.9 % flush 10 mL (has no administration in time range)   heparin 12160 units/250 mL (100 units/mL) in 0.45 % NaCl infusion (has no administration in time range)   Pharmacy to Dose Heparin (has no administration in time range)   ipratropium-albuterol (DUO-NEB) nebulizer solution 3 mL (3 mL Nebulization Given 3/26/25 1735)   iopamidol (ISOVUE-300) 61 % injection 78 mL (78 mL Intravenous Given 3/26/25 1917)     heparin, 12 Units/kg/hr  Pharmacy to Dose Heparin,          Last NIH score:                                                          Dysphagia screening results:  Patient Factors Component (Dysphagia:Stroke or Rule-out)  Best Eye Response: 4-->(E4) spontaneous (03/26/25 1735)  Best Motor Response: 6-->(M6) obeys commands (03/26/25 1735)  Best Verbal Response: 4-->(V4) confused (03/26/25 1735)  Spencer Coma Scale Score: 14 (03/26/25 1735)     Kemal Coma Scale:  No data recorded     CIWA:        Restraint Type:            Isolation Status:  No active isolations

## 2025-03-27 NOTE — PLAN OF CARE
Goal Outcome Evaluation:  Plan of Care Reviewed With: patient, child        Progress: no change  Outcome Evaluation: A/O x 2.  Disoriented to time.  Occasional labored breathing, and tachyapenic, particularly during conversation and with movement.   Pt currently wearing 2L NC. Pt denies pain.  Unable to ambulate d/t LANDERS.  PW in place.  WOC consulted for an old PI on coccyx that was present upon admission.  Disease process discussed with pt's daughter.  Pennie has elected to seek comfort measures.  Hopsice consult ordered - pt is not inpatient hospice appropriate at this time; however, pt is outpatient hospice appropriate.  Comfort meds ordered PRN.

## 2025-03-27 NOTE — CASE MANAGEMENT/SOCIAL WORK
Discharge Planning Assessment  [facility]     Patient Name: Fanny Boyce  MRN: [id number]  Today's Date: 3/27/2025    Admit Date: 3/26/2025    Plan: TBD   Discharge Needs Assessment       Row Name 03/27/25 1041       Living Environment    People in Home child(cari), adult    Current Living Arrangements home    Potentially Unsafe Housing Conditions none    In the past 12 months has the electric, gas, oil, or water company threatened to shut off services in your home? No    Primary Care Provided by child(cari)    Provides Primary Care For no one, unable/limited ability to care for self    Family Caregiver if Needed child(cari), adult    Quality of Family Relationships helpful;involved;supportive    Able to Return to Prior Arrangements yes       Resource/Environmental Concerns    Resource/Environmental Concerns none       Transportation Needs    In the past 12 months, has lack of transportation kept you from medical appointments or from getting medications? no    In the past 12 months, has lack of transportation kept you from meetings, work, or from getting things needed for daily living? No       Food Insecurity    Within the past 12 months, you worried that your food would run out before you got the money to buy more. Never true    Within the past 12 months, the food you bought just didn't last and you didn't have money to get more. Never true       Transition Planning    Patient/Family Anticipates Transition to home with family    Transportation Anticipated family or friend will provide       Discharge Needs Assessment    Readmission Within the Last 30 Days no previous admission in last 30 days    Equipment Currently Used at Home rollator;walker, rolling;wheelchair                   Discharge Plan       Row Name 03/27/25 1106       Plan    Plan TBD    Patient/Family in Agreement with Plan yes    Plan Comments Met with MsAmelie Boyce at the bedside to initiate discharge plan. Also called daughter for information. Ms.  Carli lives with her daughter in a [facility]. She needs assistance with activities of daily living at baseline. She uses a wheelchair and rollator for mobility, and she also has a rolling walker. Per her daughter, she is current with [facility]. Her primary care provider is STEW Shukla. She denies obstacles to getting medical care or obtaining medications. She was at [facility] recently. Patient and daughter are agreeable to going back to rehab if recommended. Her discharge plan is pending hospital course.  will continue to follow plan of care and assist with discharge planning needs as indicated.                  Selected Continued Care - Prior Encounters Includes continued care and service providers with selected services from prior encounters from 12/26/2024 to 3/27/2025      Discharged on 2/26/2025 Admission date: 2/20/2025 - Discharge disposition: Rehab Facility or Unit (DC - External)      Destination       Service Provider Services Address Phone Fax Patient Preferred    [facility] [address] [address] [phone] --                      Discharged on 2/18/2025 Admission date: 2/9/2025 - Discharge disposition: Rehab Facility or Unit (DC - External)      Destination       Service Provider Services Address Phone Fax Patient Preferred    [facility] [address] [address] [phone] --                             Demographic Summary       Row Name 03/27/25 1039       General Information    Admission Type observation    Arrived From emergency department    Referral Source admission list    Reason for Consult discharge planning    Preferred Language English       Contact Information    Permission Granted to Share Info With family/designee    Contact Information Obtained for      Contact Information Comments Lety Lewis Daughter [phone]                   Functional Status       Row Name 03/27/25 1039       Functional Status    Usual Activity Tolerance moderate    Current Activity Tolerance poor       Physical Activity    On average, how many days per week do you engage in moderate to strenuous exercise (like a brisk walk)? 0 days    On average, how many minutes do you engage in exercise at this level? 0 min    Number of minutes of exercise per week 0       Assessment of Health Literacy    How often do you have someone help you read hospital materials? Always    How often do you have problems learning about your medical condition because of difficulty understanding written information? Often    How often do you have a problem understanding what is told to you about your medical condition? Often    How confident are you filling out medical forms by yourself? Not at all    Health Literacy Fair       Functional Status, IADL    Medications assistive person    Meal Preparation assistive person    Housekeeping assistive person    Laundry assistive person    Shopping assistive person    If for any reason you need help with day-to-day activities such as bathing, preparing meals, shopping, managing finances, etc., do you get the help you need? I get all the help I need       Mental Status    General Appearance WDL WDL       Mental Status Summary    Recent Changes in Mental Status/Cognitive Functioning no changes                   Psychosocial    No documentation.                  Abuse/Neglect    No documentation.                  Legal    No documentation.                  Substance Abuse    No documentation.                  Patient Forms    No documentation.                     Johnna Bee RN

## 2025-03-27 NOTE — PROGRESS NOTES
Pharmacy to Dose Heparin Infusion Note    Fanny Boyce is a 84 y.o. female receiving heparin infusion.     Therapy for (VTE/Cardiac): Cardiac - pt has PE but targeting 0.3-0.5 Anti-Xa  Patient Weight: 52.2kg  Initial Bolus (Y/N): No  Any Bolus (Y/N): No     Signs or Symptoms of Bleeding: No, does have hx of GIB (admitted here 2/25 for significant bleed)    Cardiac or Other (Not VTE)   Initial Bolus: 60 units/kg (Max 4,000 units)  Initial rate: 12 units/kg/hr (Max 1,000 units/hr)   Anti-Xa Bolus   Dose Infusion Hold   Time Infusion Rate Change (units/kg/hr) Repeat  Anti-Xa    < 0.11 50 units/kg  (4000 units Max) None Increase by  3 units/kg/hr 6 hours   0.11- 0.19 25 units/kg  (2000 units Max) None Increase by  2 units/kg/hr 6 hours   0.2 - 0.29 0 None Increase by  1 units/kg/hr 6 hours   0.3 - 0.5 0 None No Change 6 hours (after 2 consecutive levels in range check qAM)   0.51 - 0.6 0 None Decrease by  1 units/kg/hr 6 hours   0.61 - 0.8 0 30 minutes Decrease by  2 units/kg/hr 6 hours   0.81 - 1 0 60 minutes Decrease by  3 units/kg/hr 6 hours   >1 0 Hold  After Anti-Xa less than 0.5 decrease previous rate by  4 units/kg/hr  Every 2 hours until Anti-Xa  less than 0.5 then when infusion restarts in 6 hours     Results from last 7 days   Lab Units 03/26/25  1725   INR  0.98   HEMOGLOBIN g/dL 11.5*   HEMATOCRIT % 38.6   PLATELETS 10*3/mm3 246       Date   Time   Anti-Xa Current Rate (units/kg/hr) Bolus   (units) Rate Change   (units/kg/hr) New Rate (units/kg/hr) Repeat  Anti-Xa Comments /  Pump Check    3/26 1946 pending -- No bolus ever +12 12 0200 Off Eliquis since last admission for GIB 2/20. Anticoagulating conservatively given above. Weight/rate verified. AKIRA Woodruff,  TAYLOR  3/26/2025  20:06 EDT

## 2025-03-27 NOTE — H&P
[facility] Medicine Services  HISTORY AND PHYSICAL    Patient Name: Fanny Boyce  : 1940  MRN: [id number]  Primary Care Physician: Nemo Aponte PA  Date of admission: 3/26/2025    Subjective   Subjective     Chief Complaint:  Shortness of breath     HPI:  Fanny Boyce is a 84 y.o. female with PMH significant for A-fib (currently not anticoagulated secondary to recent GI bleed), COPD, dementia, lung cancer, Parkinson disease, who presents to the ED with complaint of shortness of breath.  HPI obtained per medical record and daughter Suzanne Lewis as patient is poor historian.  Her daughter states that over the past couple days she has had increasing shortness of breath.  She is only able to take a few steps before becoming short of breath.  She reports that her mother has been off of her Eliquis since having a GI bleed in February.  The patient currently denies any chest pain and reports she has felt short of breath for years.  Upon arrival to the ED, she is noted to have elevated troponin and proBNP.  CXR is negative for acute findings.  CTA chest notes emphysema as well as bilateral pulmonary emboli without definite right heart strain.  She will be admitted to hospital medicine for further evaluation.    Review of Systems   Unable to perform ROS: Dementia          Personal History     Past Medical History:   Diagnosis Date    Atrial fibrillation     COPD (chronic obstructive pulmonary disease)     Dementia     History of lung cancer     Parkinson disease     Pulmonary embolism        Past Surgical History:   Procedure Laterality Date    ENDOSCOPY N/A 2025    Procedure: ESOPHAGOGASTRODUODENOSCOPY;  Surgeon: Steve Gamez MD;  Location: [facility] ENDOSCOPY;  Service: Gastroenterology;  Laterality: N/A;    HIP HEMIARTHROPLASTY Right 2/10/2025    Procedure: HIP HEMIARTHROPLASTY RIGHT;  Surgeon: Frandy Patino MD;  Location: [facility] OR;  Service: Orthopedics;   Laterality: Right;    JOINT REPLACEMENT      LUNG CANCER SURGERY         Family History:  family history includes Aneurysm in her father; Heart disease in her father and mother.     Social History:  reports that she has quit smoking. Her smoking use included cigarettes. She has a 40 pack-year smoking history. She has been exposed to tobacco smoke. She has never used smokeless tobacco. She reports that she does not drink alcohol and does not use drugs.  Social History     Social History Narrative    Not on file       Medications:  Metoprolol Tartrate, acetaminophen, carbidopa-levodopa, fluticasone, ipratropium-albuterol, meclizine, pantoprazole, polyethylene glycol, sennosides-docusate, traZODone, and vitamin D3    Allergies   Allergen Reactions    Nitrofurantoin Unknown (See Comments)    Latex Unknown (See Comments)     Other reaction(s): Itching, Rash   2itching and Severe rash    Morphine Nausea And Vomiting, Nausea Only, Unknown - Low Severity, Other (See Comments), Rash and Unknown (See Comments)     Nausea    morphine sulfate    Wound Dressing Adhesive Other (See Comments) and Rash       Objective   Objective     Vital Signs:   Temp:  [97.6 °F (36.4 °C)] 97.6 °F (36.4 °C)  Heart Rate:  [84-86] 84  Resp:  [18] 18  BP: (122-132)/(60-62) 132/62    Physical Exam   Constitutional: Awake, alert, no acute distress  Eyes: PERRLA, sclerae anicteric, no conjunctival injection  HENT: NCAT, mucous membranes moist  Neck: Supple, no thyromegaly, no lymphadenopathy, trachea midline  Respiratory: Clear to auscultation bilaterally, nonlabored respirations   Cardiovascular: irregular, no murmurs, rubs, or gallops, palpable pedal pulses bilaterally  Gastrointestinal: Positive bowel sounds, soft, nontender, nondistended  Musculoskeletal: No bilateral ankle edema, no clubbing or cyanosis to extremities  Psychiatric: Appropriate affect, cooperative  Neurologic: Oriented to self and place, disoriented to time and poor historian,  strength symmetric in all extremities, Cranial Nerves grossly intact to confrontation, speech clear  Skin: No rashes      Result Review:  I have personally reviewed the results from the time of this admission to 3/26/2025 21:35 EDT and agree with these findings:  [x]  Laboratory list / accordion  [x]  Microbiology  [x]  Radiology  [x]  EKG/Telemetry   []  Cardiology/Vascular   []  Pathology  [x]  Old records  []  Other:  Most notable findings include:     LAB RESULTS:      Lab 03/26/25 2010 03/26/25  1725   WBC  --  7.00   HEMOGLOBIN  --  11.5*   HEMATOCRIT  --  38.6   PLATELETS  --  246   NEUTROS ABS  --  4.73   IMMATURE GRANS (ABS)  --  0.02   LYMPHS ABS  --  1.49   MONOS ABS  --  0.53   EOS ABS  --  0.16   MCV  --  86.7   PROTIME  --  13.1   APTT  --  29.6*   HEPARIN ANTI-XA 0.10*  --          Lab 03/26/25  1728 03/26/25  1725   SODIUM  --  141   POTASSIUM  --  4.3   CHLORIDE  --  104   CO2  --  25.0   ANION GAP  --  12.0   BUN  --  18   CREATININE 0.70 0.64   EGFR  --  86.2   GLUCOSE  --  103*   CALCIUM  --  9.2         Lab 03/26/25  1725   TOTAL PROTEIN 6.8   ALBUMIN 3.7   GLOBULIN 3.1   ALT (SGPT) <5   AST (SGOT) 17   BILIRUBIN 0.3   ALK PHOS 119*         Lab 03/26/25  1838 03/26/25  1725   PROBNP  --  6,249.0*   HSTROP T 156* 162*   PROTIME  --  13.1   INR  --  0.98                 Brief Urine Lab Results  (Last result in the past 365 days)        Color   Clarity   Blood   Leuk Est   Nitrite   Protein   CREAT   Urine HCG        02/20/25 1846 Yellow   Cloudy   Trace   Moderate (2+)   Negative   Negative                 Microbiology Results (last 10 days)       Procedure Component Value - Date/Time    COVID PRE-OP / PRE-PROCEDURE SCREENING ORDER (NO ISOLATION) - Swab, Nasopharynx [[id number]]  (Normal) Collected: 03/26/25 1756    Lab Status: Final result Specimen: Swab from Nasopharynx Updated: 03/26/25 1830    Narrative:      The following orders were created for panel order COVID PRE-OP / PRE-PROCEDURE  SCREENING ORDER (NO ISOLATION) - Swab, Nasopharynx.  Procedure                               Abnormality         Status                     ---------                               -----------         ------                     COVID-19 and FLU A/B PCR...[[id number]]  Normal              Final result                 Please view results for these tests on the individual orders.    COVID-19 and FLU A/B PCR, 1 HR TAT - Swab, Nasopharynx [[id number]]  (Normal) Collected: 03/26/25 1756    Lab Status: Final result Specimen: Swab from Nasopharynx Updated: 03/26/25 1830     COVID19 Not Detected     Influenza A PCR Not Detected     Influenza B PCR Not Detected    Narrative:      Fact sheet for providers: https://www.fda.gov/media/689534/download    Fact sheet for patients: https://www.fda.gov/media/786357/download    Test performed by PCR.            CT Angiogram Chest  Addendum Date: 3/26/2025  ADDENDUM #1 Addition to impression: There is evidence of emphysema. Correlate with patient history and risk factors and please assess if the patient meets criteria for routine low dose CT lung cancer screening. Electronically Signed: Royer Quintero MD  3/26/2025 7:37 PM EDT  Workstation ID: [id number] ORIGINAL REPORT: CT ANGIOGRAM CHEST Date of Exam: 3/26/2025 7:14 PM EDT Indication: SOB, recent hip fx, rule out PE. Comparison: CTA chest 2/12/2025 Technique: CTA of the chest was performed after the uneventful intravenous administration of 73 mL Isovue-370. Reconstructed coronal and sagittal images were also obtained. In addition, a 3-D volume rendered image was created for interpretation. Automated exposure control and iterative reconstruction methods were used. Findings: There are bilateral pulmonary emboli including pulmonary emboli in the distal right main pulmonary artery which extends into the lobar and segmental branches of the right lower lobe and into the segmental branches of the right middle lobe (series 4 image  44-55). There  is a pulmonary embolism in the lobar pulmonary artery of the left upper lobe (series 4 image 35). Grossly normal and unchanged caliber of the main pulmonary artery. No definite right heart strain. Unremarkable appearance of the thoracic aorta. No thoracic adenopathy. There is a large sliding hiatal hernia. Unremarkable appearance of the airways. The lungs are clear without focal consolidation. No pulmonary infarct. There is centrilobular emphysema. No pleural effusion or pneumothorax. There is accentuated thoracic kyphosis with chronic severe compression deformity of the T12 vertebral body. No acute osseous findings. Unremarkable appearance of the upper abdomen. There is nonobstructing right nephrolithiasis. Impression: Bilateral pulmonary emboli including pulmonary emboli in the distal right main pulmonary artery extending into the lobar and segmental branches of the right lower lobe and into the segmental branches of the right middle lobe. There is a pulmonary embolism in the lobar pulmonary artery of the left upper lobe. No definite CT evidence of right heart however correlation with troponin and EKG recommended given the embolic burden. Findings discussed with ordering provider Paulina Mora by Dr. Royer Quintero via secure epic chat at 7:25 p.m. 3/26/2025. Electronically Signed: Royer Quintero MD  3/26/2025 7:27 PM EDT  Workstation ID: [id number]    Result Date: 3/26/2025  CT ANGIOGRAM CHEST Date of Exam: 3/26/2025 7:14 PM EDT Indication: SOB, recent hip fx, rule out PE. Comparison: CTA chest 2/12/2025 Technique: CTA of the chest was performed after the uneventful intravenous administration of 73 mL Isovue-370. Reconstructed coronal and sagittal images were also obtained. In addition, a 3-D volume rendered image was created for interpretation. Automated exposure control and iterative reconstruction methods were used. Findings: There are bilateral pulmonary emboli including pulmonary emboli in the distal right  main pulmonary artery which extends into the lobar and segmental branches of the right lower lobe and into the segmental branches of the right middle lobe (series 4 image  44-55). There is a pulmonary embolism in the lobar pulmonary artery of the left upper lobe (series 4 image 35). Grossly normal and unchanged caliber of the main pulmonary artery. No definite right heart strain. Unremarkable appearance of the thoracic aorta. No thoracic adenopathy. There is a large sliding hiatal hernia. Unremarkable appearance of the airways. The lungs are clear without focal consolidation. No pulmonary infarct. There is centrilobular emphysema. No pleural effusion or pneumothorax. There is accentuated thoracic kyphosis with chronic severe compression deformity of the T12 vertebral body. No acute osseous findings. Unremarkable appearance of the upper abdomen. There is nonobstructing right nephrolithiasis.     Impression: Impression: Bilateral pulmonary emboli including pulmonary emboli in the distal right main pulmonary artery extending into the lobar and segmental branches of the right lower lobe and into the segmental branches of the right middle lobe. There is a pulmonary embolism in the lobar pulmonary artery of the left upper lobe. No definite CT evidence of right heart however correlation with troponin and EKG recommended given the embolic burden. Findings discussed with ordering provider Paulina Mora by Dr. Royer Quintero via secure epic chat at 7:25 p.m. 3/26/2025. Electronically Signed: Royer Quintero MD  3/26/2025 7:27 PM EDT  Workstation ID: [id number]    XR Chest 1 View  Result Date: 3/26/2025  XR CHEST 1 VW Date of Exam: 3/26/2025 5:18 PM EDT Indication: CHF/COPD protocol Comparison: 2/20/2025 Findings: Heart shadow is mildly enlarged with pulmonary vasculature appears normal. Prominent pulmonary hilar shadows are unchanged. Lungs are clear except for thin linear scarring left lung base similar to prior study. No  clearly new pulmonary parenchymal disease, evidence of effusion or pneumothorax is seen. Old healed left proximal humerus fracture is again noted.     Impression: Impression: No new chest disease. Electronically Signed: Kevin Kaminski MD  3/26/2025 6:02 PM EDT  Workstation ID: [id number]      Results for orders placed during the hospital encounter of 02/09/25    Adult Transthoracic Echo Complete W/ Cont if Necessary Per Protocol    Interpretation Summary    Left ventricular systolic function is normal. Estimated left ventricular EF = 70%    Moderate tricuspid valve regurgitation is present.    Estimated  right ventricular systolic pressure from tricuspid regurgitation is 46 mmHg.    There is a trivial pericardial effusion.      Assessment & Plan   Assessment & Plan       Pulmonary embolism    Atrial fibrillation    COPD (chronic obstructive pulmonary disease)    Dementia    Parkinson's disease    84 y.o. female with PMH significant for A-fib (currently not anticoagulated secondary to recent GI bleed), COPD, dementia, lung cancer, Parkinson disease, who presents to the ED with complaint of shortness of breath who is found to have concern for bilateral PEs.    Bilateral pulmonary emboli  - Known history of A-fib without recent anticoagulation secondary to GI bleed in February 2025  - Heparin drip started in ED without bolus dosing, cardiology notified and agreed  - Cardiology consult in the a.m. for further anticoagulation    Elevated troponin  - Likely secondary to above  - No complaints of chest pain  - Trending down    A-fib  - Currently rate controlled  - Continue metoprolol with hold parameters    Peptic ulcer disease  Recent GI bleed  - Continue Protonix twice daily    Dementia  Parkinson's disease  - Continue carbidopa levodopa    COPD  - Not in acute exacerbation  - Baseline room air  - DuoNebs as needed  - Continue Flonase    DVT prophylaxis:  Heparin gtt    CODE STATUS: Discussed with daughter over the phone  Code  Status (Patient has no pulse and is not breathing): No CPR (Do Not Attempt to Resuscitate)  Medical Interventions (Patient has pulse or is breathing): Limited Support  Medical Intervention Limits: No intubation (DNI)      Expected Discharge  Expected discharge date/ time has not been documented.     Signature: Electronically signed by LAVERNE Guajardo, 03/26/25, 9:35 PM EDT.

## 2025-03-28 PROCEDURE — 99232 SBSQ HOSP IP/OBS MODERATE 35: CPT | Performed by: STUDENT IN AN ORGANIZED HEALTH CARE EDUCATION/TRAINING PROGRAM

## 2025-03-28 RX ADMIN — PANTOPRAZOLE SODIUM 40 MG: 40 TABLET, DELAYED RELEASE ORAL at 21:18

## 2025-03-28 RX ADMIN — FLUTICASONE PROPIONATE 2 SPRAY: 50 SPRAY, METERED NASAL at 10:52

## 2025-03-28 RX ADMIN — METOPROLOL TARTRATE 25 MG: 25 TABLET, FILM COATED ORAL at 21:17

## 2025-03-28 RX ADMIN — CARBIDOPA AND LEVODOPA 1 TABLET: 25; 100 TABLET ORAL at 17:43

## 2025-03-28 RX ADMIN — CARBIDOPA AND LEVODOPA 1 TABLET: 25; 100 TABLET ORAL at 09:13

## 2025-03-28 RX ADMIN — CARBIDOPA AND LEVODOPA 1 TABLET: 25; 100 TABLET ORAL at 13:33

## 2025-03-28 RX ADMIN — METOPROLOL TARTRATE 25 MG: 25 TABLET, FILM COATED ORAL at 10:53

## 2025-03-28 RX ADMIN — PANTOPRAZOLE SODIUM 40 MG: 40 TABLET, DELAYED RELEASE ORAL at 10:52

## 2025-03-28 RX ADMIN — SENNOSIDES AND DOCUSATE SODIUM 2 TABLET: 50; 8.6 TABLET ORAL at 10:53

## 2025-03-28 RX ADMIN — SENNOSIDES AND DOCUSATE SODIUM 2 TABLET: 50; 8.6 TABLET ORAL at 21:18

## 2025-03-28 RX ADMIN — Medication 10 ML: at 10:54

## 2025-03-28 RX ADMIN — Medication 10 ML: at 21:18

## 2025-03-28 NOTE — PROGRESS NOTES
[facility] Medicine Services  PROGRESS NOTE    Patient Name: Fanny Boyce  : 1940  MRN: [id number]    Date of Admission: 3/26/2025  Primary Care Physician: Nemo Aponte PA    Subjective   Subjective     CC:  Pulmonary embolism    HPI:  Patient sleeping peacefully this morning.  No acute events overnight.  Daughter at bedside has no concerns.      Objective   Objective     Vital Signs:   Temp:  [97.5 °F (36.4 °C)-98 °F (36.7 °C)] 97.8 °F (36.6 °C)  Heart Rate:  [69-84] 69  Resp:  [18-19] 19  BP: ()/(55-65) 93/55  Flow (L/min) (Oxygen Therapy):  [2] 2     Physical Exam  Constitutional:       General: She is not in acute distress.     Appearance: She is not ill-appearing.   HENT:      Head: Normocephalic and atraumatic.      Nose: Nose normal.   Pulmonary:      Effort: Pulmonary effort is normal. No respiratory distress.            Results Reviewed:  LAB RESULTS:      Lab 25  1320 25  03525  1838 25  1725   WBC 7.07 5.43  --   --  7.00   HEMOGLOBIN 10.5* 9.6*  --   --  11.5*   HEMATOCRIT 35.7 32.8*  --   --  38.6   PLATELETS 225 199  --   --  246   NEUTROS ABS 4.82 3.20  --   --  4.73   IMMATURE GRANS (ABS) 0.02 0.02  --   --  0.02   LYMPHS ABS 1.45 1.51  --   --  1.49   MONOS ABS 0.47 0.43  --   --  0.53   EOS ABS 0.21 0.21  --   --  0.16   MCV 87.1 86.8  --   --  86.7   PROTIME  --   --   --   --  13.1   APTT  --   --   --   --  29.6*   HEPARIN ANTI-XA 0.20* 0.14* 0.10*  --   --    HSTROP T  --   --   --  156* 162*         Lab 25  0355 25  1728 25  1725   SODIUM 140  --  141   POTASSIUM 3.8  --  4.3   CHLORIDE 106  --  104   CO2 23.0  --  25.0   ANION GAP 11.0  --  12.0   BUN 15  --  18   CREATININE 0.58 0.70 0.64   EGFR 88.3  --  86.2   GLUCOSE 88  --  103*   CALCIUM 8.6  --  9.2   MAGNESIUM 2.2  --   --          Lab 25  1725   TOTAL PROTEIN 6.8   ALBUMIN 3.7   GLOBULIN 3.1   ALT (SGPT) <5   AST (SGOT)  17   BILIRUBIN 0.3   ALK PHOS 119*         Lab 03/26/25  1838 03/26/25  1725   PROBNP  --  6,249.0*   HSTROP T 156* 162*   PROTIME  --  13.1   INR  --  0.98                 Brief Urine Lab Results  (Last result in the past 365 days)        Color   Clarity   Blood   Leuk Est   Nitrite   Protein   CREAT   Urine HCG        02/20/25 1846 Yellow   Cloudy   Trace   Moderate (2+)   Negative   Negative                   Microbiology Results Abnormal       None            CT Angiogram Chest  Addendum Date: 3/26/2025  ADDENDUM #1 Addition to impression: There is evidence of emphysema. Correlate with patient history and risk factors and please assess if the patient meets criteria for routine low dose CT lung cancer screening. Electronically Signed: Royer Quintero MD  3/26/2025 7:37 PM EDT  Workstation ID: [id number] ORIGINAL REPORT: CT ANGIOGRAM CHEST Date of Exam: 3/26/2025 7:14 PM EDT Indication: SOB, recent hip fx, rule out PE. Comparison: CTA chest 2/12/2025 Technique: CTA of the chest was performed after the uneventful intravenous administration of 73 mL Isovue-370. Reconstructed coronal and sagittal images were also obtained. In addition, a 3-D volume rendered image was created for interpretation. Automated exposure control and iterative reconstruction methods were used. Findings: There are bilateral pulmonary emboli including pulmonary emboli in the distal right main pulmonary artery which extends into the lobar and segmental branches of the right lower lobe and into the segmental branches of the right middle lobe (series 4 image  44-55). There is a pulmonary embolism in the lobar pulmonary artery of the left upper lobe (series 4 image 35). Grossly normal and unchanged caliber of the main pulmonary artery. No definite right heart strain. Unremarkable appearance of the thoracic aorta. No thoracic adenopathy. There is a large sliding hiatal hernia. Unremarkable appearance of the airways. The lungs are clear without focal  consolidation. No pulmonary infarct. There is centrilobular emphysema. No pleural effusion or pneumothorax. There is accentuated thoracic kyphosis with chronic severe compression deformity of the T12 vertebral body. No acute osseous findings. Unremarkable appearance of the upper abdomen. There is nonobstructing right nephrolithiasis. Impression: Bilateral pulmonary emboli including pulmonary emboli in the distal right main pulmonary artery extending into the lobar and segmental branches of the right lower lobe and into the segmental branches of the right middle lobe. There is a pulmonary embolism in the lobar pulmonary artery of the left upper lobe. No definite CT evidence of right heart however correlation with troponin and EKG recommended given the embolic burden. Findings discussed with ordering provider Paulina Mora by Dr. Royer Quintero via secure epic chat at 7:25 p.m. 3/26/2025. Electronically Signed: Royer Quintero MD  3/26/2025 7:27 PM EDT  Workstation ID: [id number]    Result Date: 3/26/2025  CT ANGIOGRAM CHEST Date of Exam: 3/26/2025 7:14 PM EDT Indication: SOB, recent hip fx, rule out PE. Comparison: CTA chest 2/12/2025 Technique: CTA of the chest was performed after the uneventful intravenous administration of 73 mL Isovue-370. Reconstructed coronal and sagittal images were also obtained. In addition, a 3-D volume rendered image was created for interpretation. Automated exposure control and iterative reconstruction methods were used. Findings: There are bilateral pulmonary emboli including pulmonary emboli in the distal right main pulmonary artery which extends into the lobar and segmental branches of the right lower lobe and into the segmental branches of the right middle lobe (series 4 image  44-55). There is a pulmonary embolism in the lobar pulmonary artery of the left upper lobe (series 4 image 35). Grossly normal and unchanged caliber of the main pulmonary artery. No definite right heart strain.  Unremarkable appearance of the thoracic aorta. No thoracic adenopathy. There is a large sliding hiatal hernia. Unremarkable appearance of the airways. The lungs are clear without focal consolidation. No pulmonary infarct. There is centrilobular emphysema. No pleural effusion or pneumothorax. There is accentuated thoracic kyphosis with chronic severe compression deformity of the T12 vertebral body. No acute osseous findings. Unremarkable appearance of the upper abdomen. There is nonobstructing right nephrolithiasis.     Impression: Impression: Bilateral pulmonary emboli including pulmonary emboli in the distal right main pulmonary artery extending into the lobar and segmental branches of the right lower lobe and into the segmental branches of the right middle lobe. There is a pulmonary embolism in the lobar pulmonary artery of the left upper lobe. No definite CT evidence of right heart however correlation with troponin and EKG recommended given the embolic burden. Findings discussed with ordering provider Paulina Mora by Dr. Royer Quintero via secure epic chat at 7:25 p.m. 3/26/2025. Electronically Signed: Royer Quintero MD  3/26/2025 7:27 PM EDT  Workstation ID: [id number]      Results for orders placed during the hospital encounter of 02/09/25    Adult Transthoracic Echo Complete W/ Cont if Necessary Per Protocol    Interpretation Summary    Left ventricular systolic function is normal. Estimated left ventricular EF = 70%    Moderate tricuspid valve regurgitation is present.    Estimated  right ventricular systolic pressure from tricuspid regurgitation is 46 mmHg.    There is a trivial pericardial effusion.      Current medications:  Scheduled Meds:carbidopa-levodopa, 1 tablet, Oral, TID  fluticasone, 2 spray, Each Nare, Daily  metoprolol tartrate, 25 mg, Oral, Q12H  pantoprazole, 40 mg, Oral, Daily  senna-docusate sodium, 2 tablet, Oral, Q12H  sodium chloride, 10 mL, Intravenous, Q12H      Continuous Infusions:      PRN Meds:.  acetaminophen **OR** acetaminophen **OR** acetaminophen    senna-docusate sodium **AND** polyethylene glycol **AND** bisacodyl **AND** bisacodyl    glycopyrrolate **OR** glycopyrrolate **OR** glycopyrrolate **OR** glycopyrrolate    HYDROmorphone    ipratropium-albuterol    LORazepam **OR** LORazepam **OR** [DISCONTINUED] LORazepam    LORazepam **OR** LORazepam **OR** [DISCONTINUED] LORazepam    LORazepam **OR** LORazepam    metoprolol tartrate    sodium chloride    sodium chloride    sodium chloride    Assessment & Plan   Assessment & Plan     Active Hospital Problems    Diagnosis  POA    **Pulmonary embolism [I26.99]  Yes    Atrial fibrillation [I48.91]  Yes    COPD (chronic obstructive pulmonary disease) [J44.9]  Yes    Dementia [F03.90]  Yes    Parkinson's disease [G20.A1]  Yes      Resolved Hospital Problems   No resolved problems to display.        Brief Hospital Course to date:  84 y.o. female with PMH significant for A-fib (currently not anticoagulated secondary to recent GI bleed), COPD, dementia, lung cancer, Parkinson disease, who presents to the ED with complaint of shortness of breath who is found to have concern for bilateral PEs.     Bilateral pulmonary emboli  Acute on chronic hypoxic respiratory failure  Elevated proBNP  COPD/emphysema  Known history of A-fib without recent anticoagulation secondary to GI bleed in February 2025  CTA imaging showing new pulmonary embolisms with large burden however no evidence of right heart strain  Minimal oxygen requirements at this time  Cardiology consulted, discussed with daughter over the phone stating difficult to anticoagulate due to recent GI bleed and severe anemia requiring transfusions, morbidity and mortality was discussed with untreated PEs being very high  My colleague had a Long discussion with daughter, patient is now comfort care, discussed the risks of bleeding while on anticoagulation given history of recurrent GI bleeds.  The  patient is not oriented to person, time and place at this time, daughter has made decision to proceed with comfort care measures  Heparin drip discontinued  2 L nasal cannula, just for comfort    Chronic anemia  Evidence of duodenal ulcer on most recent EGD, recurrent history of GI bleeds  Stopping lab checks due to comfort measures    Elevated troponin  Likely secondary to above  No complaints of chest pain     A-fib  CAD  [id number] of 5  Currently rate controlled  Continue metoprolol with hold parameters, continue for now     Peptic ulcer disease  Recent GI bleed  Continue Protonix twice daily     Dementia  Parkinson's disease  Continue carbidopa levodopa     COPD  Not in acute exacerbation  Baseline room air, currently on 2 L  DuoNebs as needed  Continue Flonase    Expected Discharge Location and Transportation: TBD  Expected Discharge TBD  Expected discharge date/ time has not been documented.     VTE Prophylaxis:  Mechanical VTE prophylaxis orders are present.    AM-PAC 6 Clicks Score (PT): 16 (03/27/25 2012)    CODE STATUS:   Code Status and Medical Interventions: No CPR (Do Not Attempt to Resuscitate); Comfort Measures   Ordered at: 03/27/25 1431     Code Status (Patient has no pulse and is not breathing):    No CPR (Do Not Attempt to Resuscitate)     Medical Interventions (Patient has pulse or is breathing):    Comfort Measures       Christine Sun MD  03/28/25

## 2025-03-28 NOTE — NURSING NOTE
"Reason for Wound, Ostomy and Continence (WOC) Nursing Consultation: \"    Old PI on coccyx\"     Patient lying on side.  Family/support person present, attentive.     Wound Assessment  Wound Type: MASD (Moisture associated skin damage) - Incontinence Associated Dermatitis (IAD), cannot r/o stage 2  Location: coccyx, R sacrococcygeal   Care provided: Skin prep; Allevyn  Notes: Pt has been seen in the past for MASD to area. The peeling, dry skin is indicative of MASD mixed with friction. However, the area is directly over coccyx, and right sacrococcygeal, therefore considering healing stage 2 with MASD.  Wound Image:       Recommendation(s) for management of wound:   -Refer to wound care orders for specific instructions on how to treat/manage wound.  -Cleanse with purple wipe when needed. Skin prep. Allevyn, with coccyx part of dressing sealed-assess QS by peeling Allevyn from top to bottom without breaking dressing seal at coccyx. If unable to keep moisture out of Allevyn, consider thin layer of zguard over area bid without allevyn.  -Practice pressure injury prevention protocol.    Most recent Jerod Scale score:  Sensory Perception: 4-->no impairment  Moisture: 3-->occasionally moist  Activity: 3-->walks occasionally  Mobility: 3-->slightly limited  Nutrition: 3-->adequate  Friction and Shear: 1-->problem  Jerod Score: 17 (03/27/25 2012)     Specialty support surface: [facility]   Will order ALLY PUMP    [facility] ISOTOUR PUMP # 12 ordered for patient.    Please implement pressure injury prevention protocol. Specialty beds do not replace turning/offloading.    At discharge, please wipe down pump with appropriate cleaning wipes and notify Charge RN and Transport to have [facility] ISOTOUR PUMP returned to storage. Thank you.    Pressure Injury Prevention Protocol (initiate for Jerod Score of 18 or less):   *Turn q 2 hr, keep heels elevated and offloaded with offloading heel boots.  *Allevn dressings to heels, sacrum/coccyx  *Follow " C.A.R.E protocol if medical devices (Bipap, delarosa, Ng tube, etc) are being used.  *Reduce layers under patient (one sheet as drawsheet and two incontinence pads) to allow waffle or ALLY to improve microclimate   *Raise knee-gatch before elevating HOB to reduce shearing      WOC Team will sign off.  Please re-consult if the wound(s) worsens.

## 2025-03-28 NOTE — CASE MANAGEMENT/SOCIAL WORK
Continued Stay Note  [facility]     Patient Name: Fanny Boyce  MRN: [id number]  Today's Date: 3/28/2025    Admit Date: 3/26/2025    Plan: TBD   Discharge Plan       Row Name 03/28/25 3009       Plan    Plan TBD    Patient/Family in Agreement with Plan yes    Plan Comments Met with Ms. Boyce and her daughter at the bedside to discuss discharge plan. Daughter is currently overwhelmed due to both parents being in the hospital. She asked that  check back with her next week, so that she will hopefully have a better idea of what each parent will need.  agreed. Will continue to follow plan of care and assist with discharge planning needs as indicated.      Row Name 03/28/25 1217       Plan    Plan To be determined    Plan Comments Visit made to pt, pt sitting up in bed, lunch tray at the bedside, offered pt the food or drink from the tray, pt declined, stated will wait until later to eat.  Telephone call to pt's daughter Lety regarding the referral. Lety stated does not know a discharge plan for pt, as Lety has also been caring for another gentleman who is currently hospitalized with a fractured hip. Vinay stated will not be able to care for both.  Before discussing hospice Vinay stated needs to know where pt will discharge to following hospitalization. Will continue to follow. Please call [phone] if can be of further assistance.                   Discharge Codes    No documentation.                       Johnna Bee RN

## 2025-03-28 NOTE — PROGRESS NOTES
Continued Stay Note   [place]     Patient Name: Fanny Boyce  MRN: [id number]  Today's Date: 3/28/2025    Admit Date: 3/26/2025    Plan: To be determined   Discharge Plan       Row Name 03/28/25 1215       Plan    Plan To be determined    Plan Comments Visit made to pt, pt sitting up in bed, lunch tray at the bedside, offered pt the food or drink from the tray, pt declined, stated will wait until later to eat.  Telephone call to pt's daughter Lety regarding the referral. Lety stated does not know a discharge plan for pt, as Lety has also been caring for another gentleman who is currently hospitalized with a fractured hip. Lety stated will not be able to care for both.  Before discussing hospice Lety stated needs to know where pt will discharge to following hospitalization. Will continue to follow. Please call [phone] if can be of further assistance.                   Discharge Codes    No documentation.                       Raquel Aragon RN

## 2025-03-29 LAB
QT INTERVAL: 398 MS
QT INTERVAL: 404 MS
QTC INTERVAL: 456 MS
QTC INTERVAL: 483 MS

## 2025-03-29 PROCEDURE — 99232 SBSQ HOSP IP/OBS MODERATE 35: CPT | Performed by: NURSE PRACTITIONER

## 2025-03-29 RX ADMIN — Medication 10 ML: at 20:43

## 2025-03-29 RX ADMIN — SENNOSIDES AND DOCUSATE SODIUM 2 TABLET: 50; 8.6 TABLET ORAL at 10:32

## 2025-03-29 RX ADMIN — CARBIDOPA AND LEVODOPA 1 TABLET: 25; 100 TABLET ORAL at 12:28

## 2025-03-29 RX ADMIN — CARBIDOPA AND LEVODOPA 1 TABLET: 25; 100 TABLET ORAL at 10:32

## 2025-03-29 RX ADMIN — METOPROLOL TARTRATE 25 MG: 25 TABLET, FILM COATED ORAL at 20:42

## 2025-03-29 RX ADMIN — FLUTICASONE PROPIONATE 2 SPRAY: 50 SPRAY, METERED NASAL at 10:38

## 2025-03-29 RX ADMIN — CARBIDOPA AND LEVODOPA 1 TABLET: 25; 100 TABLET ORAL at 20:42

## 2025-03-29 RX ADMIN — PANTOPRAZOLE SODIUM 40 MG: 40 TABLET, DELAYED RELEASE ORAL at 10:32

## 2025-03-29 NOTE — PROGRESS NOTES
[facility] Medicine Services  PROGRESS NOTE    Patient Name: Fanny Boyce  : 1940  MRN: [id number]    Date of Admission: 3/26/2025  Primary Care Physician: Nemo Aponte PA    Subjective   Subjective     CC:  Pulmonary embolism    HPI:  Patient is resting in bed in NAD. She is eating breakfast. Denies any pain or soa.       Objective   Objective     Vital Signs:   Temp:  [97.5 °F (36.4 °C)-97.8 °F (36.6 °C)] 97.7 °F (36.5 °C)  Heart Rate:  [56-76] 56  Resp:  [18-19] 18  BP: ()/(55-60) 113/59  Flow (L/min) (Oxygen Therapy):  [2] 2     Physical Exam:  Constitutional: No acute distress, awake, alert  HENT: NCAT, mucous membranes moist  Respiratory: Clear to auscultation bilaterally, respiratory effort normal 2L 96%  Cardiovascular: RRR, no murmurs, rubs, or gallops  Gastrointestinal: Positive bowel sounds, soft, nontender, nondistended  Musculoskeletal: No bilateral ankle edema  Psychiatric: Appropriate affect, cooperative  Neurologic: Oriented x 1, strength symmetric in all extremities, C speech clear  Skin: No rashes, pale      Results Reviewed:  LAB RESULTS:      Lab 25  1320 25  0355 25  1838 25  1725   WBC 7.07 5.43  --   --  7.00   HEMOGLOBIN 10.5* 9.6*  --   --  11.5*   HEMATOCRIT 35.7 32.8*  --   --  38.6   PLATELETS 225 199  --   --  246   NEUTROS ABS 4.82 3.20  --   --  4.73   IMMATURE GRANS (ABS) 0.02 0.02  --   --  0.02   LYMPHS ABS 1.45 1.51  --   --  1.49   MONOS ABS 0.47 0.43  --   --  0.53   EOS ABS 0.21 0.21  --   --  0.16   MCV 87.1 86.8  --   --  86.7   PROTIME  --   --   --   --  13.1   APTT  --   --   --   --  29.6*   HEPARIN ANTI-XA 0.20* 0.14* 0.10*  --   --    HSTROP T  --   --   --  156* 162*         Lab 25  0355 25  1728 25  1725   SODIUM 140  --  141   POTASSIUM 3.8  --  4.3   CHLORIDE 106  --  104   CO2 23.0  --  25.0   ANION GAP 11.0  --  12.0   BUN 15  --  18   CREATININE 0.58 0.70  0.64   EGFR 88.3  --  86.2   GLUCOSE 88  --  103*   CALCIUM 8.6  --  9.2   MAGNESIUM 2.2  --   --          Lab 03/26/25  1725   TOTAL PROTEIN 6.8   ALBUMIN 3.7   GLOBULIN 3.1   ALT (SGPT) <5   AST (SGOT) 17   BILIRUBIN 0.3   ALK PHOS 119*         Lab 03/26/25  1838 03/26/25  1725   PROBNP  --  6,249.0*   HSTROP T 156* 162*   PROTIME  --  13.1   INR  --  0.98                 Brief Urine Lab Results  (Last result in the past 365 days)        Color   Clarity   Blood   Leuk Est   Nitrite   Protein   CREAT   Urine HCG        02/20/25 1846 Yellow   Cloudy   Trace   Moderate (2+)   Negative   Negative                   Microbiology Results Abnormal       None            No radiology results from the last 24 hrs    Results for orders placed during the hospital encounter of 02/09/25    Adult Transthoracic Echo Complete W/ Cont if Necessary Per Protocol    Interpretation Summary    Left ventricular systolic function is normal. Estimated left ventricular EF = 70%    Moderate tricuspid valve regurgitation is present.    Estimated  right ventricular systolic pressure from tricuspid regurgitation is 46 mmHg.    There is a trivial pericardial effusion.      Current medications:  Scheduled Meds:carbidopa-levodopa, 1 tablet, Oral, TID  fluticasone, 2 spray, Each Nare, Daily  metoprolol tartrate, 25 mg, Oral, Q12H  pantoprazole, 40 mg, Oral, Daily  senna-docusate sodium, 2 tablet, Oral, Q12H  sodium chloride, 10 mL, Intravenous, Q12H      Continuous Infusions:   PRN Meds:.  acetaminophen **OR** acetaminophen **OR** acetaminophen    senna-docusate sodium **AND** polyethylene glycol **AND** bisacodyl **AND** bisacodyl    glycopyrrolate **OR** glycopyrrolate **OR** glycopyrrolate **OR** glycopyrrolate    HYDROmorphone    ipratropium-albuterol    LORazepam **OR** LORazepam **OR** [DISCONTINUED] LORazepam    LORazepam **OR** LORazepam **OR** [DISCONTINUED] LORazepam    LORazepam **OR** LORazepam    metoprolol tartrate    sodium chloride     sodium chloride    sodium chloride    Assessment & Plan   Assessment & Plan     Active Hospital Problems    Diagnosis  POA    **Pulmonary embolism [I26.99]  Yes    Atrial fibrillation [I48.91]  Yes    COPD (chronic obstructive pulmonary disease) [J44.9]  Yes    Dementia [F03.90]  Yes    Parkinson's disease [G20.A1]  Yes      Resolved Hospital Problems   No resolved problems to display.        Brief Hospital Course to date:  Fanny Boyce is a 84 y.o. female  with PMH significant for A-fib (currently not anticoagulated secondary to recent GI bleed), COPD, dementia, lung cancer, Parkinson disease, who presents to the ED with complaint of shortness of breath who is found to have concern for bilateral PEs.     Plan was partially entered by my partner and I have reviewed and updated as appropriate on 3/29/25     Bilateral pulmonary emboli  Acute on chronic hypoxic respiratory failure  Elevated proBNP  COPD/emphysema  Known history of A-fib without recent anticoagulation secondary to GI bleed in February 2025  CTA imaging showing new pulmonary embolisms with large burden however no evidence of right heart strain  Minimal oxygen requirements at this time  Cardiology consulted, discussed with daughter over the phone stating difficult to anticoagulate due to recent GI bleed and severe anemia requiring transfusions, morbidity and mortality was discussed with untreated PEs being very high  My colleague had a Long discussion with daughter, patient is now comfort care, discussed the risks of bleeding while on anticoagulation given history of recurrent GI bleeds.  The patient is not oriented to person, time and place at this time, daughter has made decision to proceed with comfort care measures  Heparin drip discontinued  2 L nasal cannula, just for comfort     Chronic anemia  Evidence of duodenal ulcer on most recent EGD, recurrent history of GI bleeds  Stopping lab checks due to comfort measures     Elevated troponin  Likely  secondary to above  No complaints of chest pain     A-fib  CAD  [id number] of 5  Currently rate controlled  Continue metoprolol with hold parameters, continue for now     Peptic ulcer disease  Recent GI bleed  Continue Protonix twice daily     Dementia  Parkinson's disease  Continue carbidopa levodopa     COPD  Not in acute exacerbation  Baseline room air, currently on 2 L  DuoNebs as needed  Continue Flonase    Expected Discharge Location and Transportation: D  Expected Discharge   Expected Discharge Date: 3/31/2025; Expected Discharge Time:      VTE Prophylaxis:  Mechanical VTE prophylaxis orders are present.         AM-PAC 6 Clicks Score (PT): 16 (03/28/25 0800)    CODE STATUS:   Code Status and Medical Interventions: No CPR (Do Not Attempt to Resuscitate); Comfort Measures   Ordered at: 03/27/25 1431     Code Status (Patient has no pulse and is not breathing):    No CPR (Do Not Attempt to Resuscitate)     Medical Interventions (Patient has pulse or is breathing):    Comfort Measures       Yuki Martin, APRN  03/29/25

## 2025-03-30 LAB
BASOPHILS # BLD AUTO: 0.06 10*3/MM3 (ref 0–0.2)
BASOPHILS NFR BLD AUTO: 0.9 % (ref 0–1.5)
DEPRECATED RDW RBC AUTO: 49.1 FL (ref 37–54)
EOSINOPHIL # BLD AUTO: 0.21 10*3/MM3 (ref 0–0.4)
EOSINOPHIL NFR BLD AUTO: 3.3 % (ref 0.3–6.2)
ERYTHROCYTE [DISTWIDTH] IN BLOOD BY AUTOMATED COUNT: 15.4 % (ref 12.3–15.4)
HCT VFR BLD AUTO: 36.1 % (ref 34–46.6)
HGB BLD-MCNC: 10.4 G/DL (ref 12–15.9)
IMM GRANULOCYTES # BLD AUTO: 0.02 10*3/MM3 (ref 0–0.05)
IMM GRANULOCYTES NFR BLD AUTO: 0.3 % (ref 0–0.5)
LYMPHOCYTES # BLD AUTO: 1.13 10*3/MM3 (ref 0.7–3.1)
LYMPHOCYTES NFR BLD AUTO: 17.8 % (ref 19.6–45.3)
MCH RBC QN AUTO: 25 PG (ref 26.6–33)
MCHC RBC AUTO-ENTMCNC: 28.8 G/DL (ref 31.5–35.7)
MCV RBC AUTO: 86.8 FL (ref 79–97)
MONOCYTES # BLD AUTO: 0.36 10*3/MM3 (ref 0.1–0.9)
MONOCYTES NFR BLD AUTO: 5.7 % (ref 5–12)
NEUTROPHILS NFR BLD AUTO: 4.57 10*3/MM3 (ref 1.7–7)
NEUTROPHILS NFR BLD AUTO: 72 % (ref 42.7–76)
NRBC BLD AUTO-RTO: 0 /100 WBC (ref 0–0.2)
PLATELET # BLD AUTO: 246 10*3/MM3 (ref 140–450)
PMV BLD AUTO: 10.5 FL (ref 6–12)
RBC # BLD AUTO: 4.16 10*6/MM3 (ref 3.77–5.28)
WBC NRBC COR # BLD AUTO: 6.35 10*3/MM3 (ref 3.4–10.8)

## 2025-03-30 PROCEDURE — 99232 SBSQ HOSP IP/OBS MODERATE 35: CPT | Performed by: NURSE PRACTITIONER

## 2025-03-30 PROCEDURE — 85025 COMPLETE CBC W/AUTO DIFF WBC: CPT | Performed by: PHYSICIAN ASSISTANT

## 2025-03-30 RX ADMIN — CARBIDOPA AND LEVODOPA 1 TABLET: 25; 100 TABLET ORAL at 12:39

## 2025-03-30 RX ADMIN — METOPROLOL TARTRATE 25 MG: 25 TABLET, FILM COATED ORAL at 08:06

## 2025-03-30 RX ADMIN — CARBIDOPA AND LEVODOPA 1 TABLET: 25; 100 TABLET ORAL at 08:06

## 2025-03-30 RX ADMIN — FLUTICASONE PROPIONATE 2 SPRAY: 50 SPRAY, METERED NASAL at 08:21

## 2025-03-30 RX ADMIN — Medication 10 ML: at 20:41

## 2025-03-30 RX ADMIN — CARBIDOPA AND LEVODOPA 1 TABLET: 25; 100 TABLET ORAL at 18:59

## 2025-03-30 RX ADMIN — PANTOPRAZOLE SODIUM 40 MG: 40 TABLET, DELAYED RELEASE ORAL at 08:06

## 2025-03-30 RX ADMIN — SENNOSIDES AND DOCUSATE SODIUM 2 TABLET: 50; 8.6 TABLET ORAL at 08:06

## 2025-03-30 RX ADMIN — METOPROLOL TARTRATE 25 MG: 25 TABLET, FILM COATED ORAL at 20:41

## 2025-03-30 RX ADMIN — Medication 10 ML: at 08:12

## 2025-03-30 NOTE — PROGRESS NOTES
[facility] Medicine Services  PROGRESS NOTE    Patient Name: Fanny Boyce  : 1940  MRN: [id number]    Date of Admission: 3/26/2025  Primary Care Physician: Nemo Aponte PA    Subjective   Subjective     CC:  Pulmonary embolism    HPI:  Patient is resting in bed in NAD. She states she slept well. No acute events overnight per nursing        Objective   Objective     Vital Signs:   Temp:  [96.5 °F (35.8 °C)-98.4 °F (36.9 °C)] 96.5 °F (35.8 °C)  Heart Rate:  [] 58  Resp:  [16-20] 16  BP: (101-121)/(46-66) 103/52  Flow (L/min) (Oxygen Therapy):  [2] 2     Physical Exam:  Constitutional: No acute distress, awake, alert  HENT: NCAT, mucous membranes moist  Respiratory: Clear to auscultation bilaterally, respiratory effort normal 2L   Cardiovascular: RRR, no murmurs, rubs, or gallops  Gastrointestinal: Positive bowel sounds, soft, nontender, nondistended  Musculoskeletal: No bilateral ankle edema  Psychiatric: Appropriate affect, cooperative  Neurologic: Oriented x 1, strength symmetric in all extremities,  speech clear  Skin: No rashes, pale      Results Reviewed:  LAB RESULTS:      Lab 25  1320 25  0355 25  1838 25  1725   WBC 7.07 5.43  --   --  7.00   HEMOGLOBIN 10.5* 9.6*  --   --  11.5*   HEMATOCRIT 35.7 32.8*  --   --  38.6   PLATELETS 225 199  --   --  246   NEUTROS ABS 4.82 3.20  --   --  4.73   IMMATURE GRANS (ABS) 0.02 0.02  --   --  0.02   LYMPHS ABS 1.45 1.51  --   --  1.49   MONOS ABS 0.47 0.43  --   --  0.53   EOS ABS 0.21 0.21  --   --  0.16   MCV 87.1 86.8  --   --  86.7   PROTIME  --   --   --   --  13.1   APTT  --   --   --   --  29.6*   HEPARIN ANTI-XA 0.20* 0.14* 0.10*  --   --    HSTROP T  --   --   --  156* 162*         Lab 25  0355 25  1728 25  1725   SODIUM 140  --  141   POTASSIUM 3.8  --  4.3   CHLORIDE 106  --  104   CO2 23.0  --  25.0   ANION GAP 11.0  --  12.0   BUN 15  --  18    CREATININE 0.58 0.70 0.64   EGFR 88.3  --  86.2   GLUCOSE 88  --  103*   CALCIUM 8.6  --  9.2   MAGNESIUM 2.2  --   --          Lab 03/26/25  1725   TOTAL PROTEIN 6.8   ALBUMIN 3.7   GLOBULIN 3.1   ALT (SGPT) <5   AST (SGOT) 17   BILIRUBIN 0.3   ALK PHOS 119*         Lab 03/26/25  1838 03/26/25  1725   PROBNP  --  6,249.0*   HSTROP T 156* 162*   PROTIME  --  13.1   INR  --  0.98                 Brief Urine Lab Results  (Last result in the past 365 days)        Color   Clarity   Blood   Leuk Est   Nitrite   Protein   CREAT   Urine HCG        02/20/25 1846 Yellow   Cloudy   Trace   Moderate (2+)   Negative   Negative                   Microbiology Results Abnormal       None            No radiology results from the last 24 hrs    Results for orders placed during the hospital encounter of 02/09/25    Adult Transthoracic Echo Complete W/ Cont if Necessary Per Protocol    Interpretation Summary    Left ventricular systolic function is normal. Estimated left ventricular EF = 70%    Moderate tricuspid valve regurgitation is present.    Estimated  right ventricular systolic pressure from tricuspid regurgitation is 46 mmHg.    There is a trivial pericardial effusion.      Current medications:  Scheduled Meds:carbidopa-levodopa, 1 tablet, Oral, TID  fluticasone, 2 spray, Each Nare, Daily  metoprolol tartrate, 25 mg, Oral, Q12H  pantoprazole, 40 mg, Oral, Daily  senna-docusate sodium, 2 tablet, Oral, Q12H  sodium chloride, 10 mL, Intravenous, Q12H      Continuous Infusions:   PRN Meds:.  acetaminophen **OR** acetaminophen **OR** acetaminophen    senna-docusate sodium **AND** polyethylene glycol **AND** bisacodyl **AND** bisacodyl    glycopyrrolate **OR** glycopyrrolate **OR** glycopyrrolate **OR** glycopyrrolate    HYDROmorphone    ipratropium-albuterol    LORazepam **OR** LORazepam **OR** [DISCONTINUED] LORazepam    LORazepam **OR** LORazepam **OR** [DISCONTINUED] LORazepam    LORazepam **OR** LORazepam    metoprolol  tartrate    sodium chloride    sodium chloride    sodium chloride    Assessment & Plan   Assessment & Plan     Active Hospital Problems    Diagnosis  POA    **Pulmonary embolism [I26.99]  Yes    Atrial fibrillation [I48.91]  Yes    COPD (chronic obstructive pulmonary disease) [J44.9]  Yes    Dementia [F03.90]  Yes    Parkinson's disease [G20.A1]  Yes      Resolved Hospital Problems   No resolved problems to display.        Brief Hospital Course to date:  Fanny Boyce is a 84 y.o. female  with PMH significant for A-fib (currently not anticoagulated secondary to recent GI bleed), COPD, dementia, lung cancer, Parkinson disease, who presents to the ED with complaint of shortness of breath who is found to have concern for bilateral PEs.     Plan was partially entered by my partner and I have reviewed and updated as appropriate on 3/30/25     Bilateral pulmonary emboli  Acute on chronic hypoxic respiratory failure  Elevated proBNP  COPD/emphysema  Known history of A-fib without recent anticoagulation secondary to GI bleed in February 2025  CTA imaging showing new pulmonary embolisms with large burden however no evidence of right heart strain  Minimal oxygen requirements at this time  Cardiology consulted, discussed with daughter over the phone stating difficult to anticoagulate due to recent GI bleed and severe anemia requiring transfusions, morbidity and mortality was discussed with untreated PEs being very high  My colleague had a Long discussion with daughter, patient is now comfort care, discussed the risks of bleeding while on anticoagulation given history of recurrent GI bleeds.  The patient is not oriented to person, time and place at this time, daughter has made decision to proceed with comfort care measures  Heparin drip discontinued  2 L nasal cannula, just for comfort     Chronic anemia  Evidence of duodenal ulcer on most recent EGD, recurrent history of GI bleeds  Stopping lab checks due to comfort  measures     Elevated troponin  Likely secondary to above  No complaints of chest pain     A-fib  CAD  [id number] of 5  Currently rate controlled  Continue metoprolol with hold parameters, continue for now     Peptic ulcer disease  Recent GI bleed  Continue Protonix twice daily     Dementia  Parkinson's disease  Continue carbidopa levodopa     COPD  Not in acute exacerbation  Baseline room air, currently on 2 L  DuoNebs as needed  Continue Flonase    Expected Discharge Location and Transportation: TBD  Expected Discharge   Expected Discharge Date: 3/31/2025; Expected Discharge Time:      VTE Prophylaxis:  Mechanical VTE prophylaxis orders are present.         AM-PAC 6 Clicks Score (PT): 16 (03/30/25 0130)    CODE STATUS:   Code Status and Medical Interventions: No CPR (Do Not Attempt to Resuscitate); Comfort Measures   Ordered at: 03/27/25 1431     Code Status (Patient has no pulse and is not breathing):    No CPR (Do Not Attempt to Resuscitate)     Medical Interventions (Patient has pulse or is breathing):    Comfort Measures       Yuki Martin, LAVERNE  03/30/25

## 2025-03-30 NOTE — PLAN OF CARE
Goal Outcome Evaluation:                               Pt transferred to 5B this shift. VSS on 2L. Aox self and place. Pleasant. Upper dentures present. Skin interventions conducted. Tolerates pills whole. Resting well. PW in place.

## 2025-03-30 NOTE — PLAN OF CARE
Goal Outcome Evaluation:  Plan of Care Reviewed With: patient, family.         Progress: no change  Pt remains oriented to self & place.  Medications administered per MAR. Pt with no active complaints, resting in bed with eyes closed.

## 2025-03-31 PROCEDURE — 99231 SBSQ HOSP IP/OBS SF/LOW 25: CPT | Performed by: NURSE PRACTITIONER

## 2025-03-31 RX ADMIN — Medication 10 ML: at 20:33

## 2025-03-31 RX ADMIN — METOPROLOL TARTRATE 25 MG: 25 TABLET, FILM COATED ORAL at 20:33

## 2025-03-31 RX ADMIN — METOPROLOL TARTRATE 25 MG: 25 TABLET, FILM COATED ORAL at 08:03

## 2025-03-31 RX ADMIN — CARBIDOPA AND LEVODOPA 1 TABLET: 25; 100 TABLET ORAL at 18:54

## 2025-03-31 RX ADMIN — CARBIDOPA AND LEVODOPA 1 TABLET: 25; 100 TABLET ORAL at 08:03

## 2025-03-31 RX ADMIN — CARBIDOPA AND LEVODOPA 1 TABLET: 25; 100 TABLET ORAL at 11:25

## 2025-03-31 RX ADMIN — PANTOPRAZOLE SODIUM 40 MG: 40 TABLET, DELAYED RELEASE ORAL at 08:03

## 2025-03-31 RX ADMIN — SENNOSIDES AND DOCUSATE SODIUM 2 TABLET: 50; 8.6 TABLET ORAL at 20:33

## 2025-03-31 RX ADMIN — FLUTICASONE PROPIONATE 2 SPRAY: 50 SPRAY, METERED NASAL at 08:03

## 2025-03-31 RX ADMIN — Medication 10 ML: at 08:04

## 2025-03-31 NOTE — PROGRESS NOTES
Continued Stay Note  [facility]     Patient Name: Fanny Boyce  MRN: [id number]  Today's Date: 3/31/2025    Admit Date: 3/26/2025    Plan: Skilled rehab   Discharge Plan       Row Name 03/31/25 1540       Plan    Plan Ongoing    Plan Comments Visit made to pt, pt appeared to be sleeping, no family present. Telephone call to pt's daughter Lety Davidson stated was at the hospital earlier today, met with Analilia  and discussed discharge plans. Lety wants pt to do short term rehab following hospital discharge. Following rehab Lety wants to take pt home. Lety stated will be at the hospital and will call the hospice liaison when arrives. Please call [phone] if can be of further assistance.      Row Name 03/31/25 0258       Plan    Plan     Plan Comments                    Discharge Codes    No documentation.                 Expected Discharge Date and Time       Expected Discharge Date Expected Discharge Time    Mar 31, 2025               Raquel Aragon RN

## 2025-03-31 NOTE — PROGRESS NOTES
[facility] Medicine Services  PROGRESS NOTE    Patient Name: Fanny Boyce  : 1940  MRN: [id number]    Date of Admission: 3/26/2025  Primary Care Physician: Nemo Aponte PA    Subjective   Subjective     CC:  Pulmonary embolism    HPI:  Pt sleeping, awaken easily with conversation. She denies any issues today.   Copied text in this note has been reviewed and is accurate as of 25.         Objective   Objective     Vital Signs:   Temp:  [97.9 °F (36.6 °C)-98.2 °F (36.8 °C)] 98.2 °F (36.8 °C)  Heart Rate:  [61-73] 61  Resp:  [18] 18  BP: (123-133)/(65-76) 123/65  Flow (L/min) (Oxygen Therapy):  [2] 2     Physical Exam:  Constitutional: sleeping, awaken easily with conversation  HENT: NCAT, mucous membranes moist  Respiratory: Clear to auscultation bilaterally, nonlabored 2L 96%  Cardiovascular: RRR, no murmurs, rubs, or gallops HR 73  Gastrointestinal: Positive bowel sounds, soft, nontender, nondistended  Musculoskeletal: No bilateral ankle edema  Psychiatric: Appropriate affect, cooperative  Neurologic: Oriented to person, COTTRELL, speech clear  Skin: No rashes          Results Reviewed:  LAB RESULTS:      Lab 25  0930 25  1320 25  0355 25  1838 25  1725   WBC 6.35 7.07 5.43  --   --  7.00   HEMOGLOBIN 10.4* 10.5* 9.6*  --   --  11.5*   HEMATOCRIT 36.1 35.7 32.8*  --   --  38.6   PLATELETS 246 225 199  --   --  246   NEUTROS ABS 4.57 4.82 3.20  --   --  4.73   IMMATURE GRANS (ABS) 0.02 0.02 0.02  --   --  0.02   LYMPHS ABS 1.13 1.45 1.51  --   --  1.49   MONOS ABS 0.36 0.47 0.43  --   --  0.53   EOS ABS 0.21 0.21 0.21  --   --  0.16   MCV 86.8 87.1 86.8  --   --  86.7   PROTIME  --   --   --   --   --  13.1   APTT  --   --   --   --   --  29.6*   HEPARIN ANTI-XA  --  0.20* 0.14* 0.10*  --   --    HSTROP T  --   --   --   --  156* 162*         Lab 25  0355 25  1728 25  1725   SODIUM 140  --  141   POTASSIUM 3.8   --  4.3   CHLORIDE 106  --  104   CO2 23.0  --  25.0   ANION GAP 11.0  --  12.0   BUN 15  --  18   CREATININE 0.58 0.70 0.64   EGFR 88.3  --  86.2   GLUCOSE 88  --  103*   CALCIUM 8.6  --  9.2   MAGNESIUM 2.2  --   --          Lab 03/26/25  1725   TOTAL PROTEIN 6.8   ALBUMIN 3.7   GLOBULIN 3.1   ALT (SGPT) <5   AST (SGOT) 17   BILIRUBIN 0.3   ALK PHOS 119*         Lab 03/26/25  1838 03/26/25  1725   PROBNP  --  6,249.0*   HSTROP T 156* 162*   PROTIME  --  13.1   INR  --  0.98                 Brief Urine Lab Results  (Last result in the past 365 days)        Color   Clarity   Blood   Leuk Est   Nitrite   Protein   CREAT   Urine HCG        02/20/25 1846 Yellow   Cloudy   Trace   Moderate (2+)   Negative   Negative                   Microbiology Results Abnormal       None            No radiology results from the last 24 hrs    Results for orders placed during the hospital encounter of 02/09/25    Adult Transthoracic Echo Complete W/ Cont if Necessary Per Protocol    Interpretation Summary    Left ventricular systolic function is normal. Estimated left ventricular EF = 70%    Moderate tricuspid valve regurgitation is present.    Estimated  right ventricular systolic pressure from tricuspid regurgitation is 46 mmHg.    There is a trivial pericardial effusion.      Current medications:  Scheduled Meds:carbidopa-levodopa, 1 tablet, Oral, TID  fluticasone, 2 spray, Each Nare, Daily  metoprolol tartrate, 25 mg, Oral, Q12H  pantoprazole, 40 mg, Oral, Daily  senna-docusate sodium, 2 tablet, Oral, Q12H  sodium chloride, 10 mL, Intravenous, Q12H      Continuous Infusions:   PRN Meds:.  acetaminophen **OR** acetaminophen **OR** acetaminophen    senna-docusate sodium **AND** polyethylene glycol **AND** bisacodyl **AND** bisacodyl    glycopyrrolate **OR** glycopyrrolate **OR** glycopyrrolate **OR** glycopyrrolate    HYDROmorphone    ipratropium-albuterol    LORazepam **OR** LORazepam **OR** [DISCONTINUED] LORazepam    LORazepam  **OR** LORazepam **OR** [DISCONTINUED] LORazepam    LORazepam **OR** LORazepam    metoprolol tartrate    sodium chloride    sodium chloride    sodium chloride    Assessment & Plan   Assessment & Plan     Active Hospital Problems    Diagnosis  POA    **Pulmonary embolism [I26.99]  Yes    Atrial fibrillation [I48.91]  Yes    COPD (chronic obstructive pulmonary disease) [J44.9]  Yes    Dementia [F03.90]  Yes    Parkinson's disease [G20.A1]  Yes      Resolved Hospital Problems   No resolved problems to display.        Brief Hospital Course to date:  Fanny Boyce is a 84 y.o. female  with PMH significant for A-fib (currently not anticoagulated secondary to recent GI bleed), COPD, dementia, lung cancer, Parkinson disease, who presents to the ED with complaint of shortness of breath who is found to have concern for bilateral PEs.     Plan was partially entered by my partner and I have reviewed and updated as appropriate on 3/30/25     Bilateral pulmonary emboli  Acute on chronic hypoxic respiratory failure  Elevated proBNP  COPD/emphysema  Known history of A-fib without recent anticoagulation secondary to GI bleed in February 2025  CTA imaging showing new pulmonary embolisms with large burden however no evidence of right heart strain  Minimal oxygen requirements at this time  Cardiology consulted, discussed with daughter over the phone stating difficult to anticoagulate due to recent GI bleed and severe anemia requiring transfusions, morbidity and mortality was discussed with untreated PEs being very high  My colleague had a Long discussion with daughter, patient is now comfort care, discussed the risks of bleeding while on anticoagulation given history of recurrent GI bleeds.  The patient is not oriented to person, time and place at this time, daughter has made decision to proceed with comfort care measures  Heparin drip discontinued  2 L nasal cannula, just for comfort     Chronic anemia  Evidence of duodenal ulcer on  most recent EGD, recurrent history of GI bleeds  Stopping lab checks due to comfort measures     Elevated troponin  Likely secondary to above  No complaints of chest pain     A-fib  CAD  [id number] of 5  Currently rate controlled  Continue metoprolol with hold parameters, continue for now     Peptic ulcer disease  Recent GI bleed  Continue Protonix twice daily     Dementia  Parkinson's disease  Continue carbidopa levodopa     COPD  Not in acute exacerbation  Baseline room air, currently on 2 L  DuoNebs as needed  Continue Flonase    Expected Discharge Location and Transportation: TBD  Expected Discharge   04/03/2025     VTE Prophylaxis:  Mechanical VTE prophylaxis orders are present.         AM-PAC 6 Clicks Score (PT): 12 (03/31/25 3394)    CODE STATUS:   Code Status and Medical Interventions: No CPR (Do Not Attempt to Resuscitate); Comfort Measures   Ordered at: 03/27/25 1431     Code Status (Patient has no pulse and is not breathing):    No CPR (Do Not Attempt to Resuscitate)     Medical Interventions (Patient has pulse or is breathing):    Comfort Measures       LAVERNE Degroot  03/31/25

## 2025-03-31 NOTE — PLAN OF CARE
Goal Outcome Evaluation:  Plan of Care Reviewed With: patient        Progress: improving  Outcome Evaluation: vss on room air, Q2 hour turn, pt appetite increasing and was able to eat more today, case mgmt exploring options for post hospital care, no complaints of pain, no PRN medications requested, continue with plan of care

## 2025-03-31 NOTE — PLAN OF CARE
Goal Outcome Evaluation:  Plan of Care Reviewed With: patient        Progress: no change  Outcome Evaluation: Pt rested well throughout shift. VSS on RA. No c/o pain or nausea. Pt turned Q2 hrs. PW in place. Awaiting placement. No other concerns noted at this time.

## 2025-03-31 NOTE — CASE MANAGEMENT/SOCIAL WORK
Continued Stay Note   [facility]     Patient Name: Fanny Boyce  MRN: [id number]  Today's Date: 3/31/2025    Admit Date: 3/26/2025    Plan: Ongoing   Discharge Plan       Row Name 03/31/25 1458       Plan    Plan Ongoing    Plan Comments MSW met with patient and daughter June [phone] at bedside. Discussed discharge options (Rehab, SNF or Home with HH or Hospice). Patient and daughter discussed rehab options for patient; attempted to discuss the appropriate level of care. Daughter prefers [facility] as patients' spouse is going to [facility]. MSW discussed having therapy work with the patient and see what the rec's are. PT/OT to work with patient. Hospice continues to follow. Plan is Ongoing                   Discharge Codes    No documentation.                 Expected Discharge Date and Time       Expected Discharge Date Expected Discharge Time    Mar 31, 2025               JEROMY Sanabria (Kay)

## 2025-04-01 PROCEDURE — 97166 OT EVAL MOD COMPLEX 45 MIN: CPT

## 2025-04-01 PROCEDURE — 99232 SBSQ HOSP IP/OBS MODERATE 35: CPT | Performed by: INTERNAL MEDICINE

## 2025-04-01 PROCEDURE — 97161 PT EVAL LOW COMPLEX 20 MIN: CPT

## 2025-04-01 RX ADMIN — PANTOPRAZOLE SODIUM 40 MG: 40 TABLET, DELAYED RELEASE ORAL at 08:36

## 2025-04-01 RX ADMIN — CARBIDOPA AND LEVODOPA 1 TABLET: 25; 100 TABLET ORAL at 11:51

## 2025-04-01 RX ADMIN — SENNOSIDES AND DOCUSATE SODIUM 2 TABLET: 50; 8.6 TABLET ORAL at 20:32

## 2025-04-01 RX ADMIN — FLUTICASONE PROPIONATE 2 SPRAY: 50 SPRAY, METERED NASAL at 08:36

## 2025-04-01 RX ADMIN — METOPROLOL TARTRATE 25 MG: 25 TABLET, FILM COATED ORAL at 20:33

## 2025-04-01 RX ADMIN — CARBIDOPA AND LEVODOPA 1 TABLET: 25; 100 TABLET ORAL at 17:53

## 2025-04-01 RX ADMIN — CARBIDOPA AND LEVODOPA 1 TABLET: 25; 100 TABLET ORAL at 08:36

## 2025-04-01 RX ADMIN — Medication 10 ML: at 08:36

## 2025-04-01 RX ADMIN — METOPROLOL TARTRATE 25 MG: 25 TABLET, FILM COATED ORAL at 08:36

## 2025-04-01 NOTE — PLAN OF CARE
Goal Outcome Evaluation:  Plan of Care Reviewed With: patient        Progress: no change  Outcome Evaluation: Patient presents with weakness, decreased trunk control, balance deficits, and cognitive impairments affecting functional mobility. IP PT services warranted to support return to baseline. Recommend SNF at d/c.    Anticipated Discharge Disposition (PT): skilled nursing facility

## 2025-04-01 NOTE — PROGRESS NOTES
Continued Stay Note  [facility]     Patient Name: Fanny Boyce  MRN: [id number]  Today's Date: 4/1/2025    Admit Date: 3/26/2025    Plan: STR Vs HH   Discharge Plan       Row Name 04/01/25 1130       Plan    Plan STR Vs HH    Patient/Family in Agreement with Plan yes    Plan Comments Hospice f/u visit made w./ daughter to answer additional hospice related questions/concerns. Hospice related questions/concerns answered/addressed. Per conversation, daughter (Suzanne) states that she would like to see if the pt can be accepted @ [facility] & states that if she is unable to be accepted @ [facility], the plan will be home. Daughter states that would like for the pt to have the opportunity to return to baseline functioning. Writer did explain that hospice does have the ability to provide PT, but that the level of PT that she desires may not allow the pt to have the opportunity to do w/ hospice. Daughter verbalized understanding & states that HH maybe a better fit. Writer did explain that hospice does have the ability to f/u w/ her on an out put basis. Suzanne verbalized understanding. Writer did educate on the EMS DNR. EMS DNR x 2 completed. A copy has been placed on the pt.'s chart. Pt's care team updated via [employer] chat. Hospice will continue to follow. If further assistance is needed, please call [phone].                   Discharge Codes    No documentation.                 Expected Discharge Date and Time       Expected Discharge Date Expected Discharge Time    Mar 31, 2025               Cass Hitchcock

## 2025-04-01 NOTE — THERAPY EVALUATION
Patient Name: Fanny Boyce  : 1940    MRN: [id number]                              Today's Date: 2025       Admit Date: 3/26/2025    Visit Dx:     ICD-10-CM ICD-9-CM   1. Bilateral pulmonary embolism  I26.99 415.19   2. Elevated troponin  R79.89 790.6   3. SOB (shortness of breath)  R06.02 786.05   4. History of atrial fibrillation  Z86.79 V12.59   5. History of dementia  Z86.59 V11.8     Patient Active Problem List   Diagnosis    Atrial fibrillation    COPD (chronic obstructive pulmonary disease)    Dementia    Parkinson's disease    Fracture of left humerus with routine healing    Fracture of femoral neck, right    Severe protein-calorie malnutrition    Hiatal hernia    GIB (gastrointestinal bleeding)    Falls    Pulmonary embolism     Past Medical History:   Diagnosis Date    Atrial fibrillation     COPD (chronic obstructive pulmonary disease)     Dementia     History of lung cancer     History of transfusion     Parkinson disease     Pulmonary embolism      Past Surgical History:   Procedure Laterality Date    ENDOSCOPY N/A 2025    Procedure: ESOPHAGOGASTRODUODENOSCOPY;  Surgeon: Steve Gamez MD;  Location:  [facility];  Service: Gastroenterology;  Laterality: N/A;    HIP HEMIARTHROPLASTY Right 2/10/2025    Procedure: HIP HEMIARTHROPLASTY RIGHT;  Surgeon: Frandy Patino MD;  Location:  [facility];  Service: Orthopedics;  Laterality: Right;    JOINT REPLACEMENT      LUNG CANCER SURGERY        General Information       Row Name 25 08          Physical Therapy Time and Intention    Document Type evaluation  -NS     Mode of Treatment physical therapy  -NS       Row Name 25 08          General Information    Patient Profile Reviewed yes  -NS     Prior Level of Function independent:;gait;mod assist:;max assist:;ADL's  pt questionable historian, states she uses a rollator at baseline but has had recent functional decline  -NS     Existing Precautions/Restrictions  fall;oxygen therapy device and L/min  O2 for comfort  -NS     Barriers to Rehab previous functional deficit;cognitive status  -NS       Row Name 04/01/25 0800          Living Environment    Current Living Arrangements home  -NS     People in Home child(cari), adult  -NS       Row Name 04/01/25 0800          Home Main Entrance    Number of Stairs, Main Entrance none  -NS       Row Name 04/01/25 0800          Stairs Within Home, Primary    Number of Stairs, Within Home, Primary none  -NS       Row Name 04/01/25 0800          Cognition    Orientation Status (Cognition) oriented to;person;verbal cues/prompts needed for orientation;place;disoriented to;time  could pick hospital and [facility] from choices  -NS       Row Name 04/01/25 0800          Safety Issues/Impairments Affecting Functional Mobility    Safety Issues Affecting Function (Mobility) insight into deficits/self-awareness;sequencing abilities;safety precaution awareness;safety precautions follow-through/compliance  -NS     Impairments Affecting Function (Mobility) balance;cognition;coordination;endurance/activity tolerance;motor planning;range of motion (ROM);strength;postural/trunk control  -NS               User Key  (r) = Recorded By, (t) = Taken By, (c) = Cosigned By      Initials Name Provider Type    Judy Granados PT Physical Therapist                   Mobility       Row Name 04/01/25 0800          Bed Mobility    Bed Mobility supine-sit  -NS     Supine-Sit [place] (Bed Mobility) moderate assist (50% patient effort);1 person assist;verbal cues  -NS     Assistive Device (Bed Mobility) bed rails;head of bed elevated;repositioning sheet  -NS     Comment, (Bed Mobility) assist needed at hips and trunk  -NS       Row Name 04/01/25 0800          Transfers    Comment, (Transfers) cues for hand and foot placement, manual assist needed for foot placement and increasing HEENA  -NS       Row Name 04/01/25 0800          Bed-Chair Transfer    Bed-Chair  [place] (Transfers) maximum assist (25% patient effort);2 person assist  -NS     Assistive Device (Bed-Chair Transfers) other (see comments)  BUE support  -NS       Row Name 04/01/25 0800          Sit-Stand Transfer    Sit-Stand [place] (Transfers) maximum assist (25% patient effort);2 person assist  -NS     Assistive Device (Sit-Stand Transfers) other (see comments)  BUE support  -NS       Row Name 04/01/25 0800          Gait/Stairs (Locomotion)    [place] Level (Gait) not tested  -NS               User Key  (r) = Recorded By, (t) = Taken By, (c) = Cosigned By      Initials Name Provider Type    Judy Granados PT Physical Therapist                   Obj/Interventions       Row Name 04/01/25 0800          Range of Motion Comprehensive    General Range of Motion lower extremity range of motion deficits identified  -NS     Comment, General Range of Motion decreased full ankle DF bilaterally  -NS       Row Name 04/01/25 0800          Strength Comprehensive (MMT)    General Manual Muscle Testing (MMT) Assessment lower extremity strength deficits identified  -NS     Comment, General Manual Muscle Testing (MMT) Assessment B ankle DF: 4/5, B knee ext: 4/5  -NS       Row Name 04/01/25 0800          Balance    Balance Assessment sitting static balance;sitting dynamic balance;standing static balance;standing dynamic balance  -NS     Static Sitting Balance minimal assist  -NS     Dynamic Sitting Balance minimal assist  -NS     Position, Sitting Balance unsupported;sitting edge of bed  -NS     Static Standing Balance maximum assist;2-person assist  -NS     Dynamic Standing Balance maximum assist;2-person assist  -NS     Position/Device Used, Standing Balance supported  -NS     Comment, Balance intermittently needing Min A due to R lateral lean in sitting, able to progress to CGA  -NS       Row Name 04/01/25 0800          Sensory Assessment (Somatosensory)    Sensory Assessment (Somatosensory) LE sensation  intact  -NS               User Key  (r) = Recorded By, (t) = Taken By, (c) = Cosigned By      Initials Name Provider Type    Judy Granados PT Physical Therapist                   Goals/Plan       Row Name 04/01/25 0800          Bed Mobility Goal 1 (PT)    Activity/Assistive Device (Bed Mobility Goal 1, PT) supine to sit  -NS     [place] Level/Cues Needed (Bed Mobility Goal 1, PT) contact guard required  -NS     Time Frame (Bed Mobility Goal 1, PT) short term goal (STG);1 week  -NS       Row Name 04/01/25 0800          Transfer Goal 1 (PT)    Activity/Assistive Device (Transfer Goal 1, PT) sit-to-stand/stand-to-sit;bed-to-chair/chair-to-bed  -NS     [place] Level/Cues Needed (Transfer Goal 1, PT) minimum assist (75% or more patient effort)  -NS     Time Frame (Transfer Goal 1, PT) long term goal (LTG);10 days  -NS       Row Name 04/01/25 0800          Gait Training Goal 1 (PT)    Activity/Assistive Device (Gait Training Goal 1, PT) gait (walking locomotion);assistive device use  -NS     [place] Level (Gait Training Goal 1, PT) moderate assist (50-74% patient effort)  -NS     Distance (Gait Training Goal 1, PT) 10  -NS     Time Frame (Gait Training Goal 1, PT) long term goal (LTG);10 days  -NS       Row Name 04/01/25 0800          Therapy Assessment/Plan (PT)    Planned Therapy Interventions (PT) balance training;bed mobility training;gait training;home exercise program;neuromuscular re-education;strengthening;patient/family education;transfer training;ROM (range of motion)  -NS               User Key  (r) = Recorded By, (t) = Taken By, (c) = Cosigned By      Initials Name Provider Type    Judy Granados PT Physical Therapist                   Clinical Impression       Row Name 04/01/25 0800          Pain    Pretreatment Pain Rating 0/10 - no pain  -NS     Posttreatment Pain Rating 0/10 - no pain  -NS       Row Name 04/01/25 0800          Plan of Care Review    Plan of Care Reviewed With patient   -NS     Progress no change  -NS     Outcome Evaluation Patient presents with weakness, decreased trunk control, balance deficits, and cognitive impairments affecting functional mobility. IP PT services warranted to support return to baseline. Recommend SNF at d/c.  -NS       Row Name 04/01/25 0800          Therapy Assessment/Plan (PT)    Patient/Family Therapy Goals Statement (PT) return to PLOF  -NS     Rehab Potential (PT) fair  -NS     Criteria for Skilled Interventions Met (PT) yes;meets criteria;skilled treatment is necessary  -NS     Therapy Frequency (PT) daily  -NS     Predicted Duration of Therapy Intervention (PT) 10 days  -NS       Row Name 04/01/25 0800          Vital Signs    Pre Patient Position Supine  -NS     Intra Patient Position Standing  -NS     Post Patient Position Sitting  -NS       Row Name 04/01/25 0800          Positioning and Restraints    Pre-Treatment Position in bed  -NS     Post Treatment Position chair  -NS     In Chair notified nsg;reclined;call light within reach;encouraged to call for assist;exit alarm on;legs elevated;heels elevated;on mechanical lift sling;waffle cushion  -NS               User Key  (r) = Recorded By, (t) = Taken By, (c) = Cosigned By      Initials Name Provider Type    Judy Granados, PT Physical Therapist                   Outcome Measures       Row Name 04/01/25 0800          How much help from another person do you currently need...    Turning from your back to your side while in flat bed without using bedrails? 2  -NS     Moving from lying on back to sitting on the side of a flat bed without bedrails? 2  -NS     Moving to and from a bed to a chair (including a wheelchair)? 2  -NS     Standing up from a chair using your arms (e.g., wheelchair, bedside chair)? 2  -NS     Climbing 3-5 steps with a railing? 1  -NS     To walk in hospital room? 1  -NS     AM-PAC 6 Clicks Score (PT) 10  -NS     Highest Level of Mobility Goal 4 --> Transfer to chair/commode  -NS        Row Name 04/01/25 0800          Functional Assessment    Outcome Measure Options AM-PAC 6 Clicks Basic Mobility (PT)  -NS               User Key  (r) = Recorded By, (t) = Taken By, (c) = Cosigned By      Initials Name Provider Type    Judy Granados PT Physical Therapist                                 Physical Therapy Education       Title: PT OT SLP Therapies (In Progress)       Topic: Physical Therapy (In Progress)       Point: Mobility training (Done)       Learning Progress Summary            Patient Acceptance, E, VU,NR by NS at 4/1/2025 0938                      Point: Home exercise program (Not Started)       Learner Progress:  Not documented in this visit.              Point: Body mechanics (Done)       Learning Progress Summary            Patient Acceptance, E, VU,NR by NS at 4/1/2025 0938                      Point: Precautions (Done)       Learning Progress Summary            Patient Acceptance, E, VU,NR by NS at 4/1/2025 0938                                      User Key       Initials Effective Dates Name Provider Type Discipline    NS 06/16/21 -  Judy Mancia PT Physical Therapist PT                  PT Recommendation and Plan  Planned Therapy Interventions (PT): balance training, bed mobility training, gait training, home exercise program, neuromuscular re-education, strengthening, patient/family education, transfer training, ROM (range of motion)  Progress: no change  Outcome Evaluation: Patient presents with weakness, decreased trunk control, balance deficits, and cognitive impairments affecting functional mobility. IP PT services warranted to support return to baseline. Recommend SNF at d/c.     Time Calculation:   PT Evaluation Complexity  History, PT Evaluation Complexity: 3 or more personal factors and/or comorbidities  Examination of Body Systems (PT Eval Complexity): total of 4 or more elements  Clinical Presentation (PT Evaluation Complexity): stable  Clinical Decision Making (PT  Evaluation Complexity): low complexity  Overall Complexity (PT Evaluation Complexity): low complexity     PT Charges       Row Name 04/01/25 0800             Time Calculation    Start Time 0800  -NS      PT Received On 04/01/25  -NS      PT Goal Re-Cert Due Date 04/11/25  -NS         Untimed Charges    PT Eval/Re-eval Minutes 50  -NS         Total Minutes    Untimed Charges Total Minutes 50  -NS       Total Minutes 50  -NS                User Key  (r) = Recorded By, (t) = Taken By, (c) = Cosigned By      Initials Name Provider Type    Judy Granados, PT Physical Therapist                  Therapy Charges for Today       Code Description Service Date Service Provider Modifiers Qty    [id number] HC PT EVAL LOW COMPLEXITY 4 4/1/2025 Judy Mancia, PT GP 1            PT G-Codes  Outcome Measure Options: AM-PAC 6 Clicks Basic Mobility (PT)  AM-PAC 6 Clicks Score (PT): 10  PT Discharge Summary  Anticipated Discharge Disposition (PT): skilled nursing facility    Judy Mancia PT  4/1/2025

## 2025-04-01 NOTE — CASE MANAGEMENT/SOCIAL WORK
Continued Stay Note  [facility]     Patient Name: Fanny Boyce  MRN: [id number]  Today's Date: 4/1/2025    Admit Date: 3/26/2025    Plan: Seeking rehab via  EMS   Discharge Plan       Row Name 04/01/25 1308       Plan    Plan Seeking rehab via  EMS    Plan Comments MSW met with patient at bedside no family at bedside.MSW made a referral to local SNF (patient approved by [facility]). Previously patient and her daughter expressed a preference for [facility]. MSW made a referral to [facility] rep Lesvia [phone] who explains she will review patient. MSW was informed that patient and daughter will go home with  if they aren't approved for [facility]. Spoke with patients daughter who is requesting a referral to [facility]. Plan is seeking rehab via  EMS                Discharge Codes    No documentation.                 Expected Discharge Date and Time       Expected Discharge Date Expected Discharge Time    Mar 31, 2025               JEROMY Sanabria (Kay)

## 2025-04-01 NOTE — PLAN OF CARE
Goal Outcome Evaluation:  Plan of Care Reviewed With: patient        Progress: improving  Outcome Evaluation: VSS on 2L O2 per NC, PT ambulated pt to bedside and up in chair, eating without complaints of nausea, awaiting palcement, continue plan of care

## 2025-04-01 NOTE — PROGRESS NOTES
[facility] Medicine Services  PROGRESS NOTE    Patient Name: Fanny Boyce  : 1940  MRN: [id number]    Date of Admission: 3/26/2025  Primary Care Physician: Nemo Aponte PA    Subjective   Subjective     CC:  Pulmonary embolism    HPI:  No complaints today, ate a little breakfast.  Daughter at bedside        Objective   Objective     Vital Signs:   Temp:  [97.5 °F (36.4 °C)-97.7 °F (36.5 °C)] 97.7 °F (36.5 °C)  Heart Rate:  [60-77] 60  Resp:  [16-18] 16  BP: (120-121)/(65-67) 121/65  Flow (L/min) (Oxygen Therapy):  [2] 2     Physical Exam:  Frail female, in bed, appears chronically deconditioned  MM moist  RRR  Breath sounds grossly clear  Abdomen soft  Awake, will briefly answer questions  Flat affect              Results Reviewed:  LAB RESULTS:      Lab 25  0930 25  1320 25  03525  1838 25  1725   WBC 6.35 7.07 5.43  --   --  7.00   HEMOGLOBIN 10.4* 10.5* 9.6*  --   --  11.5*   HEMATOCRIT 36.1 35.7 32.8*  --   --  38.6   PLATELETS 246 225 199  --   --  246   NEUTROS ABS 4.57 4.82 3.20  --   --  4.73   IMMATURE GRANS (ABS) 0.02 0.02 0.02  --   --  0.02   LYMPHS ABS 1.13 1.45 1.51  --   --  1.49   MONOS ABS 0.36 0.47 0.43  --   --  0.53   EOS ABS 0.21 0.21 0.21  --   --  0.16   MCV 86.8 87.1 86.8  --   --  86.7   PROTIME  --   --   --   --   --  13.1   APTT  --   --   --   --   --  29.6*   HEPARIN ANTI-XA  --  0.20* 0.14* 0.10*  --   --    HSTROP T  --   --   --   --  156* 162*         Lab 25  03525  1728 25  1725   SODIUM 140  --  141   POTASSIUM 3.8  --  4.3   CHLORIDE 106  --  104   CO2 23.0  --  25.0   ANION GAP 11.0  --  12.0   BUN 15  --  18   CREATININE 0.58 0.70 0.64   EGFR 88.3  --  86.2   GLUCOSE 88  --  103*   CALCIUM 8.6  --  9.2   MAGNESIUM 2.2  --   --          Lab 25  1725   TOTAL PROTEIN 6.8   ALBUMIN 3.7   GLOBULIN 3.1   ALT (SGPT) <5   AST (SGOT) 17   BILIRUBIN 0.3   ALK PHOS 119*          Lab 03/26/25  1838 03/26/25  1725   PROBNP  --  6,249.0*   HSTROP T 156* 162*   PROTIME  --  13.1   INR  --  0.98                 Brief Urine Lab Results  (Last result in the past 365 days)        Color   Clarity   Blood   Leuk Est   Nitrite   Protein   CREAT   Urine HCG        02/20/25 1846 Yellow   Cloudy   Trace   Moderate (2+)   Negative   Negative                   Microbiology Results Abnormal       None            No radiology results from the last 24 hrs    Results for orders placed during the hospital encounter of 02/09/25    Adult Transthoracic Echo Complete W/ Cont if Necessary Per Protocol    Interpretation Summary    Left ventricular systolic function is normal. Estimated left ventricular EF = 70%    Moderate tricuspid valve regurgitation is present.    Estimated  right ventricular systolic pressure from tricuspid regurgitation is 46 mmHg.    There is a trivial pericardial effusion.      Current medications:  Scheduled Meds:carbidopa-levodopa, 1 tablet, Oral, TID  fluticasone, 2 spray, Each Nare, Daily  metoprolol tartrate, 25 mg, Oral, Q12H  pantoprazole, 40 mg, Oral, Daily  senna-docusate sodium, 2 tablet, Oral, Q12H  sodium chloride, 10 mL, Intravenous, Q12H      Continuous Infusions:   PRN Meds:.  acetaminophen **OR** acetaminophen **OR** acetaminophen    senna-docusate sodium **AND** polyethylene glycol **AND** bisacodyl **AND** bisacodyl    glycopyrrolate **OR** glycopyrrolate **OR** glycopyrrolate **OR** glycopyrrolate    ipratropium-albuterol    metoprolol tartrate    sodium chloride    sodium chloride    sodium chloride    Assessment & Plan   Assessment & Plan     Active Hospital Problems    Diagnosis  POA    **Pulmonary embolism [I26.99]  Yes    Atrial fibrillation [I48.91]  Yes    COPD (chronic obstructive pulmonary disease) [J44.9]  Yes    Dementia [F03.90]  Yes    Parkinson's disease [G20.A1]  Yes      Resolved Hospital Problems   No resolved problems to display.        Brief  Hospital Course to date:  Fanny Boyce is a 84 y.o. female  with PMH significant for A-fib (currently not anticoagulated secondary to recent GI bleed), COPD, dementia, lung cancer, Parkinson disease, who presents to the ED with complaint of shortness of breath who is found to have concern for bilateral PEs.     Plan was partially entered by my partner and I have reviewed and updated as appropriate on 3/30/25     Bilateral pulmonary emboli  Acute on chronic hypoxic respiratory failure  Elevated proBNP  COPD/emphysema  Known history of A-fib without recent anticoagulation secondary to GI bleed in February 2025  CTA imaging showing new pulmonary embolisms with large burden however no evidence of right heart strain  Minimal oxygen requirements at this time  Cardiology consulted, discussed with daughter over the phone stating difficult to anticoagulate due to recent GI bleed and severe anemia requiring transfusions, morbidity and mortality was discussed with untreated PEs being very high  My colleague had a Long discussion with daughter, patient is now comfort care, discussed the risks of bleeding while on anticoagulation given history of recurrent GI bleeds.  The patient is not oriented to person, time and place at this time, daughter has made decision to proceed with comfort care measures     Chronic anemia  Evidence of duodenal ulcer on most recent EGD, recurrent history of GI bleeds     Elevated troponin  Likely secondary to above  No complaints of chest pain     A-fib  CAD  [id number] of 5  Currently rate controlled  Continue metoprolol with hold parameters, continue for now     Peptic ulcer disease  Recent GI bleed  Continue Protonix twice daily     Dementia  Parkinson's disease  Continue carbidopa levodopa     COPD  Not in acute exacerbation  Baseline room air, currently on 2 L  DuoNebs as needed  Continue Flonase    Goals of care  - daughter agreeable with Hospice care, but would like to try [facility]  first to see if patient can become stronger to assist with transfers.      Expected Discharge Location and Transportation: TBD  Expected Discharge   04/03/2025     VTE Prophylaxis:  Mechanical VTE prophylaxis orders are present.         AM-PAC 6 Clicks Score (PT): 10 (04/01/25 0800)    CODE STATUS:   Code Status and Medical Interventions: No CPR (Do Not Attempt to Resuscitate); Comfort Measures   Ordered at: 03/27/25 1431     Code Status (Patient has no pulse and is not breathing):    No CPR (Do Not Attempt to Resuscitate)     Medical Interventions (Patient has pulse or is breathing):    Comfort Measures       Michael Truong MD  04/01/25

## 2025-04-01 NOTE — PLAN OF CARE
Goal Outcome Evaluation:  Plan of Care Reviewed With: patient        Progress: no change  Outcome Evaluation: OT eval complete. Pt presents w/ significant generalized weakness, balance deficits, and cognitive deficits warranting cont skilled IPOT POC. Recommend pt DC to SNF, will monitor progress closely.    Anticipated Discharge Disposition (OT): skilled nursing facility

## 2025-04-01 NOTE — THERAPY EVALUATION
Patient Name: Fanny Boyce  : 1940    MRN: [id number]                              Today's Date: 2025       Admit Date: 3/26/2025    Visit Dx:     ICD-10-CM ICD-9-CM   1. Bilateral pulmonary embolism  I26.99 415.19   2. Elevated troponin  R79.89 790.6   3. SOB (shortness of breath)  R06.02 786.05   4. History of atrial fibrillation  Z86.79 V12.59   5. History of dementia  Z86.59 V11.8     Patient Active Problem List   Diagnosis    Atrial fibrillation    COPD (chronic obstructive pulmonary disease)    Dementia    Parkinson's disease    Fracture of left humerus with routine healing    Fracture of femoral neck, right    Severe protein-calorie malnutrition    Hiatal hernia    GIB (gastrointestinal bleeding)    Falls    Pulmonary embolism     Past Medical History:   Diagnosis Date    Atrial fibrillation     COPD (chronic obstructive pulmonary disease)     Dementia     History of lung cancer     History of transfusion     Parkinson disease     Pulmonary embolism      Past Surgical History:   Procedure Laterality Date    ENDOSCOPY N/A 2025    Procedure: ESOPHAGOGASTRODUODENOSCOPY;  Surgeon: Steve Gamez MD;  Location:  [facility];  Service: Gastroenterology;  Laterality: N/A;    HIP HEMIARTHROPLASTY Right 2/10/2025    Procedure: HIP HEMIARTHROPLASTY RIGHT;  Surgeon: Frandy Patino MD;  Location:  [facility];  Service: Orthopedics;  Laterality: Right;    JOINT REPLACEMENT      LUNG CANCER SURGERY        General Information       Row Name 25 0936          OT Time and Intention    Document Type evaluation  -CS     Mode of Treatment occupational therapy  -CS       Row Name 25 0936          General Information    Patient Profile Reviewed yes  -CS     Prior Level of Function mod assist:;all household mobility;ADL's  Pt questionnable historian, TBA further  -CS     Existing Precautions/Restrictions fall;oxygen therapy device and L/min  -CS     Barriers to Rehab medically  complex;cognitive status  -       Row Name 04/01/25 0936          Living Environment    Current Living Arrangements home  Pt limited historian, TBA further  -CS     People in Home child(cari), adult  -       Row Name 04/01/25 0936          Cognition    Orientation Status (Cognition) oriented to;person;verbal cues/prompts needed for orientation;place;disoriented to;time;situation  -       Row Name 04/01/25 0936          Safety Issues/Impairments Affecting Functional Mobility    Impairments Affecting Function (Mobility) balance;cognition;coordination;endurance/activity tolerance;motor planning;range of motion (ROM);strength;postural/trunk control  -CS     Cognitive Impairments, Mobility Safety/Performance attention;sequencing abilities;insight into deficits/self-awareness  -               User Key  (r) = Recorded By, (t) = Taken By, (c) = Cosigned By      Initials Name Provider Type    CS Helga Mancia, RADHA Occupational Therapist                     Mobility/ADL's       Row Name 04/01/25 0937          Transfers    Transfers bed-chair transfer;sit-stand transfer;stand-sit transfer  -     Comment, (Transfers) BUE support w/ B knees blocked, performed stand pivot, achieved ~75% upright posture  -       Row Name 04/01/25 0937          Bed-Chair Transfer    Bed-Chair [place] (Transfers) maximum assist (25% patient effort);2 person assist;verbal cues  -       Row Name 04/01/25 0937          Sit-Stand Transfer    Sit-Stand [place] (Transfers) maximum assist (25% patient effort);2 person assist;verbal cues  -       Row Name 04/01/25 0937          Stand-Sit Transfer    Stand-Sit [place] (Transfers) maximum assist (25% patient effort);2 person assist;verbal cues  -       Row Name 04/01/25 0937          Activities of Daily Living    BADL Assessment/Intervention lower body dressing;grooming  -       Row Name 04/01/25 0937          Lower Body Dressing Assessment/Training    [place] Level  (Lower Body Dressing) don;socks;dependent (less than 25% patient effort)  -     Position (Lower Body Dressing) supine  -       Row Name 04/01/25 0937          Grooming Assessment/Training    [place] Level (Grooming) hair care, combing/brushing;maximum assist (25% patient effort)  -     Position (Grooming) edge of bed sitting  -               User Key  (r) = Recorded By, (t) = Taken By, (c) = Cosigned By      Initials Name Provider Type     Helga Mancia OT Occupational Therapist                   Obj/Interventions       Row Name 04/01/25 0938          Sensory Assessment (Somatosensory)    Sensory Assessment (Somatosensory) UE sensation intact  -       Row Name 04/01/25 0938          Range of Motion Comprehensive    General Range of Motion bilateral upper extremity ROM WFL  -       Row Name 04/01/25 0938          Strength Comprehensive (MMT)    Comment, General Manual Muscle Testing (MMT) Assessment BUE grossly 3/5  -       Row Name 04/01/25 0938          Balance    Balance Assessment sitting static balance;sitting dynamic balance;standing static balance;standing dynamic balance  -     Static Sitting Balance minimal assist  -CS     Dynamic Sitting Balance minimal assist  -CS     Position, Sitting Balance sitting edge of bed  -     Static Standing Balance maximum assist;2-person assist  -CS     Dynamic Standing Balance maximum assist;2-person assist  -CS     Position/Device Used, Standing Balance supported  -     Balance Interventions sitting;standing;static;dynamic;dynamic reaching;occupation based/functional task;weight shifting activity  -               User Key  (r) = Recorded By, (t) = Taken By, (c) = Cosigned By      Initials Name Provider Type     Helga Mancia OT Occupational Therapist                   Goals/Plan       Row Name 04/01/25 0945          Transfer Goal 1 (OT)    Activity/Assistive Device (Transfer Goal 1, OT) sit-to-stand/stand-to-sit;toilet  -     [place]  Level/Cues Needed (Transfer Goal 1, OT) minimum assist (75% or more patient effort)  -CS     Time Frame (Transfer Goal 1, OT) long term goal (LTG);10 days  -CS     Progress/Outcome (Transfer Goal 1, OT) goal ongoing  -CS       Row Name 04/01/25 0939          Grooming Goal 1 (OT)    Activity/Device (Grooming Goal 1, OT) hair care;wash face, hands  -CS     [place] (Grooming Goal 1, OT) standby assist  -CS     Time Frame (Grooming Goal 1, OT) long term goal (LTG);10 days  -CS     Progress/Outcome (Grooming Goal 1, OT) goal ongoing  -CS       Row Name 04/01/25 0939          Self-Feeding Goal 1 (OT)    Activity/Device (Self-Feeding Goal 1, OT) finger foods;scoop food and bring to mouth;liquids to mouth  -CS     [place] Level/Cues Needed (Self-Feeding Goal 1, OT) standby assist  -CS     Time Frame (Self-Feeding Goal 1, OT) short term goal (STG);5 days  -CS     Strategies/Barriers (Self-Feeding Goal 1, OT) AAD  -CS     Progress/Outcomes (Self-Feeding Goal 1, OT) goal ongoing  -CS       Row Name 04/01/25 0939          Therapy Assessment/Plan (OT)    Planned Therapy Interventions (OT) activity tolerance training;adaptive equipment training;BADL retraining;functional balance retraining;occupation/activity based interventions;ROM/therapeutic exercise;strengthening exercise;transfer/mobility retraining;patient/caregiver education/training  -CS               User Key  (r) = Recorded By, (t) = Taken By, (c) = Cosigned By      Initials Name Provider Type    CS Helga Mancia, OT Occupational Therapist                   Clinical Impression       Row Name 04/01/25 0938          Pain Assessment    Pretreatment Pain Rating 0/10 - no pain  -CS     Posttreatment Pain Rating 0/10 - no pain  -CS       Row Name 04/01/25 0938          Plan of Care Review    Plan of Care Reviewed With patient  -CS     Progress no change  -CS     Outcome Evaluation OT eval complete. Pt presents w/ significant generalized weakness, balance deficits,  and cognitive deficits warranting cont skilled IPOT POC. Recommend pt DC to SNF, will monitor progress closely.  -CS       Row Name 04/01/25 0938          Therapy Assessment/Plan (OT)    Patient/Family Therapy Goal Statement (OT) Return to PLOF  -CS     Rehab Potential (OT) good  -CS     Criteria for Skilled Therapeutic Interventions Met (OT) yes;skilled treatment is necessary  -CS     Therapy Frequency (OT) daily  -CS     Predicted Duration of Therapy Intervention (OT) 10 days  -CS       Row Name 04/01/25 0938          Therapy Plan Review/Discharge Plan (OT)    Anticipated Discharge Disposition (OT) [facility] nursing facility  -CS       Row Name 04/01/25 0938          Vital Signs    O2 Delivery Pre Treatment nasal cannula  -CS     O2 Delivery Intra Treatment nasal cannula  -CS     O2 Delivery Post Treatment nasal cannula  -CS     Intra Patient Position Standing  -CS     Post Patient Position Sitting  -CS       Row Name 04/01/25 0938          Positioning and Restraints    Pre-Treatment Position in bed  -CS     Post Treatment Position chair  -CS     In Chair notified nsg;reclined;call light within reach;encouraged to call for assist;exit alarm on;RUE elevated;LUE elevated;waffle cushion;on mechanical lift sling;legs elevated;heels elevated  -CS               User Key  (r) = Recorded By, (t) = Taken By, (c) = Cosigned By      Initials Name Provider Type    CS Helga Mancia, RADHA Occupational Therapist                   Outcome Measures       Row Name 04/01/25 0940          How much help from another is currently needed...    Putting on and taking off regular lower body clothing? 1  -CS     Bathing (including washing, rinsing, and drying) 2  -CS     Toileting (which includes using toilet bed pan or urinal) 1  -CS     Putting on and taking off regular upper body clothing 2  -CS     Taking care of personal grooming (such as brushing teeth) 2  -CS     Eating meals 2  -CS     AM-PAC 6 Clicks Score (OT) 10  -CS       Row Name  04/01/25 0800          How much help from another person do you currently need...    Turning from your back to your side while in flat bed without using bedrails? 2  -NS     Moving from lying on back to sitting on the side of a flat bed without bedrails? 2  -NS     Moving to and from a bed to a chair (including a wheelchair)? 2  -NS     Standing up from a chair using your arms (e.g., wheelchair, bedside chair)? 2  -NS     Climbing 3-5 steps with a railing? 1  -NS     To walk in hospital room? 1  -NS     AM-PAC 6 Clicks Score (PT) 10  -NS     Highest Level of Mobility Goal 4 --> Transfer to chair/commode  -NS       Row Name 04/01/25 0940 04/01/25 0800       Functional Assessment    Outcome Measure Options AM-PAC 6 Clicks Daily Activity (OT)  -CS AM-PAC 6 Clicks Basic Mobility (PT)  -NS              User Key  (r) = Recorded By, (t) = Taken By, (c) = Cosigned By      Initials Name Provider Type    CS Helga Mancia, RADHA Occupational Therapist    Judy Granados PT Physical Therapist                    Occupational Therapy Education       Title: PT OT SLP Therapies (In Progress)       Topic: Occupational Therapy (In Progress)       Point: ADL training (In Progress)       Learning Progress Summary            Patient Acceptance, E, NR by  at 4/1/2025 0940                      Point: Precautions (In Progress)       Learning Progress Summary            Patient Acceptance, E, NR by  at 4/1/2025 0940                      Point: Body mechanics (In Progress)       Learning Progress Summary            Patient Acceptance, E, NR by  at 4/1/2025 0940                                      User Key       Initials Effective Dates Name Provider Type Discipline     09/02/21 -  Helga Mancia OT Occupational Therapist OT                  OT Recommendation and Plan  Planned Therapy Interventions (OT): activity tolerance training, adaptive equipment training, BADL retraining, functional balance retraining, occupation/activity  based interventions, ROM/therapeutic exercise, strengthening exercise, transfer/mobility retraining, patient/caregiver education/training  Therapy Frequency (OT): daily  Plan of Care Review  Plan of Care Reviewed With: patient  Progress: no change  Outcome Evaluation: OT eval complete. Pt presents w/ significant generalized weakness, balance deficits, and cognitive deficits warranting cont skilled IPOT POC. Recommend pt DC to SNF, will monitor progress closely.     Time Calculation:   Evaluation Complexity (OT)  Review Occupational Profile/Medical/Therapy History Complexity: expanded/moderate complexity  Assessment, Occupational Performance/Identification of Deficit Complexity: 5 or more performance deficits  Clinical Decision Making Complexity (OT): detailed assessment/moderate complexity  Overall Complexity of Evaluation (OT): moderate complexity     Time Calculation- OT       Row Name 04/01/25 0941             Time Calculation- OT    OT Start Time 0815  -CS      OT Received On 04/01/25  -CS      OT Goal Re-Cert Due Date 04/11/25  -CS         Untimed Charges    OT Eval/Re-eval Minutes 50  -CS         Total Minutes    Untimed Charges Total Minutes 50  -CS       Total Minutes 50  -CS                User Key  (r) = Recorded By, (t) = Taken By, (c) = Cosigned By      Initials Name Provider Type    CS Helga Mancia OT Occupational Therapist                  Therapy Charges for Today       Code Description Service Date Service Provider Modifiers Qty    [id number] HC OT EVAL MOD COMPLEXITY 4 4/1/2025 Helga Mancia OT GO 1                 Helga Mancia OT  4/1/2025

## 2025-04-01 NOTE — DISCHARGE PLACEMENT REQUEST
"Fanny Campbell \"Monica\" (84 y.o. Female)       contact Analilia Spence  [phone]     Date of Birth   1940    Social Security Number       Address   [address] [place] [id number]    Home Phone   [phone]    MRN   [id number]       Christianity   None    Marital Status                               Admission Date   3/26/2025    Admission Type   Emergency    Admitting Provider   Michael Truong MD    Attending Provider   Michael Truong MD    Department, Room/Bed   [address], [address]/1       Discharge Date       Discharge Disposition       Discharge Destination                                 Attending Provider: Michael Truong MD    Allergies: Nitrofurantoin, Latex, Morphine, Wound Dressing Adhesive    Isolation: None   Infection: None   Code Status: No CPR    Ht: 152.4 cm (60\")   Wt: 52.2 kg (115 lb 1.3 oz)    Admission Cmt: None   Principal Problem: Pulmonary embolism [I26.99]                   Active Insurance as of 3/26/2025       Primary Coverage       Payor Plan Insurance Group Employer/Plan Group    MEDICARE MEDICARE A & B        Payor Plan Address Payor Plan Phone Number Payor Plan Fax Number Effective Dates    [address] [phone]  9/1/2001 - None Entered    [facility] [id number]         Subscriber Name Subscriber Birth Date Member ID       AFNNY CAMPBELL 1940 [id number]               Secondary Coverage       Payor Plan Insurance Group Employer/Plan Group    HUMANA HUMANA [facility]              [id number]       Payor Plan Address Payor Plan Phone Number Payor Plan Fax Number Effective Dates    [address] [address]   1/1/2015 - None Entered    [facility] [id number]         Subscriber Name Subscriber Birth Date Member ID       FANNY CAMPBELL 1940 [id number]                     Emergency Contacts        (Rel.) Home Phone Work Phone Mobile Phone    JoshuaLety (Daughter) [phone] -- [phone]    Licha Pugh (Grandchild) -- -- --      "            Physician Progress Notes (most recent note)        Mari Way APRN at 25 1158              [facility]  PROGRESS NOTE    Patient Name: Fanny Boyce  : 1940  MRN: [id number]    Date of Admission: 3/26/2025  Primary Care Physician: Nemo Aponte PA    Subjective   Subjective     CC:  Pulmonary embolism    HPI:  Pt sleeping, awaken easily with conversation. She denies any issues today.   Copied text in this note has been reviewed and is accurate as of 25.         Objective   Objective     Vital Signs:   Temp:  [97.9 °F (36.6 °C)-98.2 °F (36.8 °C)] 98.2 °F (36.8 °C)  Heart Rate:  [61-73] 61  Resp:  [18] 18  BP: (123-133)/(65-76) 123/65  Flow (L/min) (Oxygen Therapy):  [2] 2     Physical Exam:  Constitutional: sleeping, awaken easily with conversation  HENT: NCAT, mucous membranes moist  Respiratory: Clear to auscultation bilaterally, nonlabored 2L 96%  Cardiovascular: RRR, no murmurs, rubs, or gallops HR 73  Gastrointestinal: Positive bowel sounds, soft, nontender, nondistended  Musculoskeletal: No bilateral ankle edema  Psychiatric: Appropriate affect, cooperative  Neurologic: Oriented to person, COTTRELL, speech clear  Skin: No rashes          Results Reviewed:  LAB RESULTS:      Lab 25  0930 25  1320 25  0355 25  1838 25  1725   WBC 6.35 7.07 5.43  --   --  7.00   HEMOGLOBIN 10.4* 10.5* 9.6*  --   --  11.5*   HEMATOCRIT 36.1 35.7 32.8*  --   --  38.6   PLATELETS 246 225 199  --   --  246   NEUTROS ABS 4.57 4.82 3.20  --   --  4.73   IMMATURE GRANS (ABS) 0.02 0.02 0.02  --   --  0.02   LYMPHS ABS 1.13 1.45 1.51  --   --  1.49   MONOS ABS 0.36 0.47 0.43  --   --  0.53   EOS ABS 0.21 0.21 0.21  --   --  0.16   MCV 86.8 87.1 86.8  --   --  86.7   PROTIME  --   --   --   --   --  13.1   APTT  --   --   --   --   --  29.6*   HEPARIN ANTI-XA  --  0.20* 0.14* 0.10*  --   --    HSTROP T  --   --   --    --  156* 162*         Lab 03/27/25  0355 03/26/25  1728 03/26/25  1725   SODIUM 140  --  141   POTASSIUM 3.8  --  4.3   CHLORIDE 106  --  104   CO2 23.0  --  25.0   ANION GAP 11.0  --  12.0   BUN 15  --  18   CREATININE 0.58 0.70 0.64   EGFR 88.3  --  86.2   GLUCOSE 88  --  103*   CALCIUM 8.6  --  9.2   MAGNESIUM 2.2  --   --          Lab 03/26/25  1725   TOTAL PROTEIN 6.8   ALBUMIN 3.7   GLOBULIN 3.1   ALT (SGPT) <5   AST (SGOT) 17   BILIRUBIN 0.3   ALK PHOS 119*         Lab 03/26/25  1838 03/26/25  1725   PROBNP  --  6,249.0*   HSTROP T 156* 162*   PROTIME  --  13.1   INR  --  0.98                 Brief Urine Lab Results  (Last result in the past 365 days)        Color   Clarity   Blood   Leuk Est   Nitrite   Protein   CREAT   Urine HCG        02/20/25 1846 Yellow   Cloudy   Trace   Moderate (2+)   Negative   Negative                   Microbiology Results Abnormal       None            No radiology results from the last 24 hrs    Results for orders placed during the hospital encounter of 02/09/25    Adult Transthoracic Echo Complete W/ Cont if Necessary Per Protocol    Interpretation Summary    Left ventricular systolic function is normal. Estimated left ventricular EF = 70%    Moderate tricuspid valve regurgitation is present.    Estimated  right ventricular systolic pressure from tricuspid regurgitation is 46 mmHg.    There is a trivial pericardial effusion.      Current medications:  Scheduled Meds:carbidopa-levodopa, 1 tablet, Oral, TID  fluticasone, 2 spray, Each Nare, Daily  metoprolol tartrate, 25 mg, Oral, Q12H  pantoprazole, 40 mg, Oral, Daily  senna-docusate sodium, 2 tablet, Oral, Q12H  sodium chloride, 10 mL, Intravenous, Q12H      Continuous Infusions:   PRN Meds:.  acetaminophen **OR** acetaminophen **OR** acetaminophen    senna-docusate sodium **AND** polyethylene glycol **AND** bisacodyl **AND** bisacodyl    glycopyrrolate **OR** glycopyrrolate **OR** glycopyrrolate **OR** glycopyrrolate     HYDROmorphone    ipratropium-albuterol    LORazepam **OR** LORazepam **OR** [DISCONTINUED] LORazepam    LORazepam **OR** LORazepam **OR** [DISCONTINUED] LORazepam    LORazepam **OR** LORazepam    metoprolol tartrate    sodium chloride    sodium chloride    sodium chloride    Assessment & Plan   Assessment & Plan     Active Hospital Problems    Diagnosis  POA    **Pulmonary embolism [I26.99]  Yes    Atrial fibrillation [I48.91]  Yes    COPD (chronic obstructive pulmonary disease) [J44.9]  Yes    Dementia [F03.90]  Yes    Parkinson's disease [G20.A1]  Yes      Resolved Hospital Problems   No resolved problems to display.        Brief Hospital Course to date:  Fanny Boyce is a 84 y.o. female  with PMH significant for A-fib (currently not anticoagulated secondary to recent GI bleed), COPD, dementia, lung cancer, Parkinson disease, who presents to the ED with complaint of shortness of breath who is found to have concern for bilateral PEs.     Plan was partially entered by my partner and I have reviewed and updated as appropriate on 3/30/25     Bilateral pulmonary emboli  Acute on chronic hypoxic respiratory failure  Elevated proBNP  COPD/emphysema  Known history of A-fib without recent anticoagulation secondary to GI bleed in February 2025  CTA imaging showing new pulmonary embolisms with large burden however no evidence of right heart strain  Minimal oxygen requirements at this time  Cardiology consulted, discussed with daughter over the phone stating difficult to anticoagulate due to recent GI bleed and severe anemia requiring transfusions, morbidity and mortality was discussed with untreated PEs being very high  My colleague had a Long discussion with daughter, patient is now comfort care, discussed the risks of bleeding while on anticoagulation given history of recurrent GI bleeds.  The patient is not oriented to person, time and place at this time, daughter has made decision to proceed with comfort care  measures  Heparin drip discontinued  2 L nasal cannula, just for comfort     Chronic anemia  Evidence of duodenal ulcer on most recent EGD, recurrent history of GI bleeds  Stopping lab checks due to comfort measures     Elevated troponin  Likely secondary to above  No complaints of chest pain     A-fib  CAD  [id number] of 5  Currently rate controlled  Continue metoprolol with hold parameters, continue for now     Peptic ulcer disease  Recent GI bleed  Continue Protonix twice daily     Dementia  Parkinson's disease  Continue carbidopa levodopa     COPD  Not in acute exacerbation  Baseline room air, currently on 2 L  DuoNebs as needed  Continue Flonase    Expected Discharge Location and Transportation: TBD  Expected Discharge   2025     VTE Prophylaxis:  Mechanical VTE prophylaxis orders are present.         AM-PAC 6 Clicks Score (PT): 12 (25 2395)    CODE STATUS:   Code Status and Medical Interventions: No CPR (Do Not Attempt to Resuscitate); Comfort Measures   Ordered at: 25 1431     Code Status (Patient has no pulse and is not breathing):    No CPR (Do Not Attempt to Resuscitate)     Medical Interventions (Patient has pulse or is breathing):    Comfort Measures       LAVERNE Degroot  25        Electronically signed by Mari Way APRN at 25 1204          Physical Therapy Notes (most recent note)        Judy Mancia, PT at 25 0800  Version 1 of 1         Patient Name: Fanny Boyce  : 1940    MRN: [id number]                              Today's Date: 2025       Admit Date: 3/26/2025    Visit Dx:     ICD-10-CM ICD-9-CM   1. Bilateral pulmonary embolism  I26.99 415.19   2. Elevated troponin  R79.89 790.6   3. SOB (shortness of breath)  R06.02 786.05   4. History of atrial fibrillation  Z86.79 V12.59   5. History of dementia  Z86.59 V11.8     Patient Active Problem List   Diagnosis    Atrial fibrillation    COPD (chronic obstructive pulmonary disease)     Dementia    Parkinson's disease    Fracture of left humerus with routine healing    Fracture of femoral neck, right    Severe protein-calorie malnutrition    Hiatal hernia    GIB (gastrointestinal bleeding)    Falls    Pulmonary embolism     Past Medical History:   Diagnosis Date    Atrial fibrillation     COPD (chronic obstructive pulmonary disease)     Dementia     History of lung cancer     History of transfusion     Parkinson disease     Pulmonary embolism      Past Surgical History:   Procedure Laterality Date    ENDOSCOPY N/A 2/21/2025    Procedure: ESOPHAGOGASTRODUODENOSCOPY;  Surgeon: Steve Gamez MD;  Location:  [facility] ENDOSCOPY;  Service: Gastroenterology;  Laterality: N/A;    HIP HEMIARTHROPLASTY Right 2/10/2025    Procedure: HIP HEMIARTHROPLASTY RIGHT;  Surgeon: Frandy Patino MD;  Location:  [facility] OR;  Service: Orthopedics;  Laterality: Right;    JOINT REPLACEMENT      LUNG CANCER SURGERY        General Information       Row Name 04/01/25 0800          Physical Therapy Time and Intention    Document Type evaluation  -NS     Mode of Treatment physical therapy  -NS       Row Name 04/01/25 0800          General Information    Patient Profile Reviewed yes  -NS     Prior Level of Function independent:;gait;mod assist:;max assist:;ADL's  pt questionable historian, states she uses a rollator at baseline but has had recent functional decline  -NS     Existing Precautions/Restrictions fall;oxygen therapy device and L/min  O2 for comfort  -NS     Barriers to Rehab previous functional deficit;cognitive status  -NS       Row Name 04/01/25 0800          Living Environment    Current Living Arrangements home  -NS     People in Home child(cari), adult  -NS       Row Name 04/01/25 0800          Home Main Entrance    Number of Stairs, Main Entrance none  -NS       Row Name 04/01/25 0800          Stairs Within Home, Primary    Number of Stairs, Within Home, Primary none  -NS       Row Name 04/01/25 0800           Cognition    Orientation Status (Cognition) oriented to;person;verbal cues/prompts needed for orientation;place;disoriented to;time  could pick hospital and [facility] from choices  -NS       Row Name 04/01/25 0800          Safety Issues/Impairments Affecting Functional Mobility    Safety Issues Affecting Function (Mobility) insight into deficits/self-awareness;sequencing abilities;safety precaution awareness;safety precautions follow-through/compliance  -NS     Impairments Affecting Function (Mobility) balance;cognition;coordination;endurance/activity tolerance;motor planning;range of motion (ROM);strength;postural/trunk control  -NS               User Key  (r) = Recorded By, (t) = Taken By, (c) = Cosigned By      Initials Name Provider Type    Judy Granados PT Physical Therapist                   Mobility       Row Name 04/01/25 0800          Bed Mobility    Bed Mobility supine-sit  -NS     Supine-Sit [place] (Bed Mobility) moderate assist (50% patient effort);1 person assist;verbal cues  -NS     Assistive Device (Bed Mobility) bed rails;head of bed elevated;repositioning sheet  -NS     Comment, (Bed Mobility) assist needed at hips and trunk  -NS       Row Name 04/01/25 0800          Transfers    Comment, (Transfers) cues for hand and foot placement, manual assist needed for foot placement and increasing HEENA  -NS       Row Name 04/01/25 0800          Bed-Chair Transfer    Bed-Chair [place] (Transfers) maximum assist (25% patient effort);2 person assist  -NS     Assistive Device (Bed-Chair Transfers) other (see comments)  BUE support  -NS       Row Name 04/01/25 0800          Sit-Stand Transfer    Sit-Stand [place] (Transfers) maximum assist (25% patient effort);2 person assist  -NS     Assistive Device (Sit-Stand Transfers) other (see comments)  BUE support  -NS       Row Name 04/01/25 0800          Gait/Stairs (Locomotion)    [place] Level (Gait) not tested  -NS               User Key  (r) =  Recorded By, (t) = Taken By, (c) = Cosigned By      Initials Name Provider Type    Judy Granados PT Physical Therapist                   Obj/Interventions       Row Name 04/01/25 0800          Range of Motion Comprehensive    General Range of Motion lower extremity range of motion deficits identified  -NS     Comment, General Range of Motion decreased full ankle DF bilaterally  -NS       [facility] Name 04/01/25 0800          Strength Comprehensive (MMT)    General Manual Muscle Testing (MMT) Assessment lower extremity strength deficits identified  -NS     Comment, General Manual Muscle Testing (MMT) Assessment B ankle DF: 4/5, B knee ext: 4/5  -NS       [facility] Name 04/01/25 0800          Balance    Balance Assessment sitting static balance;sitting dynamic balance;standing static balance;standing dynamic balance  -NS     Static Sitting Balance minimal assist  -NS     Dynamic Sitting Balance minimal assist  -NS     Position, Sitting Balance unsupported;sitting edge of bed  -NS     Static Standing Balance maximum assist;2-person assist  -NS     Dynamic Standing Balance maximum assist;2-person assist  -NS     Position/Device Used, Standing Balance supported  -NS     Comment, Balance intermittently needing Min A due to R lateral lean in sitting, able to progress to CGA  -NS       Row Name 04/01/25 0800          Sensory Assessment (Somatosensory)    Sensory Assessment (Somatosensory) LE sensation intact  -NS               User Key  (r) = Recorded By, (t) = Taken By, (c) = Cosigned By      Initials Name Provider Type    Judy Granados PT Physical Therapist                   Goals/Plan       Row Name 04/01/25 0800          Bed Mobility Goal 1 (PT)    Activity/Assistive Device (Bed Mobility Goal 1, PT) supine to sit  -NS     [place] Level/Cues Needed (Bed Mobility Goal 1, PT) contact guard required  -NS     Time Frame (Bed Mobility Goal 1, PT) short term goal (STG);1 week  -NS       [facility] Name 04/01/25 0800           Transfer Goal 1 (PT)    Activity/Assistive Device (Transfer Goal 1, PT) sit-to-stand/stand-to-sit;bed-to-chair/chair-to-bed  -NS     [place] Level/Cues Needed (Transfer Goal 1, PT) minimum assist (75% or more patient effort)  -NS     Time Frame (Transfer Goal 1, PT) long term goal (LTG);10 days  -NS       Row Name 04/01/25 0800          Gait Training Goal 1 (PT)    Activity/Assistive Device (Gait Training Goal 1, PT) gait (walking locomotion);assistive device use  -NS     [place] Level (Gait Training Goal 1, PT) moderate assist (50-74% patient effort)  -NS     Distance (Gait Training Goal 1, PT) 10  -NS     Time Frame (Gait Training Goal 1, PT) long term goal (LTG);10 days  -NS       Row Name 04/01/25 0800          Therapy Assessment/Plan (PT)    Planned Therapy Interventions (PT) balance training;bed mobility training;gait training;home exercise program;neuromuscular re-education;strengthening;patient/family education;transfer training;ROM (range of motion)  -NS               User Key  (r) = Recorded By, (t) = Taken By, (c) = Cosigned By      Initials Name Provider Type    Judy Granados, PT Physical Therapist                   Clinical Impression       Row Name 04/01/25 0800          Pain    Pretreatment Pain Rating 0/10 - no pain  -NS     Posttreatment Pain Rating 0/10 - no pain  -NS       Row Name 04/01/25 0800          Plan of Care Review    Plan of Care Reviewed With patient  -NS     Progress no change  -NS     Outcome Evaluation Patient presents with weakness, decreased trunk control, balance deficits, and cognitive impairments affecting functional mobility. IP PT services warranted to support return to baseline. Recommend SNF at d/c.  -NS       Row Name 04/01/25 0800          Therapy Assessment/Plan (PT)    Patient/Family Therapy Goals Statement (PT) return to PLOF  -NS     Rehab Potential (PT) fair  -NS     Criteria for Skilled Interventions Met (PT) yes;meets criteria;skilled treatment is  necessary  -NS     Therapy Frequency (PT) daily  -NS     Predicted Duration of Therapy Intervention (PT) 10 days  -NS       Row Name 04/01/25 0800          Vital Signs    Pre Patient Position Supine  -NS     Intra Patient Position Standing  -NS     Post Patient Position Sitting  -NS       Row Name 04/01/25 0800          Positioning and Restraints    Pre-Treatment Position in bed  -NS     Post Treatment Position chair  -NS     In Chair notified nsg;reclined;call light within reach;encouraged to call for assist;exit alarm on;legs elevated;heels elevated;on mechanical lift sling;waffle cushion  -NS               User Key  (r) = Recorded By, (t) = Taken By, (c) = Cosigned By      Initials Name Provider Type    Judy Granados PT Physical Therapist                   Outcome Measures       Row Name 04/01/25 0800          How much help from another person do you currently need...    Turning from your back to your side while in flat bed without using bedrails? 2  -NS     Moving from lying on back to sitting on the side of a flat bed without bedrails? 2  -NS     Moving to and from a bed to a chair (including a wheelchair)? 2  -NS     Standing up from a chair using your arms (e.g., wheelchair, bedside chair)? 2  -NS     Climbing 3-5 steps with a railing? 1  -NS     To walk in hospital room? 1  -NS     AM-PAC 6 Clicks Score (PT) 10  -NS     Highest Level of Mobility Goal 4 --> Transfer to chair/commode  -NS       Row Name 04/01/25 0800          Functional Assessment    Outcome Measure Options AM-PAC 6 Clicks Basic Mobility (PT)  -NS               User Key  (r) = Recorded By, (t) = Taken By, (c) = Cosigned By      Initials Name Provider Type    Judy Granados PT Physical Therapist                                 Physical Therapy Education       Title: PT OT SLP Therapies (In Progress)       Topic: Physical Therapy (In Progress)       Point: Mobility training (Done)       Learning Progress Summary            Patient  Acceptance, E, VU,NR by NS at 4/1/2025 0938                      Point: Home exercise program (Not Started)       Learner Progress:  Not documented in this visit.              Point: Body mechanics (Done)       Learning Progress Summary            Patient Acceptance, E, VU,NR by NS at 4/1/2025 0938                      Point: Precautions (Done)       Learning Progress Summary            Patient Acceptance, E, VU,NR by NS at 4/1/2025 0938                                      User Key       Initials Effective Dates Name Provider Type Discipline    NS 06/16/21 -  Judy Mancia, PT Physical Therapist PT                  PT Recommendation and Plan  Planned Therapy Interventions (PT): balance training, bed mobility training, gait training, home exercise program, neuromuscular re-education, strengthening, patient/family education, transfer training, ROM (range of motion)  Progress: no change  Outcome Evaluation: Patient presents with weakness, decreased trunk control, balance deficits, and cognitive impairments affecting functional mobility. IP PT services warranted to support return to baseline. Recommend SNF at d/c.     Time Calculation:   PT Evaluation Complexity  History, PT Evaluation Complexity: 3 or more personal factors and/or comorbidities  Examination of Body Systems (PT Eval Complexity): total of 4 or more elements  Clinical Presentation (PT Evaluation Complexity): stable  Clinical Decision Making (PT Evaluation Complexity): low complexity  Overall Complexity (PT Evaluation Complexity): low complexity     PT Charges       Row Name 04/01/25 0800             Time Calculation    Start Time 0800  -NS      PT Received On 04/01/25  -NS      PT Goal Re-Cert Due Date 04/11/25  -NS         Untimed Charges    PT Eval/Re-eval Minutes 50  -NS         Total Minutes    Untimed Charges Total Minutes 50  -NS       Total Minutes 50  -NS                User Key  (r) = Recorded By, (t) = Taken By, (c) = Cosigned By      Initials  Name Provider Type    NS Judy Mancia PT Physical Therapist                  Therapy Charges for Today       Code Description Service Date Service Provider Modifiers Qty    [id number] HC PT EVAL LOW COMPLEXITY 4 2025 Judy Mancia PT GP 1            PT G-Codes  Outcome Measure Options: AM-PAC 6 Clicks Basic Mobility (PT)  AM-PAC 6 Clicks Score (PT): 10  PT Discharge Summary  Anticipated Discharge Disposition (PT): skilled nursing facility    Judy Mancia PT  2025      Electronically signed by Judy Mancia PT at 25 0939          Occupational Therapy Notes (most recent note)        Helga Mancia, OT at 25 0815          Patient Name: Fanny Boyce  : 1940    MRN: [id number]                              Today's Date: 2025       Admit Date: 3/26/2025    Visit Dx:     ICD-10-CM ICD-9-CM   1. Bilateral pulmonary embolism  I26.99 415.19   2. Elevated troponin  R79.89 790.6   3. SOB (shortness of breath)  R06.02 786.05   4. History of atrial fibrillation  Z86.79 V12.59   5. History of dementia  Z86.59 V11.8     Patient Active Problem List   Diagnosis    Atrial fibrillation    COPD (chronic obstructive pulmonary disease)    Dementia    Parkinson's disease    Fracture of left humerus with routine healing    Fracture of femoral neck, right    Severe protein-calorie malnutrition    Hiatal hernia    GIB (gastrointestinal bleeding)    Falls    Pulmonary embolism     Past Medical History:   Diagnosis Date    Atrial fibrillation     COPD (chronic obstructive pulmonary disease)     Dementia     History of lung cancer     History of transfusion     Parkinson disease     Pulmonary embolism      Past Surgical History:   Procedure Laterality Date    ENDOSCOPY N/A 2025    Procedure: ESOPHAGOGASTRODUODENOSCOPY;  Surgeon: Steve Gamez MD;  Location: [facility] ENDOSCOPY;  Service: Gastroenterology;  Laterality: N/A;    HIP HEMIARTHROPLASTY Right 2/10/2025    Procedure: HIP HEMIARTHROPLASTY RIGHT;   Surgeon: Frandy Patino MD;  [facility];  Service: Orthopedics;  Laterality: Right;    JOINT REPLACEMENT      LUNG CANCER SURGERY        General Information       Row Name 04/01/25 0936          OT Time and Intention    Document Type evaluation  -CS     Mode of Treatment occupational therapy  -CS       Row Name 04/01/25 0936          General Information    Patient Profile Reviewed yes  -CS     Prior Level of Function mod assist:;all household mobility;ADL's  Pt questionnable historian, IDANIA further  -CS     Existing Precautions/Restrictions fall;oxygen therapy device and L/min  -CS     Barriers to Rehab medically complex;cognitive status  -CS       Row Name 04/01/25 0936          Living Environment    Current Living Arrangements home  Pt limited historian, IDANIA further  -CS     People in Home child(cari), adult  -CS       Row Name 04/01/25 0936          Cognition    Orientation Status (Cognition) oriented to;person;verbal cues/prompts needed for orientation;place;disoriented to;time;situation  -CS       Row Name 04/01/25 0936          Safety Issues/Impairments Affecting Functional Mobility    Impairments Affecting Function (Mobility) balance;cognition;coordination;endurance/activity tolerance;motor planning;range of motion (ROM);strength;postural/trunk control  -CS     Cognitive Impairments, Mobility Safety/Performance attention;sequencing abilities;insight into deficits/self-awareness  -CS               User Key  (r) = Recorded By, (t) = Taken By, (c) = Cosigned By      Initials Name Provider Type    CS Helga Mancia, RADHA Occupational Therapist                     Mobility/ADL's       Row Name 04/01/25 0937          Transfers    Transfers bed-chair transfer;sit-stand transfer;stand-sit transfer  -CS     Comment, (Transfers) BUE support w/ B knees blocked, performed stand pivot, achieved ~75% upright posture  -CS       Row Name 04/01/25 0937          Bed-Chair Transfer    Bed-Chair [place]  (Transfers) maximum assist (25% patient effort);2 person assist;verbal cues  -       Row Name 04/01/25 0937          Sit-Stand Transfer    Sit-Stand [place] (Transfers) maximum assist (25% patient effort);2 person assist;verbal cues  -       Row Name 04/01/25 0937          Stand-Sit Transfer    Stand-Sit [place] (Transfers) maximum assist (25% patient effort);2 person assist;verbal cues  -       Row Name 04/01/25 0937          Activities of Daily Living    BADL Assessment/Intervention lower body dressing;grooming  -       Row Name 04/01/25 0937          Lower Body Dressing Assessment/Training    [place] Level (Lower Body Dressing) don;socks;dependent (less than 25% patient effort)  -CS     Position (Lower Body Dressing) supine  -       Row Name 04/01/25 0937          Grooming Assessment/Training    [place] Level (Grooming) hair care, combing/brushing;maximum assist (25% patient effort)  -CS     Position (Grooming) edge of bed sitting  -               User Key  (r) = Recorded By, (t) = Taken By, (c) = Cosigned By      Initials Name Provider Type    CS Helga Mancia OT Occupational Therapist                   Obj/Interventions       Row Name 04/01/25 0938          Sensory Assessment (Somatosensory)    Sensory Assessment (Somatosensory) UE sensation intact  -[facility] Name 04/01/25 0938          Range of Motion Comprehensive    General Range of Motion bilateral upper extremity ROM WFL  -[facility] Name 04/01/25 0938          Strength Comprehensive (MMT)    Comment, General Manual Muscle Testing (MMT) Assessment BUE grossly 3/5  -       Row Name 04/01/25 0938          Balance    Balance Assessment sitting static balance;sitting dynamic balance;standing static balance;standing dynamic balance  -     Static Sitting Balance minimal assist  -     Dynamic Sitting Balance minimal assist  -     Position, Sitting Balance sitting edge of bed  -     Static Standing Balance maximum  assist;2-person assist  -CS     Dynamic Standing Balance maximum assist;2-person assist  -CS     Position/Device Used, Standing Balance supported  -CS     Balance Interventions sitting;standing;static;dynamic;dynamic reaching;occupation based/functional task;weight shifting activity  -CS               User Key  (r) = Recorded By, (t) = Taken By, (c) = Cosigned By      Initials Name Provider Type    CS Helga Mancia, OT Occupational Therapist                   Goals/Plan       Row Name 04/01/25 0939          Transfer Goal 1 (OT)    Activity/Assistive Device (Transfer Goal 1, OT) sit-to-stand/stand-to-sit;toilet  -CS     [place] Level/Cues Needed (Transfer Goal 1, OT) minimum assist (75% or more patient effort)  -CS     Time Frame (Transfer Goal 1, OT) long term goal (LTG);10 days  -CS     Progress/Outcome (Transfer Goal 1, OT) goal ongoing  -CS       Row Name 04/01/25 0939          Grooming Goal 1 (OT)    Activity/Device (Grooming Goal 1, OT) hair care;wash face, hands  -CS     [place] (Grooming Goal 1, OT) standby assist  -CS     Time Frame (Grooming Goal 1, OT) long term goal (LTG);10 days  -CS     Progress/Outcome (Grooming Goal 1, OT) goal ongoing  -CS       Row Name 04/01/25 0939          Self-Feeding Goal 1 (OT)    Activity/Device (Self-Feeding Goal 1, OT) finger foods;scoop food and bring to mouth;liquids to mouth  -CS     [place] Level/Cues Needed (Self-Feeding Goal 1, OT) standby assist  -CS     Time Frame (Self-Feeding Goal 1, OT) short term goal (STG);5 days  -CS     Strategies/Barriers (Self-Feeding Goal 1, OT) AAD  -CS     Progress/Outcomes (Self-Feeding Goal 1, OT) goal ongoing  -CS       Row Name 04/01/25 0939          Therapy Assessment/Plan (OT)    Planned Therapy Interventions (OT) activity tolerance training;adaptive equipment training;BADL retraining;functional balance retraining;occupation/activity based interventions;ROM/therapeutic exercise;strengthening  exercise;transfer/mobility retraining;patient/caregiver education/training  -CS               User Key  (r) = Recorded By, (t) = Taken By, (c) = Cosigned By      Initials Name Provider Type    CS Helga Mancia, RADHA Occupational Therapist                   Clinical Impression       Row Name 04/01/25 0938          Pain Assessment    Pretreatment Pain Rating 0/10 - no pain  -CS     Posttreatment Pain Rating 0/10 - no pain  -CS       Row Name 04/01/25 0938          Plan of Care Review    Plan of Care Reviewed With patient  -CS     Progress no change  -CS     Outcome Evaluation OT eval complete. Pt presents w/ significant generalized weakness, balance deficits, and cognitive deficits warranting cont skilled IPOT POC. Recommend pt DC to SNF, will monitor progress closely.  -CS       Row Name 04/01/25 0938          Therapy Assessment/Plan (OT)    Patient/Family Therapy Goal Statement (OT) Return to PLOF  -CS     Rehab Potential (OT) good  -CS     Criteria for Skilled Therapeutic Interventions Met (OT) yes;skilled treatment is necessary  -CS     Therapy Frequency (OT) daily  -CS     Predicted Duration of Therapy Intervention (OT) 10 days  -CS       Row Name 04/01/25 0938          Therapy Plan Review/Discharge Plan (OT)    Anticipated Discharge Disposition (OT) skilled nursing facility  -CS       Row Name 04/01/25 0938          Vital Signs    O2 Delivery Pre Treatment nasal cannula  -CS     O2 Delivery Intra Treatment nasal cannula  -CS     O2 Delivery Post Treatment nasal cannula  -CS     Intra Patient Position Standing  -CS     Post Patient Position Sitting  -CS       Row Name 04/01/25 0938          Positioning and Restraints    Pre-Treatment Position in bed  -CS     Post Treatment Position chair  -CS     In Chair notified nsg;reclined;call light within reach;encouraged to call for assist;exit alarm on;RUE elevated;LUE elevated;waffle cushion;on mechanical lift sling;legs elevated;heels elevated  -CS               User Key   (r) = Recorded By, (t) = Taken By, (c) = Cosigned By      Initials Name Provider Type    Helga Calles OT Occupational Therapist                   Outcome Measures       Row Name 04/01/25 0940          How much help from another is currently needed...    Putting on and taking off regular lower body clothing? 1  -CS     Bathing (including washing, rinsing, and drying) 2  -CS     Toileting (which includes using toilet bed pan or urinal) 1  -CS     Putting on and taking off regular upper body clothing 2  -CS     Taking care of personal grooming (such as brushing teeth) 2  -CS     Eating meals 2  -CS     AM-PAC 6 Clicks Score (OT) 10  -CS       Row Name 04/01/25 0800          How much help from another person do you currently need...    Turning from your back to your side while in flat bed without using bedrails? 2  -NS     Moving from lying on back to sitting on the side of a flat bed without bedrails? 2  -NS     Moving to and from a bed to a chair (including a wheelchair)? 2  -NS     Standing up from a chair using your arms (e.g., wheelchair, bedside chair)? 2  -NS     Climbing 3-5 steps with a railing? 1  -NS     To walk in hospital room? 1  -NS     AM-PAC 6 Clicks Score (PT) 10  -NS     Highest Level of Mobility Goal 4 --> Transfer to chair/commode  -NS       Row Name 04/01/25 0940 04/01/25 0800       Functional Assessment    Outcome Measure Options AM-PAC 6 Clicks Daily Activity (OT)  -CS AM-PAC 6 Clicks Basic Mobility (PT)  -NS              User Key  (r) = Recorded By, (t) = Taken By, (c) = Cosigned By      Initials Name Provider Type    Helga Calles OT Occupational Therapist    Judy Granados PT Physical Therapist                    Occupational Therapy Education       Title: PT OT SLP Therapies (In Progress)       Topic: Occupational Therapy (In Progress)       Point: ADL training (In Progress)       Learning Progress Summary            Patient Acceptance, E, NR by BRYCE at 4/1/2025 0940                       Point: Precautions (In Progress)       Learning Progress Summary            Patient Acceptance, E, NR by CS at 4/1/2025 0940                      Point: Body mechanics (In Progress)       Learning Progress Summary            Patient Acceptance, E, NR by  at 4/1/2025 0940                                      User Key       Initials Effective Dates Name Provider Type Discipline     09/02/21 -  Helga Mancia, OT Occupational Therapist OT                  OT Recommendation and Plan  Planned Therapy Interventions (OT): activity tolerance training, adaptive equipment training, BADL retraining, functional balance retraining, occupation/activity based interventions, ROM/therapeutic exercise, strengthening exercise, transfer/mobility retraining, patient/caregiver education/training  Therapy Frequency (OT): daily  Plan of Care Review  Plan of Care Reviewed With: patient  Progress: no change  Outcome Evaluation: OT eval complete. Pt presents w/ significant generalized weakness, balance deficits, and cognitive deficits warranting cont skilled IPOT POC. Recommend pt DC to SNF, will monitor progress closely.     Time Calculation:   Evaluation Complexity (OT)  Review Occupational Profile/Medical/Therapy History Complexity: expanded/moderate complexity  Assessment, Occupational Performance/Identification of Deficit Complexity: 5 or more performance deficits  Clinical Decision Making Complexity (OT): detailed assessment/moderate complexity  Overall Complexity of Evaluation (OT): moderate complexity     Time Calculation- OT       Row Name 04/01/25 0941             Time Calculation- OT    OT Start Time 0815  -CS      OT Received On 04/01/25  -CS      OT Goal Re-Cert Due Date 04/11/25  -CS         Untimed Charges    OT Eval/Re-eval Minutes 50  -CS         Total Minutes    Untimed Charges Total Minutes 50  -CS       Total Minutes 50  -CS                User Key  (r) = Recorded By, (t) = Taken By, (c) = Cosigned By       Initials Name Provider Type    CS Helga Mancia OT Occupational Therapist                  Therapy Charges for Today       Code Description Service Date Service Provider Modifiers Qty    [id number] HC OT EVAL MOD COMPLEXITY 4 4/1/2025 Helga Mancia OT GO 1                 Helga Mancia OT  4/1/2025    Electronically signed by Helga Mancia OT at 04/01/25 0941

## 2025-04-02 ENCOUNTER — APPOINTMENT (OUTPATIENT)
Facility: HOSPITAL | Age: 85
End: 2025-04-02
Payer: MEDICARE

## 2025-04-02 VITALS
HEART RATE: 62 BPM | DIASTOLIC BLOOD PRESSURE: 62 MMHG | RESPIRATION RATE: 16 BRPM | OXYGEN SATURATION: 95 % | WEIGHT: 115.08 LBS | HEIGHT: 60 IN | BODY MASS INDEX: 22.59 KG/M2 | TEMPERATURE: 97.1 F | SYSTOLIC BLOOD PRESSURE: 121 MMHG

## 2025-04-02 PROCEDURE — 99239 HOSP IP/OBS DSCHRG MGMT >30: CPT | Performed by: INTERNAL MEDICINE

## 2025-04-02 RX ORDER — PANTOPRAZOLE SODIUM 40 MG/1
40 TABLET, DELAYED RELEASE ORAL DAILY
Start: 2025-04-03 | End: 2025-06-26

## 2025-04-02 RX ORDER — METOPROLOL TARTRATE 25 MG/1
25 TABLET, FILM COATED ORAL EVERY 12 HOURS SCHEDULED
Start: 2025-04-02

## 2025-04-02 RX ADMIN — CARBIDOPA AND LEVODOPA 1 TABLET: 25; 100 TABLET ORAL at 12:15

## 2025-04-02 RX ADMIN — METOPROLOL TARTRATE 25 MG: 25 TABLET, FILM COATED ORAL at 08:11

## 2025-04-02 RX ADMIN — PANTOPRAZOLE SODIUM 40 MG: 40 TABLET, DELAYED RELEASE ORAL at 08:11

## 2025-04-02 RX ADMIN — SENNOSIDES AND DOCUSATE SODIUM 2 TABLET: 50; 8.6 TABLET ORAL at 08:11

## 2025-04-02 RX ADMIN — CARBIDOPA AND LEVODOPA 1 TABLET: 25; 100 TABLET ORAL at 08:12

## 2025-04-02 RX ADMIN — FLUTICASONE PROPIONATE 2 SPRAY: 50 SPRAY, METERED NASAL at 08:11

## 2025-04-02 NOTE — DISCHARGE PLACEMENT REQUEST
"Fanny Campbell \"Monica\" (84 y.o. Female)   Dr. Ankit Sutherland  PCP: Nemo Aponte  Hospice Dx: Parkinson's Dementia/COPD/GIB/Bilateral PE w/o anticoagulation   Covid negative on 3/26/25      Date of Birth   1940    Social Security Number       [place]   [facility] [address]    Home Phone   [phone]    MRN   [id number]       [employer]   None    Marital Status                               Admission Date   3/26/2025    Admission Type   Emergency    Admitting Provider   Michael Truong MD    Attending Provider   Michael Truong MD    Department, Room/Bed   [address], [address]       Discharge Date       Discharge Disposition   Rehab Facility or Unit (DC - External)    Discharge Destination                                 Attending Provider: Michael Truong MD    Allergies: Nitrofurantoin, Latex, Morphine, Wound Dressing Adhesive    Isolation: None   Infection: None   Code Status: No CPR    Ht: 152.4 cm (60\")   Wt: 52.2 kg (115 lb 1.3 oz)    Admission Cmt: None   Principal Problem: Pulmonary embolism [I26.99]                   Active Insurance as of 3/26/2025       Primary Coverage       Payor Plan Insurance Group Employer/Plan Group    MEDICARE MEDICARE A & B        Payor Plan Address Payor Plan Phone Number Payor Plan Fax Number Effective Dates    [address] [phone]  9/1/2001 - None Entered    [facility] [id number]         Subscriber Name Subscriber Birth Date Member ID       FANNY CAMPBELL 1940 [id number]               Secondary Coverage       Payor Plan Insurance Group Employer/Plan Group    HUMANA HUMANA [facility]              [id number]       Payor Plan Address Payor Plan Phone Number Payor Plan Fax Number Effective Dates    [address] [address]   1/1/2015 - None Entered    [facility] [id number]         Subscriber Name Subscriber Birth Date Member ID       FANNY CAMPBELL 1940 [id number]                     Emergency Contacts        " (Rel.) Home Phone Work Phone Mobile Phone    Lety Lewis (Daughter) [phone] -- [phone]    KeatonAmi (Grandchild) -- -- --              Emergency Contact Information       Name Relation Home Work Mobile    Lety Lewis Daughter [phone]    Keaton,Ami Grandchild             Other Contacts    None on File       Insurance Information                  MEDICARE/MEDICARE A & B Phone: [phone]    Subscriber: Fanny Boyce Subscriber#: [id number]    Group#: -- Precert#: --    Authorization#: -- Effective Date: --        HUMANA/HUMANA [employer] SUP              Phone: --    Subscriber: Fanny Boyce Subscriber#: [id number]    Group#: [id number] Precert#: --    Authorization#: -- Effective Date: --          Problem List           Codes Noted - Resolved       Hospital    * (Principal) Pulmonary embolism ICD-10-CM: I26.99  ICD-9-CM: 415.19 3/26/2025 - Present    Atrial fibrillation ICD-10-CM: I48.91  ICD-9-CM: 427.31 1/31/2024 - Present    COPD (chronic obstructive pulmonary disease) ICD-10-CM: J44.9  ICD-9-CM: 496 1/31/2024 - Present    Dementia ICD-10-CM: F03.90  ICD-9-CM: 294.20 1/31/2024 - Present    Parkinson's disease ICD-10-CM: G20.A1  ICD-9-CM: 332.0 1/31/2024 - Present       Non-Hospital    Falls ICD-10-CM: R29.6  ICD-9-CM: V15.88 3/19/2025 - Present    GIB (gastrointestinal bleeding) ICD-10-CM: K92.2  ICD-9-CM: 578.9 2/20/2025 - Present    Hiatal hernia ICD-10-CM: K44.9  ICD-9-CM: 553.3 2/15/2025 - Present    Severe protein-calorie malnutrition ICD-10-CM: E43  ICD-9-CM: 262 2/14/2025 - Present    Fracture of femoral neck, right ICD-10-CM: S72.001A  ICD-9-CM: 820.8 2/9/2025 - Present    Fracture of left humerus with routine healing ICD-10-CM: S42.302D  ICD-9-CM: V54.11 6/4/2024 - Present        History & Physical        Cristina Wolf, APRN at 03/26/25 2059       Attestation signed by Kathy Martin MD at 03/26/25 3628      I have reviewed this documentation and agree.                           [facility] Medicine Services  HISTORY AND PHYSICAL    Patient Name: Fanny Boyce  : 1940  MRN: [id number]  Primary Care Physician: Nemo Aponte PA  Date of admission: 3/26/2025    Subjective  Subjective     Chief Complaint:  Shortness of breath     HPI:  Fanny Boyce is a 84 y.o. female with PMH significant for A-fib (currently not anticoagulated secondary to recent GI bleed), COPD, dementia, lung cancer, Parkinson disease, who presents to the ED with complaint of shortness of breath.  HPI obtained per medical record and daughter Suzanne Lewis as patient is poor historian.  Her daughter states that over the past couple days she has had increasing shortness of breath.  She is only able to take a few steps before becoming short of breath.  She reports that her mother has been off of her Eliquis since having a GI bleed in February.  The patient currently denies any chest pain and reports she has felt short of breath for years.  Upon arrival to the ED, she is noted to have elevated troponin and proBNP.  CXR is negative for acute findings.  CTA chest notes emphysema as well as bilateral pulmonary emboli without definite right heart strain.  She will be admitted to hospital medicine for further evaluation.    Review of Systems   Unable to perform ROS: Dementia          Personal History     Past Medical History:   Diagnosis Date    Atrial fibrillation     COPD (chronic obstructive pulmonary disease)     Dementia     History of lung cancer     Parkinson disease     Pulmonary embolism        Past Surgical History:   Procedure Laterality Date    ENDOSCOPY N/A 2025    Procedure: ESOPHAGOGASTRODUODENOSCOPY;  Surgeon: Steve Gamez MD;  Location: [facility] ENDOSCOPY;  Service: Gastroenterology;  Laterality: N/A;    HIP HEMIARTHROPLASTY Right 2/10/2025    Procedure: HIP HEMIARTHROPLASTY RIGHT;  Surgeon: Frandy Patino MD;  Location: [facility] OR;  Service:  Orthopedics;  Laterality: Right;    JOINT REPLACEMENT      LUNG CANCER SURGERY         Family History:  family history includes Aneurysm in her father; Heart disease in her father and mother.     Social History:  reports that she has quit smoking. Her smoking use included cigarettes. She has a 40 pack-year smoking history. She has been exposed to tobacco smoke. She has never used smokeless tobacco. She reports that she does not drink alcohol and does not use drugs.  Social History     Social History Narrative    Not on file       Medications:  Metoprolol Tartrate, acetaminophen, carbidopa-levodopa, fluticasone, ipratropium-albuterol, meclizine, pantoprazole, polyethylene glycol, sennosides-docusate, traZODone, and vitamin D3    Allergies   Allergen Reactions    Nitrofurantoin Unknown (See Comments)    Latex Unknown (See Comments)     Other reaction(s): Itching, Rash   2itching and Severe rash    Morphine Nausea And Vomiting, Nausea Only, Unknown - Low Severity, Other (See Comments), Rash and Unknown (See Comments)     Nausea    morphine sulfate    Wound Dressing Adhesive Other (See Comments) and Rash       Objective  Objective     Vital Signs:   Temp:  [97.6 °F (36.4 °C)] 97.6 °F (36.4 °C)  Heart Rate:  [84-86] 84  Resp:  [18] 18  BP: (122-132)/(60-62) 132/62    Physical Exam   Constitutional: Awake, alert, no acute distress  Eyes: PERRLA, sclerae anicteric, no conjunctival injection  HENT: NCAT, mucous membranes moist  Neck: Supple, no thyromegaly, no lymphadenopathy, trachea midline  Respiratory: Clear to auscultation bilaterally, nonlabored respirations   Cardiovascular: irregular, no murmurs, rubs, or gallops, palpable pedal pulses bilaterally  Gastrointestinal: Positive bowel sounds, soft, nontender, nondistended  Musculoskeletal: No bilateral ankle edema, no clubbing or cyanosis to extremities  Psychiatric: Appropriate affect, cooperative  Neurologic: Oriented to self and place, disoriented to time and poor  historian, strength symmetric in all extremities, Cranial Nerves grossly intact to confrontation, speech clear  Skin: No rashes      Result Review:  I have personally reviewed the results from the time of this admission to 3/26/2025 21:35 EDT and agree with these findings:  [x]  Laboratory list / accordion  [x]  Microbiology  [x]  Radiology  [x]  EKG/Telemetry   []  Cardiology/Vascular   []  Pathology  [x]  Old records  []  Other:  Most notable findings include:     LAB RESULTS:      Lab 03/26/25 2010 03/26/25  1725   WBC  --  7.00   HEMOGLOBIN  --  11.5*   HEMATOCRIT  --  38.6   PLATELETS  --  246   NEUTROS ABS  --  4.73   IMMATURE GRANS (ABS)  --  0.02   LYMPHS ABS  --  1.49   MONOS ABS  --  0.53   EOS ABS  --  0.16   MCV  --  86.7   PROTIME  --  13.1   APTT  --  29.6*   HEPARIN ANTI-XA 0.10*  --          Lab 03/26/25  1728 03/26/25  1725   SODIUM  --  141   POTASSIUM  --  4.3   CHLORIDE  --  104   CO2  --  25.0   ANION GAP  --  12.0   BUN  --  18   CREATININE 0.70 0.64   EGFR  --  86.2   GLUCOSE  --  103*   CALCIUM  --  9.2         Lab 03/26/25  1725   TOTAL PROTEIN 6.8   ALBUMIN 3.7   GLOBULIN 3.1   ALT (SGPT) <5   AST (SGOT) 17   BILIRUBIN 0.3   ALK PHOS 119*         Lab 03/26/25  1838 03/26/25  1725   PROBNP  --  6,249.0*   HSTROP T 156* 162*   PROTIME  --  13.1   INR  --  0.98                 Brief Urine Lab Results  (Last result in the past 365 days)        Color   Clarity   Blood   Leuk Est   Nitrite   Protein   CREAT   Urine HCG        02/20/25 1846 Yellow   Cloudy   Trace   Moderate (2+)   Negative   Negative                 Microbiology Results (last 10 days)       Procedure Component Value - Date/Time    COVID PRE-OP / PRE-PROCEDURE SCREENING ORDER (NO ISOLATION) - Swab, Nasopharynx [[id number]]  (Normal) Collected: 03/26/25 1756    Lab Status: Final result Specimen: Swab from Nasopharynx Updated: 03/26/25 1830    Narrative:      The following orders were created for panel order COVID PRE-OP /  PRE-PROCEDURE SCREENING ORDER (NO ISOLATION) - Swab, Nasopharynx.  Procedure                               Abnormality         Status                     ---------                               -----------         ------                     COVID-19 and FLU A/B PCR...[[id number]]  Normal              Final result                 Please view results for these tests on the individual orders.    COVID-19 and FLU A/B PCR, 1 HR TAT - Swab, Nasopharynx [[id number]]  (Normal) Collected: 03/26/25 1756    Lab Status: Final result Specimen: Swab from Nasopharynx Updated: 03/26/25 1830     COVID19 Not Detected     Influenza A PCR Not Detected     Influenza B PCR Not Detected    Narrative:      Fact sheet for providers: https://www.fda.gov/media/647249/download    Fact sheet for patients: https://www.fda.gov/media/531491/download    Test performed by PCR.            CT Angiogram Chest  Addendum Date: 3/26/2025  ADDENDUM #1 Addition to impression: There is evidence of emphysema. Correlate with patient history and risk factors and please assess if the patient meets criteria for routine low dose CT lung cancer screening. Electronically Signed: Royer Quintero MD  3/26/2025 7:37 PM EDT  Workstation ID: [id number] ORIGINAL REPORT: CT ANGIOGRAM CHEST Date of Exam: 3/26/2025 7:14 PM EDT Indication: SOB, recent hip fx, rule out PE. Comparison: CTA chest 2/12/2025 Technique: CTA of the chest was performed after the uneventful intravenous administration of 73 mL Isovue-370. Reconstructed coronal and sagittal images were also obtained. In addition, a 3-D volume rendered image was created for interpretation. Automated exposure control and iterative reconstruction methods were used. Findings: There are bilateral pulmonary emboli including pulmonary emboli in the distal right main pulmonary artery which extends into the lobar and segmental branches of the right lower lobe and into the segmental branches of the right middle lobe (series 4 image   44-55). There is a pulmonary embolism in the lobar pulmonary artery of the left upper lobe (series 4 image 35). Grossly normal and unchanged caliber of the main pulmonary artery. No definite right heart strain. Unremarkable appearance of the thoracic aorta. No thoracic adenopathy. There is a large sliding hiatal hernia. Unremarkable appearance of the airways. The lungs are clear without focal consolidation. No pulmonary infarct. There is centrilobular emphysema. No pleural effusion or pneumothorax. There is accentuated thoracic kyphosis with chronic severe compression deformity of the T12 vertebral body. No acute osseous findings. Unremarkable appearance of the upper abdomen. There is nonobstructing right nephrolithiasis. Impression: Bilateral pulmonary emboli including pulmonary emboli in the distal right main pulmonary artery extending into the lobar and segmental branches of the right lower lobe and into the segmental branches of the right middle lobe. There is a pulmonary embolism in the lobar pulmonary artery of the left upper lobe. No definite CT evidence of right heart however correlation with troponin and EKG recommended given the embolic burden. Findings discussed with ordering provider Paulina Mora by Dr. Royer Quintero via secure epic chat at 7:25 p.m. 3/26/2025. Electronically Signed: Royer Quintero MD  3/26/2025 7:27 PM EDT  Workstation ID: [id number]    Result Date: 3/26/2025  CT ANGIOGRAM CHEST Date of Exam: 3/26/2025 7:14 PM EDT Indication: SOB, recent hip fx, rule out PE. Comparison: CTA chest 2/12/2025 Technique: CTA of the chest was performed after the uneventful intravenous administration of 73 mL Isovue-370. Reconstructed coronal and sagittal images were also obtained. In addition, a 3-D volume rendered image was created for interpretation. Automated exposure control and iterative reconstruction methods were used. Findings: There are bilateral pulmonary emboli including pulmonary emboli in the  distal right main pulmonary artery which extends into the lobar and segmental branches of the right lower lobe and into the segmental branches of the right middle lobe (series 4 image  44-55). There is a pulmonary embolism in the lobar pulmonary artery of the left upper lobe (series 4 image 35). Grossly normal and unchanged caliber of the main pulmonary artery. No definite right heart strain. Unremarkable appearance of the thoracic aorta. No thoracic adenopathy. There is a large sliding hiatal hernia. Unremarkable appearance of the airways. The lungs are clear without focal consolidation. No pulmonary infarct. There is centrilobular emphysema. No pleural effusion or pneumothorax. There is accentuated thoracic kyphosis with chronic severe compression deformity of the T12 vertebral body. No acute osseous findings. Unremarkable appearance of the upper abdomen. There is nonobstructing right nephrolithiasis.     Impression: Impression: Bilateral pulmonary emboli including pulmonary emboli in the distal right main pulmonary artery extending into the lobar and segmental branches of the right lower lobe and into the segmental branches of the right middle lobe. There is a pulmonary embolism in the lobar pulmonary artery of the left upper lobe. No definite CT evidence of right heart however correlation with troponin and EKG recommended given the embolic burden. Findings discussed with ordering provider Paulina Mora by Dr. Royer Quinetro via secure epic chat at 7:25 p.m. 3/26/2025. Electronically Signed: Royer Quintero MD  3/26/2025 7:27 PM EDT  Workstation ID: [id number]    XR Chest 1 View  Result Date: 3/26/2025  XR CHEST 1 VW Date of Exam: 3/26/2025 5:18 PM EDT Indication: CHF/COPD protocol Comparison: 2/20/2025 Findings: Heart shadow is mildly enlarged with pulmonary vasculature appears normal. Prominent pulmonary hilar shadows are unchanged. Lungs are clear except for thin linear scarring left lung base similar to prior  study. No clearly new pulmonary parenchymal disease, evidence of effusion or pneumothorax is seen. Old healed left proximal humerus fracture is again noted.     Impression: Impression: No new chest disease. Electronically Signed: Kevin Kaminski MD  3/26/2025 6:02 PM EDT  Workstation ID: [id number]      Results for orders placed during the hospital encounter of 02/09/25    Adult Transthoracic Echo Complete W/ Cont if Necessary Per Protocol    Interpretation Summary    Left ventricular systolic function is normal. Estimated left ventricular EF = 70%    Moderate tricuspid valve regurgitation is present.    Estimated  right ventricular systolic pressure from tricuspid regurgitation is 46 mmHg.    There is a trivial pericardial effusion.      Assessment & Plan  Assessment & Plan       Pulmonary embolism    Atrial fibrillation    COPD (chronic obstructive pulmonary disease)    Dementia    Parkinson's disease    84 y.o. female with PMH significant for A-fib (currently not anticoagulated secondary to recent GI bleed), COPD, dementia, lung cancer, Parkinson disease, who presents to the ED with complaint of shortness of breath who is found to have concern for bilateral PEs.    Bilateral pulmonary emboli  - Known history of A-fib without recent anticoagulation secondary to GI bleed in February 2025  - Heparin drip started in ED without bolus dosing, cardiology notified and agreed  - Cardiology consult in the a.m. for further anticoagulation    Elevated troponin  - Likely secondary to above  - No complaints of chest pain  - Trending down    A-fib  - Currently rate controlled  - Continue metoprolol with hold parameters    Peptic ulcer disease  Recent GI bleed  - Continue Protonix twice daily    Dementia  Parkinson's disease  - Continue carbidopa levodopa    COPD  - Not in acute exacerbation  - Baseline room air  - DuoNebs as needed  - Continue Flonase    DVT prophylaxis:  Heparin gtt    CODE STATUS: Discussed with daughter over the  phone  Code Status (Patient has no pulse and is not breathing): No CPR (Do Not Attempt to Resuscitate)  Medical Interventions (Patient has pulse or is breathing): Limited Support  Medical Intervention Limits: No intubation (DNI)      Expected Discharge  Expected discharge date/ time has not been documented.     Signature: Electronically signed by LAVERNE Guajardo, 03/26/25, 9:35 PM EDT.                  Electronically signed by Kathy Martin MD at 03/26/25 7585       Current Facility-Administered Medications   Medication Dose Route Frequency Provider Last Rate Last Admin    acetaminophen (TYLENOL) tablet 650 mg  650 mg Oral Q4H PRN Ankit Sutherland MD        Or    acetaminophen (TYLENOL) 160 MG/5ML oral solution 650 mg  650 mg Oral Q4H PRN Ankit Sutherlnad MD        Or    acetaminophen (TYLENOL) suppository 650 mg  650 mg Rectal Q4H PRN Ankit Sutherland MD        sennosides-docusate (PERICOLACE) 8.6-50 MG per tablet 2 tablet  2 tablet Oral Q12H Cristina Wolf APRN   2 tablet at 04/02/25 0811    And    polyethylene glycol (MIRALAX) packet 17 g  17 g Oral Daily PRN Cristina Wolf APRN        And    bisacodyl (DULCOLAX) EC tablet 5 mg  5 mg Oral Daily PRN Cristina Wolf APRN        And    bisacodyl (DULCOLAX) suppository 10 mg  10 mg Rectal Daily PRN Cristina Wolf APRN        carbidopa-levodopa (SINEMET)  MG per tablet 1 tablet  1 tablet Oral TID Cristina Wolf APRN   1 tablet at 04/02/25 1215    fluticasone (FLONASE) 50 MCG/ACT nasal spray 2 spray  2 spray Each Nare Daily Cristina Wolf APRN   2 spray at 04/02/25 0811    glycopyrrolate (ROBINUL) injection 0.2 mg  0.2 mg Intravenous Q2H PRN Ankit Sutherland MD        Or    glycopyrrolate (ROBINUL) injection 0.2 mg  0.2 mg Subcutaneous Q2H PRN Ankit Sutherland MD        Or    glycopyrrolate (ROBINUL) injection 0.4 mg  0.4 mg Intravenous Q2H PRN Ankit Sutherland MD        Or    glycopyrrolate (ROBINUL) injection 0.4 mg  0.4  mg Subcutaneous Q2H PRN Ankit Sutherland MD        ipratropium-albuterol (DUO-NEB) nebulizer solution 3 mL  3 mL Nebulization Q4H PRN Cristina Wolf APRN        metoprolol tartrate (LOPRESSOR) injection 5 mg  5 mg Intravenous Q6H PRN Ankit Sutherland MD   5 mg at 25 1306    metoprolol tartrate (LOPRESSOR) tablet 25 mg  25 mg Oral Q12H Ankit Sutherland MD   25 mg at 25 0811    pantoprazole (PROTONIX) EC tablet 40 mg  40 mg Oral Daily Crsitina Wolf APRN   40 mg at 25 0811    sodium chloride 0.9 % flush 10 mL  10 mL Intravenous PRN Paulina Mora PA        sodium chloride 0.9 % flush 10 mL  10 mL Intravenous Q12H Cristina Wolf, APRN   10 mL at 25 0836    sodium chloride 0.9 % flush 10 mL  10 mL Intravenous PRN Cristina Wolf APRN        sodium chloride 0.9 % infusion 40 mL  40 mL Intravenous PRN Cristina Wolf, APRN            Physician Progress Notes (last 72 hours)        Michael Truong MD at 25 1015              [facility] Medicine Services  PROGRESS NOTE    Patient Name: Fanny Boyce  : 1940  MRN: [id number]    Date of Admission: 3/26/2025  Primary Care Physician: Nemo Aponte PA    Subjective   Subjective     CC:  Pulmonary embolism    HPI:  No complaints today, ate a little breakfast.  Daughter at bedside        Objective   Objective     Vital Signs:   Temp:  [97.5 °F (36.4 °C)-97.7 °F (36.5 °C)] 97.7 °F (36.5 °C)  Heart Rate:  [60-77] 60  Resp:  [16-18] 16  BP: (120-121)/(65-67) 121/65  Flow (L/min) (Oxygen Therapy):  [2] 2     Physical Exam:  Frail female, in bed, appears chronically deconditioned  MM moist  RRR  Breath sounds grossly clear  Abdomen soft  Awake, will briefly answer questions  Flat affect              Results Reviewed:  LAB RESULTS:      Lab 25  0930 25  1320 25  0355 25  1838 25  1725   WBC 6.35 7.07 5.43  --   --  7.00   HEMOGLOBIN 10.4* 10.5* 9.6*  --    --  11.5*   HEMATOCRIT 36.1 35.7 32.8*  --   --  38.6   PLATELETS 246 225 199  --   --  246   NEUTROS ABS 4.57 4.82 3.20  --   --  4.73   IMMATURE GRANS (ABS) 0.02 0.02 0.02  --   --  0.02   LYMPHS ABS 1.13 1.45 1.51  --   --  1.49   MONOS ABS 0.36 0.47 0.43  --   --  0.53   EOS ABS 0.21 0.21 0.21  --   --  0.16   MCV 86.8 87.1 86.8  --   --  86.7   PROTIME  --   --   --   --   --  13.1   APTT  --   --   --   --   --  29.6*   HEPARIN ANTI-XA  --  0.20* 0.14* 0.10*  --   --    HSTROP T  --   --   --   --  156* 162*         Lab 03/27/25  0355 03/26/25  1728 03/26/25  1725   SODIUM 140  --  141   POTASSIUM 3.8  --  4.3   CHLORIDE 106  --  104   CO2 23.0  --  25.0   ANION GAP 11.0  --  12.0   BUN 15  --  18   CREATININE 0.58 0.70 0.64   EGFR 88.3  --  86.2   GLUCOSE 88  --  103*   CALCIUM 8.6  --  9.2   MAGNESIUM 2.2  --   --          Lab 03/26/25  1725   TOTAL PROTEIN 6.8   ALBUMIN 3.7   GLOBULIN 3.1   ALT (SGPT) <5   AST (SGOT) 17   BILIRUBIN 0.3   ALK PHOS 119*         Lab 03/26/25  1838 03/26/25  1725   PROBNP  --  6,249.0*   HSTROP T 156* 162*   PROTIME  --  13.1   INR  --  0.98                 Brief Urine Lab Results  (Last result in the past 365 days)        Color   Clarity   Blood   Leuk Est   Nitrite   Protein   CREAT   Urine HCG        02/20/25 1846 Yellow   Cloudy   Trace   Moderate (2+)   Negative   Negative                   Microbiology Results Abnormal       None            No radiology results from the last 24 hrs    Results for orders placed during the hospital encounter of 02/09/25    Adult Transthoracic Echo Complete W/ Cont if Necessary Per Protocol    Interpretation Summary    Left ventricular systolic function is normal. Estimated left ventricular EF = 70%    Moderate tricuspid valve regurgitation is present.    Estimated  right ventricular systolic pressure from tricuspid regurgitation is 46 mmHg.    There is a trivial pericardial effusion.      Current medications:  Scheduled  Meds:carbidopa-levodopa, 1 tablet, Oral, TID  fluticasone, 2 spray, Each Nare, Daily  metoprolol tartrate, 25 mg, Oral, Q12H  pantoprazole, 40 mg, Oral, Daily  senna-docusate sodium, 2 tablet, Oral, Q12H  sodium chloride, 10 mL, Intravenous, Q12H      Continuous Infusions:   PRN Meds:.  acetaminophen **OR** acetaminophen **OR** acetaminophen    senna-docusate sodium **AND** polyethylene glycol **AND** bisacodyl **AND** bisacodyl    glycopyrrolate **OR** glycopyrrolate **OR** glycopyrrolate **OR** glycopyrrolate    ipratropium-albuterol    metoprolol tartrate    sodium chloride    sodium chloride    sodium chloride    Assessment & Plan   Assessment & Plan     Active Hospital Problems    Diagnosis  POA    **Pulmonary embolism [I26.99]  Yes    Atrial fibrillation [I48.91]  Yes    COPD (chronic obstructive pulmonary disease) [J44.9]  Yes    Dementia [F03.90]  Yes    Parkinson's disease [G20.A1]  Yes      Resolved Hospital Problems   No resolved problems to display.        Brief Hospital Course to date:  Fanny Boyce is a 84 y.o. female  with PMH significant for A-fib (currently not anticoagulated secondary to recent GI bleed), COPD, dementia, lung cancer, Parkinson disease, who presents to the ED with complaint of shortness of breath who is found to have concern for bilateral PEs.     Plan was partially entered by my partner and I have reviewed and updated as appropriate on 3/30/25     Bilateral pulmonary emboli  Acute on chronic hypoxic respiratory failure  Elevated proBNP  COPD/emphysema  Known history of A-fib without recent anticoagulation secondary to GI bleed in February 2025  CTA imaging showing new pulmonary embolisms with large burden however no evidence of right heart strain  Minimal oxygen requirements at this time  Cardiology consulted, discussed with daughter over the phone stating difficult to anticoagulate due to recent GI bleed and severe anemia requiring transfusions, morbidity and mortality was  discussed with untreated PEs being very high  My colleague had a Long discussion with daughter, patient is now comfort care, discussed the risks of bleeding while on anticoagulation given history of recurrent GI bleeds.  The patient is not oriented to person, time and place at this time, daughter has made decision to proceed with comfort care measures     Chronic anemia  Evidence of duodenal ulcer on most recent EGD, recurrent history of GI bleeds     Elevated troponin  Likely secondary to above  No complaints of chest pain     A-fib  CAD  [id number] of 5  Currently rate controlled  Continue metoprolol with hold parameters, continue for now     Peptic ulcer disease  Recent GI bleed  Continue Protonix twice daily     Dementia  Parkinson's disease  Continue carbidopa levodopa     COPD  Not in acute exacerbation  Baseline room air, currently on 2 L  DuoNebs as needed  Continue Flonase    Goals of care  - daughter agreeable with Hospice care, but would like to try [facility] first to see if patient can become stronger to assist with transfers.      Expected Discharge Location and Transportation: TBD  Expected Discharge   2025     VTE Prophylaxis:  Mechanical VTE prophylaxis orders are present.         AM-PAC 6 Clicks Score (PT): 10 (25 0800)    CODE STATUS:   Code Status and Medical Interventions: No CPR (Do Not Attempt to Resuscitate); Comfort Measures   Ordered at: 25 1431     Code Status (Patient has no pulse and is not breathing):    No CPR (Do Not Attempt to Resuscitate)     Medical Interventions (Patient has pulse or is breathing):    Comfort Measures       Michael Truong MD  25        Electronically signed by Michael Truong MD at 25 [phone]       Mari Way APRN at 25 1158              [facility] Medicine Services  PROGRESS NOTE    Patient Name: Fanny Boyce  : 1940  MRN: [id number]    Date of Admission: 3/26/2025  Primary Care  Physician: Nemo Aponte PA    Subjective   Subjective     CC:  Pulmonary embolism    HPI:  Pt sleeping, awaken easily with conversation. She denies any issues today.   Copied text in this note has been reviewed and is accurate as of 03/31/25.         Objective   Objective     Vital Signs:   Temp:  [97.9 °F (36.6 °C)-98.2 °F (36.8 °C)] 98.2 °F (36.8 °C)  Heart Rate:  [61-73] 61  Resp:  [18] 18  BP: (123-133)/(65-76) 123/65  Flow (L/min) (Oxygen Therapy):  [2] 2     Physical Exam:  Constitutional: sleeping, awaken easily with conversation  HENT: NCAT, mucous membranes moist  Respiratory: Clear to auscultation bilaterally, nonlabored 2L 96%  Cardiovascular: RRR, no murmurs, rubs, or gallops HR 73  Gastrointestinal: Positive bowel sounds, soft, nontender, nondistended  Musculoskeletal: No bilateral ankle edema  Psychiatric: Appropriate affect, cooperative  Neurologic: Oriented to person, COTTRELL, speech clear  Skin: No rashes          Results Reviewed:  LAB RESULTS:      Lab 03/30/25  0930 03/27/25  1320 03/27/25  0355 03/26/25 2010 03/26/25  1838 03/26/25  1725   WBC 6.35 7.07 5.43  --   --  7.00   HEMOGLOBIN 10.4* 10.5* 9.6*  --   --  11.5*   HEMATOCRIT 36.1 35.7 32.8*  --   --  38.6   PLATELETS 246 225 199  --   --  246   NEUTROS ABS 4.57 4.82 3.20  --   --  4.73   IMMATURE GRANS (ABS) 0.02 0.02 0.02  --   --  0.02   LYMPHS ABS 1.13 1.45 1.51  --   --  1.49   MONOS ABS 0.36 0.47 0.43  --   --  0.53   EOS ABS 0.21 0.21 0.21  --   --  0.16   MCV 86.8 87.1 86.8  --   --  86.7   PROTIME  --   --   --   --   --  13.1   APTT  --   --   --   --   --  29.6*   HEPARIN ANTI-XA  --  0.20* 0.14* 0.10*  --   --    HSTROP T  --   --   --   --  156* 162*         Lab 03/27/25  0355 03/26/25  1728 03/26/25  1725   SODIUM 140  --  141   POTASSIUM 3.8  --  4.3   CHLORIDE 106  --  104   CO2 23.0  --  25.0   ANION GAP 11.0  --  12.0   BUN 15  --  18   CREATININE 0.58 0.70 0.64   EGFR 88.3  --  86.2   GLUCOSE 88  --  103*   CALCIUM  8.6  --  9.2   MAGNESIUM 2.2  --   --          Lab 03/26/25  1725   TOTAL PROTEIN 6.8   ALBUMIN 3.7   GLOBULIN 3.1   ALT (SGPT) <5   AST (SGOT) 17   BILIRUBIN 0.3   ALK PHOS 119*         Lab 03/26/25  1838 03/26/25  1725   PROBNP  --  6,249.0*   HSTROP T 156* 162*   PROTIME  --  13.1   INR  --  0.98                 Brief Urine Lab Results  (Last result in the past 365 days)        Color   Clarity   Blood   Leuk Est   Nitrite   Protein   CREAT   Urine HCG        02/20/25 1846 Yellow   Cloudy   Trace   Moderate (2+)   Negative   Negative                   Microbiology Results Abnormal       None            No radiology results from the last 24 hrs    Results for orders placed during the hospital encounter of 02/09/25    Adult Transthoracic Echo Complete W/ Cont if Necessary Per Protocol    Interpretation Summary    Left ventricular systolic function is normal. Estimated left ventricular EF = 70%    Moderate tricuspid valve regurgitation is present.    Estimated  right ventricular systolic pressure from tricuspid regurgitation is 46 mmHg.    There is a trivial pericardial effusion.      Current medications:  Scheduled Meds:carbidopa-levodopa, 1 tablet, Oral, TID  fluticasone, 2 spray, Each Nare, Daily  metoprolol tartrate, 25 mg, Oral, Q12H  pantoprazole, 40 mg, Oral, Daily  senna-docusate sodium, 2 tablet, Oral, Q12H  sodium chloride, 10 mL, Intravenous, Q12H      Continuous Infusions:   PRN Meds:.  acetaminophen **OR** acetaminophen **OR** acetaminophen    senna-docusate sodium **AND** polyethylene glycol **AND** bisacodyl **AND** bisacodyl    glycopyrrolate **OR** glycopyrrolate **OR** glycopyrrolate **OR** glycopyrrolate    HYDROmorphone    ipratropium-albuterol    LORazepam **OR** LORazepam **OR** [DISCONTINUED] LORazepam    LORazepam **OR** LORazepam **OR** [DISCONTINUED] LORazepam    LORazepam **OR** LORazepam    metoprolol tartrate    sodium chloride    sodium chloride    sodium chloride    Assessment & Plan    Assessment & Plan     Active Hospital Problems    Diagnosis  POA    **Pulmonary embolism [I26.99]  Yes    Atrial fibrillation [I48.91]  Yes    COPD (chronic obstructive pulmonary disease) [J44.9]  Yes    Dementia [F03.90]  Yes    Parkinson's disease [G20.A1]  Yes      Resolved Hospital Problems   No resolved problems to display.        Brief Hospital Course to date:  Fanny Boyce is a 84 y.o. female  with PMH significant for A-fib (currently not anticoagulated secondary to recent GI bleed), COPD, dementia, lung cancer, Parkinson disease, who presents to the ED with complaint of shortness of breath who is found to have concern for bilateral PEs.     Plan was partially entered by my partner and I have reviewed and updated as appropriate on 3/30/25     Bilateral pulmonary emboli  Acute on chronic hypoxic respiratory failure  Elevated proBNP  COPD/emphysema  Known history of A-fib without recent anticoagulation secondary to GI bleed in February 2025  CTA imaging showing new pulmonary embolisms with large burden however no evidence of right heart strain  Minimal oxygen requirements at this time  Cardiology consulted, discussed with daughter over the phone stating difficult to anticoagulate due to recent GI bleed and severe anemia requiring transfusions, morbidity and mortality was discussed with untreated PEs being very high  My colleague had a Long discussion with daughter, patient is now comfort care, discussed the risks of bleeding while on anticoagulation given history of recurrent GI bleeds.  The patient is not oriented to person, time and place at this time, daughter has made decision to proceed with comfort care measures  Heparin drip discontinued  2 L nasal cannula, just for comfort     Chronic anemia  Evidence of duodenal ulcer on most recent EGD, recurrent history of GI bleeds  Stopping lab checks due to comfort measures     Elevated troponin  Likely secondary to above  No complaints of chest pain      A-fib  CAD  [id number] of 5  Currently rate controlled  Continue metoprolol with hold parameters, continue for now     Peptic ulcer disease  Recent GI bleed  Continue Protonix twice daily     Dementia  Parkinson's disease  Continue carbidopa levodopa     COPD  Not in acute exacerbation  Baseline room air, currently on 2 L  DuoNebs as needed  Continue Flonase    Expected Discharge Location and Transportation: TBD  Expected Discharge   04/03/2025     VTE Prophylaxis:  Mechanical VTE prophylaxis orders are present.         AM-PAC 6 Clicks Score (PT): 12 (03/31/25 0975)    CODE STATUS:   Code Status and Medical Interventions: No CPR (Do Not Attempt to Resuscitate); Comfort Measures   Ordered at: 03/27/25 1431     Code Status (Patient has no pulse and is not breathing):    No CPR (Do Not Attempt to Resuscitate)     Medical Interventions (Patient has pulse or is breathing):    Comfort Measures       LAVERNE Degroot  03/31/25        Electronically signed by Mari Way APRN at 03/31/25 1204       Consult Notes (last 72 hours)  Notes from 03/30/25 1216 through 04/02/25 1216   No notes of this type exist for this encounter.

## 2025-04-02 NOTE — PROGRESS NOTES
Pt will d/c to [facility] today. Hospice will plan 2 week post hospital d/c phone call on 4/16/25.

## 2025-04-02 NOTE — CASE MANAGEMENT/SOCIAL WORK
Case Management Discharge Note      Final Note: MSW met with patient at bedside. Patient is approved for [facility] to be admitted today. Report can be called into [phone] discharge summary faxed to [phone]. [facility] has an inhouse Pharmacy will pull meds from discharge. MSW spoke with patient’s daughter who is agreeable to discharge plan.  Plan is [facility] today at 2pm via  EMS         Selected Continued Care - Admitted Since 3/26/2025       Destination Coordination complete.      Service Provider Services Address Phone Fax Patient Preferred    [facility] [address] [facility] [address] [phone] --              Durable Medical Equipment    No services have been selected for the patient.                Dialysis/Infusion    No services have been selected for the patient.                Home Medical Care    No services have been selected for the patient.                Therapy    No services have been selected for the patient.                Community Resources    No services have been selected for the patient.                Community & DME    No services have been selected for the patient.                    Selected Continued Care - Prior Encounters Includes continued care and service providers with selected services from prior encounters from 12/26/2024 to 4/2/2025      Discharged on 2/26/2025 Admission date: 2/20/2025 - Discharge disposition: Rehab Facility or Unit (DC - External)      Destination       Service Provider Services Address Phone Fax Patient Preferred    [facility] [address] [facility] [address] [phone] --                      Discharged on 2/18/2025 Admission date: 2/9/2025 - Discharge disposition: Rehab Facility or Unit (DC - External)      Destination       Service Provider Services Address Phone Fax Patient Preferred     [facility] [address] , [facility] [address] [phone] --                               Final Discharge Disposition Code: 62 - inpatient rehab facility

## 2025-04-02 NOTE — PLAN OF CARE
Goal Outcome Evaluation:              Outcome Evaluation: VSS on 2LNC. Pt in chair upon arrival of shift. Pt back to bed with strong 2 assist. Would reccomend using a lift. Pt has no IV upon assessment. A&Ox2. Patient awaiting decision for [facility] or [facility] rehab before making the decision to d/c home with HH. No concerns at this time. Will continue to provide care according to plan.

## 2025-04-02 NOTE — DISCHARGE SUMMARY
[facility] Medicine Services  DISCHARGE SUMMARY    Patient Name: Fanny Boyce  : 1940  MRN: [id number]    Date of Admission: 3/26/2025  5:07 PM  Date of Discharge:  2025  Primary Care Physician: Nemo Aponte PA    Consults       Date and Time Order Name Status Description    3/27/2025 12:12 AM Inpatient Cardiology Consult Completed     2025  4:12 PM Gastroenterology Consult Completed             Hospital Course     Presenting Problem: shortness of breath    Active Hospital Problems    Diagnosis  POA    **Pulmonary embolism [I26.99]  Yes    Atrial fibrillation [I48.91]  Yes    COPD (chronic obstructive pulmonary disease) [J44.9]  Yes    Dementia [F03.90]  Yes    Parkinson's disease [G20.A1]  Yes      Resolved Hospital Problems   No resolved problems to display.          Hospital Course:  Fanny Boyce is a 84 y.o. female  with PMH significant for A-fib (currently not anticoagulated secondary to recent GI bleed), COPD, dementia, lung cancer, Parkinson disease, who presents to the ED with complaint of shortness of breath who was found to have bilateral PEs.        Bilateral pulmonary emboli  Acute on chronic hypoxic respiratory failure  Elevated proBNP  COPD/emphysema  Known history of A-fib without recent anticoagulation secondary to GI bleed in 2025  CTA imaging showing new pulmonary embolisms with large burden however no evidence of right heart strain  Cardiology consulted, discussed with daughter over the phone stating difficult to anticoagulate due to recent GI bleed and severe anemia requiring transfusions, morbidity and mortality was discussed with untreated PEs being very high  My colleague had a long discussion with daughter, patient is now comfort care, discussed the risks of bleeding while on anticoagulation given history of recurrent GI bleeds.    The patient is not oriented to person, time, daughter has made decision to proceed with comfort  care measures -- Hospice followed while inpatient     Chronic anemia  Duodenal ulcers on most recent EGD, recurrent history of GI bleeds     Elevated troponin  Likely secondary to above  No complaints of chest pain     A-fib  CAD  [id number] of 5  rate controlled  Continue metoprolol     Peptic ulcer disease  Recent GI bleed  Continue Protonix      Dementia  Parkinson's disease  Continue carbidopa levodopa     COPD  Not in acute exacerbation  Baseline room air, currently on 2 L  DuoNebs as needed  Continue Flonase     Goals of care  - daughter agreeable with Hospice care, but would like to try [facility] first to see if the patient can become stronger in order to assist with transfers. The daughter said she will likely contact Hospice after rehab, and understands she can call Hospice earlier, even while at rehab, should her mother experience clinical worsening.        Discharge Follow Up Recommendations for outpatient labs/diagnostics:       Day of Discharge     HPI:   No complaints today.  Denies cough or shortness of breath.    Review of Systems  Gen: no fevers    Vital Signs:   Temp:  [97.1 °F (36.2 °C)-97.8 °F (36.6 °C)] 97.1 °F (36.2 °C)  Heart Rate:  [62-66] 62  Resp:  [16-18] 16  BP: (104-121)/(52-62) 121/62  Flow (L/min) (Oxygen Therapy):  [2] 2      Physical Exam:  Frail, in bed  MM moist  RRR  Diminished breath sounds bilaterally, poor effort, no wheezes  Abdomen soft, NT  Awake, speech soft  Flat affect    Pertinent  and/or Most Recent Results     LAB RESULTS:      Lab 03/30/25  0930 03/27/25  1320 03/27/25  0355 03/26/25 2010 03/26/25  1725   WBC 6.35 7.07 5.43  --  7.00   HEMOGLOBIN 10.4* 10.5* 9.6*  --  11.5*   HEMATOCRIT 36.1 35.7 32.8*  --  38.6   PLATELETS 246 225 199  --  246   NEUTROS ABS 4.57 4.82 3.20  --  4.73   IMMATURE GRANS (ABS) 0.02 0.02 0.02  --  0.02   LYMPHS ABS 1.13 1.45 1.51  --  1.49   MONOS ABS 0.36 0.47 0.43  --  0.53   EOS ABS 0.21 0.21 0.21  --  0.16   MCV 86.8 87.1 86.8   --  86.7   PROTIME  --   --   --   --  13.1   APTT  --   --   --   --  29.6*   HEPARIN ANTI-XA  --  0.20* 0.14* 0.10*  --          Lab 03/27/25  0355 03/26/25  1728 03/26/25  1725   SODIUM 140  --  141   POTASSIUM 3.8  --  4.3   CHLORIDE 106  --  104   CO2 23.0  --  25.0   ANION GAP 11.0  --  12.0   BUN 15  --  18   CREATININE 0.58 0.70 0.64   EGFR 88.3  --  86.2   GLUCOSE 88  --  103*   CALCIUM 8.6  --  9.2   MAGNESIUM 2.2  --   --          Lab 03/26/25  1725   TOTAL PROTEIN 6.8   ALBUMIN 3.7   GLOBULIN 3.1   ALT (SGPT) <5   AST (SGOT) 17   BILIRUBIN 0.3   ALK PHOS 119*         Lab 03/26/25  1838 03/26/25  1725   PROBNP  --  6,249.0*   HSTROP T 156* 162*   PROTIME  --  13.1   INR  --  0.98                 Brief Urine Lab Results  (Last result in the past 365 days)        Color   Clarity   Blood   Leuk Est   Nitrite   Protein   CREAT   Urine HCG        02/20/25 1846 Yellow   Cloudy   Trace   Moderate (2+)   Negative   Negative                 Microbiology Results (last 10 days)       Procedure Component Value - Date/Time    COVID PRE-OP / PRE-PROCEDURE SCREENING ORDER (NO ISOLATION) - Swab, Nasopharynx [[id number]]  (Normal) Collected: 03/26/25 1756    Lab Status: Final result Specimen: Swab from Nasopharynx Updated: 03/26/25 1830    Narrative:      The following orders were created for panel order COVID PRE-OP / PRE-PROCEDURE SCREENING ORDER (NO ISOLATION) - Swab, Nasopharynx.  Procedure                               Abnormality         Status                     ---------                               -----------         ------                     COVID-19 and FLU A/B PCR...[[id number]]  Normal              Final result                 Please view results for these tests on the individual orders.    COVID-19 and FLU A/B PCR, 1 HR TAT - Swab, Nasopharynx [[id number]]  (Normal) Collected: 03/26/25 1756    Lab Status: Final result Specimen: Swab from Nasopharynx Updated: 03/26/25 1830     COVID19 Not Detected      Influenza A PCR Not Detected     Influenza B PCR Not Detected    Narrative:      Fact sheet for providers: https://www.fda.gov/media/641793/download    Fact sheet for patients: https://www.fda.gov/media/365974/download    Test performed by PCR.            CT Angiogram Chest  Addendum Date: 3/26/2025  ADDENDUM #1 Addition to impression: There is evidence of emphysema. Correlate with patient history and risk factors and please assess if the patient meets criteria for routine low dose CT lung cancer screening. Electronically Signed: Royer Quintero MD  3/26/2025 7:37 PM EDT  Workstation ID: [id number] ORIGINAL REPORT: CT ANGIOGRAM CHEST Date of Exam: 3/26/2025 7:14 PM EDT Indication: SOB, recent hip fx, rule out PE. Comparison: CTA chest 2/12/2025 Technique: CTA of the chest was performed after the uneventful intravenous administration of 73 mL Isovue-370. Reconstructed coronal and sagittal images were also obtained. In addition, a 3-D volume rendered image was created for interpretation. Automated exposure control and iterative reconstruction methods were used. Findings: There are bilateral pulmonary emboli including pulmonary emboli in the distal right main pulmonary artery which extends into the lobar and segmental branches of the right lower lobe and into the segmental branches of the right middle lobe (series 4 image  44-55). There is a pulmonary embolism in the lobar pulmonary artery of the left upper lobe (series 4 image 35). Grossly normal and unchanged caliber of the main pulmonary artery. No definite right heart strain. Unremarkable appearance of the thoracic aorta. No thoracic adenopathy. There is a large sliding hiatal hernia. Unremarkable appearance of the airways. The lungs are clear without focal consolidation. No pulmonary infarct. There is centrilobular emphysema. No pleural effusion or pneumothorax. There is accentuated thoracic kyphosis with chronic severe compression deformity of the T12 vertebral  body. No acute osseous findings. Unremarkable appearance of the upper abdomen. There is nonobstructing right nephrolithiasis. Impression: Bilateral pulmonary emboli including pulmonary emboli in the distal right main pulmonary artery extending into the lobar and segmental branches of the right lower lobe and into the segmental branches of the right middle lobe. There is a pulmonary embolism in the lobar pulmonary artery of the left upper lobe. No definite CT evidence of right heart however correlation with troponin and EKG recommended given the embolic burden. Findings discussed with ordering provider Paulina Mora by Dr. Royer Quintero via secure epic chat at 7:25 p.m. 3/26/2025. Electronically Signed: Royer Quintero MD  3/26/2025 7:27 PM EDT  Workstation ID: [id number]    Result Date: 3/26/2025  CT ANGIOGRAM CHEST Date of Exam: 3/26/2025 7:14 PM EDT Indication: SOB, recent hip fx, rule out PE. Comparison: CTA chest 2/12/2025 Technique: CTA of the chest was performed after the uneventful intravenous administration of 73 mL Isovue-370. Reconstructed coronal and sagittal images were also obtained. In addition, a 3-D volume rendered image was created for interpretation. Automated exposure control and iterative reconstruction methods were used. Findings: There are bilateral pulmonary emboli including pulmonary emboli in the distal right main pulmonary artery which extends into the lobar and segmental branches of the right lower lobe and into the segmental branches of the right middle lobe (series 4 image  44-55). There is a pulmonary embolism in the lobar pulmonary artery of the left upper lobe (series 4 image 35). Grossly normal and unchanged caliber of the main pulmonary artery. No definite right heart strain. Unremarkable appearance of the thoracic aorta. No thoracic adenopathy. There is a large sliding hiatal hernia. Unremarkable appearance of the airways. The lungs are clear without focal consolidation. No pulmonary  infarct. There is centrilobular emphysema. No pleural effusion or pneumothorax. There is accentuated thoracic kyphosis with chronic severe compression deformity of the T12 vertebral body. No acute osseous findings. Unremarkable appearance of the upper abdomen. There is nonobstructing right nephrolithiasis.     Impression: Bilateral pulmonary emboli including pulmonary emboli in the distal right main pulmonary artery extending into the lobar and segmental branches of the right lower lobe and into the segmental branches of the right middle lobe. There is a pulmonary embolism in the lobar pulmonary artery of the left upper lobe. No definite CT evidence of right heart however correlation with troponin and EKG recommended given the embolic burden. Findings discussed with ordering provider Paulina Mora by Dr. Royer Quintero via secure epic chat at 7:25 p.m. 3/26/2025. Electronically Signed: Royer Quintero MD  3/26/2025 7:27 PM EDT  Workstation ID: [id number]    XR Chest 1 View  Result Date: 3/26/2025  XR CHEST 1 VW Date of Exam: 3/26/2025 5:18 PM EDT Indication: CHF/COPD protocol Comparison: 2/20/2025 Findings: Heart shadow is mildly enlarged with pulmonary vasculature appears normal. Prominent pulmonary hilar shadows are unchanged. Lungs are clear except for thin linear scarring left lung base similar to prior study. No clearly new pulmonary parenchymal disease, evidence of effusion or pneumothorax is seen. Old healed left proximal humerus fracture is again noted.     Impression: No new chest disease. Electronically Signed: Kevin Kaminski MD  3/26/2025 6:02 PM EDT  Workstation ID: [id number]              Results for orders placed during the hospital encounter of 02/09/25    Adult Transthoracic Echo Complete W/ Cont if Necessary Per Protocol    Interpretation Summary    Left ventricular systolic function is normal. Estimated left ventricular EF = 70%    Moderate tricuspid valve regurgitation is present.    Estimated  right  ventricular systolic pressure from tricuspid regurgitation is 46 mmHg.    There is a trivial pericardial effusion.      Plan for Follow-up of Pending Labs/Results:     Discharge Details        Discharge Medications        Changes to Medications        Instructions Start Date   metoprolol tartrate 25 MG tablet  Commonly known as: LOPRESSOR  What changed:   medication strength  See the new instructions.   25 mg, Oral, Every 12 Hours Scheduled      pantoprazole 40 MG EC tablet  Commonly known as: PROTONIX  What changed: See the new instructions.   40 mg, Oral, Daily   Start Date: April 3, 2025            Continue These Medications        Instructions Start Date   acetaminophen 325 MG tablet  Commonly known as: TYLENOL   650 mg, Oral, Every 6 Hours PRN, OTC      carbidopa-levodopa  MG per tablet  Commonly known as: SINEMET   1 tablet, 3 Times Daily      fluticasone 50 MCG/ACT nasal spray  Commonly known as: FLONASE   2 sprays, Daily PRN      ipratropium-albuterol 0.5-2.5 mg/3 ml nebulizer  Commonly known as: DUO-NEB   3 mL, Nebulization, Every 4 Hours PRN      polyethylene glycol 17 g packet  Commonly known as: MIRALAX   17 g, Oral, Daily PRN      sennosides-docusate 8.6-50 MG per tablet  Commonly known as: PERICOLACE   2 tablets, Oral, 2 Times Daily      vitamin D3 125 MCG (5000 UT) capsule capsule   5,000 Units, Daily             Stop These Medications      meclizine 25 MG tablet  Commonly known as: ANTIVERT     traZODone 100 MG tablet  Commonly known as: DESYREL              Allergies   Allergen Reactions    Nitrofurantoin Unknown (See Comments)    Latex Unknown (See Comments)     Other reaction(s): Itching, Rash   2itching and Severe rash    Morphine Nausea And Vomiting, Nausea Only, Unknown - Low Severity, Other (See Comments), Rash and Unknown (See Comments)     Nausea    morphine sulfate    Wound Dressing Adhesive Other (See Comments) and Rash         Discharge Disposition:  Rehab Facility or Unit (DC -  External)    Diet:  Hospital:  Diet Order   Procedures    Diet: Cardiac; Healthy Heart (2-3 Na+); Fluid Consistency: Thin (IDDSI 0)            Activity:      Restrictions or Other Recommendations:         CODE STATUS:    Code Status and Medical Interventions: No CPR (Do Not Attempt to Resuscitate); Comfort Measures   Ordered at: 03/27/25 1431     Code Status (Patient has no pulse and is not breathing):    No CPR (Do Not Attempt to Resuscitate)     Medical Interventions (Patient has pulse or is breathing):    Comfort Measures       Future Appointments   Date Time Provider Department Center   4/2/2025  2:00 PM MED [facility] None                 Michael Truong MD  04/02/25      Time Spent on Discharge:  I spent  35  minutes on this discharge activity which included: face-to-face encounter with the patient, reviewing the data in the system, coordination of the care with the nursing staff as well as consultants, documentation, and entering orders.

## 2025-04-02 NOTE — PROGRESS NOTES
Nutrition Services    Patient Name:  Fanny Boyce  YOB: 1940  MRN: [id number]  Admit Date:  3/26/2025    RD notes pt has transitioned to comfort measures only. Please adjust diet per pt wishes/comfort measures. RD will follow in the peripheral, please consult Nutrition if needed for specific dietary needs or in the event that GOC should change, thank you.    Electronically signed by:  Ruth Ann Hill RD  04/02/25 08:24 EDT

## 2025-04-03 NOTE — PROGRESS NOTES
Enter Query Response Below      Query Response: acute hypoxic respiratory failure was not supported  Electronically signed by Michael Truong MD, 04/03/25, 4:46 PM EDT.               If applicable, please update the problem list.

## 2025-04-03 NOTE — PROGRESS NOTES
Enter Query Response Below      Query Response: Chronic illness related severe protein calorie malnutrition   Electronically signed by Michael Truong MD, 04/03/25, 4:47 PM EDT.               If applicable, please update the problem list.

## 2025-04-03 NOTE — PROGRESS NOTES
Enter Query Response Below      Query Response: chronic respiratory was not supported  Electronically signed by Michael Truong MD, 04/03/25, 4:45 PM EDT.               If applicable, please update the problem list.

## 2025-04-08 ENCOUNTER — PATIENT ROUNDING (BHMG ONLY) (OUTPATIENT)
Dept: CARDIOLOGY | Facility: CLINIC | Age: 85
End: 2025-04-08
Payer: MEDICARE

## 2025-04-08 NOTE — PROGRESS NOTES
April 8, 2025    Hello, may I speak with Fanny Boyce? Yes.    My name is Nemo OKEEFE      I am  with [facility]  [address]  [address]  [place] [address] [phone].    Before we get started may I verify your date of birth? 1940, Yes, correct.    I am calling to officially welcome you to our practice and ask about your recent visit. Is this a good time to talk? yes    Tell me about your visit with us. What things went well?  Everything was fine.       We're always looking for ways to make our patients' experiences even better. Do you have recommendations on ways we may improve?  no; nothing wrong with service and doctor; all fine.    Overall were you satisfied with your first visit to our practice? yes       I appreciate you taking the time to speak with me today. Is there anything else I can do for you? no      Thank you, and have a great day.

## (undated) DEVICE — BLANKT WARM UPPR/BDY ARM/OUT 57X196CM

## (undated) DEVICE — SYR LUERLOK 50ML

## (undated) DEVICE — PILLW ABD MD

## (undated) DEVICE — HYBRID CO2 TUBING/CAP SET FOR OLYMPUS® SCOPES & CO2 SOURCE: Brand: ERBE

## (undated) DEVICE — Device

## (undated) DEVICE — AIR/WATER CLEANING VALVES: Brand: AIR/WATER CLEANING VALVES

## (undated) DEVICE — THE BITE BLOCK MAXI, LATEX FREE STRAP IS USED TO PROTECT THE ENDOSCOPE INSERTION TUBE FROM BEING BITTEN BY THE PATIENT.

## (undated) DEVICE — SOL IRR H2O BO 1000ML STRL

## (undated) DEVICE — ANTIBACTERIAL UNDYED BRAIDED (POLYGLACTIN 910), SYNTHETIC ABSORBABLE SUTURE: Brand: COATED VICRYL

## (undated) DEVICE — FEMORAL CANAL PRESSURIZER WITHOUT HUB, MEDIUM

## (undated) DEVICE — TUBING, SUCTION, 1/4" X 10', STRAIGHT: Brand: MEDLINE

## (undated) DEVICE — GLV SURG SENSICARE PI ORTHO SZ7.5 LF STRL

## (undated) DEVICE — SOLIDIFIER LIQ PREMISORB 1500CC

## (undated) DEVICE — CEMENT MIXING SYSTEM WITH MIS FEMORAL BREAKAWAY NOZZLE: Brand: REVOLUTION

## (undated) DEVICE — KT ORCA ORCAPOD DISP STRL

## (undated) DEVICE — SST TWIST DRILL, STANDARD, 2MM DIA. X 127MM: Brand: MICROAIRE®

## (undated) DEVICE — DEV HEMOSPRAY ENDO HEMOST 7F 220CM

## (undated) DEVICE — CONTN GRAD MEAS TRIANG 32OZ BLK

## (undated) DEVICE — FIRST STEP BEDSIDE ADD WATER KIT - RESEALABLE STAND-UP POUCH, ENDOSCOPIC CLEANING PAD - 1 POUCH: Brand: FIRST STEP BEDSIDE ADD WATER KIT - RESEALABLE STAND-UP POUCH, ENDOSCOPIC CLEANIN

## (undated) DEVICE — STOCKINETTE,IMPERVIOUS,12X48,STERILE: Brand: MEDLINE

## (undated) DEVICE — KT PUMP INFUBLOCK MDL 2100 PMKITSOLIS

## (undated) DEVICE — DRSNG SURG AQUACEL AG/ADVNTGE 9X25CM 3.5X10IN

## (undated) DEVICE — UNDERGLV SURG BIOGEL INDICAT PI SZ8 BLU

## (undated) DEVICE — INTENDED TO AID IN THE PASSING OF SUTURES THROUGH BONE AND SOFT TISSUE DURING ORTHOPEDIC SURGERY: Brand: HOFFEE SUTURE RETRIEVER

## (undated) DEVICE — MICRO HVTSA, 0.5G AND HVTSA SOURCEMARK PRODUCT CODE M1206 AND M1206-01: Brand: EXOFIN MICRO HVTSA, 0.5G

## (undated) DEVICE — PATIENT RETURN ELECTRODE, SINGLE-USE, CONTACT QUALITY MONITORING, ADULT, WITH 9FT CORD, FOR PATIENTS WEIGING OVER 33LBS. (15KG): Brand: MEGADYNE

## (undated) DEVICE — SAFELINER SUCTION CANISTER 1000CC: Brand: DEROYAL

## (undated) DEVICE — PK HIP TOTL UNIV 10

## (undated) DEVICE — PULLOVER TOGA, 2X LARGE: Brand: FLYTE, SURGICOOL

## (undated) DEVICE — SKN PREP SPNG STKS PVP PNT STR: Brand: MEDLINE INDUSTRIES, INC.

## (undated) DEVICE — PENCL SMOKE/EVAC MEGADYNE TELESCP 10FT

## (undated) DEVICE — 450 ML BOTTLE OF 0.05% CHLORHEXIDINE GLUCONATE IN 99.95% STERILE WATER FOR IRRIGATION, USP AND APPLICATOR.: Brand: IRRISEPT ANTIMICROBIAL WOUND LAVAGE

## (undated) DEVICE — TRAP FLD MINIVAC MEGADYNE 100ML

## (undated) DEVICE — BONE PREPARATION KIT
Type: IMPLANTABLE DEVICE | Site: HIP | Status: NON-FUNCTIONAL
Brand: BIOPREP
Removed: 2025-02-10

## (undated) DEVICE — LUBE JELLY FOIL PACK 1.4 OZ: Brand: MEDLINE INDUSTRIES, INC.

## (undated) DEVICE — BIPOLAR ELECTROHEMOSTASIS CATHETER: Brand: GOLD PROBE 350CM

## (undated) DEVICE — INTRO ACCSR BLNT TP